# Patient Record
Sex: MALE | Race: WHITE | HISPANIC OR LATINO | Employment: FULL TIME | URBAN - METROPOLITAN AREA
[De-identification: names, ages, dates, MRNs, and addresses within clinical notes are randomized per-mention and may not be internally consistent; named-entity substitution may affect disease eponyms.]

---

## 2017-02-03 ENCOUNTER — PATIENT MESSAGE (OUTPATIENT)
Dept: BARIATRICS | Facility: CLINIC | Age: 42
End: 2017-02-03

## 2017-02-03 ENCOUNTER — OFFICE VISIT (OUTPATIENT)
Dept: BARIATRICS | Facility: CLINIC | Age: 42
End: 2017-02-03
Payer: COMMERCIAL

## 2017-02-03 ENCOUNTER — LAB VISIT (OUTPATIENT)
Dept: LAB | Facility: HOSPITAL | Age: 42
End: 2017-02-03
Attending: SURGERY
Payer: COMMERCIAL

## 2017-02-03 VITALS
WEIGHT: 315 LBS | SYSTOLIC BLOOD PRESSURE: 100 MMHG | DIASTOLIC BLOOD PRESSURE: 70 MMHG | HEIGHT: 68 IN | BODY MASS INDEX: 47.74 KG/M2 | HEART RATE: 67 BPM

## 2017-02-03 DIAGNOSIS — Z98.84 STATUS POST LAPAROSCOPIC SLEEVE GASTRECTOMY: ICD-10-CM

## 2017-02-03 DIAGNOSIS — E78.5 HYPERLIPIDEMIA, UNSPECIFIED HYPERLIPIDEMIA TYPE: ICD-10-CM

## 2017-02-03 DIAGNOSIS — I10 ESSENTIAL HYPERTENSION: ICD-10-CM

## 2017-02-03 DIAGNOSIS — Z98.84 LAP-BAND SURGERY STATUS: ICD-10-CM

## 2017-02-03 DIAGNOSIS — E66.01 MORBID OBESITY, UNSPECIFIED OBESITY TYPE: ICD-10-CM

## 2017-02-03 DIAGNOSIS — E66.01 MORBID OBESITY WITH BMI OF 50.0-59.9, ADULT: Primary | ICD-10-CM

## 2017-02-03 DIAGNOSIS — G47.33 OBSTRUCTIVE SLEEP APNEA SYNDROME: ICD-10-CM

## 2017-02-03 DIAGNOSIS — K21.9 GASTROESOPHAGEAL REFLUX DISEASE, ESOPHAGITIS PRESENCE NOT SPECIFIED: ICD-10-CM

## 2017-02-03 LAB
ALBUMIN SERPL BCP-MCNC: 3.5 G/DL
ALP SERPL-CCNC: 131 U/L
ALT SERPL W/O P-5'-P-CCNC: 31 U/L
ANION GAP SERPL CALC-SCNC: 7 MMOL/L
AST SERPL-CCNC: 23 U/L
BASOPHILS # BLD AUTO: 0.03 K/UL
BASOPHILS NFR BLD: 0.6 %
BILIRUB SERPL-MCNC: 0.9 MG/DL
BUN SERPL-MCNC: 13 MG/DL
CALCIUM SERPL-MCNC: 9.3 MG/DL
CHLORIDE SERPL-SCNC: 109 MMOL/L
CHOLEST/HDLC SERPL: 5.7 {RATIO}
CO2 SERPL-SCNC: 25 MMOL/L
CREAT SERPL-MCNC: 0.8 MG/DL
DIFFERENTIAL METHOD: ABNORMAL
EOSINOPHIL # BLD AUTO: 0.3 K/UL
EOSINOPHIL NFR BLD: 5.9 %
ERYTHROCYTE [DISTWIDTH] IN BLOOD BY AUTOMATED COUNT: 13.8 %
EST. GFR  (AFRICAN AMERICAN): >60 ML/MIN/1.73 M^2
EST. GFR  (NON AFRICAN AMERICAN): >60 ML/MIN/1.73 M^2
GLUCOSE SERPL-MCNC: 88 MG/DL
HCT VFR BLD AUTO: 43.6 %
HDL/CHOLESTEROL RATIO: 17.6 %
HDLC SERPL-MCNC: 176 MG/DL
HDLC SERPL-MCNC: 31 MG/DL
HGB BLD-MCNC: 14.2 G/DL
IRON SERPL-MCNC: 118 UG/DL
LDLC SERPL CALC-MCNC: 125.8 MG/DL
LYMPHOCYTES # BLD AUTO: 1.7 K/UL
LYMPHOCYTES NFR BLD: 32.9 %
MCH RBC QN AUTO: 26.3 PG
MCHC RBC AUTO-ENTMCNC: 32.6 %
MCV RBC AUTO: 81 FL
MONOCYTES # BLD AUTO: 0.4 K/UL
MONOCYTES NFR BLD: 8.5 %
NEUTROPHILS # BLD AUTO: 2.6 K/UL
NEUTROPHILS NFR BLD: 51.9 %
NONHDLC SERPL-MCNC: 145 MG/DL
PLATELET # BLD AUTO: 269 K/UL
PMV BLD AUTO: 9.8 FL
POTASSIUM SERPL-SCNC: 3.8 MMOL/L
PROT SERPL-MCNC: 6.8 G/DL
RBC # BLD AUTO: 5.39 M/UL
SATURATED IRON: 37 %
SODIUM SERPL-SCNC: 141 MMOL/L
TOTAL IRON BINDING CAPACITY: 318 UG/DL
TRANSFERRIN SERPL-MCNC: 215 MG/DL
TRIGL SERPL-MCNC: 96 MG/DL
VIT B12 SERPL-MCNC: 625 PG/ML
WBC # BLD AUTO: 5.07 K/UL

## 2017-02-03 PROCEDURE — 83540 ASSAY OF IRON: CPT

## 2017-02-03 PROCEDURE — 99999 PR PBB SHADOW E&M-EST. PATIENT-LVL III: CPT | Mod: PBBFAC,,, | Performed by: PHYSICIAN ASSISTANT

## 2017-02-03 PROCEDURE — 85025 COMPLETE CBC W/AUTO DIFF WBC: CPT

## 2017-02-03 PROCEDURE — 82607 VITAMIN B-12: CPT

## 2017-02-03 PROCEDURE — 80061 LIPID PANEL: CPT

## 2017-02-03 PROCEDURE — 99024 POSTOP FOLLOW-UP VISIT: CPT | Mod: S$GLB,,, | Performed by: PHYSICIAN ASSISTANT

## 2017-02-03 PROCEDURE — 80053 COMPREHEN METABOLIC PANEL: CPT

## 2017-02-03 PROCEDURE — 84425 ASSAY OF VITAMIN B-1: CPT

## 2017-02-03 NOTE — PATIENT INSTRUCTIONS
- Continue daily vitamins and medications.  - Ursodiol 500 mg daily for 3 months for the gallbladder.  - Anti-Acid medication, Omeprazole daily as needed.  Follow instructions to wean off medication.  - Miralax daily for constipation, no fiber.  - No NSAIDs, Tylenol for pain.  - Can swallow whole pills.  - RTC in 3 months or sooner if needed.  - Call the office for any issues.  - Check labs today.    - To lose weight you want to cut 100% starchy carbohydrates out of your diet (bread, rice, pasta, potatoes, granola, flour, corn, peas, oatmeal, grits, tortillas, crackers, chips) and get  grams of protein.  Aim for 100 grams of protein daily.    - Premier Protein (Chocolate, Bananas & Cream, Strawberries & Cream, Vanilla) [employer] or [employer]co    - Syntrax [place] from [employer], www.YCLIENTS COMPANY.com, www.bariatricchoice.com. (LACTOSE FREE)    - Atkins Lift - [employer] & [employer] (LACTOSE FREE)    - Veggetti Pro from [employer], [employer], [employer] Bath & Beyond    - www.pinterest.com (cauliflower, cloud bread, quest bar cookies, eggplant, zucchini, zucchini noodles, crustless quiche, no carb meals, taco lettuce boats)    - http://theworldaccordingtoeggface.Movatu.Projjix/        Eating Protein after Bariatric Surgery  After having Bariatric Surgery it can get confusing which foods are the best to eat, especially when it comes to getting in enough protein when you are on the go. Here are a few ideas on how to get in the best quality of protein when youre having a busy day.    Protein bars can be a great way to get in the calories and protein you need. NOTICE: Protein bars are high in calories and should be used as a meal replacement. You should purchase protein bars with at least 20g of protein and no more then 4g of sugar.                                    A few bars that meet these guidelines are:  Product Name Serving Size Calories Protein Sugar   Pure Protein 1  bar   (1.76 oz) 180 kcal   19g protein 2g sugar   Think Thin  1 bar     (2.1 oz) 240 kcal 20g protein 0g sugar   EAS Myoplex Carb Control 1 bar    (2.46 oz ) 260 kcal 25g protein 1g sugar   Power Bar Protein Plus Reduced Sugar 1 bar     (2.57 oz) 270 kcal 22g protein 1g sugar   Muscletech Nitro-tech Hardcore 1 bar     (2.8 oz) 270 kcal 30g protein 1g sugar   Protein Revolution 1 bar       (2.75 oz) 280 kcal 32g protein 2g sugar   MET-RX Protein Plus 1 bar     (3.0 oz) 300 kcal 32g protein 3g sugar   Pure Protein 1 bar    (2.75 oz) 310 kcal 31g protein 3g sugar   Detour Lean Muscle 1 bar     (3.2 oz) 370 kcal 32g protein, 3g sugar   Quest Protein Bar 1 bar            (60 gm) 160 kcal 20 g protein 1 g sugar   Protein bars are not the only options for a on the go snack or meal. A few options include:    Edamame   1 cup = 254 kcal, 22g protein, 0g sugar  Cottage Cheese  4 oz = 82 kcal, 14g protein, 3g sugar                                          1% Milk    Turkey    3 oz = 130 kcal, 24.7g protein, 0g sugar   Boiled Egg   140 kcal, 12g protein, 2g sugar  String Cheese    2 = 160 kcal, 14g protein, 0g sugar  Lean Ham 3oz = 133 kcal, 21g protein, 0g sugar   Lentils    1 cup = 230 kcal, 17.9g protein, 3g sugar

## 2017-02-03 NOTE — MR AVS SNAPSHOT
[facility] - Bariatric Surgery  [address]  [facility] [address]  Phone: [phone]  Fax: [phone]                  Jesús Galindo   2/3/2017 8:20 AM   Office Visit    Description:  Male : 1975   Provider:  Glenny Haider PA-C   Department:  [facility] - Bariatric Surgery           Reason for Visit     Follow-up           Diagnoses this Visit        Comments    Morbid obesity with BMI of 50.0-59.9, adult    -  Primary            To Do List           Future Appointments        Provider Department Dept Phone    2017 8:00 AM LAB, SAME DAY [facility] [phone]    2017 8:40 AM Glenny Haider PA-C [facility] Bariatric Surgery [phone]      Goals (5 Years of Data)     None      [facility] On Call     [facility] On Call Nurse Care Line -  Assistance  Registered nurses in the [facility] Center provide clinical advisement, health education, appointment booking, and other advisory services.  Call for this free service at [phone].             Medications           Message regarding Medications     Verify the changes and/or additions to your medication regime listed below are the same as discussed with your clinician today.  If any of these changes or additions are incorrect, please notify your healthcare provider.        STOP taking these medications     promethazine (PHENERGAN) 25 MG suppository Place 1 suppository (25 mg total) rectally every 6 (six) hours as needed.           Verify that the below list of medications is an accurate representation of the medications you are currently taking.  If none reported, the list may be blank. If incorrect, please contact your healthcare provider. Carry this list with you in case of emergency.           Current Medications     b complex vitamins tablet Take 1 tablet by mouth once daily.    CALCIUM CITRATE/VITAMIN D3 (CALCIUM CITRATE + D ORAL) Take 1 tablet by mouth 2 (two) times daily.    cyanocobalamin,  "vitamin B-12, 1,000 mcg Subl Place 1 tablet under the tongue every other day.    ergocalciferol (VITAMIN D2) 50,000 unit Cap Take 1 capsule (50,000 Units total) by mouth once a week.    omeprazole (PRILOSEC) 40 MG capsule Take 1 capsule (40 mg total) by mouth every morning. Open capsule and swallow beads whole by mouth daily    ondansetron (ZOFRAN-ODT) 8 MG TbDL Take 1 tablet (8 mg total) by mouth every 8 (eight) hours as needed.    pediatric multivit-iron-min (FLINTSTONES COMPLETE, IRON,) Chew Take 1 tablet by mouth 2 (two) times daily.    PENNSAID 20 mg/gram /actuation(2 %) sopm APPLY TWO PUMPS TO AFFECTED AREA(S) TWICE DAILY AS NEEDED    polyethylene glycol (GLYCOLAX) 17 gram PwPk Take 17 g by mouth daily as needed (constipation).    ranitidine (ZANTAC) 150 MG tablet Take 1 tablet (150 mg total) by mouth 2 (two) times daily.    ursodiol (URMILA FORTE) 500 MG tablet Take 1 tablet (500 mg total) by mouth once daily. Crush tablet and take daily.  Okay to compound into liquid at patient's request.    zolpidem (AMBIEN) 5 MG Tab TAKE 1 TABLET (5 MG TOTAL) BY MOUTH NIGHTLY AS NEEDED.           Clinical Reference Information           Your Vitals Were     BP Pulse Height Weight BMI    100/70 67 5' 8" (1.727 m) 153.1 kg (337 lb 8.4 oz) 51.32 kg/m2      Blood Pressure          Most Recent Value    BP  100/70      Allergies as of 2/3/2017     No Known Allergies      Immunizations Administered on Date of Encounter - 2/3/2017     None      Orders Placed During Today's Visit     Future Labs/Procedures Expected by Expires    B12  2/3/2017 4/4/2018    B1  2/3/2017 4/4/2018    CBC w/ Auto Differential  2/3/2017 4/4/2018    CMP  2/3/2017 4/4/2018    Iron Studies  2/3/2017 4/4/2018    Lipid Panel  2/3/2017 4/4/2018    Vitamin D 25 Hydroxy  2/3/2017 4/4/2018      Instructions    - Continue daily vitamins and medications.  - Ursodiol 500 mg daily for 3 months for the gallbladder.  - Anti-Acid medication, Omeprazole daily as needed.  " Follow instructions to wean off medication.  - Miralax daily for constipation, no fiber.  - No NSAIDs, Tylenol for pain.  - Can swallow whole pills.  - RTC in 3 months or sooner if needed.  - Call the office for any issues.  - Check labs today.    - To lose weight you want to cut 100% starchy carbohydrates out of your diet (bread, rice, pasta, potatoes, granola, flour, corn, peas, oatmeal, grits, tortillas, crackers, chips) and get  grams of protein.  Aim for 100 grams of protein daily.    - Premier Protein (Chocolate, Bananas & Cream, Strawberries & Cream, Vanilla) [employer] or [employer]co    - Syntrax [place] from [employer], www.Scandlines.com, www.bariatricchoice.com. (LACTOSE FREE)    - Atkins Lift - [employer] & [employer] (LACTOSE FREE)    - Veggetti Pro from [employer], [employer], [employer] Bath & Beyond    - www.pinterest.com (cauliflower, cloud bread, quest bar cookies, eggplant, zucchini, zucchini noodles, crustless quiche, no carb meals, taco lettuce boats)    - http://thewfaridaaccojesicaingnickolas.Miira.gogamingo/        Eating Protein after Bariatric Surgery  After having Bariatric Surgery it can get confusing which foods are the best to eat, especially when it comes to getting in enough protein when you are on the go. Here are a few ideas on how to get in the best quality of protein when youre having a busy day.    Protein bars can be a great way to get in the calories and protein you need. NOTICE: Protein bars are high in calories and should be used as a meal replacement. You should purchase protein bars with at least 20g of protein and no more then 4g of sugar.                                    A few bars that meet these guidelines are:  Product Name Serving Size Calories Protein Sugar   Pure Protein 1  bar   (1.76 oz) 180 kcal   19g protein 2g sugar   Think Thin 1 bar     (2.1 oz) 240 kcal 20g protein 0g sugar   EAS Myoplex Carb Control 1 bar    (2.46 oz ) 260 kcal 25g protein 1g sugar   Power Bar Protein Plus  Reduced Sugar 1 bar     (2.57 oz) 270 kcal 22g protein 1g sugar   Muscletech Nitro-tech Hardcore 1 bar     (2.8 oz) 270 kcal 30g protein 1g sugar   Protein Revolution 1 bar       (2.75 oz) 280 kcal 32g protein 2g sugar   MET-RX Protein Plus 1 bar     (3.0 oz) 300 kcal 32g protein 3g sugar   Pure Protein 1 bar    (2.75 oz) 310 kcal 31g protein 3g sugar   Detour Lean Muscle 1 bar     (3.2 oz) 370 kcal 32g protein, 3g sugar   Quest Protein Bar 1 bar            (60 gm) 160 kcal 20 g protein 1 g sugar   Protein bars are not the only options for a on the go snack or meal. A few options include:    Edamame   1 cup = 254 kcal, 22g protein, 0g sugar  Cottage Cheese  4 oz = 82 kcal, 14g protein, 3g sugar                                          1% Milk    Turkey    3 oz = 130 kcal, 24.7g protein, 0g sugar   Boiled Egg   140 kcal, 12g protein, 2g sugar  String Cheese    2 = 160 kcal, 14g protein, 0g sugar  Lean Ham 3oz = 133 kcal, 21g protein, 0g sugar   Lentils    1 cup = 230 kcal, 17.9g protein, 3g sugar         Language Assistance Services     ATTENTION: Language assistance services are available, free of charge. Please call [phone].      ATENCIÓN: Si habla español, tiene a perez disposición servicios gratuitos de asistencia lingüística. Llame al [phone].     CHÚ Ý: N?u b?n nói Ti?ng Vi?t, có các d?ch v? h? tr? ngôn ng? mi?n phí dành cho b?n. G?i s? [phone].         [facility] complies with applicable Federal civil rights laws and does not discriminate on the basis of race, color, national origin, age, disability, or sex.

## 2017-02-03 NOTE — PROGRESS NOTES
BARIATRIC POST-OPERATIVE FOLLOW UP:    Chief Complaint   Patient presents with    Follow-up     3month sleeve       HISTORY OF PRESENT ILLNESS: Jesús Galindo is a 41 y.o. male with a Body mass index is 51.32 kg/(m^2). who presents for a 3 month follow up s/p Band Removal -->Lap Sleeve with Dr. Parker on 11/10/2016.  He is doing well and tolerating the diet without difficulty.  His is off all  He has lost 53 lbs, approximately 23% of his excess weight.  He feels great and is sleeping well at night.  His high blood pressure and high cholesterol are both resolved and normal off medications.  He has no complaints.     Denies: nausea, vomiting, abdominal pain, changes in bowel movement pattern, fever, chills, dysphagia, chest pain, and shortness of breath.    Review of Systems   Constitutional: Negative for chills, fever and malaise/fatigue.   Eyes: Negative for blurred vision and double vision.   Respiratory: Negative for cough, hemoptysis and shortness of breath.    Cardiovascular: Negative for chest pain, palpitations and leg swelling.   Gastrointestinal: Negative for abdominal pain, blood in stool, constipation, diarrhea, heartburn, melena, nausea and vomiting.   Genitourinary: Negative for dysuria and hematuria.   Musculoskeletal: Negative for back pain, falls, joint pain, myalgias and neck pain.   Skin: Negative for rash.   Neurological: Negative for dizziness, tingling, weakness and headaches.   Endo/Heme/Allergies: Negative for environmental allergies. Does not bruise/bleed easily.   Psychiatric/Behavioral: Negative.        EXERCISE & VITAMINS:  See Bariatric Assessment    MEDICATIONS/ALLERGIES:  Have been reviewed.    DIET:  Soft Bariatric Diet.  2 protein shakes daily plus 3-4 mini-meals, ~ grams protein.  32 oz H20.      Vitals:    02/03/17 0809   BP: 100/70   Pulse: 67       Physical Exam   Constitutional: He is oriented to person, place, and time. He appears well-developed and well-nourished. No  distress.   HENT:   Head: Normocephalic and atraumatic.   Cardiovascular: Normal rate, regular rhythm, normal heart sounds and intact distal pulses.    Pulmonary/Chest: Effort normal and breath sounds normal.   Abdominal: Soft. Bowel sounds are normal. He exhibits no distension and no mass. There is no tenderness. There is no rebound and no guarding.   WHSS   Musculoskeletal: He exhibits no edema.   Neurological: He is alert and oriented to person, place, and time.   Skin: Skin is warm and dry. No rash noted. He is not diaphoretic. No erythema. No pallor.   Psychiatric: He has a normal mood and affect. His behavior is normal.   Nursing note and vitals reviewed.      ASSESSMENT:  - Morbid obesity, Body mass index is 51.32 kg/(m^2).,  s/p Band Removal --> Lap Sleeve on 11/10/2016.  - Estimated goal weight, 276 lbs, which is 50% EWL  - Co-morbidities: HTN (normal off medication), HLD (normal off medications), JARAD (stable)   - Good Weight loss, 53 lbs, 23% EWL  - Good Exercise regimen  - Good Vitamin Regimen  - Good Diet    PLAN:  - Emphasized the importance of regular exercise and adherence to bariatric diet to achieve maximum weight loss.  - Encouraged patient to continue regular exercise.  - No Bariatric Registered Dietician Available.  All Diet education and counseling done by PA.  - Advanced to a Regular Bariatric Diet.  Handouts and instructions given. All questions answered.  - Continue daily vitamins and medications.  - Ursodiol 500 mg daily for 3 months for the gallbladder.  - Anti-Acid medication, Omeprazole daily as needed.  Follow instructions to wean off medication.  - Miralax daily for constipation, no fiber.  - No NSAIDs, Tylenol for pain.  - Can swallow whole pills.  - RTC in 3 months or sooner if needed.  - Call the office for any issues.  - Check labs today and at 6 month post op visit.    15 minute visit, over 50% of time spent counseling patient face to face on diet, exercise, and weight loss.

## 2017-02-07 LAB — VIT B1 SERPL-MCNC: 49 UG/L (ref 38–122)

## 2017-03-22 ENCOUNTER — OFFICE VISIT (OUTPATIENT)
Dept: INTERNAL MEDICINE | Facility: CLINIC | Age: 42
End: 2017-03-22
Payer: COMMERCIAL

## 2017-03-22 ENCOUNTER — PATIENT MESSAGE (OUTPATIENT)
Dept: INTERNAL MEDICINE | Facility: CLINIC | Age: 42
End: 2017-03-22

## 2017-03-22 VITALS — HEART RATE: 80 BPM

## 2017-03-22 DIAGNOSIS — K21.9 GASTROESOPHAGEAL REFLUX DISEASE WITHOUT ESOPHAGITIS: ICD-10-CM

## 2017-03-22 DIAGNOSIS — I10 ESSENTIAL HYPERTENSION: ICD-10-CM

## 2017-03-22 DIAGNOSIS — R07.89 CHEST WALL PAIN: ICD-10-CM

## 2017-03-22 DIAGNOSIS — G47.33 OBSTRUCTIVE SLEEP APNEA SYNDROME: ICD-10-CM

## 2017-03-22 DIAGNOSIS — B35.3 ATHLETE'S FOOT ON RIGHT: Primary | ICD-10-CM

## 2017-03-22 PROCEDURE — 99999 PR PBB SHADOW E&M-EST. PATIENT-LVL I: CPT | Mod: PBBFAC,,, | Performed by: FAMILY MEDICINE

## 2017-03-22 PROCEDURE — 1160F RVW MEDS BY RX/DR IN RCRD: CPT | Mod: S$GLB,,, | Performed by: FAMILY MEDICINE

## 2017-03-22 PROCEDURE — 99215 OFFICE O/P EST HI 40 MIN: CPT | Mod: S$GLB,,, | Performed by: FAMILY MEDICINE

## 2017-03-22 RX ORDER — NYSTATIN AND TRIAMCINOLONE ACETONIDE 100000; 1 [USP'U]/G; MG/G
CREAM TOPICAL 4 TIMES DAILY
Qty: 60 TUBE | Refills: 3 | Status: SHIPPED | OUTPATIENT
Start: 2017-03-22 | End: 2018-06-20 | Stop reason: ALTCHOICE

## 2017-03-22 NOTE — PROGRESS NOTES
Subjective:       Patient ID: Jesús Galindo is a 41 y.o. male.    Chief Complaint: No chief complaint on file.  Jesús Galindo 41 y.o. male is here for office visit to review care and physical exam, here for rash right foot, itches.  Feels well with weight loss after Bariatric intervention, has good diet.  Notes mid-steranal chest wall pain.  HPI  Review of Systems   Constitutional: Negative for activity change, appetite change, fatigue, fever and unexpected weight change.   HENT: Negative for congestion, hearing loss, postnasal drip and rhinorrhea.    Eyes: Negative for pain, discharge and visual disturbance.   Respiratory: Negative for cough, choking and shortness of breath.    Cardiovascular: Negative for chest pain, palpitations and leg swelling.   Gastrointestinal: Negative for abdominal pain, diarrhea and vomiting.   Genitourinary: Negative for dysuria, flank pain, hematuria and urgency.   Musculoskeletal: Negative for arthralgias, back pain, joint swelling and neck pain.   Skin: Negative for color change and rash.   Neurological: Negative for dizziness, tremors, syncope, weakness, numbness and headaches.   Psychiatric/Behavioral: Negative for agitation and confusion. The patient is not hyperactive.        Objective:      Physical Exam   Constitutional: He is oriented to person, place, and time. He appears well-developed and well-nourished.   HENT:   Head: Normocephalic.   Eyes: EOM are normal. Pupils are equal, round, and reactive to light.   Neck: Normal range of motion. Neck supple. No thyromegaly present.   Cardiovascular: Normal rate.  Exam reveals no gallop and no friction rub.    No murmur heard.  Pulmonary/Chest: Effort normal. No respiratory distress. He has no wheezes.   Abdominal: Soft. Bowel sounds are normal. He exhibits no mass. There is no tenderness.   Musculoskeletal: He exhibits no edema or tenderness.   Pain manubrium area   Lymphadenopathy:     He has no cervical adenopathy.   Neurological: He  is alert and oriented to person, place, and time. He has normal reflexes. No cranial nerve deficit.   Skin: Skin is warm. No rash noted.   Tinea rash right foot, between toes, also mocasin distribution   Psychiatric: He has a normal mood and affect. His behavior is normal.       Assessment:       No diagnosis found.    Plan:       Diagnoses and all orders for this visit:    Athlete's foot on right  -     nystatin-triamcinolone (MYCOLOG II) cream; Apply topically 4 (four) times daily.    Diagnoses and all orders for this visit:    Athlete's foot on right  -     nystatin-triamcinolone (MYCOLOG II) cream; Apply topically 4 (four) times daily.    Chest wall pain  - pain in manubrium area, daphne ldiscuss with Surgery  Gastroesophageal reflux disease without esophagitis  - Zantac, d/c PPI  Obstructive sleep apnea syndrome  - Improving with weight loss  Essential hypertension  - COntroled with weight loss

## 2017-03-22 NOTE — MR AVS SNAPSHOT
[facility]  [address]  [place] [address]  Phone: [phone]  Fax: [phone]                  Jesús Galindo   3/22/2017 3:20 PM   Office Visit    Description:  Male : 1975   Provider:  Clovis Mccoy MD   Department:  [facility]           Diagnoses this Visit        Comments    Athlete's foot on right    -  Primary     Chest wall pain         Gastroesophageal reflux disease without esophagitis         Obstructive sleep apnea syndrome         Essential hypertension                To Do List           Future Appointments        Provider Department Dept Phone    2017 8:00 AM LAB, SAME DAY [facility] [phone]    2017 8:40 AM [facility] - B[facility] [phone]      Goals (5 Years of Data)     None      Follow-Up and Disposition     Return in about 3 months (around 2017), or if symptoms worsen or fail to improve.       These Medications        Disp Refills Start End    nystatin-triamcinolone (MYCOLOG II) cream 60 Tube 3 3/22/2017 2017    Apply topically 4 (four) times daily. - Topical (Top)    Pharmacy: [id number] [place]  [address] [facility] Ph #: [phone]         [facility] On Call     [facility] On Call Nurse Care Line -  Assistance  Registered nurses in the [facility] provide clinical advisement, health education, appointment booking, and other advisory services.  Call for this free service at [phone].             Medications           Message regarding Medications     Verify the changes and/or additions to your medication regime listed below are the same as discussed with your clinician today.  If any of these changes or additions are incorrect, please notify your healthcare provider.        START taking these NEW medications        Refills    nystatin-triamcinolone (MYCOLOG II) cream 3    Sig: Apply topically 4 (four) times  daily.    Class: Normal    Route: Topical (Top)      STOP taking these medications     ondansetron (ZOFRAN-ODT) 8 MG TbDL Take 1 tablet (8 mg total) by mouth every 8 (eight) hours as needed.    polyethylene glycol (GLYCOLAX) 17 gram PwPk Take 17 g by mouth daily as needed (constipation).    omeprazole (PRILOSEC) 40 MG capsule Take 1 capsule (40 mg total) by mouth every morning. Open capsule and swallow beads whole by mouth daily           Verify that the below list of medications is an accurate representation of the medications you are currently taking.  If none reported, the list may be blank. If incorrect, please contact your healthcare provider. Carry this list with you in case of emergency.           Current Medications     b complex vitamins tablet Take 1 tablet by mouth once daily.    CALCIUM CITRATE/VITAMIN D3 (CALCIUM CITRATE + D ORAL) Take 1 tablet by mouth 2 (two) times daily.    cyanocobalamin, vitamin B-12, 1,000 mcg Subl Place 1 tablet under the tongue every other day.    ergocalciferol (VITAMIN D2) 50,000 unit Cap Take 1 capsule (50,000 Units total) by mouth once a week.    nystatin-triamcinolone (MYCOLOG II) cream Apply topically 4 (four) times daily.    pediatric multivit-iron-min (FLINTSTONES COMPLETE, IRON,) Chew Take 1 tablet by mouth 2 (two) times daily.    PENNSAID 20 mg/gram /actuation(2 %) sopm APPLY TWO PUMPS TO AFFECTED AREA(S) TWICE DAILY AS NEEDED    ranitidine (ZANTAC) 150 MG tablet Take 1 tablet (150 mg total) by mouth 2 (two) times daily.    ursodiol (URMILA FORTE) 500 MG tablet Take 1 tablet (500 mg total) by mouth once daily. Crush tablet and take daily.  Okay to compound into liquid at patient's request.    zolpidem (AMBIEN) 5 MG Tab TAKE 1 TABLET (5 MG TOTAL) BY MOUTH NIGHTLY AS NEEDED.           Clinical Reference Information           Your Vitals Were     Pulse                   80           Allergies as of 3/22/2017     No Known Allergies      Immunizations Administered on Date of  Encounter - 3/22/2017     None      Language Assistance Services     ATTENTION: Language assistance services are available, free of charge. Please call [phone].      ATENCIÓN: Si habla li, tiene a perez disposición servicios gratuitos de asistencia lingüística. Llame al [phone].     CHÚ Ý: N?u b?n nói Ti?ng Vi?t, có các d?ch v? h? tr? ngôn ng? mi?n phí dành cho b?n. G?i s? [phone].         [facility] complies with applicable Federal civil rights laws and does not discriminate on the basis of race, color, national origin, age, disability, or sex.

## 2017-03-23 RX ORDER — NYSTATIN 100000 U/G
CREAM TOPICAL 2 TIMES DAILY
Qty: 30 G | Refills: 3 | Status: SHIPPED | OUTPATIENT
Start: 2017-03-23 | End: 2017-08-11

## 2017-05-03 DIAGNOSIS — F51.09 SITUATIONAL INSOMNIA: ICD-10-CM

## 2017-05-04 ENCOUNTER — OFFICE VISIT (OUTPATIENT)
Dept: BARIATRICS | Facility: CLINIC | Age: 42
End: 2017-05-04
Payer: COMMERCIAL

## 2017-05-04 ENCOUNTER — LAB VISIT (OUTPATIENT)
Dept: LAB | Facility: HOSPITAL | Age: 42
End: 2017-05-04
Attending: SURGERY
Payer: COMMERCIAL

## 2017-05-04 VITALS
SYSTOLIC BLOOD PRESSURE: 120 MMHG | BODY MASS INDEX: 47.74 KG/M2 | HEIGHT: 68 IN | WEIGHT: 315 LBS | HEART RATE: 66 BPM | DIASTOLIC BLOOD PRESSURE: 80 MMHG

## 2017-05-04 DIAGNOSIS — E55.9 VITAMIN D DEFICIENCY: ICD-10-CM

## 2017-05-04 DIAGNOSIS — E66.01 MORBID OBESITY WITH BMI OF 50.0-59.9, ADULT: ICD-10-CM

## 2017-05-04 DIAGNOSIS — E66.01 MORBID OBESITY WITH BMI OF 45.0-49.9, ADULT: ICD-10-CM

## 2017-05-04 DIAGNOSIS — K21.9 GASTROESOPHAGEAL REFLUX DISEASE WITHOUT ESOPHAGITIS: ICD-10-CM

## 2017-05-04 DIAGNOSIS — G47.33 OBSTRUCTIVE SLEEP APNEA SYNDROME: Primary | ICD-10-CM

## 2017-05-04 LAB
25(OH)D3+25(OH)D2 SERPL-MCNC: 25 NG/ML
ALBUMIN SERPL BCP-MCNC: 3.6 G/DL
ALP SERPL-CCNC: 121 U/L
ALT SERPL W/O P-5'-P-CCNC: 23 U/L
ANION GAP SERPL CALC-SCNC: 5 MMOL/L
AST SERPL-CCNC: 21 U/L
BASOPHILS # BLD AUTO: 0.02 K/UL
BASOPHILS NFR BLD: 0.4 %
BILIRUB SERPL-MCNC: 0.8 MG/DL
BUN SERPL-MCNC: 14 MG/DL
CALCIUM SERPL-MCNC: 9.4 MG/DL
CHLORIDE SERPL-SCNC: 108 MMOL/L
CHOLEST/HDLC SERPL: 5.1 {RATIO}
CO2 SERPL-SCNC: 27 MMOL/L
CREAT SERPL-MCNC: 0.8 MG/DL
DIFFERENTIAL METHOD: ABNORMAL
EOSINOPHIL # BLD AUTO: 0.3 K/UL
EOSINOPHIL NFR BLD: 4.8 %
ERYTHROCYTE [DISTWIDTH] IN BLOOD BY AUTOMATED COUNT: 13.4 %
EST. GFR  (AFRICAN AMERICAN): >60 ML/MIN/1.73 M^2
EST. GFR  (NON AFRICAN AMERICAN): >60 ML/MIN/1.73 M^2
GLUCOSE SERPL-MCNC: 93 MG/DL
HCT VFR BLD AUTO: 44.2 %
HDL/CHOLESTEROL RATIO: 19.8 %
HDLC SERPL-MCNC: 182 MG/DL
HDLC SERPL-MCNC: 36 MG/DL
HGB BLD-MCNC: 14.3 G/DL
IRON SERPL-MCNC: 110 UG/DL
LDLC SERPL CALC-MCNC: 128.6 MG/DL
LYMPHOCYTES # BLD AUTO: 1.7 K/UL
LYMPHOCYTES NFR BLD: 32.6 %
MCH RBC QN AUTO: 26.8 PG
MCHC RBC AUTO-ENTMCNC: 32.4 %
MCV RBC AUTO: 83 FL
MONOCYTES # BLD AUTO: 0.4 K/UL
MONOCYTES NFR BLD: 7.9 %
NEUTROPHILS # BLD AUTO: 2.8 K/UL
NEUTROPHILS NFR BLD: 54.1 %
NONHDLC SERPL-MCNC: 146 MG/DL
PLATELET # BLD AUTO: 263 K/UL
PMV BLD AUTO: 9.4 FL
POTASSIUM SERPL-SCNC: 3.9 MMOL/L
PROT SERPL-MCNC: 7 G/DL
RBC # BLD AUTO: 5.34 M/UL
SATURATED IRON: 32 %
SODIUM SERPL-SCNC: 140 MMOL/L
TOTAL IRON BINDING CAPACITY: 340 UG/DL
TRANSFERRIN SERPL-MCNC: 230 MG/DL
TRIGL SERPL-MCNC: 87 MG/DL
VIT B12 SERPL-MCNC: 655 PG/ML
WBC # BLD AUTO: 5.22 K/UL

## 2017-05-04 PROCEDURE — 99214 OFFICE O/P EST MOD 30 MIN: CPT | Mod: S$GLB,,, | Performed by: PHYSICIAN ASSISTANT

## 2017-05-04 PROCEDURE — 83540 ASSAY OF IRON: CPT

## 2017-05-04 PROCEDURE — 3074F SYST BP LT 130 MM HG: CPT | Mod: S$GLB,,, | Performed by: PHYSICIAN ASSISTANT

## 2017-05-04 PROCEDURE — 84425 ASSAY OF VITAMIN B-1: CPT

## 2017-05-04 PROCEDURE — 1160F RVW MEDS BY RX/DR IN RCRD: CPT | Mod: S$GLB,,, | Performed by: PHYSICIAN ASSISTANT

## 2017-05-04 PROCEDURE — 85025 COMPLETE CBC W/AUTO DIFF WBC: CPT

## 2017-05-04 PROCEDURE — 99999 PR PBB SHADOW E&M-EST. PATIENT-LVL III: CPT | Mod: PBBFAC,,, | Performed by: PHYSICIAN ASSISTANT

## 2017-05-04 PROCEDURE — 80053 COMPREHEN METABOLIC PANEL: CPT

## 2017-05-04 PROCEDURE — 82607 VITAMIN B-12: CPT

## 2017-05-04 PROCEDURE — 3079F DIAST BP 80-89 MM HG: CPT | Mod: S$GLB,,, | Performed by: PHYSICIAN ASSISTANT

## 2017-05-04 PROCEDURE — 82306 VITAMIN D 25 HYDROXY: CPT

## 2017-05-04 PROCEDURE — 36415 COLL VENOUS BLD VENIPUNCTURE: CPT

## 2017-05-04 PROCEDURE — 80061 LIPID PANEL: CPT

## 2017-05-04 RX ORDER — ZOLPIDEM TARTRATE 5 MG/1
TABLET ORAL
Qty: 30 TABLET | Refills: 2 | Status: SHIPPED | OUTPATIENT
Start: 2017-05-04 | End: 2017-08-04 | Stop reason: DRUGHIGH

## 2017-05-04 RX ORDER — ERGOCALCIFEROL 1.25 MG/1
CAPSULE ORAL
Qty: 24 CAPSULE | Refills: 0 | Status: SHIPPED | OUTPATIENT
Start: 2017-05-04 | End: 2017-08-28 | Stop reason: SDUPTHER

## 2017-05-04 NOTE — PATIENT INSTRUCTIONS
- Continue daily vitamins and medications.  - Discontinue Ursodiol.  - Anti-Acid medication, Zantac as needed.  - Miralax daily for constipation, no fiber.  - No NSAIDs, Tylenol for pain.  - Can swallow whole pills.  - RTC in 3 months or sooner if needed.  - Call the office for any issues.  - Check labs at annual visit.    - To lose weight you want to cut 100% starchy carbohydrates out of your diet (bread, rice, pasta, potatoes, granola, flour, corn, peas, oatmeal, grits, tortillas, crackers, chips) and get  grams of protein.  Aim for 100 grams of protein daily.    - Premier Protein (Chocolate, Bananas & Cream, Strawberries & Cream, Vanilla) Mirian or [employer]co    - Syntrax [place] from [employer], www.bariatricadvantage.com, www.bariatricchoice.com. (LACTOSE FREE)    - Atkins Lift - [employer] & [employer] (LACTOSE FREE)    - Veggetti Pro from [employer].com, [employer], [employer] Bath & Beyond    - www.pinterest.com (cauliflower, cloud bread, quest bar cookies, eggplant, zucchini, zucchini noodles, crustless quiche, no carb meals, taco lettuce boats)    - http://janelle.ByteShield.com/    - Cauliflower Rice    - Premier Protein Clear

## 2017-05-04 NOTE — PROGRESS NOTES
BARIATRIC POST-OPERATIVE FOLLOW UP:    Chief Complaint   Patient presents with    Follow-up     6mt sleeve       HISTORY OF PRESENT ILLNESS: Jesús Galindo is a 41 y.o. male with a Body mass index is 49.78 kg/(m^2). who presents for a 6 month follow up s/p Band Removal -->Lap Sleeve with Dr. Parker on 11/10/2016.  He is doing well and tolerating the diet without difficulty.  His weight loss has slowed, likely due to adding small amounts of starches into his diet.  He has lost 63 lbs, approximately 28 of his excess weight.  He feels great and is sleeping well at night.  His high blood pressure and high cholesterol are both resolved and normal off medications.  He has no complaints.     Denies: nausea, vomiting, abdominal pain, changes in bowel movement pattern, fever, chills, dysphagia, chest pain, and shortness of breath.    Review of Systems   Constitutional: Negative for chills, fever and malaise/fatigue.   Eyes: Negative for blurred vision and double vision.   Respiratory: Negative for cough, hemoptysis and shortness of breath.    Cardiovascular: Negative for chest pain, palpitations and leg swelling.   Gastrointestinal: Negative for abdominal pain, blood in stool, constipation, diarrhea, heartburn, melena, nausea and vomiting.   Genitourinary: Negative for dysuria and hematuria.   Musculoskeletal: Negative for back pain, falls, joint pain, myalgias and neck pain.   Skin: Negative for rash.   Neurological: Negative for dizziness, tingling, weakness and headaches.   Endo/Heme/Allergies: Negative for environmental allergies. Does not bruise/bleed easily.   Psychiatric/Behavioral: Negative.        EXERCISE & VITAMINS:  See Bariatric Assessment    MEDICATIONS/ALLERGIES:  Have been reviewed.    DIET:  Regular Bariatric Diet.  Diet Recall.  Br:  2 eggs (16 g), Eva:  Chicken with greens (15 g), Di:  Red beans & 1/2 porkchop (15 g), Sn: Shake x 2 (60 g), ~80+ grams daily protein.      Vitals:    05/04/17 0828   BP:  120/80   Pulse: 66       Physical Exam   Constitutional: He is oriented to person, place, and time. He appears well-developed and well-nourished. No distress.   HENT:   Head: Normocephalic and atraumatic.   Cardiovascular: Normal rate, regular rhythm, normal heart sounds and intact distal pulses.    Pulmonary/Chest: Effort normal and breath sounds normal.   Abdominal: Soft. Bowel sounds are normal. He exhibits no distension and no mass. There is no tenderness. There is no rebound and no guarding.   WHSS   Musculoskeletal: He exhibits no edema.   Neurological: He is alert and oriented to person, place, and time.   Skin: Skin is warm and dry. No rash noted. He is not diaphoretic. No erythema. No pallor.   Psychiatric: He has a normal mood and affect. His behavior is normal.   Nursing note and vitals reviewed.      ASSESSMENT:  - Morbid obesity, Body mass index is 49.78 kg/(m^2).,  s/p Band Removal --> Lap Sleeve on 11/10/2016.  - Estimated goal weight, 276 lbs, which is 50% EWL  - Co-morbidities: HTN (normal off medication), HLD (normal off medications), JARAD (stable)   - Good Weight loss, 63 lbs, 28% EWL  - Good Exercise regimen  - Good Vitamin Regimen  - Good Diet    PLAN:  - Avoid all starches and continue to keep protein levels from  grams daily. Gave handouts and tips on how to do this.  - Emphasized the importance of regular exercise and adherence to bariatric diet to achieve maximum weight loss.  - Encouraged patient to continue regular exercise.  - No Bariatric Registered Dietician Available.  All Diet education and counseling done by PA.  - Advanced to a Regular Bariatric Diet.  Handouts and instructions given. All questions answered.  - Continue daily vitamins and medications.  - Discontinue Ursodiol.  - Anti-Acid medication, Zantac as needed.  - Miralax daily for constipation, no fiber.  - No NSAIDs, Tylenol for pain.  - Can swallow whole pills.  - RTC in 3 months or sooner if needed.  - Call the  office for any issues.  - Check labs at annual visit.    25 minute visit, over 50% of time spent counseling patient face to face on diet, exercise, and weight loss.

## 2017-05-04 NOTE — MR AVS SNAPSHOT
[facility]  [address]  [facility] [address]  Phone: [phone]  Fax: [phone]                  Jesús Galindo   2017 8:40 AM   Office Visit    Description:  Male : 1975   Provider:  Glenny Haider PA-C   Department:  [facility]           Reason for Visit     Follow-up                To Do List           Future Appointments        Provider Department Dept Phone    8/3/2017 8:40 AM Glenny Haider PA-C [facility] [phone]      Goals (5 Years of Data)     None      [facility]     [facility] Care [facility] -  Assistance  Unless otherwise directed by your provider, please contact [facility], our nurse care line that is available for  assistance.     Registered nurses in the [facility] provide: appointment scheduling, clinical advisement, health education, and other advisory services.  Call: [phone] (toll free)               Medications           Message regarding Medications     Verify the changes and/or additions to your medication regime listed below are the same as discussed with your clinician today.  If any of these changes or additions are incorrect, please notify your healthcare provider.             Verify that the below list of medications is an accurate representation of the medications you are currently taking.  If none reported, the list may be blank. If incorrect, please contact your healthcare provider. Carry this list with you in case of emergency.           Current Medications     b complex vitamins tablet Take 1 tablet by mouth once daily.    CALCIUM CITRATE/VITAMIN D3 (CALCIUM CITRATE + D ORAL) Take 1 tablet by mouth 2 (two) times daily.    cyanocobalamin, vitamin B-12, 1,000 mcg Subl Place 1 tablet under the tongue every other day.    ergocalciferol (VITAMIN D2) 50,000 unit Cap Take 1 capsule (50,000 Units total) by mouth once a week.    nystatin (MYCOSTATIN)  "cream Apply topically 2 (two) times daily.    pediatric multivit-iron-min (FLINTSTONES COMPLETE, IRON,) Chew Take 1 tablet by mouth 2 (two) times daily.    PENNSAID 20 mg/gram /actuation(2 %) sopm APPLY TWO PUMPS TO AFFECTED AREA(S) TWICE DAILY AS NEEDED    ranitidine (ZANTAC) 150 MG tablet Take 1 tablet (150 mg total) by mouth 2 (two) times daily.    ursodiol (URMILA FORTE) 500 MG tablet Take 1 tablet (500 mg total) by mouth once daily. Crush tablet and take daily.  Okay to compound into liquid at patient's request.    zolpidem (AMBIEN) 5 MG Tab TAKE 1 TABLET (5 MG TOTAL) BY MOUTH NIGHTLY AS NEEDED.    nystatin-triamcinolone (MYCOLOG II) cream Apply topically 4 (four) times daily.           Clinical Reference Information           Your Vitals Were     BP Pulse Height Weight BMI    120/80 66 5' 8" (1.727 m) 148.5 kg (327 lb 6.1 oz) 49.78 kg/m2      Blood Pressure          Most Recent Value    BP  120/80      Allergies as of 5/4/2017     No Known Allergies      Immunizations Administered on Date of Encounter - 5/4/2017     None      Instructions    - Continue daily vitamins and medications.  - Discontinue Ursodiol.  - Anti-Acid medication, Zantac as needed.  - Miralax daily for constipation, no fiber.  - No NSAIDs, Tylenol for pain.  - Can swallow whole pills.  - RTC in 3 months or sooner if needed.  - Call the office for any issues.  - Check labs at annual visit.    - To lose weight you want to cut 100% starchy carbohydrates out of your diet (bread, rice, pasta, potatoes, granola, flour, corn, peas, oatmeal, grits, tortillas, crackers, chips) and get  grams of protein.  Aim for 100 grams of protein daily.    - Premier Protein (Chocolate, Bananas & Cream, Strawberries & Cream, Vanilla) [employer] or [employer]    - Syntrax [place] from [employer], www.bariatricadOpenbucksage.com, www.bariatricchoice.com. (LACTOSE FREE)    - Atkins Lift - [employer] & [employer] (LACTOSE FREE)    - Veggetti Pro from [employer]    - www.[employer].com (cauliflower, cloud bread, quest bar cookies, eggplant, zucchini, zucchini noodles, crustless quiche, no carb meals, taco lettuce boats)    - http://theyiselingnickolas.[email or web address]/    - Cauliflower Rice    - Premier Protein Clear       Language Assistance Services     ATTENTION: Language assistance services are available, free of charge. Please call [phone].      ATENCIÓN: Si habla español, tiene a perez disposición servicios gratuitos de asistencia lingüística. Llame al [phone].     CHÚ Ý: N?u b?n nói Ti?ng Vi?t, có các d?ch v? h? tr? ngôn ng? mi?n phí dành cho b?n. G?i s? [phone].         [facility] complies with applicable Federal civil rights laws and does not discriminate on the basis of race, color, national origin, age, disability, or sex.

## 2017-05-05 LAB — VIT B1 SERPL-MCNC: 53 UG/L (ref 38–122)

## 2017-06-21 DIAGNOSIS — F51.09 SITUATIONAL INSOMNIA: ICD-10-CM

## 2017-06-22 ENCOUNTER — TELEPHONE (OUTPATIENT)
Dept: SLEEP MEDICINE | Facility: CLINIC | Age: 42
End: 2017-06-22

## 2017-06-22 RX ORDER — ZOLPIDEM TARTRATE 5 MG/1
5 TABLET ORAL NIGHTLY PRN
Qty: 30 TABLET | Refills: 1 | Status: SHIPPED | OUTPATIENT
Start: 2017-06-22 | End: 2017-07-22

## 2017-07-09 ENCOUNTER — PATIENT MESSAGE (OUTPATIENT)
Dept: GASTROENTEROLOGY | Facility: CLINIC | Age: 42
End: 2017-07-09

## 2017-08-04 ENCOUNTER — PATIENT MESSAGE (OUTPATIENT)
Dept: SLEEP MEDICINE | Facility: CLINIC | Age: 42
End: 2017-08-04

## 2017-08-04 DIAGNOSIS — F51.09 SITUATIONAL INSOMNIA: ICD-10-CM

## 2017-08-04 RX ORDER — ZOLPIDEM TARTRATE 10 MG/1
10 TABLET ORAL NIGHTLY PRN
Qty: 30 TABLET | Refills: 3 | Status: SHIPPED | OUTPATIENT
Start: 2017-08-04 | End: 2017-09-03

## 2017-08-11 ENCOUNTER — OFFICE VISIT (OUTPATIENT)
Dept: OCCUPATIONAL MEDICINE | Facility: CLINIC | Age: 42
End: 2017-08-11
Payer: COMMERCIAL

## 2017-08-11 VITALS — HEART RATE: 66 BPM | DIASTOLIC BLOOD PRESSURE: 89 MMHG | SYSTOLIC BLOOD PRESSURE: 143 MMHG

## 2017-08-11 DIAGNOSIS — S80.02XA CONTUSION OF LEFT KNEE, INITIAL ENCOUNTER: Primary | ICD-10-CM

## 2017-08-11 DIAGNOSIS — S80.11XA CONTUSION OF RIGHT LOWER LEG, INITIAL ENCOUNTER: ICD-10-CM

## 2017-08-11 PROCEDURE — 99214 OFFICE O/P EST MOD 30 MIN: CPT | Mod: S$GLB,,, | Performed by: NURSE PRACTITIONER

## 2017-08-11 PROCEDURE — 3077F SYST BP >= 140 MM HG: CPT | Mod: S$GLB,,, | Performed by: NURSE PRACTITIONER

## 2017-08-11 PROCEDURE — 3008F BODY MASS INDEX DOCD: CPT | Mod: S$GLB,,, | Performed by: NURSE PRACTITIONER

## 2017-08-11 PROCEDURE — 3079F DIAST BP 80-89 MM HG: CPT | Mod: S$GLB,,, | Performed by: NURSE PRACTITIONER

## 2017-08-11 RX ORDER — IBUPROFEN 400 MG/1
400 TABLET ORAL EVERY 6 HOURS PRN
COMMUNITY
Start: 2017-08-11 | End: 2018-11-06 | Stop reason: DRUGHIGH

## 2017-08-11 RX ORDER — OMEPRAZOLE 40 MG/1
CAPSULE, DELAYED RELEASE ORAL
Refills: 12 | COMMUNITY
Start: 2017-08-02 | End: 2019-08-20 | Stop reason: SDUPTHER

## 2017-08-11 NOTE — PROGRESS NOTES
Pt comes in with right lower leg contusion and left knee contusion from a direct blow with a pallet.

## 2017-08-11 NOTE — PROGRESS NOTES
Subjective:       Patient ID: Jesús Galindo is a 42 y.o. male.    Chief Complaint: Work Related Injury; Knee Injury; and Leg Injury                Pt comes in with right lower leg contusion and left knee contusion from a direct blow with a pallet.                 Knee Injury   This is a new problem. The current episode started today. The problem occurs constantly. The problem has been unchanged. Associated symptoms include joint swelling. Pertinent negatives include no anorexia, chest pain, congestion, coughing, diaphoresis, numbness or weakness. The symptoms are aggravated by bending and walking. He has tried ice for the symptoms. The treatment provided mild relief.     Review of Systems   Constitution: Negative for decreased appetite, diaphoresis and weakness.   HENT: Negative for congestion and ear pain.    Eyes: Negative for blurred vision and double vision.   Cardiovascular: Positive for leg swelling. Negative for chest pain, claudication and palpitations.        Left leg varicose veins medial    Respiratory: Negative for cough and hemoptysis.    Endocrine: Negative for cold intolerance and heat intolerance.   Hematologic/Lymphatic: Negative for adenopathy.   Skin: Negative for nail changes and poor wound healing.   Musculoskeletal: Positive for joint swelling and stiffness. Negative for back pain and muscle weakness.        Left knee contusion swelling noted. Tightness behind the left knee reported. Hx of venous insuffiencey   Right lower leg contusion swelling noted.    Gastrointestinal: Negative for bloating and anorexia.   Genitourinary: Negative for bladder incontinence and dysuria.   Neurological: Negative for aphonia, difficulty with concentration and numbness.   Psychiatric/Behavioral: Negative for altered mental status and hallucinations. The patient is nervous/anxious.    Allergic/Immunologic: Negative for environmental allergies.       Objective:      Physical Exam   Constitutional: He is oriented to  person, place, and time. He appears well-developed and well-nourished.   Obese male   Eyes: Conjunctivae and EOM are normal. Pupils are equal, round, and reactive to light.   Neck: Normal range of motion. Neck supple.   Cardiovascular: Normal rate, regular rhythm and intact distal pulses.    Pulses:       Dorsalis pedis pulses are 2+ on the right side, and 2+ on the left side.        Posterior tibial pulses are 2+ on the right side, and 2+ on the left side.   Lower extrem swelling and left lower leg varicose veins    Pulmonary/Chest: Effort normal and breath sounds normal.   Abdominal: Soft. Bowel sounds are normal.   Musculoskeletal: He exhibits tenderness.        Right hip: Normal.        Left hip: Normal.        Left knee: He exhibits decreased range of motion, swelling, erythema and bony tenderness. Tenderness found. Medial joint line tenderness noted.        Right upper leg: Normal.        Left upper leg: Normal.        Right lower leg: He exhibits tenderness, swelling and edema.        Legs:  Neurological: He is alert and oriented to person, place, and time. He has normal reflexes. No cranial nerve deficit or sensory deficit. Gait abnormal.   Skin: Skin is warm, dry and intact.   bilat lower legs swelling noted.  Peripheral pulse intact.  Skin warm dry and intact.  Hx of varicose veins- incompetent valves   Psychiatric: He has a normal mood and affect. His behavior is normal.       Assessment:       1. Contusion of left knee, initial encounter    2. Contusion of right lower leg, initial encounter        Plan:       Jesús was seen today for work related injury, knee injury and leg injury.    Diagnoses and all orders for this visit:    Contusion of left knee, initial encounter  -     X-Ray Knee 3 View Left; Future    Contusion of right lower leg, initial encounter  -     X-Ray Tibia Fibula 2 View Right; Future    Other orders  -     ibuprofen (ADVIL,MOTRIN) 400 MG tablet; Take 1 tablet (400 mg total) by mouth  every 6 (six) hours as needed for Other (Pain). Over the counter. Take with food        Medications Ordered This Encounter      ibuprofen (ADVIL,MOTRIN) 400 MG tablet          Sig: Take 1 tablet (400 mg total) by mouth every 6 (six) hours as needed for Other (Pain). Over the counter. Take with food  Patient Instructions: Apply ice 24-48 hours then apply heat/warm soaks, Elevated affected area (ace wrap, elevate, ice and rest)   Restrictions: No Prolonged standing/walking, No lifting/pushing/pulling more than 10 lbs, Sit down work only, Avoid climbing/kneeling/squatting, Avoid frequent bending/lifting/twisting (WORK STATUS: LIGHT DUTY.  OFFICE WORK IF AVAILABLE.  NO PROLONGED STANDING. ICE WHLE AT WORK.  ELEVATE WHILE AT WORK. MEDICATION AS DIRECTED.  rETURN TO THE CLINIC TUESDAY .  CALL WITH ANY QUESTIONS AND OR CONCERNS.  REPORT ANY CHANGES IN YOUR STATUS)

## 2017-08-11 NOTE — PATIENT INSTRUCTIONS
Understanding Bone Bruise (Bone Contusion)  A bone bruise is an injury to a bone that is less severe than a bone fracture. Bone bruises are fairly common. They can happen to people of all ages. Any type of bone in your body can get a bone bruise. Other injuries often happen along with a bone bruise, such as damage to nearby ligaments.  What happens when a bone is bruised?  Bone is made of different kinds of tissue. The periosteum is a thin layer of tissue that covers most of a bone. Where bones come together, there is usually a layer of cartilage at the edges. The bone here is called subchondral bone. Deep inside the bone is an area called the medulla. It contains the bone marrow and fibrous tissue called trabeculae.  With a bone fracture, all of the trabeculae in a region of bone have broken. But with a bone bruise, an injury only damages some of these trabeculae. An injury might cause blood to build up in the area beneath the periosteum. This causes a subperiosteal hematoma, a type of bone bruise. An injury might also cause bleeding and swelling in the area between your cartilage and the bone beneath it. This causes a subchondral bone bruise. Or bleeding and swelling can occur in the medulla of your bone. This is called an interosseous bone bruise.  What causes a bone bruise?  Injury of any kind can cause a bone bruise. Sports injuries, motor vehicle accidents, or falls from a height can cause them. Twisting injuries that cause joint sprains can also cause a bone bruise. Health conditions like arthritis may also lead to a bone bruise. This is because arthritis causes bone surfaces to grind against each other. Child abuse is another cause of bone bruises.  Symptoms of a bone bruise  Symptoms of a bone bruise can include:  · Pain and soreness in the injured area  · Swelling in the area and soft tissues around it  · Change in color of the injured area  · Swelling or stiffness of an injured joint  This pain is often  more severe and lasts longer than a soft tissue injury. How severe your symptoms are and how long they last depends on how severe the bone bruise is.  Diagnosing a bone bruise  Your healthcare provider will ask you about your medical history and symptoms. He or she will ask how you got your injury. Your provider will examine the injured area to check for pain, bruising, and swelling. After the exam, your health care provider may be able to tell if you have a bone bruise.  A bone bruise doesnt show up on an X-ray. But you may be given an X-ray to rule out a bone fracture. A fracture may need a different kind of treatment. An MRI can confirm a bone bruise. But your healthcare provider will likely only give you an MRI if your symptoms dont get better.  Date Last Reviewed: 3/3/2015  © 5566-4714 [employer]. [address] [place] [address]. All rights reserved. This information is not intended as a substitute for professional medical care. Always follow your healthcare professional's instructions.        Soft Tissue Contusion (Child)  A contusion is another word for a bruise. It happens when small blood vessels break open and leak blood into the nearby area. A contusion can result from a bump, hit, or fall. Symptoms of a contusion often include changes in skin color (bruising), swelling, and pain. It may take several hours for a deep bruise to show up. If the injury is severe, your child may need an X-ray to check for broken bones.  Depending on where the bruise is and how serious it is, pain may make it hard for your child to move the affected body part. Contusions on the back or chest may make it painful to take a deep breath.  Swelling should decrease in a few days. Bruising and pain may take several weeks to go away. Your child can gradually go back to normal activities when the swelling has gone down and he or she feels better.   Home care  Follow these guidelines when caring for your child  at home:  · Your childs health care provider may prescribe medicines for pain and inflammation. Follow all instructions for giving these to your child.  · Have your child rest as needed. You may need to restrict your child's activities for a few days.  · Protect the area with a soft towel or a pillow if advised by the childs provider.  · Use cold to help reduce swelling and pain. For infants or toddlers, wet a clean cloth with cold water, then wring it out. For older children, use a cold pack or a plastic bag of ice cubes wrapped in a thin, dry cloth  Apply the cold source to the bruised area for up to 20 minutes. Repeat this a few times a day while your child is awake. Continue for 1 or 2 days or as instructed.  · When the swelling has gone away, start using warm compresses. This is a clean cloth thats damp with warm water. Apply this to the area for 10 minutes, several times a day.  · Follow any other instructions you were given.  · Keep in mind that bruising may take several weeks to go away.  Follow-up care  Follow up with your childs health care provider.  Special note to parents  Health care providers are trained to see injuries such as this in young children as a sign of possible abuse. You may be asked questions about how your child was injured. Health care providers are required by law to ask you these questions. This is done to protect your child. Please try to be patient.  When to seek medical advice  Call your child's health care provider right away if your child has:  · Pain or swelling that doesn't improve or that gets worse  · Your child has new symptoms  Date Last Reviewed: 5/7/2015 © 2000-2016 [employer]. [address] [place] [address], [place] [address]. All rights reserved. This information is not intended as a substitute for professional medical care. Always follow your healthcare professional's instructions.      Restrictions: No Prolonged standing/walking, No lifting/pushing/pulling  more than 10 lbs, Sit down work only, Avoid climbing/kneeling/squatting, Avoid frequent bending/lifting/twisting (WORK STATUS: LIGHT DUTY.  OFFICE WORK IF AVAILABLE.  NO PROLONGED STANDING. ICE WHLE AT WORK.  ELEVATE WHILE AT WORK. MEDICATION AS DIRECTED.  RETURN TO THE CLINIC TUESDAY .  CALL WITH ANY QUESTIONS AND OR CONCERNS.  REPORT ANY CHANGES IN YOUR STATUS)

## 2017-08-11 NOTE — LETTER
[facility] [address]  [place] [address]  Phone: [phone]  Fax: [phone]    Pt Name: Jesús Galindo  Injury Date: 8/11/17   Employee ID:  Date of First Treatment: 08/11/2017   Company: [employer] reference to record [employer]          Appointment Time: 03:30 PM Arrived:  3:45 PM CDT   Physician: Valerio Red NP          Office Treatment: Jesús was seen today for work related injury, knee injury and leg injury.    Diagnoses and all orders for this visit:    Contusion of left knee, initial encounter  -     X-Ray Knee 3 View Left; Future    Contusion of right lower leg, initial encounter  -     X-Ray Tibia Fibula 2 View Right; Future    Other orders  -     ibuprofen (ADVIL,MOTRIN) 400 MG tablet; Take 1 tablet (400 mg total) by mouth every 6 (six) hours as needed for Other (Pain). Over the counter. Take with food       Patient Instructions: Apply ice 24-48 hours then apply heat/warm soaks, Elevated affected area (ace wrap, elevate, ice and rest)    Restrictions: No Prolonged standing/walking, No lifting/pushing/pulling more than 10 lbs, Sit down work only, Avoid climbing/kneeling/squatting, Avoid frequent bending/lifting/twisting (WORK STATUS: LIGHT DUTY.  OFFICE WORK IF AVAILABLE.  NO PROLONGED STANDING. ICE WHLE AT WORK.  ELEVATE WHILE AT WORK. MEDICATION AS DIRECTED.  rETURN TO THE CLINIC TUESDAY .  CALL WITH ANY QUESTIONS AND OR CONCERNS.  REPORT ANY CHANGES IN YOUR STATUS)       Return Appointment: 8/15/2017

## 2017-08-15 ENCOUNTER — OFFICE VISIT (OUTPATIENT)
Dept: OCCUPATIONAL MEDICINE | Facility: CLINIC | Age: 42
End: 2017-08-15
Payer: COMMERCIAL

## 2017-08-15 VITALS — SYSTOLIC BLOOD PRESSURE: 135 MMHG | DIASTOLIC BLOOD PRESSURE: 91 MMHG | HEART RATE: 68 BPM

## 2017-08-15 DIAGNOSIS — S80.02XD CONTUSION OF LEFT KNEE, SUBSEQUENT ENCOUNTER: Primary | ICD-10-CM

## 2017-08-15 DIAGNOSIS — S80.11XD CONTUSION OF RIGHT LOWER LEG, SUBSEQUENT ENCOUNTER: ICD-10-CM

## 2017-08-15 PROCEDURE — 3008F BODY MASS INDEX DOCD: CPT | Mod: S$GLB,,, | Performed by: NURSE PRACTITIONER

## 2017-08-15 PROCEDURE — 3075F SYST BP GE 130 - 139MM HG: CPT | Mod: S$GLB,,, | Performed by: NURSE PRACTITIONER

## 2017-08-15 PROCEDURE — 3080F DIAST BP >= 90 MM HG: CPT | Mod: S$GLB,,, | Performed by: NURSE PRACTITIONER

## 2017-08-15 PROCEDURE — 99213 OFFICE O/P EST LOW 20 MIN: CPT | Mod: S$GLB,,, | Performed by: NURSE PRACTITIONER

## 2017-08-15 NOTE — LETTER
[facility] [address]  [place] [address]  Phone: [phone]  Fax: [phone]    Pt Name: Jesús Galindo  Injury Date: 08/11/2017   Employee ID:  Date of First Treatment: 08/11/2017   Company: [employer] reference to record [employer]          Appointment Time: 10:15 AM Arrived: 10:30 AM CDT   Physician: Valerio Red NP          Office Treatment: Jesús was seen today for work related injury and knee injury.    Diagnoses and all orders for this visit:    Contusion of left knee, subsequent encounter    Contusion of right lower leg, subsequent encounter       Patient Instructions: Attention not to aggravate affected area, Elevated affected area, Apply ice 24-48 hours then apply heat/warm soaks, Use splint as directed (MEDICATION AS NEEDED.  INCREASE YOUR ACTIVITY AS TOLERATED.  RANGE OF MOTION EXERCISES DAILY AS TOLERATED.  ORTHO KNEE SLEEVE AS NEEDED.  ALTERNATE ICE AND HEAT.  ELEVATE AT HOME)    Restrictions: Sit or stand as needed, No lifting/pushing/pulling more than 25 lbs, No Prolonged standing/walking (WORK STATUS: LIGHT DUTY AND MAY INCREASE ACTIVITY AS TOLERATED. AVOID STRENUOUS ACTIVITY.  RETURN TO THE CLINIC IN ONE WEEK.  )       Return Appointment: 8/22/2017 10:00 am

## 2017-08-15 NOTE — PROGRESS NOTES
Subjective:       Patient ID: Jesús Galindo is a 42 y.o. male.    Chief Complaint: Work Related Injury and Knee Injury    Knee Injury   This is a new problem. The current episode started in the past 7 days. The problem occurs intermittently. The problem has been rapidly improving. Pertinent negatives include no chest pain, chills, coughing, fatigue, fever, headaches, nausea, numbness or vomiting. The symptoms are aggravated by twisting and bending (PROLONGED STANDING). He has tried NSAIDs and ice for the symptoms. The treatment provided moderate relief.     Review of Systems   Constitution: Negative for chills, fatigue and fever.   HENT: Negative for ear pain and headaches.    Eyes: Negative for blurred vision and pain.   Cardiovascular: Negative for chest pain, claudication, dyspnea on exertion and palpitations.   Respiratory: Negative for cough and shortness of breath.    Endocrine: Negative for polydipsia, polyphagia and polyuria.   Musculoskeletal: Positive for joint pain and stiffness (LEFT KNEE STIFFNESS). Negative for arthritis and muscle weakness.        Pt states that his Left knee is getting better and states that he has no pain.  RIGHT LOWER TIB/FIB TENDERNESS AT IMPACT SITE REPORTED   Gastrointestinal: Negative for diarrhea, heartburn, nausea and vomiting.   Genitourinary: Negative for dysuria.   Neurological: Negative for dizziness, numbness and sensory change.   Psychiatric/Behavioral: Negative for depression. The patient is not nervous/anxious.    Allergic/Immunologic: Negative for persistent infections.       Objective:      Physical Exam   Constitutional: He is oriented to person, place, and time. He appears well-developed and well-nourished.   HENT:   Head: Atraumatic.   Eyes: Conjunctivae and EOM are normal. Pupils are equal, round, and reactive to light.   Neck: Normal range of motion. Neck supple.   Cardiovascular: Normal rate and regular rhythm.    LEG LEG LARGE TORTUROUS VARICOSE VEINS    Pulmonary/Chest: Effort normal and breath sounds normal.   Abdominal: Soft. Bowel sounds are normal.   Musculoskeletal: Normal range of motion. He exhibits tenderness. He exhibits no edema or deformity.        Right hip: Normal.        Left hip: Normal.        Left knee: He exhibits swelling and ecchymosis. Tenderness found. Medial joint line tenderness noted.        Right lower leg: He exhibits tenderness and swelling.        Left lower leg: He exhibits swelling.        Legs:  LEFT KNEE ECCHYMOSIS AND SWELLING NOTED.  SKIN INTACT WARM AND DRY.  SENSORY AND CIRCULATION INTACT.  RIGHT LOWER LEG SWELLING AT IMPACT SITE NOTED.  SKIN WARM AND INTACT. FULL RANGE OF MOTION, DTR 2 PLUS BILAT, ABLE TO SQUAT BUT NOT KNEEL   Neurological: He is alert and oriented to person, place, and time. He has normal reflexes. He displays normal reflexes. No cranial nerve deficit. He exhibits normal muscle tone. Coordination normal.   Skin: Skin is warm, dry and intact. Ecchymosis noted.   BRUISING AND MILD SWELLING NOTED LEFT KNEE AND RIGHT LOWER LEG.  SKIN INTACT, SENSORY AND CIRCULATION INTACT.    Psychiatric: He has a normal mood and affect. His behavior is normal.   Nursing note and vitals reviewed.      Assessment:       1. Contusion of left knee, subsequent encounter    2. Contusion of right lower leg, subsequent encounter        Plan:       Jesús was seen today for work related injury and knee injury.    Diagnoses and all orders for this visit:    Contusion of left knee, subsequent encounter    Contusion of right lower leg, subsequent encounter         Patient Instructions: Attention not to aggravate affected area, Elevated affected area, Apply ice 24-48 hours then apply heat/warm soaks, Use splint as directed (MEDICATION AS NEEDED.  INCREASE YOUR ACTIVITY AS TOLERATED.  RANGE OF MOTION EXERCISES DAILY AS TOLERATED.  ORTHO KNEE SLEEVE AS NEEDED.  ALTERNATE ICE AND HEAT.  ELEVATE AT HOME)   Restrictions: Sit or stand as needed,  No lifting/pushing/pulling more than 25 lbs, No Prolonged standing/walking (WORK STATUS: LIGHT DUTY AND MAY INCREASE ACTIVITY AS TOLERATED. AVOID STRENUOUS ACTIVITY.  RETURN TO THE CLINIC IN ONE WEEK.  )

## 2017-08-15 NOTE — PATIENT INSTRUCTIONS
Lower Extremity Contusion  You have a contusion (bruise) of a lower extremity (leg, knee, ankle, foot, or toe). Symptoms include pain, swelling, and skin discoloration. No bones are broken. This injury may take from a few days to a few weeks to heal.  During that time, the bruise may change from reddish in color, to purple-blue, to green-yellow, to yellow-brown.  Home care  · Unless another medication was prescribed, you can take acetaminophen, ibuprofen, or naproxen to control pain. (If you have chronic liver or kidney disease or ever had a stomach ulcer or GI bleeding, talk with your doctor before using these medicines.)  · Elevate the injured area to reduce pain and swelling. As much as possible, sit or lie down with the injured area raised about the level of your heart. This is especially important during the first 48 hours.  · Ice the injured area to help reduce pain and swelling. Wrap a cold source (ice pack or ice cubes in a plastic bag) in a thin towel. Apply to the bruised area for 20 minutes every 1 to 2 hours the first day. Continue this 3 to 4 times a day until the pain and swelling goes away.  · If crutches have been advised, do not bear full weight on the injured leg until you can do so without pain. You may return to sports when you are able to put full weight and impact on the injured leg without pain.  Follow up  Follow up with your healthcare provider or our staff as advised. Call if you are not improving within the next 1 to 2 weeks.  When to seek medical advice   Call your healthcare provider right away if any of these occur:  · Increased pain or swelling  · Foot or toes become cold, blue, numb or tingly  · Signs of infection: Warmth, drainage, or increased redness or pain around the injury  · Inability to move the injured area   · Frequent bruising for unknown reasons  Date Last Reviewed: 4/24/2015  © 4930-0615 [employer]. [address] [place] [address], [place] [address]. All rights  reserved. This information is not intended as a substitute for professional medical care. Always follow your healthcare professional's instructions.        Soft Tissue Contusion (Child)  A contusion is another word for a bruise. It happens when small blood vessels break open and leak blood into the nearby area. A contusion can result from a bump, hit, or fall. Symptoms of a contusion often include changes in skin color (bruising), swelling, and pain. It may take several hours for a deep bruise to show up. If the injury is severe, your child may need an X-ray to check for broken bones.  Depending on where the bruise is and how serious it is, pain may make it hard for your child to move the affected body part. Contusions on the back or chest may make it painful to take a deep breath.  Swelling should decrease in a few days. Bruising and pain may take several weeks to go away. Your child can gradually go back to normal activities when the swelling has gone down and he or she feels better.   Home care  Follow these guidelines when caring for your child at home:  · Your childs health care provider may prescribe medicines for pain and inflammation. Follow all instructions for giving these to your child.  · Have your child rest as needed. You may need to restrict your child's activities for a few days.  · Protect the area with a soft towel or a pillow if advised by the childs provider.  · Use cold to help reduce swelling and pain. For infants or toddlers, wet a clean cloth with cold water, then wring it out. For older children, use a cold pack or a plastic bag of ice cubes wrapped in a thin, dry cloth  Apply the cold source to the bruised area for up to 20 minutes. Repeat this a few times a day while your child is awake. Continue for 1 or 2 days or as instructed.  · When the swelling has gone away, start using warm compresses. This is a clean cloth thats damp with warm water. Apply this to the area for 10 minutes,  several times a day.  · Follow any other instructions you were given.  · Keep in mind that bruising may take several weeks to go away.  Follow-up care  Follow up with your childs health care provider.  Special note to parents  Health care providers are trained to see injuries such as this in young children as a sign of possible abuse. You may be asked questions about how your child was injured. Health care providers are required by law to ask you these questions. This is done to protect your child. Please try to be patient.  When to seek medical advice  Call your child's health care provider right away if your child has:  · Pain or swelling that doesn't improve or that gets worse  · Your child has new symptoms  Date Last Reviewed: 5/7/2015  © 4377-5743 [employer]. [address] [place] [address], [place] [address]. All rights reserved. This information is not intended as a substitute for professional medical care. Always follow your healthcare professional's instructions.      Patient Instructions: Attention not to aggravate affected area, Elevated affected area, Apply ice 24-48 hours then apply heat/warm soaks, Use splint as directed (MEDICATION AS NEEDED.  INCREASE YOUR ACTIVITY AS TOLERATED.  RANGE OF MOTION EXERCISES DAILY AS TOLERATED.  ORTHO KNEE SLEEVE AS NEEDED.  ALTERNATE ICE AND HEAT.  ELEVATE AT HOME)

## 2017-08-25 ENCOUNTER — OFFICE VISIT (OUTPATIENT)
Dept: OCCUPATIONAL MEDICINE | Facility: CLINIC | Age: 42
End: 2017-08-25
Payer: COMMERCIAL

## 2017-08-25 VITALS — SYSTOLIC BLOOD PRESSURE: 115 MMHG | HEART RATE: 80 BPM | DIASTOLIC BLOOD PRESSURE: 79 MMHG

## 2017-08-25 DIAGNOSIS — S80.02XD CONTUSION OF LEFT KNEE, SUBSEQUENT ENCOUNTER: Primary | ICD-10-CM

## 2017-08-25 PROCEDURE — 3078F DIAST BP <80 MM HG: CPT | Mod: S$GLB,,, | Performed by: NURSE PRACTITIONER

## 2017-08-25 PROCEDURE — 3008F BODY MASS INDEX DOCD: CPT | Mod: S$GLB,,, | Performed by: NURSE PRACTITIONER

## 2017-08-25 PROCEDURE — 3074F SYST BP LT 130 MM HG: CPT | Mod: S$GLB,,, | Performed by: NURSE PRACTITIONER

## 2017-08-25 PROCEDURE — 99213 OFFICE O/P EST LOW 20 MIN: CPT | Mod: S$GLB,,, | Performed by: NURSE PRACTITIONER

## 2017-08-25 RX ORDER — TRAMADOL HYDROCHLORIDE 50 MG/1
TABLET ORAL
Refills: 0 | COMMUNITY
Start: 2017-06-08 | End: 2018-03-15 | Stop reason: ALTCHOICE

## 2017-08-25 NOTE — ADDENDUM NOTE
Encounter addended by: Fay Duggan MA on: 8/25/2017  1:13 PM<BR>    Actions taken: Letter status changed

## 2017-08-25 NOTE — PROGRESS NOTES
Subjective:       Patient ID: Jesús Galindo is a 42 y.o. male.    Chief Complaint: Work Related Injury and Knee Pain    Knee Pain    The incident occurred more than 1 week ago (left knee contusion . Denies previous injury to left leg and or knee). The incident occurred at work. Injury mechanism: blunt trauma. The pain is present in the left knee. Quality: very mild tenderness reported. The pain is at a severity of 2/10. The pain is mild. The pain has been improving since onset. Pertinent negatives include no inability to bear weight, loss of motion, loss of sensation, muscle weakness, numbness or tingling. He reports no foreign bodies present. The symptoms are aggravated by movement (after work reports it is more tender but is ready to return to full duty). He has tried ice, elevation and NSAIDs for the symptoms. The treatment provided significant relief.     Review of Systems   Constitution: Negative for chills, fever and weakness.   HENT: Negative for congestion, ear pain and headaches.    Eyes: Negative for blurred vision and pain.   Cardiovascular: Negative for chest pain, claudication, leg swelling and palpitations.   Respiratory: Negative for cough and shortness of breath.    Endocrine: Negative for polydipsia, polyphagia and polyuria.   Skin: Negative for flushing and rash.   Musculoskeletal: Positive for joint pain (mild left lateral knee tenderness). Negative for muscle cramps, muscle weakness and myalgias.        Pt states that he has some discomfort if Left knee stays in one place it gets crampy. The pain is more some on the back of the Left knee    Gastrointestinal: Negative for bloating and abdominal pain.   Genitourinary: Negative for flank pain.   Neurological: Negative for dizziness, numbness and tingling.   Psychiatric/Behavioral: Negative for depression. The patient is not nervous/anxious.        Objective:      Physical Exam   Constitutional: He is oriented to person, place, and time. He appears  well-developed and well-nourished.   HENT:   Head: Normocephalic and atraumatic.   Neck: Normal range of motion. Neck supple.   Cardiovascular: Normal rate and regular rhythm.    Pulmonary/Chest: Effort normal and breath sounds normal.   Abdominal: Soft. Bowel sounds are normal.   Central obesity   Musculoskeletal: Normal range of motion. He exhibits tenderness (left  lateral knee tenderness at impact site.  reports as feeling like a bruise.  denies joint instability).        Left hip: Normal.        Left knee: He exhibits normal range of motion, no swelling, no ecchymosis, no erythema, normal alignment and normal patellar mobility. Tenderness found. No MCL and no LCL tenderness noted.        Left ankle: Normal.        Left upper leg: Normal.        Left lower leg: Normal.        Legs:       Left foot: Normal.   Left knee assessment : full range of motion noted, ms 5/5, dtr 2 plus, sensory and circulation intact. Able to squat well.  Gait normal   Neurological: He is alert and oriented to person, place, and time.   Skin: Skin is warm and dry.   Psychiatric: He has a normal mood and affect. His behavior is normal.   Nursing note and vitals reviewed.      Assessment:       1. Contusion of left knee, subsequent encounter        Plan:     Jesús was seen today for work related injury and knee pain.    Diagnoses and all orders for this visit:    Contusion of left knee, subsequent encounter           Patient Instructions: Daily home exercises/warm soaks (Continue range of motion exercises and ice and heat if needed.  Call wuith any question and or concerns)   Restrictions: Regular Duty, Discharged from Occupational Health

## 2017-08-25 NOTE — PATIENT INSTRUCTIONS
Lower Extremity Contusion  You have a contusion (bruise) of a lower extremity (leg, knee, ankle, foot, or toe). Symptoms include pain, swelling, and skin discoloration. No bones are broken. This injury may take from a few days to a few weeks to heal.  During that time, the bruise may change from reddish in color, to purple-blue, to green-yellow, to yellow-brown.  Home care  · Unless another medication was prescribed, you can take acetaminophen, ibuprofen, or naproxen to control pain. (If you have chronic liver or kidney disease or ever had a stomach ulcer or GI bleeding, talk with your doctor before using these medicines.)  · Elevate the injured area to reduce pain and swelling. As much as possible, sit or lie down with the injured area raised about the level of your heart. This is especially important during the first 48 hours.  · Ice the injured area to help reduce pain and swelling. Wrap a cold source (ice pack or ice cubes in a plastic bag) in a thin towel. Apply to the bruised area for 20 minutes every 1 to 2 hours the first day. Continue this 3 to 4 times a day until the pain and swelling goes away.  · If crutches have been advised, do not bear full weight on the injured leg until you can do so without pain. You may return to sports when you are able to put full weight and impact on the injured leg without pain.  Follow up  Follow up with your healthcare provider or our staff as advised. Call if you are not improving within the next 1 to 2 weeks.  When to seek medical advice   Call your healthcare provider right away if any of these occur:  · Increased pain or swelling  · Foot or toes become cold, blue, numb or tingly  · Signs of infection: Warmth, drainage, or increased redness or pain around the injury  · Inability to move the injured area   · Frequent bruising for unknown reasons  Date Last Reviewed: 4/24/2015  © 1749-4857 [employer]. [address] [place] [address], [place] [address]. All rights  reserved. This information is not intended as a substitute for professional medical care. Always follow your healthcare professional's instructions.

## 2017-08-25 NOTE — LETTER
[facility] [address]  [place] [address]  Phone: [phone]  Fax: [phone]    Pt Name: Jesús Galindo  Injury Date:    Employee ID:  Date of First Treatment:    Company: [employer] to record [id number]            Appointment Time: 08:45 AM Arrived:  9:00 AM CDT   Physician: Valerio Red NP            Office Treatment: Jesús was seen today for work related injury and knee pain.    Diagnoses and all orders for this visit:    Contusion of left knee, subsequent encounter       Patient Instructions: Daily home exercises/warm soaks (Continue range of motion exercises and ice and heat if needed.  Call wuith any question and or concerns)    Restrictions: Regular Duty, Discharged from Occupational Health       Return Appointment: Visit date not found

## 2017-08-28 ENCOUNTER — OFFICE VISIT (OUTPATIENT)
Dept: BARIATRICS | Facility: CLINIC | Age: 42
End: 2017-08-28
Payer: COMMERCIAL

## 2017-08-28 ENCOUNTER — LAB VISIT (OUTPATIENT)
Dept: LAB | Facility: HOSPITAL | Age: 42
End: 2017-08-28
Payer: COMMERCIAL

## 2017-08-28 VITALS
HEART RATE: 73 BPM | WEIGHT: 315 LBS | HEIGHT: 68 IN | BODY MASS INDEX: 47.74 KG/M2 | SYSTOLIC BLOOD PRESSURE: 120 MMHG | DIASTOLIC BLOOD PRESSURE: 80 MMHG

## 2017-08-28 DIAGNOSIS — E55.9 VITAMIN D DEFICIENCY: ICD-10-CM

## 2017-08-28 DIAGNOSIS — E66.01 MORBID OBESITY WITH BMI OF 50.0-59.9, ADULT: ICD-10-CM

## 2017-08-28 DIAGNOSIS — G47.33 OBSTRUCTIVE SLEEP APNEA SYNDROME: ICD-10-CM

## 2017-08-28 DIAGNOSIS — K21.9 GASTROESOPHAGEAL REFLUX DISEASE WITHOUT ESOPHAGITIS: ICD-10-CM

## 2017-08-28 DIAGNOSIS — E66.01 MORBID OBESITY WITH BMI OF 45.0-49.9, ADULT: Primary | ICD-10-CM

## 2017-08-28 LAB — 25(OH)D3+25(OH)D2 SERPL-MCNC: 24 NG/ML

## 2017-08-28 PROCEDURE — 3008F BODY MASS INDEX DOCD: CPT | Mod: S$GLB,,, | Performed by: PHYSICIAN ASSISTANT

## 2017-08-28 PROCEDURE — 82306 VITAMIN D 25 HYDROXY: CPT

## 2017-08-28 PROCEDURE — 3074F SYST BP LT 130 MM HG: CPT | Mod: S$GLB,,, | Performed by: PHYSICIAN ASSISTANT

## 2017-08-28 PROCEDURE — 3079F DIAST BP 80-89 MM HG: CPT | Mod: S$GLB,,, | Performed by: PHYSICIAN ASSISTANT

## 2017-08-28 PROCEDURE — 36415 COLL VENOUS BLD VENIPUNCTURE: CPT

## 2017-08-28 PROCEDURE — 99999 PR PBB SHADOW E&M-EST. PATIENT-LVL IV: CPT | Mod: PBBFAC,,, | Performed by: PHYSICIAN ASSISTANT

## 2017-08-28 PROCEDURE — 99214 OFFICE O/P EST MOD 30 MIN: CPT | Mod: S$GLB,,, | Performed by: PHYSICIAN ASSISTANT

## 2017-08-28 RX ORDER — ERGOCALCIFEROL 1.25 MG/1
CAPSULE ORAL
Qty: 24 CAPSULE | Refills: 0 | Status: SHIPPED | OUTPATIENT
Start: 2017-08-28 | End: 2017-09-07 | Stop reason: SDUPTHER

## 2017-08-28 NOTE — PROGRESS NOTES
BARIATRIC POST-OPERATIVE FOLLOW UP:    Chief Complaint   Patient presents with    Follow-up       HISTORY OF PRESENT ILLNESS: Jesús Galindo is a 42 y.o. male with a Body mass index is 48.61 kg/m². who presents for a 6 month follow up s/p Band Removal -->Lap Sleeve with Dr. Parker on 11/10/2016.  He is doing well and tolerating the diet without difficulty.  His weight loss has slowed, likely due to adding small amounts of starches into his diet and a large amount of nuts.  Some days he only does 1 protein shake and his daily protein is too low.  He has lost 71 lbs, approximately 31% of his excess weight.  He feels great and is sleeping well at night.  His high blood pressure and high cholesterol are both resolved and normal off medications.  He has no complaints.     Denies: nausea, vomiting, abdominal pain, changes in bowel movement pattern, fever, chills, dysphagia, chest pain, and shortness of breath.    Review of Systems   Constitutional: Negative for chills, fever and malaise/fatigue.   Eyes: Negative for blurred vision and double vision.   Respiratory: Negative for cough, hemoptysis and shortness of breath.    Cardiovascular: Negative for chest pain, palpitations and leg swelling.   Gastrointestinal: Negative for abdominal pain, blood in stool, constipation, diarrhea, heartburn, melena, nausea and vomiting.   Genitourinary: Negative for dysuria and hematuria.   Musculoskeletal: Negative for back pain, falls, joint pain, myalgias and neck pain.   Skin: Negative for rash.   Neurological: Negative for dizziness, tingling, weakness and headaches.   Endo/Heme/Allergies: Negative for environmental allergies. Does not bruise/bleed easily.   Psychiatric/Behavioral: Negative.        EXERCISE & VITAMINS:  See Bariatric Assessment    MEDICATIONS/ALLERGIES:  Have been reviewed.    DIET:  Regular Bariatric Diet.  Diet Recall.  Br:  2 eggs & chickne (20 g),  Di:  Steak & salad (15 g), Sn: Shake, ham, cheese, unsalted nuts  (45 g), ~80+ grams daily protein.      Vitals:    08/28/17 0852   BP: 120/80   Pulse: 73       Physical Exam   Constitutional: He is oriented to person, place, and time. He appears well-developed and well-nourished. No distress.   HENT:   Head: Normocephalic and atraumatic.   Cardiovascular: Normal rate, regular rhythm, normal heart sounds and intact distal pulses.    Pulmonary/Chest: Effort normal and breath sounds normal.   Abdominal: Soft. Bowel sounds are normal. He exhibits no distension and no mass. There is no tenderness. There is no rebound and no guarding.   WHSS   Musculoskeletal: He exhibits no edema.   Neurological: He is alert and oriented to person, place, and time.   Skin: Skin is warm and dry. No rash noted. He is not diaphoretic. No erythema. No pallor.   Psychiatric: He has a normal mood and affect. His behavior is normal.   Nursing note and vitals reviewed.      ASSESSMENT:  - Morbid obesity, Body mass index is 48.61 kg/m².,  s/p Band Removal --> Lap Sleeve on 11/10/2016.  - Estimated goal weight, 276 lbs, which is 50% EWL  - Co-morbidities: HTN (normal off medication), HLD (normal off medications), JARAD (stable)   - Good Weight loss, 71 lbs, 31% EWL  - Good Exercise regimen  - Good Vitamin Regimen  - Good Diet    PLAN:  - Avoid all starches and continue to keep protein levels from  grams daily. Gave handouts and tips on how to do this.  - Emphasized the importance of regular exercise and adherence to bariatric diet to achieve maximum weight loss.  - Encouraged patient to continue regular exercise.  - Follow with RD to reinforce diet.  - Continue daily vitamins and medications.  - Anti-Acid medication, Zantac as needed.  - RTC in 3 months or sooner if needed.  - Call the office for any issues.  - Check labs at annual visit.    25 minute visit, over 50% of time spent counseling patient face to face on diet, exercise, and weight loss.

## 2017-08-28 NOTE — PATIENT INSTRUCTIONS
- go back to 2 shakes every day  - Add a fitness workout from Youtube (Can start with 20 minute full body workout and increase to 40 minutes)  - Vitamin D today and continue with twice daily prescription.    Since surgery you have lost 71 lbs, 31% of your excess weight              REBOOT DIET  High Protein Liquid Diet to Modified Liquid Diet  *Remember to always get in  grams of protein daily*    Sugar Free clear liquids allowed in unlimited amounts (H20, Crystal light, SF jello, low sodium broth, Powerade Zero, Caffeine free tea, Diet V8 splash, etc)       For 7 days you should drink 4 - 30 gram protein shakes (must have 4 g of sugar or less)       On Day 8 you will replace 1 - 30 gram protein shake with 1-2 high protein foods.  Therefore you will be drinking 3 protein shakes with 1-2 high protein foods. You will do this for 1 week.    High Protein Foods Include:  Canned tuna or chicken (packed in water)  Lean ground turkey breast or ground round  Turkey or chicken (no skin); cooked tender and cut in small pieces  Lean pork or beef (cook in crock pot until very tender; cut in small pieces  Scrambled, poached, or boiled eggs  Baked, broiled, grilled or boiled fish and seafood (not fried!)  Silken tofu, Edamame (soybeans)  Beans, hummus and lentils  Lean deli meats (turkey and chicken breast, ham, roast beef)  1% or Skim Milk, Lactaid, or Soymilk  Low-fat or fat-free cottage cheese, soft cheese, mozzarella string cheese, or ricotta   Light yogurt, Greek yogurt, SF pudding     After 1 week on 3 shakes and 1-2 high protein foods, you will replace another shake with 1-2 high protein foods.  Therefore you will be drinking 2 protein shakes with 3-4 high protein foods.  You will do this until you meet your desired goal.    Avoid all items below:  High fat milk (whole, 2%)  Butter, margarine, oil, mayonnaise  Sour cream, cream cheese, salad dressing  Ice Cream Cakes, cookies, pies, desserts, candy  Luncheon  meats (bologna, salami, chopped ham)  Corn Granola/cereal with nuts  Shredded Coconut  Sausage, Ramos Gravy Fried Foods  White and wheat Bread, Rice, Pasta Cereals (including grits, oatmeal)  Crackers, Pretzels, Chips, Granola Corn, Popcorn, Peas  White Potatoes, Sweet potatoes Flour and corn tortillas  Sugary drinks  Carbonated drinks  Alcohol

## 2017-08-28 NOTE — ADDENDUM NOTE
Encounter addended by: Fay Duggan MA on: 8/28/2017  8:54 AM<BR>    Actions taken: Letter status changed

## 2017-09-07 DIAGNOSIS — E55.9 VITAMIN D DEFICIENCY: ICD-10-CM

## 2017-09-07 RX ORDER — ERGOCALCIFEROL 1.25 MG/1
CAPSULE ORAL
Qty: 24 CAPSULE | Refills: 0 | Status: SHIPPED | OUTPATIENT
Start: 2017-09-07 | End: 2018-02-22 | Stop reason: SDUPTHER

## 2017-11-21 DIAGNOSIS — E66.01 MORBID OBESITY WITH BMI OF 50.0-59.9, ADULT: Primary | ICD-10-CM

## 2017-11-21 DIAGNOSIS — K21.9 GASTROESOPHAGEAL REFLUX DISEASE WITHOUT ESOPHAGITIS: ICD-10-CM

## 2017-11-21 DIAGNOSIS — E66.01 MORBID OBESITY WITH BMI OF 45.0-49.9, ADULT: ICD-10-CM

## 2017-11-22 ENCOUNTER — LAB VISIT (OUTPATIENT)
Dept: LAB | Facility: HOSPITAL | Age: 42
End: 2017-11-22
Payer: COMMERCIAL

## 2017-11-22 ENCOUNTER — OFFICE VISIT (OUTPATIENT)
Dept: BARIATRICS | Facility: CLINIC | Age: 42
End: 2017-11-22
Payer: COMMERCIAL

## 2017-11-22 VITALS
DIASTOLIC BLOOD PRESSURE: 80 MMHG | SYSTOLIC BLOOD PRESSURE: 105 MMHG | WEIGHT: 304.88 LBS | HEART RATE: 74 BPM | HEIGHT: 68 IN | BODY MASS INDEX: 46.21 KG/M2

## 2017-11-22 DIAGNOSIS — R63.4 WEIGHT LOSS: Primary | ICD-10-CM

## 2017-11-22 DIAGNOSIS — F43.9 STRESS: ICD-10-CM

## 2017-11-22 DIAGNOSIS — Z98.84 S/P LAPAROSCOPIC SLEEVE GASTRECTOMY: ICD-10-CM

## 2017-11-22 DIAGNOSIS — K21.9 GASTROESOPHAGEAL REFLUX DISEASE WITHOUT ESOPHAGITIS: ICD-10-CM

## 2017-11-22 DIAGNOSIS — E66.01 MORBID OBESITY WITH BMI OF 45.0-49.9, ADULT: ICD-10-CM

## 2017-11-22 DIAGNOSIS — F41.9 ANXIETY: ICD-10-CM

## 2017-11-22 DIAGNOSIS — E66.01 MORBID OBESITY WITH BMI OF 50.0-59.9, ADULT: ICD-10-CM

## 2017-11-22 LAB
25(OH)D3+25(OH)D2 SERPL-MCNC: 27 NG/ML
ALBUMIN SERPL BCP-MCNC: 3.5 G/DL
ALP SERPL-CCNC: 133 U/L
ALT SERPL W/O P-5'-P-CCNC: 32 U/L
ANION GAP SERPL CALC-SCNC: 8 MMOL/L
AST SERPL-CCNC: 36 U/L
BASOPHILS # BLD AUTO: 0.06 K/UL
BASOPHILS NFR BLD: 1.1 %
BILIRUB SERPL-MCNC: 1.2 MG/DL
BUN SERPL-MCNC: 10 MG/DL
CALCIUM SERPL-MCNC: 9.4 MG/DL
CHLORIDE SERPL-SCNC: 107 MMOL/L
CHOLEST SERPL-MCNC: 174 MG/DL
CHOLEST/HDLC SERPL: 4.6 {RATIO}
CO2 SERPL-SCNC: 26 MMOL/L
CREAT SERPL-MCNC: 0.9 MG/DL
DIFFERENTIAL METHOD: ABNORMAL
EOSINOPHIL # BLD AUTO: 0.4 K/UL
EOSINOPHIL NFR BLD: 7.5 %
ERYTHROCYTE [DISTWIDTH] IN BLOOD BY AUTOMATED COUNT: 14 %
EST. GFR  (AFRICAN AMERICAN): >60 ML/MIN/1.73 M^2
EST. GFR  (NON AFRICAN AMERICAN): >60 ML/MIN/1.73 M^2
GLUCOSE SERPL-MCNC: 85 MG/DL
HCT VFR BLD AUTO: 46.2 %
HDLC SERPL-MCNC: 38 MG/DL
HDLC SERPL: 21.8 %
HGB BLD-MCNC: 15 G/DL
IMM GRANULOCYTES # BLD AUTO: 0.02 K/UL
IMM GRANULOCYTES NFR BLD AUTO: 0.4 %
IRON SERPL-MCNC: 170 UG/DL
LDLC SERPL CALC-MCNC: 120.6 MG/DL
LYMPHOCYTES # BLD AUTO: 1.2 K/UL
LYMPHOCYTES NFR BLD: 22.2 %
MCH RBC QN AUTO: 26.7 PG
MCHC RBC AUTO-ENTMCNC: 32.5 G/DL
MCV RBC AUTO: 82 FL
MONOCYTES # BLD AUTO: 0.5 K/UL
MONOCYTES NFR BLD: 9.2 %
NEUTROPHILS # BLD AUTO: 3.2 K/UL
NEUTROPHILS NFR BLD: 59.6 %
NONHDLC SERPL-MCNC: 136 MG/DL
NRBC BLD-RTO: 0 /100 WBC
PLATELET # BLD AUTO: 277 K/UL
PMV BLD AUTO: 9.2 FL
POTASSIUM SERPL-SCNC: 3.8 MMOL/L
PROT SERPL-MCNC: 7.1 G/DL
RBC # BLD AUTO: 5.61 M/UL
SATURATED IRON: 48 %
SODIUM SERPL-SCNC: 141 MMOL/L
TOTAL IRON BINDING CAPACITY: 355 UG/DL
TRANSFERRIN SERPL-MCNC: 240 MG/DL
TRIGL SERPL-MCNC: 77 MG/DL
VIT B12 SERPL-MCNC: 955 PG/ML
WBC # BLD AUTO: 5.31 K/UL

## 2017-11-22 PROCEDURE — 80053 COMPREHEN METABOLIC PANEL: CPT

## 2017-11-22 PROCEDURE — 82306 VITAMIN D 25 HYDROXY: CPT

## 2017-11-22 PROCEDURE — 36415 COLL VENOUS BLD VENIPUNCTURE: CPT

## 2017-11-22 PROCEDURE — 99999 PR PBB SHADOW E&M-EST. PATIENT-LVL IV: CPT | Mod: PBBFAC,,, | Performed by: PHYSICIAN ASSISTANT

## 2017-11-22 PROCEDURE — 99024 POSTOP FOLLOW-UP VISIT: CPT | Mod: S$GLB,,, | Performed by: PHYSICIAN ASSISTANT

## 2017-11-22 PROCEDURE — 80061 LIPID PANEL: CPT

## 2017-11-22 PROCEDURE — 84425 ASSAY OF VITAMIN B-1: CPT

## 2017-11-22 PROCEDURE — 82607 VITAMIN B-12: CPT

## 2017-11-22 PROCEDURE — 83540 ASSAY OF IRON: CPT

## 2017-11-22 PROCEDURE — 85025 COMPLETE CBC W/AUTO DIFF WBC: CPT

## 2017-11-22 RX ORDER — NEOMYCIN SULFATE, POLYMYXIN B SULFATE AND DEXAMETHASONE 3.5; 10000; 1 MG/ML; [USP'U]/ML; MG/ML
1 SUSPENSION/ DROPS OPHTHALMIC DAILY
Refills: 0 | COMMUNITY
Start: 2017-11-10 | End: 2018-06-20 | Stop reason: ALTCHOICE

## 2017-11-22 RX ORDER — ZOLPIDEM TARTRATE 10 MG/1
1 TABLET ORAL DAILY
Refills: 3 | COMMUNITY
Start: 2017-11-07 | End: 2018-01-12 | Stop reason: SDUPTHER

## 2017-11-22 RX ORDER — MELOXICAM 15 MG/1
1 TABLET ORAL DAILY PRN
Refills: 2 | COMMUNITY
Start: 2017-10-30 | End: 2018-11-06 | Stop reason: ALTCHOICE

## 2017-11-22 NOTE — PATIENT INSTRUCTIONS
Protein 2O at [facility]    [facility] Clear at [facility] Isopure RTD        Meal Ideas for Regular Bariatric Diet  *Recipes and products available at [email or web address]      Breakfast: (15-20g protein)    - Egg white omelet: 2 egg whites or ½ cup Egg Beaters. (Optional proteins: cheese, shrimp, black beans, chicken, sliced turkey) (Optional veggies: tomatoes, salsa, spinach, mushrooms, onions, green peppers, or small slice avocado)     - Egg and sausage: 1 egg or ¼ cup Egg Beaters (any variety), with 1 erik or 2 links of Turkey sausage or Veggie breakfast sausage (Surface Logix or FreshOffice)    - Crust-less breakfast quiche: To make a glass pie dish, mix 4oz part skim Ricotta, 1 cup skim milk, and 2 eggs as your base. Add protein: shredded cheese, sliced lean ham or turkey, turkey espinoza/sausage. Add veggies: tomato, onion, green onion, mushroom, green pepper, spinach, etc.    - Yogurt parfait: Mix 1 - 6oz container Dannon Light N Fit vanilla yogurt, with ¼ cup crushed unsalted nuts    - Cottage cheese and fruit: ½ cup part-skim cottage cheese or ricotta cheese topped with fresh fruit or sugar free preserves     - Susan Emerson's Vanilla Egg custard* (add 2 Tbsp instant coffee granules to make Cappuccino Custard*)    - Hi-Protein café latte (skim milk, decaf coffee, 1 scoop protein powder). Optional to add Sugar free syrup or extract flavoring.    - Breakfast Lox: spread fat free cream cheese on slices of smoked salmon. Serve over scrambled or egg over easy (sauteed with nonstick cookspray) OR on a cucumber slice    - Eggwhich: Scramble or cook 1 large egg over easy using nonstick cookspray. Place between 2 slices of British espinoza and low fat cheese.     Lunch: (20-30g protein)    - ½ cup Black bean soup (Homemade or Progresso), with ¼ cup shredded low-fat cheese. Top with chopped tomato or fresh salsa.     - Lean deli turkey breast and low-fat sliced cheese, mustard or light mathias to moisten, rolled up together, or  wrapped in a Zachary lettuce leaf    - Chicken salad made from dinner leftovers, moisten with low-fat salad dressing or light mathias. Also try leftover salmon, shrimp, tuna or boiled eggs. Serve ½ cup over dark green salad    - Fat-free canned refried beans, topped with ¼ cup shredded low-fat cheese. Top with chopped tomato or fresh salsa.     - Greek salad: Top mixed greens with 1-2oz grilled chicken, tomatoes, red onions, 2-3 kalamata olives, and sprinkle lightly with feta cheese. Spritz with Balsamic vinegar to taste.     - Crust-less lunch quiche: To make a glass pie dish, mix 4oz part skim Ricotta, 1 cup skim milk, and 2 eggs as your base. Add protein: shredded cheese, sliced lean ham or turkey, shrimp, chicken. Add veggies: tomato, onion, green onion, mushroom, green pepper, spinach, artichoke, broccoli, etc.    - Pizza bake: spread a  gorge kelley mushroom with tomato sauce, low-fat shredded mozzarella and turkey pepperoni or Tajik espinoza. Add any veggies. Roast for 10-15 minutes, until cheese melted.     - Cucumber crab bites: Spread ¼ cup crab dip (lump crabmeat + light cream cheese and green onions) over sliced cucumber.     - Chicken with light spinach and artichoke dip*: Puree in : 6oz cooked and drained spinach, 2 cloves garlic, 1 can cannelloni beans, ½ cup chopped green onions, 1 can drained artichoke hearts (not marinated in oil), lemon juice and basil. Mix in 2oz chopped up chicken.    Supper: (20-30g protein)    - Serve grilled fish over dark green salad tossed with low-fat dressing, served with grilled asparagus rose     - Rotisserie chicken salad: served with sliced strawberries, walnuts, fat-free feta cheese crumbles and 1 tbsp Mejias Own Light Raspberry [place] Vinaigrette    - Shrimp cocktail: Dip cold boiled shrimp in homemade low-sugar cocktail sauce (1/2 cup Quiana One Carb ketchup, 2 tbsp horseradish, 1/4 tsp hot sauce, 1 tsp WorcesSeeToohire sauce, 1 tbsp freshly-squeezed  lemon juice). Serve with dark green salad, walnuts, and crumbled blue cheese drizzled with olive oil and Balsamic vinegar    - Tuna Melt: Spread tuna salad onto 2 thick slices of tomato. Top with low-fat cheese and broil until cheese is melted. May also be made with chicken salad of shrimp salad. [place] with different types of cheeses.    - Chicken or beef fajitas (no tortilla, rice, beans, chips). Top meat and veggies w/ fresh salsa, fat free sour cream.     - Homemade low-fat Chili using extra lean ground beef or ground turkey. Top with shredded cheese and salsa as desired. May add dollop fat-free sour cream if desired    - Chicken parmesan: Top chicken breast w/ low sugar marinara sauce, mozzarella cheese and bake until chicken reaches 165*.  Serve w/ spaghetti SQUASH or Kuwaiti cut green beans    - Dinner Omelet with shrimp or chicken and onion, green peppers and chives.    - No noodle lasagna: Use sliced zucchini or eggplant in place of noodles.  Layer with part skim ricotta cheese and low sugar meat sauce (use very lean ground beef or ground turkey).    - Mexican chicken bake: Bake chunks of chicken breast or thigh with taco seasoning, Pace brand enchilada sauce, green onions and low-fat cheese. Serve with ¼ cup black beans or fat free refried beans topped with chopped tomatoes or salsa.    - Edel frozen meatballs, simmered in Classico Marinara sauce. Different flavors of salsa or spaghetti sauce create different dishes! Sprinkle with parmesan cheese. Serve with grilled or steamed veggies, or a dark green salad.    - Simmer boneless skinless chicken thigh chunks in Classico Marinara sauce or roasted salsa until tender with chopped onion, bell pepper, garlic, mushrooms, spinach, etc.     - Hamburger or veggie burger, without the bun, dressed the way you like. Served with grilled or steamed veggies.    - Eggplant parmesan: Bake slices of eggplant at 350 degrees for 15 minutes. Layer tomato sauce, sliced  eggplant and low-fat mozzarella cheese in a baking dish and cover with foil. Bake 30-40 more minutes or until bubbly. Uncover and bake at 400 degrees for about 15 more minutes, or until top is slightly crisp.    - Fish tacos: grilled/baked white fish, wrapped in Zachary lettuce leaf, topped with salsa, shredded low-fat cheese, and light coleslaw.    - Chicken ronnie: Sprinkle chicken w/ 1 tsp of hidden valley ranch dip mix. Then grill chicken and top with black beans, salsa and 1 tsp fat free sour cream.     - Cauliflower pizza crust: Use cauliflower as crust (see recipe on pinterest, no flour!). Top w/ low fat cheese, turkey pepperoni and veggies and bake again    - chicken or turkey crust pizza: use ground chicken or turkey instead of cauliflower, spread in [place] and bake at 350 for about 20-30 minutes(may want to add garlic, black pepper, oregano and other herbs to ground meat mixture).  Remove and top w/ low fat cheese, turkey pepperoni and veggies and bake again for another 10 minutes or until cheese is browned.     Snacks: (100-200 calories; >5g protein)    - 1 low-fat cheese stick with 8 cherry tomatoes or 1 serving fresh fruit  - 4 thin slices fat-free turkey breast and 1 slice low-fat cheese  - 4 thin slices fat-free honey ham with wedge of melon  - 6-8 edamame pods (equivalent to about 1/4 cup edamame without pods).   - 1/4 cup unsalted nuts with ½ cup fruit  - 6-oz container Dannon Light n Fit vanilla yogurt, topped with 1oz unsalted nuts         - apple, celery or baby carrots spread with 2 Tbsp PB2  - apple slices with 1 oz slice low-fat cheese  - Apple slices dipped in 2 Tbsp of PB2  - celery, cucumber, bell pepper or baby carrots dipped in ¼ cup hummus bean spread or light spinach and artichoke dip (*recipe in lunch section)  - celery, cucumber, baby carrots dipped in high protein greek yogurt (Mix 16 oz plain greek yogurt + 1 packet of hidden valley ranch dip mix)  - Ranjeet Links Beef Steak - 14g  protein! (similar to beef jerky)  - 2 wedges Laughing Cow - Light Herb & Garlic Cheese with sliced cucumber or green bell pepper  - 1/2 cup low-fat cottage cheese with ¼ cup fruit or ¼ cup salsa  - RTD Protein drinks: Atkins, Low Carb Slim Fast, EAS light, Muscle Milk Light, etc.  - Homemade Protein drinks: GNC Soy95, Isopure, Nectar, UNJURY, Whey Gourmet, etc. Mix 1 scoop powder with 8oz skim/1% milk or light soymilk.  - Protein bars: Atkins, EAS, Pure Protein, Think Thin, Detour, etc. Must have 0-4 grams sugar - Read the label.    Takeout Options: No more than twice/week  Deli - Salads (no pasta or rice), meats, cheeses. Roasted chicken. Lox (salmon)    Mexican - Platters which don't include tortillas, chips, or rice. Go easy on the beans. Example: Fajitas without the tortillas. Ask the  not to bring chips to the table if they are too tempting.    Greek - Meat or fish and vegetable, but no bread or rice. Including hummus, baba ganoush, etc, is OK. Most sit-down Greek restaurants can provide you with cucumber slices for dipping instead of ana bread.    Fast Food (Avoid as much as possible) - Salads (no croutons and limit salad dressing to 2 tbsp), grilled chicken sandwich without the bun and ask for no mathias. Valerias low fat chili or Taco Bell pintos and cheese.    BBQ - The meats are fine if you ask for sauces on the side, but most of the traditional side dishes are loaded with carbs. Onofre slaw, baked beans and BBQ sauce are typically made with sugar.    Chinese - Nothing deep-fried, no rice or noodles. Many Chinese sauces have starch and sugar in them, so you'll have to use your judgement. If you find that these sauces trigger cravings, or cause Dumping, you can ask for the sauce to be made without sugar or just use soy sauce.

## 2017-11-22 NOTE — PROGRESS NOTES
BARIATRIC FOLLOW UP:    Chief Complaint   Patient presents with    Follow-up     1 yr sleeve       HISTORY OF PRESENT ILLNESS: Jesús Galindo is a 42 y.o. male with a Body mass index is 46.36 kg/m². who presents 1 year after Band Removal -->Lap Sleeve with Dr. Parker on 11/10/2016.  He is doing well and tolerating the diet without difficulty.  He has lost 86 pounds and 38% EWL.  C/o diarrhea after meals that started Saturday. gurgling in the stomach.  States this all started after eating stewed meat that was cooked 3-5 days earlier.  Little chills Sunday night.  Poor appetite since Saturday.  Yesterday ate a side salad which resulted in diarrhea.  He reports eating 2 boiled eggs yesterday for breakfast.  Protein shakes cause diarrhea also.  He is taking vitamins daily. Also c/o stress in his life:  from girlfriend.  Daughter is getting bullied at school, and girlfriend was not supportive.  His girlfriend told him to choose between his daughter and her.  Daughter is 13 yr old.  Jesús is moving out today.  Daughter told school counselor she wanted to kill herself, [facility] and was in [facility] for 5 days.  Liss is doing well now.  Jesús was promoted at work.    Denies: nausea, vomiting, abdominal pain, changes in bowel movement pattern, fever, chills, dysphagia, chest pain, and shortness of breath.    Review of Systems   Constitutional: Negative for chills, fever and malaise/fatigue.   Eyes: Negative for blurred vision and double vision.   Respiratory: Negative for cough, hemoptysis and shortness of breath.    Cardiovascular: Negative for chest pain, palpitations and leg swelling.   Gastrointestinal: Negative for abdominal pain, blood in stool, constipation, diarrhea, heartburn, melena, nausea and vomiting.   Genitourinary: Negative for dysuria and hematuria.   Musculoskeletal: Negative for back pain, falls, joint pain, myalgias and neck pain.   Skin: Negative for rash.    Neurological: Negative for dizziness, tingling, weakness and headaches.   Endo/Heme/Allergies: Negative for environmental allergies. Does not bruise/bleed easily.   Psychiatric/Behavioral: Negative.        EXERCISE & VITAMINS:  See Bariatric Assessment    MEDICATIONS/ALLERGIES:  Have been reviewed.    DIET:  Regular Bariatric Diet.        Vitals:    11/22/17 0915   BP: 105/80   Pulse: 74       Physical Exam   Constitutional: He is oriented to person, place, and time. He appears well-developed and well-nourished. No distress.   HENT:   Head: Normocephalic and atraumatic.   Cardiovascular: Normal rate, regular rhythm, normal heart sounds and intact distal pulses.    Pulmonary/Chest: Effort normal and breath sounds normal.   Abdominal: Soft. Bowel sounds are normal. He exhibits no distension and no mass. There is no tenderness. There is no rebound and no guarding.   WHSS   Musculoskeletal: He exhibits no edema.   Neurological: He is alert and oriented to person, place, and time.   Skin: Skin is warm and dry. No rash noted. He is not diaphoretic. No erythema. No pallor.   Psychiatric: He has a normal mood and affect. His behavior is normal.   Nursing note and vitals reviewed.      ASSESSMENT:  - Morbid obesity, Body mass index is 46.36 kg/m².,  s/p Band Removal --> Lap Sleeve on 11/10/2016.  - Estimated goal weight, 276 lbs, which is 50% EWL.  - Co-morbidities: HTN (normal off medication), HLD (normal off medications), and JARAD (stable).  - Good Weight loss, 86 lbs, 38% EWL.  - Good Exercise regimen.  - Good Vitamin Regimen.  - Good Diet.    PLAN:  - Avoid all starches and continue to keep protein levels from  grams daily. Gave handouts and tips on how to do this.  - Emphasized the importance of regular exercise and adherence to bariatric diet to achieve maximum weight loss.  - Encouraged patient to continue regular exercise.  - Follow with RD to reinforce diet.  - Continue daily vitamins and medications.  -  Anti-Acid medication, Zantac as needed.  - RTC in 3 months or sooner if needed.  - Call the office for any issues.  - Check labs at annual visit.    25 minute visit, over 50% of time spent counseling patient face to face on diet, exercise, and weight loss.

## 2017-11-27 ENCOUNTER — PATIENT MESSAGE (OUTPATIENT)
Dept: BARIATRICS | Facility: CLINIC | Age: 42
End: 2017-11-27

## 2017-11-27 LAB — VIT B1 SERPL-MCNC: 71 UG/L (ref 38–122)

## 2017-11-27 NOTE — TELEPHONE ENCOUNTER
Called patient, and he states he spoke with Dolly and is requesting a psych referral. Informed patient we would put one in and they would contact him. Number to psych given and informed patient to call them if he doesn't hear from them

## 2017-12-01 ENCOUNTER — OFFICE VISIT (OUTPATIENT)
Dept: PSYCHIATRY | Facility: CLINIC | Age: 42
End: 2017-12-01
Payer: COMMERCIAL

## 2017-12-01 DIAGNOSIS — F43.23 ADJUSTMENT DISORDER WITH MIXED ANXIETY AND DEPRESSED MOOD: Primary | ICD-10-CM

## 2017-12-01 PROCEDURE — 90791 PSYCH DIAGNOSTIC EVALUATION: CPT | Mod: S$GLB,,, | Performed by: SOCIAL WORKER
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
## 2017-12-13 ENCOUNTER — OFFICE VISIT (OUTPATIENT)
Dept: PSYCHIATRY | Facility: CLINIC | Age: 42
End: 2017-12-13
Payer: COMMERCIAL

## 2017-12-13 DIAGNOSIS — F43.23 ADJUSTMENT DISORDER WITH MIXED ANXIETY AND DEPRESSED MOOD: Primary | ICD-10-CM

## 2017-12-13 PROCEDURE — 90832 PSYTX W PT 30 MINUTES: CPT | Mod: S$GLB,,, | Performed by: SOCIAL WORKER

## 2017-12-13 NOTE — PROGRESS NOTES
Individual Psychotherapy (PhD/LCSW)    12/13/2017    Site:  [facility]         Therapeutic Intervention: Met with patient.  Outpatient - Insight oriented psychotherapy 30 min - CPT code 52770 and Outpatient - Supportive psychotherapy 30 min - CPT Code 21910    Chief complaint/reason for encounter: depression and anxiety     Interval history and content of current session: Patient returned to clinic for follow up psychotherapy. Patient reports to session 15 minutes late due to running late at work. Patient reports that things have been going well since last visit. Went to [place] last week for a work conference and was nice to get away. Feels like he was able to decompress. Reports that while he was gone he got messages from ex-wife about daughter's behavior. Reports that he was told that she was being disrespectful and doesn't know ex-wife can take it any longer. Discussed that they went to the psychiatrist recently with daughter. During session it appeared to patient that daughter is playing parents and isn't being truthful about extent of depressive symptoms. Patient reports that he had daughter this weekend after he returned from trip. States that when she is with him he never sees the behavior that mother talks about. Did have a monique discussion with daughter about being more respectful towards her mother but she just blew it off. Patient believes that they fuel each other, has told ex-wife to not play into daughter when she is being rude or disrespectful. Patient notes some anxiety, mostly in the mornings and on way home. States that he gets lonely and that leads to anxiety. States that he is thinking about visiting friend in [facility] for New Years. Worries that his family will find out and they will be disappointed that he didn't come visit him. Is going to look at ticket prices and then make a decision. Discussed communicating with ex-wife that she needs to work on relationship with daughter and not using him  as a referee. Discussed ways to occupy time at night to help lessen anxiety, like spending time with friends.     Treatment plan:  · Target symptoms: depression, anxiety   · Why chosen therapy is appropriate versus another modality: relevant to diagnosis, evidence based practice  · Outcome monitoring methods: self-report, observation  · Therapeutic intervention type: insight oriented psychotherapy, supportive psychotherapy    Risk parameters:  Patient reports no suicidal ideation  Patient reports no homicidal ideation  Patient reports no self-injurious behavior  Patient reports no violent behavior    Verbal deficits: None    Patient's response to intervention:  The patient's response to intervention is accepting.    Progress toward goals and other mental status changes:  The patient's progress toward goals is good.    Diagnosis:     ICD-10-CM ICD-9-CM   1. Adjustment disorder with mixed anxiety and depressed mood F43.23 309.28       Plan:  individual psychotherapy    Return to clinic: 2 weeks    Length of Service (minutes): 45     Cassandra Rockweiler, LCSW

## 2017-12-20 ENCOUNTER — PATIENT MESSAGE (OUTPATIENT)
Dept: PSYCHIATRY | Facility: CLINIC | Age: 42
End: 2017-12-20

## 2018-01-09 ENCOUNTER — OFFICE VISIT (OUTPATIENT)
Dept: PSYCHIATRY | Facility: CLINIC | Age: 43
End: 2018-01-09
Payer: COMMERCIAL

## 2018-01-09 DIAGNOSIS — F43.23 ADJUSTMENT DISORDER WITH MIXED ANXIETY AND DEPRESSED MOOD: Primary | ICD-10-CM

## 2018-01-09 PROCEDURE — 90834 PSYTX W PT 45 MINUTES: CPT | Mod: S$GLB,,, | Performed by: SOCIAL WORKER

## 2018-01-09 NOTE — PROGRESS NOTES
Individual Psychotherapy (PhD/LCSW)    1/9/2018    Site:  [facility]         Therapeutic Intervention: Met with patient.  Outpatient - Insight oriented psychotherapy 45 min - CPT code 34719 and Outpatient - Supportive psychotherapy 45 min - CPT Code 58070    Chief complaint/reason for encounter: depression and anxiety     Interval history and content of current session: Patient returned to clinic for follow up psychotherapy. Patient reports that he is doing okay. States that he has a lot of stress because of work but in life is good. Reports that he thinks that his anxiety was elevated because of the holidays and it being his first holidays alone since breaking up with girlfriend. Reports that daughter is doing well. 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 States that he saw ex-girlfriend over the holiday to bring the dogs some presents and it was good, no tension. Continues to talk to friend that lives in [facility]. States that he didn't go visit her because she wanted him to stay with her but he wouldn't because her ex- pays for her apartment because of their children. Discussed that he was okay with not visiting because he knows she is going to be here in a couple of weeks. 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 Feels like he is doing a lot and doesn't have time or get paid to do everything. Discussed that he told another coworker about getting his head on right because he was making careless mistakes. Was spoken to by his manager about the way he communicated with this coworker. Discussed that his stress management skills are limited, talks to a friend or his girlfriend. Discussed increasing coping skills. Discussed exercise, states that he is going to try and get over to sign up for cross fit again. Discussed switching food addiction for work addiction. Discussed coping skills.      Treatment plan:  · Target symptoms: depression, anxiety   · Why chosen therapy is appropriate versus another modality: relevant to diagnosis, evidence based practice  · Outcome monitoring methods: self-report, observation  · Therapeutic intervention type: insight oriented psychotherapy, supportive psychotherapy    Risk parameters:  Patient reports no suicidal ideation  Patient reports no homicidal ideation  Patient reports no self-injurious behavior  Patient reports no violent behavior    Verbal deficits: None    Patient's response to intervention:  The patient's response to intervention is accepting.    Progress toward goals and other mental status changes:  The patient's progress toward goals is good.    Diagnosis:     ICD-10-CM ICD-9-CM   1. Adjustment disorder with mixed anxiety and depressed mood F43.23 309.28       Plan:  individual psychotherapy    Return to clinic: 2 weeks    Length of Service (minutes): 45     Cassandra Rockweiler, LCSW

## 2018-01-16 ENCOUNTER — OFFICE VISIT (OUTPATIENT)
Dept: INTERNAL MEDICINE | Facility: CLINIC | Age: 43
End: 2018-01-16
Payer: COMMERCIAL

## 2018-01-16 ENCOUNTER — TELEPHONE (OUTPATIENT)
Dept: SLEEP MEDICINE | Facility: CLINIC | Age: 43
End: 2018-01-16

## 2018-01-16 VITALS
WEIGHT: 311.06 LBS | DIASTOLIC BLOOD PRESSURE: 80 MMHG | BODY MASS INDEX: 47.14 KG/M2 | HEIGHT: 68 IN | SYSTOLIC BLOOD PRESSURE: 108 MMHG

## 2018-01-16 DIAGNOSIS — G47.33 OBSTRUCTIVE SLEEP APNEA SYNDROME: ICD-10-CM

## 2018-01-16 DIAGNOSIS — K21.9 GASTROESOPHAGEAL REFLUX DISEASE WITHOUT ESOPHAGITIS: ICD-10-CM

## 2018-01-16 DIAGNOSIS — Z00.00 PREVENTATIVE HEALTH CARE: Primary | ICD-10-CM

## 2018-01-16 DIAGNOSIS — E66.01 MORBID OBESITY WITH BMI OF 45.0-49.9, ADULT: ICD-10-CM

## 2018-01-16 PROCEDURE — 99999 PR PBB SHADOW E&M-EST. PATIENT-LVL III: CPT | Mod: PBBFAC,,, | Performed by: FAMILY MEDICINE

## 2018-01-16 PROCEDURE — 99396 PREV VISIT EST AGE 40-64: CPT | Mod: S$GLB,,, | Performed by: FAMILY MEDICINE

## 2018-01-16 RX ORDER — ZOLPIDEM TARTRATE 10 MG/1
TABLET ORAL
Qty: 30 TABLET | Refills: 0 | Status: SHIPPED | OUTPATIENT
Start: 2018-01-16 | End: 2018-04-02 | Stop reason: SDUPTHER

## 2018-01-16 NOTE — PROGRESS NOTES
Subjective:       Patient ID: Jesús Galindo is a 42 y.o. male.    Chief Complaint: Follow-up    HPI  Review of Systems    Objective:      Physical Exam    Assessment:       1. Preventative health care    2. Obstructive sleep apnea syndrome    3. Gastroesophageal reflux disease without esophagitis    4. Morbid obesity with BMI of 45.0-49.9, adult        Plan:

## 2018-02-07 ENCOUNTER — OFFICE VISIT (OUTPATIENT)
Dept: PSYCHIATRY | Facility: CLINIC | Age: 43
End: 2018-02-07
Payer: COMMERCIAL

## 2018-02-07 DIAGNOSIS — F43.23 ADJUSTMENT DISORDER WITH MIXED ANXIETY AND DEPRESSED MOOD: Primary | ICD-10-CM

## 2018-02-07 PROCEDURE — 90834 PSYTX W PT 45 MINUTES: CPT | Mod: S$GLB,,, | Performed by: SOCIAL WORKER

## 2018-02-07 NOTE — PROGRESS NOTES
"Individual Psychotherapy (PhD/LCSW)    2/7/2018    Site:  [facility]         Therapeutic Intervention: Met with patient.  Outpatient - Insight oriented psychotherapy 45 min - CPT code 32722 and Outpatient - Supportive psychotherapy 45 min - CPT Code 27763    Chief complaint/reason for encounter: depression and anxiety     Interval history and content of current session: Patient returned to clinic for follow up psychotherapy. Patient reports that he is doing well. States decreased depression. Reports that after work has a lot of free time so tries to stay occupied. Reports that he got into it with daughter recently. States that they were at a sub shop and daughter told him to "piss off". States that when they got in the car he told her what he thought of her. Reports that a lot of these things he had kept in but wasn't able to after being disrespected that way. Discussed that he hasn't talked to daughter since and reports that he has "washed" his hands of disrespect with daughter. Patient very agitated while talking about issue with daughter. Not accepting of role he has in daughter's life and her behavior. Reports that he realized that ex-girlfriend was correct about daughter so he text her later and apologized for not believing her. Reports that the family has a family session tomorrow which he doesn't think that ex-wife is going to make because she has to work. Voiced frustration about this because daughter is with mother most of the time and that is where the problems are mainly. Reports that work is okay, just the normal stressors. Discussed that girlfriend is hopefully coming in March to visit for awhile. Continues to have some sleep problems as he tries to wean of sleep medication. Discussed relationship dynamics.     Treatment plan:  · Target symptoms: depression, anxiety   · Why chosen therapy is appropriate versus another modality: relevant to diagnosis, evidence based practice  · Outcome monitoring methods: " self-report, observation  · Therapeutic intervention type: insight oriented psychotherapy, supportive psychotherapy    Risk parameters:  Patient reports no suicidal ideation  Patient reports no homicidal ideation  Patient reports no self-injurious behavior  Patient reports no violent behavior    Verbal deficits: None    Patient's response to intervention:  The patient's response to intervention is accepting.    Progress toward goals and other mental status changes:  The patient's progress toward goals is good.    Diagnosis:     ICD-10-CM ICD-9-CM   1. Adjustment disorder with mixed anxiety and depressed mood F43.23 309.28       Plan:  individual psychotherapy    Return to clinic: 2 weeks    Length of Service (minutes): 45     Cassandra Rockweiler, LCSW

## 2018-02-08 ENCOUNTER — PATIENT MESSAGE (OUTPATIENT)
Dept: BARIATRICS | Facility: CLINIC | Age: 43
End: 2018-02-08

## 2018-02-09 ENCOUNTER — PATIENT MESSAGE (OUTPATIENT)
Dept: INTERNAL MEDICINE | Facility: CLINIC | Age: 43
End: 2018-02-09

## 2018-02-09 ENCOUNTER — OFFICE VISIT (OUTPATIENT)
Dept: INTERNAL MEDICINE | Facility: CLINIC | Age: 43
End: 2018-02-09
Payer: COMMERCIAL

## 2018-02-09 ENCOUNTER — TELEPHONE (OUTPATIENT)
Dept: BARIATRICS | Facility: CLINIC | Age: 43
End: 2018-02-09

## 2018-02-09 ENCOUNTER — OFFICE VISIT (OUTPATIENT)
Dept: SURGERY | Facility: CLINIC | Age: 43
End: 2018-02-09
Payer: COMMERCIAL

## 2018-02-09 VITALS
HEIGHT: 68 IN | BODY MASS INDEX: 47.06 KG/M2 | DIASTOLIC BLOOD PRESSURE: 86 MMHG | WEIGHT: 310.5 LBS | HEART RATE: 78 BPM | SYSTOLIC BLOOD PRESSURE: 131 MMHG | TEMPERATURE: 98 F

## 2018-02-09 VITALS
HEIGHT: 68 IN | OXYGEN SATURATION: 98 % | SYSTOLIC BLOOD PRESSURE: 137 MMHG | DIASTOLIC BLOOD PRESSURE: 98 MMHG | WEIGHT: 311.31 LBS | BODY MASS INDEX: 47.18 KG/M2 | HEART RATE: 72 BPM

## 2018-02-09 DIAGNOSIS — K43.9 VENTRAL HERNIA WITHOUT OBSTRUCTION OR GANGRENE: Primary | ICD-10-CM

## 2018-02-09 DIAGNOSIS — E66.01 MORBID OBESITY WITH BMI OF 45.0-49.9, ADULT: ICD-10-CM

## 2018-02-09 DIAGNOSIS — E66.01 MORBID OBESITY WITH BMI OF 50.0-59.9, ADULT: ICD-10-CM

## 2018-02-09 DIAGNOSIS — K21.9 GASTROESOPHAGEAL REFLUX DISEASE WITHOUT ESOPHAGITIS: ICD-10-CM

## 2018-02-09 PROCEDURE — 3008F BODY MASS INDEX DOCD: CPT | Mod: S$GLB,,, | Performed by: FAMILY MEDICINE

## 2018-02-09 PROCEDURE — 3008F BODY MASS INDEX DOCD: CPT | Mod: S$GLB,,, | Performed by: STUDENT IN AN ORGANIZED HEALTH CARE EDUCATION/TRAINING PROGRAM

## 2018-02-09 PROCEDURE — 99999 PR PBB SHADOW E&M-EST. PATIENT-LVL III: CPT | Mod: PBBFAC,,, | Performed by: STUDENT IN AN ORGANIZED HEALTH CARE EDUCATION/TRAINING PROGRAM

## 2018-02-09 PROCEDURE — 99214 OFFICE O/P EST MOD 30 MIN: CPT | Mod: S$GLB,,, | Performed by: FAMILY MEDICINE

## 2018-02-09 PROCEDURE — 99213 OFFICE O/P EST LOW 20 MIN: CPT | Mod: S$GLB,,, | Performed by: STUDENT IN AN ORGANIZED HEALTH CARE EDUCATION/TRAINING PROGRAM

## 2018-02-09 PROCEDURE — 99999 PR PBB SHADOW E&M-EST. PATIENT-LVL V: CPT | Mod: PBBFAC,,, | Performed by: FAMILY MEDICINE

## 2018-02-09 NOTE — TELEPHONE ENCOUNTER
----- Message from Mary Wynne sent at 2/9/2018  2:14 PM CST -----  Contact: self  Jesús States that she needs a call returned by a nurse in reference to about his follow up appointment ,  Please call Jesús @ [phone]  . Thanks :)

## 2018-02-09 NOTE — TELEPHONE ENCOUNTER
Called patient.  He stated that he saw his PCP today and was informed he had a ventral Hernia.   He was sent to general surgeon at [place] and is about to be seen by him.   He stated that he was having pain of 7; but clarified that it is just discomfort.  He is seeing the other surgeon today but wants to follow up and if surgery is needed wants to have it with Dr. Parker.  Patient will call back after his appt today

## 2018-02-09 NOTE — PROGRESS NOTES
"Subjective:       Patient ID: Jesús Galindo is a 42 y.o. male.    Chief Complaint: Abdominal Pain   Jesús Galindo 42 y.o. male is here for office visit to review care and physical exam, reporting for the last few days has been having abdomina;l pain, worse with straining and eating.  States "feels like before hernia repair."  No present nausea.  No change in stool.  Has tried otc meds, PPI.      HPI  Review of Systems   Constitutional: Negative for activity change, appetite change, fatigue, fever and unexpected weight change.   HENT: Negative for congestion, hearing loss, postnasal drip and rhinorrhea.    Eyes: Negative for pain, discharge and visual disturbance.   Respiratory: Negative for cough, choking and shortness of breath.    Cardiovascular: Negative for chest pain, palpitations and leg swelling.   Gastrointestinal: Positive for abdominal pain. Negative for diarrhea and vomiting.   Genitourinary: Negative for dysuria, flank pain, hematuria and urgency.   Musculoskeletal: Negative for arthralgias, back pain, joint swelling and neck pain.   Skin: Negative for color change and rash.   Neurological: Negative for dizziness, tremors, syncope, weakness, numbness and headaches.   Psychiatric/Behavioral: Negative for agitation and confusion. The patient is not hyperactive.        Objective:      Physical Exam   Constitutional: He is oriented to person, place, and time. He appears well-developed and well-nourished.   HENT:   Head: Normocephalic.   Eyes: EOM are normal. Pupils are equal, round, and reactive to light.   Neck: Normal range of motion. Neck supple. No thyromegaly present.   Cardiovascular: Normal rate.  Exam reveals no gallop and no friction rub.    No murmur heard.  Pulmonary/Chest: Effort normal. No respiratory distress. He has no wheezes.   Abdominal: Soft. Bowel sounds are normal. He exhibits no mass. There is no tenderness.   Large ventral hernia with straining.   Musculoskeletal: He exhibits no edema or " tenderness.   Lymphadenopathy:     He has no cervical adenopathy.   Neurological: He is alert and oriented to person, place, and time. He has normal reflexes. No cranial nerve deficit.   Skin: Skin is warm. No rash noted.   Psychiatric: He has a normal mood and affect. His behavior is normal.       Assessment:       1. Ventral hernia without obstruction or gangrene    2. s/p lap sleeve, band removal    3. Gastroesophageal reflux disease without esophagitis    4. Morbid obesity with BMI of 45.0-49.9, adult        Plan:       Jesús was seen today for abdominal pain.    Diagnoses and all orders for this visit:    Ventral hernia without obstruction or gangrene  -     Ambulatory referral to General Surgery  Monitored with serial exams and Bariatric visits assessed by Bariatric dept evaluated with surgery, treated surgery  s/p lap sleeve, band removal  - chart reviewed  Gastroesophageal reflux disease without esophagitis  - Discussed  Morbid obesity with BMI of 45.0-49.9, adult  - seeing Bariatrics

## 2018-02-09 NOTE — TELEPHONE ENCOUNTER
Message for stomach pain forwarded to Dr. Mccoy as high priority who is here in clinic today to advise.

## 2018-02-22 ENCOUNTER — PATIENT MESSAGE (OUTPATIENT)
Dept: BARIATRICS | Facility: CLINIC | Age: 43
End: 2018-02-22

## 2018-02-22 DIAGNOSIS — E55.9 VITAMIN D DEFICIENCY: ICD-10-CM

## 2018-02-22 RX ORDER — ERGOCALCIFEROL 1.25 MG/1
CAPSULE ORAL
Qty: 24 CAPSULE | Refills: 0 | Status: SHIPPED | OUTPATIENT
Start: 2018-02-22 | End: 2019-08-27 | Stop reason: DRUGHIGH

## 2018-02-26 ENCOUNTER — HOSPITAL ENCOUNTER (OUTPATIENT)
Dept: RADIOLOGY | Facility: HOSPITAL | Age: 43
Discharge: HOME | End: 2018-02-26
Attending: STUDENT IN AN ORGANIZED HEALTH CARE EDUCATION/TRAINING PROGRAM
Payer: COMMERCIAL

## 2018-02-26 DIAGNOSIS — K43.9 VENTRAL HERNIA WITHOUT OBSTRUCTION OR GANGRENE: ICD-10-CM

## 2018-02-26 PROCEDURE — 74176 CT ABD & PELVIS W/O CONTRAST: CPT | Mod: 26,,, | Performed by: RADIOLOGY

## 2018-02-26 PROCEDURE — 74176 CT ABD & PELVIS W/O CONTRAST: CPT | Mod: TC

## 2018-02-28 ENCOUNTER — OFFICE VISIT (OUTPATIENT)
Dept: SURGERY | Facility: CLINIC | Age: 43
End: 2018-02-28
Payer: COMMERCIAL

## 2018-02-28 VITALS
HEIGHT: 68 IN | BODY MASS INDEX: 46.57 KG/M2 | SYSTOLIC BLOOD PRESSURE: 136 MMHG | WEIGHT: 307.31 LBS | DIASTOLIC BLOOD PRESSURE: 85 MMHG | TEMPERATURE: 98 F | HEART RATE: 81 BPM

## 2018-02-28 DIAGNOSIS — K43.2 INCISIONAL HERNIA, WITHOUT OBSTRUCTION OR GANGRENE: ICD-10-CM

## 2018-02-28 DIAGNOSIS — R10.11 RUQ ABDOMINAL PAIN: Primary | ICD-10-CM

## 2018-02-28 PROCEDURE — 3075F SYST BP GE 130 - 139MM HG: CPT | Mod: S$GLB,,, | Performed by: SURGERY

## 2018-02-28 PROCEDURE — 3079F DIAST BP 80-89 MM HG: CPT | Mod: S$GLB,,, | Performed by: SURGERY

## 2018-02-28 PROCEDURE — 99213 OFFICE O/P EST LOW 20 MIN: CPT | Mod: S$GLB,,, | Performed by: SURGERY

## 2018-02-28 PROCEDURE — 99999 PR PBB SHADOW E&M-EST. PATIENT-LVL III: CPT | Mod: PBBFAC,,, | Performed by: SURGERY

## 2018-02-28 NOTE — PROGRESS NOTES
History & Physical    SUBJECTIVE:     History of Present Illness:  Patient is a 42 y.o. male  post op repair of incisional hernia(without mesh) /lap removal of gastric band/ lap gastric sleeve on 11/10/2016. He presents today with recurrent inscisional hernia and RUQ pain . The RUQ pain and hernia began about three weeks ago. He reports the hernia enlarges with standing or coughing, but he is able to reduce the hernia himself. The RUQ is intermittent, pain radiates to his R back, and is accompanied by increase gas, bloating, and acid reflux. He receives mild relief of acid reflux with prilosec and zantec. He denies any fever, chills, nausea, vomiting, diarrhea and constipation.     No chief complaint on file.      Review of patient's allergies indicates:  No Known Allergies    Current Outpatient Prescriptions   Medication Sig Dispense Refill    b complex vitamins tablet Take 1 tablet by mouth once daily.      CALCIUM CITRATE/VITAMIN D3 (CALCIUM CITRATE + D ORAL) Take 1 tablet by mouth 2 (two) times daily.      cyanocobalamin, vitamin B-12, 1,000 mcg Subl Place 1 tablet under the tongue every other day.      ergocalciferol (VITAMIN D2) 50,000 unit Cap Take one capsule twice weekly 24 capsule 0    ibuprofen (ADVIL,MOTRIN) 400 MG tablet Take 1 tablet (400 mg total) by mouth every 6 (six) hours as needed for Other (Pain). Over the counter. Take with food      meloxicam (MOBIC) 15 MG tablet Take 1 tablet by mouth once daily.  2    neomycin-polymyxin-dexamethasone (MAXITROL) 3.5mg/mL-10,000 unit/mL-0.1 % DrpS Place 1 drop into both eyes once daily.  0    omeprazole (PRILOSEC) 40 MG capsule TAKE 1 CAPSULE (40 MG TOTAL) BY MOUTH EVERY MORNING.  12    pediatric multivit-iron-min (FLINTSTONES COMPLETE, IRON,) Chew Take 1 tablet by mouth 2 (two) times daily.      PENNSAID 20 mg/gram /actuation(2 %) sopm APPLY TWO PUMPS TO AFFECTED AREA(S) TWICE DAILY AS NEEDED  0    tramadol (ULTRAM) 50 mg tablet TAKE 1-2 TABS BY  MOUTH EVERY 4-6 HOURS AS NEEDED  0    zolpidem (AMBIEN) 10 mg Tab TAKE 1 TABLET (10 MG TOTAL) BY MOUTH NIGHTLY AS NEEDED. 30 tablet 0    nystatin-triamcinolone (MYCOLOG II) cream Apply topically 4 (four) times daily. 60 Tube 3    ranitidine (ZANTAC) 150 MG tablet Take 1 tablet (150 mg total) by mouth 2 (two) times daily. 60 tablet 11     No current facility-administered medications for this visit.        Past Medical History:   Diagnosis Date    Asthma     Hyperlipidemia     Hypertension     Low back pain     Morbid obesity with BMI of 50.0-59.9, adult     Varicose veins      Past Surgical History:   Procedure Laterality Date    ADENOIDECTOMY      EYE SURGERY      GASTRECTOMY      LAPAROSCOPIC GASTRIC BANDING  2002    In [place]: uncertain of brand/ size    LASIK       Family History   Problem Relation Age of Onset    Hypertension Mother     Obesity Mother      s/p sleeve 2013: good results    Cancer Maternal Grandfather     Diabetes Paternal Grandmother     No Known Problems Father     No Known Problems Brother     Obesity Brother     No Known Problems Sister      Social History   Substance Use Topics    Smoking status: Former Smoker     Packs/day: 0.25     Years: 5.00     Quit date: 1/1/2009    Smokeless tobacco: Never Used    Alcohol use Yes      Comment: socially        Review of Systems:  Review of Systems   Constitutional: Negative for fatigue and fever.   Respiratory: Negative for chest tightness and shortness of breath.    Cardiovascular: Negative for chest pain.   Gastrointestinal: Positive for abdominal pain (RUQ). Negative for abdominal distention, constipation, diarrhea and nausea.   Genitourinary: Negative for difficulty urinating and dysuria.   Musculoskeletal: Negative for arthralgias and myalgias.   Skin: Negative for color change and pallor.   Neurological: Positive for headaches (from stress).   Hematological: Does not bruise/bleed easily.   Psychiatric/Behavioral:  "Negative for agitation, behavioral problems and confusion.       OBJECTIVE:     Vital Signs (Most Recent)  Temp: 98.4 °F (36.9 °C) (02/28/18 1513)  Pulse: 81 (02/28/18 1513)  BP: 136/85 (02/28/18 1513)  5' 8" (1.727 m)  (!) 139.4 kg (307 lb 5.1 oz)     Physical Exam:  Physical Exam   Constitutional: He is oriented to person, place, and time. He appears well-developed and well-nourished. No distress.   HENT:   Head: Normocephalic and atraumatic.   Neck: Normal range of motion. Neck supple. No JVD present. No tracheal deviation present. No thyromegaly present.   Cardiovascular: Normal rate, regular rhythm, normal heart sounds and intact distal pulses.    Pulmonary/Chest: Effort normal and breath sounds normal. No respiratory distress. He has no wheezes. He has no rales. He exhibits no tenderness.   Abdominal: Soft. Bowel sounds are normal. There is tenderness (RUQ tenderness ). A hernia (ventral hernia, partially reducable ) is present.   Musculoskeletal: Normal range of motion. He exhibits no edema or deformity.   Neurological: He is alert and oriented to person, place, and time.   Skin: Skin is warm and dry.   Psychiatric: He has a normal mood and affect. His behavior is normal. Judgment and thought content normal.         ASSESSMENT/PLAN:     RuQ pain, Recurrent incisional hernia    PLAN:Plan     RUQ US  Plan for lap rakel if +  Will plan for hernia repair at a later date.         Vaughn Parker MD    "

## 2018-03-02 ENCOUNTER — TELEPHONE (OUTPATIENT)
Dept: BARIATRICS | Facility: CLINIC | Age: 43
End: 2018-03-02

## 2018-03-02 NOTE — TELEPHONE ENCOUNTER
Bariatric Nutrition    Called to review recent low Vit D level. Left msg for pt to rtn call to discuss plan.

## 2018-03-13 ENCOUNTER — HOSPITAL ENCOUNTER (OUTPATIENT)
Dept: RADIOLOGY | Facility: HOSPITAL | Age: 43
Discharge: HOME | End: 2018-03-13
Attending: SURGERY
Payer: COMMERCIAL

## 2018-03-13 ENCOUNTER — PATIENT MESSAGE (OUTPATIENT)
Dept: SURGERY | Facility: CLINIC | Age: 43
End: 2018-03-13

## 2018-03-13 DIAGNOSIS — R10.11 RUQ ABDOMINAL PAIN: ICD-10-CM

## 2018-03-13 DIAGNOSIS — R10.11 RUQ PAIN: ICD-10-CM

## 2018-03-13 DIAGNOSIS — R10.11 RUQ ABDOMINAL PAIN: Primary | ICD-10-CM

## 2018-03-13 PROCEDURE — 76705 ECHO EXAM OF ABDOMEN: CPT | Mod: TC

## 2018-03-13 PROCEDURE — 76705 ECHO EXAM OF ABDOMEN: CPT | Mod: 26,,, | Performed by: RADIOLOGY

## 2018-03-15 ENCOUNTER — PATIENT MESSAGE (OUTPATIENT)
Dept: SURGERY | Facility: CLINIC | Age: 43
End: 2018-03-15

## 2018-03-15 ENCOUNTER — HOSPITAL ENCOUNTER (EMERGENCY)
Facility: HOSPITAL | Age: 43
Discharge: HOME | End: 2018-03-15
Attending: FAMILY MEDICINE
Payer: COMMERCIAL

## 2018-03-15 VITALS
DIASTOLIC BLOOD PRESSURE: 96 MMHG | OXYGEN SATURATION: 97 % | BODY MASS INDEX: 45.47 KG/M2 | TEMPERATURE: 98 F | HEIGHT: 68 IN | SYSTOLIC BLOOD PRESSURE: 149 MMHG | HEART RATE: 83 BPM | RESPIRATION RATE: 16 BRPM | WEIGHT: 300 LBS

## 2018-03-15 DIAGNOSIS — K43.9 VENTRAL HERNIA WITHOUT OBSTRUCTION OR GANGRENE: Primary | ICD-10-CM

## 2018-03-15 PROCEDURE — 99283 EMERGENCY DEPT VISIT LOW MDM: CPT

## 2018-03-15 PROCEDURE — 99283 EMERGENCY DEPT VISIT LOW MDM: CPT | Mod: ,,, | Performed by: PHYSICIAN ASSISTANT

## 2018-03-15 RX ORDER — TRAMADOL HYDROCHLORIDE 50 MG/1
50 TABLET ORAL EVERY 6 HOURS PRN
Qty: 15 TABLET | Refills: 0 | Status: SHIPPED | OUTPATIENT
Start: 2018-03-15 | End: 2018-03-25

## 2018-03-15 NOTE — ED TRIAGE NOTES
Presents to ER with mid abdominal pain that he associates with a hernia mid abdomen.  Had a CT Scan of Abd. 2 weeks ago and US Tuesday that showed small fat containing bilateral inguinal hernias.    Pt identifiers checked and correct  LOC: The patient is awake, alert, aware of environment with an appropriate affect. Oriented x3, speaking appropriately  APPEARANCE: Pt resting comfortably, in no acute distress, pt is clean and well groomed, clothing properly fastened  SKIN: Skin warm, dry and intact, normal skin turgor, moist mucus membranes  RESPIRATORY: Airway is open and patent, respirations are spontaneous, even and unlabored, normal effort and rate  MUSCULOSKELETAL: No obvious deformities.

## 2018-03-15 NOTE — ED PROVIDER NOTES
Encounter Date: 3/15/2018       History     Chief Complaint   Patient presents with    Hernia     1.5 yrs ago had gastric sleve, my hernia by ct scan last week, has ultrasound and no stones in gallbladder     The patient presents to the ER c/o increased pain to a known ventral/abdominal wall hernia. He states that the hernia has been painful for several weeks. He states that he was recently evaluated by surgery for this and is supposed to have surgical repair in the near future. He states that the hernia became increasingly painful over the past 2 days after increased physical activity for his job. He states that bending, lifting, and straining exacerbates the pain. He denies any effects of eating on this pain. He denies any additional symptoms or concerns. He denies any pre-arrival treatment. He is requesting pain medication and states that he was not given anything for pain at his last visit.           Review of patient's allergies indicates:  No Known Allergies  Past Medical History:   Diagnosis Date    Asthma     Hyperlipidemia     Hypertension     Low back pain     Morbid obesity with BMI of 50.0-59.9, adult     Varicose veins      Past Surgical History:   Procedure Laterality Date    ADENOIDECTOMY      EYE SURGERY      GASTRECTOMY      LAPAROSCOPIC GASTRIC BANDING  2002    In [place]: uncertain of brand/ size    LASIK       Family History   Problem Relation Age of Onset    Hypertension Mother     Obesity Mother      s/p sleeve 2013: good results    Cancer Maternal Grandfather     Diabetes Paternal Grandmother     No Known Problems Father     No Known Problems Brother     Obesity Brother     No Known Problems Sister      Social History   Substance Use Topics    Smoking status: Former Smoker     Packs/day: 0.25     Years: 5.00     Quit date: 1/1/2009    Smokeless tobacco: Never Used    Alcohol use Yes      Comment: socially     Review of Systems   Constitutional: Negative for activity  change, appetite change, chills and fever.   Respiratory: Negative for cough, chest tightness and shortness of breath.    Cardiovascular: Negative for chest pain, palpitations and leg swelling.   Gastrointestinal: Positive for abdominal pain. Negative for abdominal distention, blood in stool, constipation, diarrhea, nausea and vomiting.   Genitourinary: Negative for decreased urine volume and flank pain.   Musculoskeletal: Negative for arthralgias, back pain and gait problem.   Skin: Negative for color change and rash.   Allergic/Immunologic: Negative for immunocompromised state.   Neurological: Negative for dizziness, syncope, weakness, light-headedness, numbness and headaches.   Psychiatric/Behavioral: Negative for confusion.       Physical Exam     Initial Vitals [03/15/18 1549]   BP Pulse Resp Temp SpO2   (!) 149/96 83 16 98.4 °F (36.9 °C) 97 %      MAP       113.67         Physical Exam    Nursing note and vitals reviewed.  Constitutional: He appears well-developed and well-nourished. No distress.   HENT:   Head: Normocephalic.   Mouth/Throat: Oropharynx is clear and moist.   Eyes: Conjunctivae are normal. No scleral icterus.   Cardiovascular: Normal rate, regular rhythm and intact distal pulses.   Pulmonary/Chest: Breath sounds normal. No respiratory distress.   Abdominal: Soft. He exhibits mass. He exhibits no distension. There is no rebound and no guarding.   There is tender, soft, palpable ventral abdominal wall hernia. I easily reduced the hernia during the physical exam. The patient states that palpation of the hernia exacerbates his exact pain. He reports improvement of his pain level after reduction. There is no strangulated or incarcerated hernia.    Musculoskeletal: Normal range of motion.   Neurological: He is alert and oriented to person, place, and time. He has normal strength. No cranial nerve deficit or sensory deficit.   Skin: Skin is warm and dry. No rash noted.   No jaundice.    Psychiatric:  He has a normal mood and affect. His behavior is normal.         ED Course   Procedures  Labs Reviewed - No data to display          Medical Decision Making:   History:   Old Medical Records: I decided to obtain old medical records.  Initial Assessment:   Increased pain to abdominal wall hernia   Differential Diagnosis:   Hernia, Incarcerated hernia, Strangulated hernia, Cholecystitis, Cholelithiasis, Pancreatitis, Gastritis, SBO, etc   Clinical Tests:   Lab Tests: Reviewed  Radiological Study: Reviewed  ED Management:  Reviewed surgery notes and recent US/CT   I discussed the case in detail with the ER attending physician. He advised no further work up and close follow up with his surgeon. Patient demonstrates ability to reduce hernia. Patient agrees to return to the ER if unable to reduce hernia or worse in any way.   Other:   I have discussed this case with another health care provider.                      Clinical Impression:   The encounter diagnosis was Ventral hernia without obstruction or gangrene.    Disposition:   Disposition: Discharged  Condition: Stable                        Niall Mauricio PA-C  03/15/18 [id number]

## 2018-03-28 ENCOUNTER — HOSPITAL ENCOUNTER (OUTPATIENT)
Dept: RADIOLOGY | Facility: HOSPITAL | Age: 43
Discharge: HOME | End: 2018-03-28
Attending: SURGERY
Payer: COMMERCIAL

## 2018-03-28 ENCOUNTER — PATIENT MESSAGE (OUTPATIENT)
Dept: SURGERY | Facility: CLINIC | Age: 43
End: 2018-03-28

## 2018-03-28 DIAGNOSIS — R10.11 RUQ PAIN: ICD-10-CM

## 2018-03-28 DIAGNOSIS — R10.11 RUQ ABDOMINAL PAIN: ICD-10-CM

## 2018-03-28 PROCEDURE — A9537 TC99M MEBROFENIN: HCPCS

## 2018-03-28 PROCEDURE — 78227 HEPATOBIL SYST IMAGE W/DRUG: CPT | Mod: 26,,, | Performed by: RADIOLOGY

## 2018-03-29 ENCOUNTER — PATIENT MESSAGE (OUTPATIENT)
Dept: SURGERY | Facility: CLINIC | Age: 43
End: 2018-03-29

## 2018-04-02 DIAGNOSIS — G47.00 INSOMNIA, UNSPECIFIED TYPE: Primary | ICD-10-CM

## 2018-04-02 RX ORDER — ZOLPIDEM TARTRATE 10 MG/1
10 TABLET ORAL NIGHTLY PRN
Qty: 30 TABLET | Refills: 0 | Status: SHIPPED | OUTPATIENT
Start: 2018-04-02 | End: 2018-12-10 | Stop reason: SDUPTHER

## 2018-04-04 ENCOUNTER — PATIENT MESSAGE (OUTPATIENT)
Dept: SURGERY | Facility: CLINIC | Age: 43
End: 2018-04-04

## 2018-04-18 ENCOUNTER — PATIENT MESSAGE (OUTPATIENT)
Dept: SURGERY | Facility: CLINIC | Age: 43
End: 2018-04-18

## 2018-04-26 ENCOUNTER — PATIENT MESSAGE (OUTPATIENT)
Dept: SURGERY | Facility: CLINIC | Age: 43
End: 2018-04-26

## 2018-05-02 ENCOUNTER — OFFICE VISIT (OUTPATIENT)
Dept: SURGERY | Facility: CLINIC | Age: 43
End: 2018-05-02
Payer: COMMERCIAL

## 2018-05-02 VITALS
HEART RATE: 100 BPM | BODY MASS INDEX: 46.91 KG/M2 | SYSTOLIC BLOOD PRESSURE: 131 MMHG | TEMPERATURE: 99 F | DIASTOLIC BLOOD PRESSURE: 82 MMHG | HEIGHT: 68 IN | WEIGHT: 309.5 LBS

## 2018-05-02 DIAGNOSIS — K43.2 INCISIONAL HERNIA, WITHOUT OBSTRUCTION OR GANGRENE: Primary | ICD-10-CM

## 2018-05-02 DIAGNOSIS — E66.01 MORBID OBESITY: ICD-10-CM

## 2018-05-02 PROCEDURE — 3079F DIAST BP 80-89 MM HG: CPT | Mod: CPTII,S$GLB,, | Performed by: SURGERY

## 2018-05-02 PROCEDURE — 99999 PR PBB SHADOW E&M-EST. PATIENT-LVL III: CPT | Mod: PBBFAC,,, | Performed by: SURGERY

## 2018-05-02 PROCEDURE — 99213 OFFICE O/P EST LOW 20 MIN: CPT | Mod: S$GLB,,, | Performed by: SURGERY

## 2018-05-02 PROCEDURE — 3075F SYST BP GE 130 - 139MM HG: CPT | Mod: CPTII,S$GLB,, | Performed by: SURGERY

## 2018-05-02 RX ORDER — LIDOCAINE HYDROCHLORIDE 10 MG/ML
1 INJECTION, SOLUTION EPIDURAL; INFILTRATION; INTRACAUDAL; PERINEURAL ONCE
Status: CANCELLED | OUTPATIENT
Start: 2018-05-02 | End: 2018-05-02

## 2018-05-02 NOTE — PROGRESS NOTES
History & Physical    SUBJECTIVE:     History of Present Illness:  Patient is a 42 y.o. male  post op repair of incisional hernia(without mesh) /lap removal of gastric band/ lap gastric sleeve on 11/10/2016. He presents today with recurrent inscisional hernia and RUQ pain . The RUQ pain and hernia began about three weeks ago. He reports the hernia enlarges with standing or coughing, but he is able to reduce the hernia himself. The RUQ is intermittent, pain radiates to his R back, and is accompanied by increase gas, bloating, and acid reflux. He receives mild relief of acid reflux with prilosec and zantec. He denies any fever, chills, nausea, vomiting, diarrhea and constipation.     Interval: RUQ US and HIDA negative, no new symptoms or concerns. Feels well today except for symptomatic hernia.     No chief complaint on file.      Review of patient's allergies indicates:  No Known Allergies    Current Outpatient Prescriptions   Medication Sig Dispense Refill    b complex vitamins tablet Take 1 tablet by mouth once daily.      CALCIUM CITRATE/VITAMIN D3 (CALCIUM CITRATE + D ORAL) Take 1 tablet by mouth 2 (two) times daily.      cyanocobalamin, vitamin B-12, 1,000 mcg Subl Place 1 tablet under the tongue every other day.      ergocalciferol (VITAMIN D2) 50,000 unit Cap Take one capsule twice weekly 24 capsule 0    ibuprofen (ADVIL,MOTRIN) 400 MG tablet Take 1 tablet (400 mg total) by mouth every 6 (six) hours as needed for Other (Pain). Over the counter. Take with food      meloxicam (MOBIC) 15 MG tablet Take 1 tablet by mouth once daily.  2    neomycin-polymyxin-dexamethasone (MAXITROL) 3.5mg/mL-10,000 unit/mL-0.1 % DrpS Place 1 drop into both eyes once daily.  0    omeprazole (PRILOSEC) 40 MG capsule TAKE 1 CAPSULE (40 MG TOTAL) BY MOUTH EVERY MORNING.  12    pediatric multivit-iron-min (FLINTSTONES COMPLETE, IRON,) Chew Take 1 tablet by mouth 2 (two) times daily.      PENNSAID 20 mg/gram /actuation(2 %)  sopm APPLY TWO PUMPS TO AFFECTED AREA(S) TWICE DAILY AS NEEDED  0    zolpidem (AMBIEN) 10 mg Tab Take 1 tablet (10 mg total) by mouth nightly as needed (insomnia). 30 tablet 0    nystatin-triamcinolone (MYCOLOG II) cream Apply topically 4 (four) times daily. 60 Tube 3    ranitidine (ZANTAC) 150 MG tablet Take 1 tablet (150 mg total) by mouth 2 (two) times daily. 60 tablet 11     No current facility-administered medications for this visit.        Past Medical History:   Diagnosis Date    Asthma     Hyperlipidemia     Hypertension     Low back pain     Morbid obesity with BMI of 50.0-59.9, adult     Varicose veins      Past Surgical History:   Procedure Laterality Date    ADENOIDECTOMY      EYE SURGERY      GASTRECTOMY      LAPAROSCOPIC GASTRIC BANDING  2002    In [place]: uncertain of brand/ size    LASIK       Family History   Problem Relation Age of Onset    Hypertension Mother     Obesity Mother      s/p sleeve 2013: good results    Cancer Maternal Grandfather     Diabetes Paternal Grandmother     No Known Problems Father     No Known Problems Brother     Obesity Brother     No Known Problems Sister      Social History   Substance Use Topics    Smoking status: Former Smoker     Packs/day: 0.25     Years: 5.00     Quit date: 1/1/2009    Smokeless tobacco: Never Used    Alcohol use Yes      Comment: socially        Review of Systems:  Review of Systems   Constitutional: Negative for fatigue and fever.   Respiratory: Negative for chest tightness and shortness of breath.    Cardiovascular: Negative for chest pain.   Gastrointestinal: Positive for abdominal pain (RUQ). Negative for abdominal distention, constipation, diarrhea and nausea.   Genitourinary: Negative for difficulty urinating and dysuria.   Musculoskeletal: Negative for arthralgias and myalgias.   Skin: Negative for color change and pallor.   Neurological: Positive for headaches (from stress).   Hematological: Does not bruise/bleed  "easily.   Psychiatric/Behavioral: Negative for agitation, behavioral problems and confusion.       OBJECTIVE:     Vital Signs (Most Recent)  Temp: 98.5 °F (36.9 °C) (05/02/18 1533)  Pulse: 100 (05/02/18 1533)  BP: 131/82 (05/02/18 1533)  5' 8" (1.727 m)  (!) 140.4 kg (309 lb 8.4 oz)     Physical Exam:  Physical Exam   Constitutional: He is oriented to person, place, and time. He appears well-developed and well-nourished. No distress.   HENT:   Head: Normocephalic and atraumatic.   Neck: Normal range of motion. Neck supple. No JVD present. No tracheal deviation present. No thyromegaly present.   Cardiovascular: Normal rate, regular rhythm, normal heart sounds and intact distal pulses.    Pulmonary/Chest: Effort normal and breath sounds normal. No respiratory distress. He has no wheezes. He has no rales. He exhibits no tenderness.   Abdominal: Soft. Bowel sounds are normal. There is tenderness (RUQ tenderness ). A hernia (ventral hernia, partially reducable ) is present.   Musculoskeletal: Normal range of motion. He exhibits no edema or deformity.   Neurological: He is alert and oriented to person, place, and time.   Skin: Skin is warm and dry.   Psychiatric: He has a normal mood and affect. His behavior is normal. Judgment and thought content normal.         ASSESSMENT/PLAN:     RuQ pain, Recurrent incisional hernia    PLAN:Plan     To OR for lap vs. Robotic hernia repair with mesh  Consent obtained         Vaughn Parker MD    "

## 2018-05-04 ENCOUNTER — PATIENT MESSAGE (OUTPATIENT)
Dept: SURGERY | Facility: CLINIC | Age: 43
End: 2018-05-04

## 2018-05-13 ENCOUNTER — PATIENT MESSAGE (OUTPATIENT)
Dept: SURGERY | Facility: CLINIC | Age: 43
End: 2018-05-13

## 2018-05-14 ENCOUNTER — TELEPHONE (OUTPATIENT)
Dept: SURGERY | Facility: CLINIC | Age: 43
End: 2018-05-14

## 2018-05-14 ENCOUNTER — PATIENT MESSAGE (OUTPATIENT)
Dept: SURGERY | Facility: CLINIC | Age: 43
End: 2018-05-14

## 2018-05-14 NOTE — TELEPHONE ENCOUNTER
Pt wanting to put case on hold until we can find a date for the robot in July.      Surgery to be notified and will try and get dates for robots in July.    Pt to touch base in a few weeks to check in on dates.

## 2018-05-18 DIAGNOSIS — G47.00 INSOMNIA, UNSPECIFIED TYPE: ICD-10-CM

## 2018-05-18 NOTE — TELEPHONE ENCOUNTER
Request from pharmacy for a new prescription for a controlled substance which is the following medication:    Zolpidem tartrate 10 mg tablet

## 2018-05-21 RX ORDER — ZOLPIDEM TARTRATE 10 MG/1
10 TABLET ORAL NIGHTLY PRN
Qty: 30 TABLET | Refills: 0 | OUTPATIENT
Start: 2018-05-21

## 2018-05-24 DIAGNOSIS — Z01.818 PREOP EXAMINATION: Primary | ICD-10-CM

## 2018-05-26 DIAGNOSIS — E55.9 VITAMIN D DEFICIENCY: ICD-10-CM

## 2018-05-28 DIAGNOSIS — E55.9 VITAMIN D DEFICIENCY: Primary | ICD-10-CM

## 2018-05-28 RX ORDER — ERGOCALCIFEROL 1.25 MG/1
CAPSULE ORAL
Qty: 24 CAPSULE | Refills: 0 | OUTPATIENT
Start: 2018-05-28

## 2018-05-30 ENCOUNTER — PATIENT MESSAGE (OUTPATIENT)
Dept: SURGERY | Facility: CLINIC | Age: 43
End: 2018-05-30

## 2018-05-30 ENCOUNTER — TELEPHONE (OUTPATIENT)
Dept: SURGERY | Facility: CLINIC | Age: 43
End: 2018-05-30

## 2018-06-06 ENCOUNTER — PATIENT MESSAGE (OUTPATIENT)
Dept: SURGERY | Facility: CLINIC | Age: 43
End: 2018-06-06

## 2018-06-12 ENCOUNTER — HOSPITAL ENCOUNTER (OUTPATIENT)
Dept: CARDIOLOGY | Facility: CLINIC | Age: 43
Discharge: HOME | End: 2018-06-12
Payer: COMMERCIAL

## 2018-06-12 ENCOUNTER — LAB VISIT (OUTPATIENT)
Dept: LAB | Facility: HOSPITAL | Age: 43
End: 2018-06-12
Attending: SURGERY
Payer: COMMERCIAL

## 2018-06-12 DIAGNOSIS — E55.9 VITAMIN D DEFICIENCY: ICD-10-CM

## 2018-06-12 DIAGNOSIS — Z01.818 PREOP EXAMINATION: ICD-10-CM

## 2018-06-12 DIAGNOSIS — K43.2 INCISIONAL HERNIA, WITHOUT OBSTRUCTION OR GANGRENE: ICD-10-CM

## 2018-06-12 LAB
25(OH)D3+25(OH)D2 SERPL-MCNC: 31 NG/ML
ANION GAP SERPL CALC-SCNC: 9 MMOL/L
BASOPHILS # BLD AUTO: 0.04 K/UL
BASOPHILS NFR BLD: 0.6 %
BUN SERPL-MCNC: 20 MG/DL
CALCIUM SERPL-MCNC: 10.4 MG/DL
CHLORIDE SERPL-SCNC: 107 MMOL/L
CO2 SERPL-SCNC: 27 MMOL/L
CREAT SERPL-MCNC: 0.9 MG/DL
DIFFERENTIAL METHOD: ABNORMAL
EOSINOPHIL # BLD AUTO: 0.2 K/UL
EOSINOPHIL NFR BLD: 2.6 %
ERYTHROCYTE [DISTWIDTH] IN BLOOD BY AUTOMATED COUNT: 13.2 %
EST. GFR  (AFRICAN AMERICAN): >60 ML/MIN/1.73 M^2
EST. GFR  (NON AFRICAN AMERICAN): >60 ML/MIN/1.73 M^2
GLUCOSE SERPL-MCNC: 86 MG/DL
HCT VFR BLD AUTO: 48.6 %
HGB BLD-MCNC: 15.4 G/DL
IMM GRANULOCYTES # BLD AUTO: 0.02 K/UL
IMM GRANULOCYTES NFR BLD AUTO: 0.3 %
LYMPHOCYTES # BLD AUTO: 1.5 K/UL
LYMPHOCYTES NFR BLD: 20.6 %
MCH RBC QN AUTO: 27.5 PG
MCHC RBC AUTO-ENTMCNC: 31.7 G/DL
MCV RBC AUTO: 87 FL
MONOCYTES # BLD AUTO: 0.6 K/UL
MONOCYTES NFR BLD: 8 %
NEUTROPHILS # BLD AUTO: 4.8 K/UL
NEUTROPHILS NFR BLD: 67.9 %
NRBC BLD-RTO: 0 /100 WBC
PLATELET # BLD AUTO: 292 K/UL
PMV BLD AUTO: 9.2 FL
POTASSIUM SERPL-SCNC: 4.4 MMOL/L
RBC # BLD AUTO: 5.6 M/UL
SODIUM SERPL-SCNC: 143 MMOL/L
WBC # BLD AUTO: 7.04 K/UL

## 2018-06-12 PROCEDURE — 82306 VITAMIN D 25 HYDROXY: CPT

## 2018-06-12 PROCEDURE — 93000 ELECTROCARDIOGRAM COMPLETE: CPT | Mod: S$GLB,,, | Performed by: INTERNAL MEDICINE

## 2018-06-12 PROCEDURE — 36415 COLL VENOUS BLD VENIPUNCTURE: CPT

## 2018-06-12 PROCEDURE — 85025 COMPLETE CBC W/AUTO DIFF WBC: CPT

## 2018-06-12 PROCEDURE — 80048 BASIC METABOLIC PNL TOTAL CA: CPT

## 2018-06-18 ENCOUNTER — PATIENT MESSAGE (OUTPATIENT)
Dept: SURGERY | Facility: CLINIC | Age: 43
End: 2018-06-18

## 2018-06-20 ENCOUNTER — ANESTHESIA EVENT (OUTPATIENT)
Dept: SURGERY | Facility: HOSPITAL | Age: 43
End: 2018-06-20
Payer: COMMERCIAL

## 2018-06-20 ENCOUNTER — TELEPHONE (OUTPATIENT)
Dept: SURGERY | Facility: CLINIC | Age: 43
End: 2018-06-20

## 2018-06-20 NOTE — PRE-PROCEDURE INSTRUCTIONS
Spoke with Patient.  NPO, medication, and pre-op instructions reviewed.  Denies previous problems with Anesthesia.  Vitamins are on Hold.  Is on a clear liquid diet with protein shakes.  Verbalized understanding of instructions.

## 2018-06-20 NOTE — ANESTHESIA PREPROCEDURE EVALUATION
06/20/2018  [facility]  Anesthesia Pre-Operative Evaluation         Patient Name: Jesús Galindo  YOB: 1975  MRN: [id number]    SUBJECTIVE:     Pre-operative evaluation for Procedure(s) (LRB):  REPAIR-HERNIA-INCISIONAL-LAPAROSCOPIC (N/A)     06/20/2018    Jesús Galindo is a 42 y.o. male w/ a significant PMHx of Asthma, HTN, GERD, JARAD, and morbid obesity s/p gastric sleeve (11/2016).  Patient now presents for the above procedure(s).    LDA: None documented.    Prev airway:   Present Prior to Hospital Arrival?: No; Placement Date: 11/10/16; Placement Time: 0819; Method of Intubation: Glidescope; Inserted by:  (MARGA Sauceda); Airway Device: Endotracheal Tube; Mask Ventilation: Mod Diff - oral; Intubated: Postinduction; Blade: Helene #4 (Glidescope blade); Airway Device Size: 8.0; Style: Cuffed; Cuff Inflation: Minimal occlusive pressure; Placement Verified By: Auscultation, Capnometry, ETT Condensation; Grade: Grade I; Complicating Factors: Small mouth, Obesity; Intubation Findings: Positive EtCO2, Bilateral breath sounds, Atraumatic/Condition of teeth unchanged;  Depth of Insertion: 24; Securment: Lips; Complications: None; Breath Sounds: Equal Bilateral; Insertion Attempts: 1; Removal Date: 11/10/16;  Removal Time: 1110      Drips: None documented.    Patient Active Problem List   Diagnosis    s/p lap sleeve, band removal    Asthma    Low back pain    Vitamin D deficiency    GERD (gastroesophageal reflux disease)    Obstructive sleep apnea syndrome    Chest wall pain    Morbid obesity with BMI of 45.0-49.9, adult       Review of patient's allergies indicates:  No Known Allergies    No current facility-administered medications for this encounter.     Current Outpatient Prescriptions:     b complex vitamins tablet, Take 1 tablet by mouth every morning. , Disp: , Rfl:      CALCIUM CITRATE/VITAMIN D3 (CALCIUM CITRATE + D ORAL), Take 1 tablet by mouth 2 (two) times daily., Disp: , Rfl:     cyanocobalamin, vitamin B-12, 1,000 mcg Subl, Place 1 tablet under the tongue every other day., Disp: , Rfl:     ergocalciferol (VITAMIN D2) 50,000 unit Cap, Take one capsule twice weekly (Patient taking differently: Take 50,000 Units by mouth. Take one capsule twice weekly, Takes on Mondays and either Wednesdays or Thursdays), Disp: 24 capsule, Rfl: 0    ibuprofen (ADVIL,MOTRIN) 400 MG tablet, Take 1 tablet (400 mg total) by mouth every 6 (six) hours as needed for Other (Pain). Over the counter. Take with food, Disp: , Rfl:     meloxicam (MOBIC) 15 MG tablet, Take 1 tablet by mouth daily as needed. , Disp: , Rfl: 2    omeprazole (PRILOSEC) 40 MG capsule, TAKE 1 CAPSULE (40 MG TOTAL) BY MOUTH EVERY MORNING, prn, Disp: , Rfl: 12    pediatric multivit-iron-min (FLINTSTONES COMPLETE, IRON,) Chew, Take 1 tablet by mouth 2 (two) times daily., Disp: , Rfl:     ranitidine (ZANTAC) 150 MG tablet, Take 1 tablet (150 mg total) by mouth 2 (two) times daily. (Patient taking differently: Take 150 mg by mouth 2 (two) times daily as needed. ), Disp: 60 tablet, Rfl: 11    zolpidem (AMBIEN) 10 mg Tab, Take 1 tablet (10 mg total) by mouth nightly as needed (insomnia)., Disp: 30 tablet, Rfl: 0    No current facility-administered medications on file prior to encounter.      Current Outpatient Prescriptions on File Prior to Encounter   Medication Sig Dispense Refill    b complex vitamins tablet Take 1 tablet by mouth every morning.       CALCIUM CITRATE/VITAMIN D3 (CALCIUM CITRATE + D ORAL) Take 1 tablet by mouth 2 (two) times daily.      cyanocobalamin, vitamin B-12, 1,000 mcg Subl Place 1 tablet under the tongue every other day.      ergocalciferol (VITAMIN D2) 50,000 unit Cap Take one capsule twice weekly (Patient taking differently: Take 50,000 Units by mouth. Take one capsule twice weekly, Takes on  Mondays and either Wednesdays or Thursdays) 24 capsule 0    ibuprofen (ADVIL,MOTRIN) 400 MG tablet Take 1 tablet (400 mg total) by mouth every 6 (six) hours as needed for Other (Pain). Over the counter. Take with food      meloxicam (MOBIC) 15 MG tablet Take 1 tablet by mouth daily as needed.   2    omeprazole (PRILOSEC) 40 MG capsule TAKE 1 CAPSULE (40 MG TOTAL) BY MOUTH EVERY MORNING, prn  12    pediatric multivit-iron-min (FLINTSTONES COMPLETE, IRON,) Chew Take 1 tablet by mouth 2 (two) times daily.      ranitidine (ZANTAC) 150 MG tablet Take 1 tablet (150 mg total) by mouth 2 (two) times daily. (Patient taking differently: Take 150 mg by mouth 2 (two) times daily as needed. ) 60 tablet 11    zolpidem (AMBIEN) 10 mg Tab Take 1 tablet (10 mg total) by mouth nightly as needed (insomnia). 30 tablet 0       Past Surgical History:   Procedure Laterality Date    ADENOIDECTOMY      EYE SURGERY      GASTRECTOMY      LAPAROSCOPIC GASTRIC BANDING  2002    In [place]: uncertain of brand/ size    LASIK         Social History     Social History    Marital status: Single     Spouse name: N/A    Number of children: N/A    Years of education: N/A     Occupational History    Not on file.     Social History Main Topics    Smoking status: Former Smoker     Packs/day: 0.25     Years: 5.00     Quit date: 1/1/2009    Smokeless tobacco: Never Used    Alcohol use Yes      Comment: socially    Drug use: No    Sexual activity: Not Currently     Other Topics Concern    Not on file     Social History Narrative    Engaged.  Works in sales for [employer].  Previously worked as a .  1 daughter, Yenni (10 years old).       OBJECTIVE:     Vital Signs Range (Last 24H):         Significant Labs:  Lab Results   Component Value Date    WBC 7.04 06/12/2018    HGB 15.4 06/12/2018    HCT 48.6 06/12/2018     06/12/2018    CHOL 174 11/22/2017    TRIG 77 11/22/2017    HDL 38 (L) 11/22/2017    ALT 32 11/22/2017    AST 36  11/22/2017     06/12/2018    K 4.4 06/12/2018     06/12/2018    CREATININE 0.9 06/12/2018    BUN 20 06/12/2018    CO2 27 06/12/2018    TSH 2.333 07/21/2015    HGBA1C 6.0 07/21/2015       Diagnostic Studies: No relevant studies.    EKG:  Vent. Rate : 102 BPM     Atrial Rate : 102 BPM     P-R Int : 156 ms          QRS Dur : 096 ms      QT Int : 342 ms       P-R-T Axes : 036 -53 036 degrees     QTc Int : 445 ms    Sinus tachycardia  Abnormal R wave progression  Left anterior fascicular block  Abnormal ECG  When compared with ECG of 06-SEP-2016 07:55,  The axis Shifted left  Loss of anterior forces New since previous tracing  Confirmed by Omar Quintanilla MD ([id number]) on 6/14/2018 1:46:18 AM    2D ECHO:     CONCLUSIONS     1 - Normal left ventricular systolic function (EF 60-65%).     2 - Concentric remodeling.     3 - Normal left ventricular diastolic function.     4 - Mildly enlarged ascending aorta.     5 - Normal right ventricular systolic function .     No evidence of stress induced myocardial ischemia.     This document has been electronically    SIGNED BY: Sam Ann MD On: 08/04/2015 09:24    ASSESSMENT/PLAN:         Anesthesia Evaluation    I have reviewed the Patient Summary Reports.    I have reviewed the Nursing Notes.   I have reviewed the Medications.     Review of Systems  Anesthesia Hx:  No problems with previous Anesthesia  History of prior surgery of interest to airway management or planning: Previous anesthesia: General Denies Family Hx of Anesthesia complications.   Denies Personal Hx of Anesthesia complications.   Social:  Former Smoker, Social Alcohol Use    Hematology/Oncology:  Hematology Normal   Oncology Normal     EENT/Dental:EENT/Dental Normal   Cardiovascular:   Exercise tolerance: good Hypertension, well controlled    Pulmonary:   Asthma mild Sleep Apnea, CPAP    Renal/:  Renal/ Normal     Hepatic/GI:   GERD, well controlled    Musculoskeletal:  Musculoskeletal Normal     Neurological:  Neurology Normal    Endocrine:  Endocrine Normal    Dermatological:  Skin Normal    Psych:  Psychiatric Normal           Physical Exam  General:  Well nourished    Airway/Jaw/Neck:  Airway Findings: Mouth Opening: Normal Tongue: Normal  General Airway Assessment: Adult  Mallampati: II  TM Distance: Normal, at least 6 cm  Jaw/Neck Findings:  Neck ROM: Normal ROM      Dental:  Dental Findings: In tact        Mental Status:  Mental Status Findings:  Cooperative, Alert and Oriented         Anesthesia Plan  Type of Anesthesia, risks & benefits discussed:  Anesthesia Type:  general  Patient's Preference: GA  Intra-op Monitoring Plan: standard ASA monitors  Intra-op Monitoring Plan Comments:   Post Op Pain Control Plan: multimodal analgesia  Post Op Pain Control Plan Comments:   Induction:   IV  Beta Blocker:  Patient is not currently on a Beta-Blocker (No further documentation required).       Informed Consent: Patient understands risks and agrees with Anesthesia plan.  Questions answered. Anesthesia consent signed with patient.  ASA Score: 3     Day of Surgery Review of History & Physical:  There are no significant changes.  H&P update referred to the surgeon.         Ready For Surgery From Anesthesia Perspective.

## 2018-06-21 ENCOUNTER — SURGERY (OUTPATIENT)
Age: 43
End: 2018-06-21

## 2018-06-21 ENCOUNTER — ANESTHESIA (OUTPATIENT)
Dept: SURGERY | Facility: HOSPITAL | Age: 43
End: 2018-06-21
Payer: COMMERCIAL

## 2018-06-21 ENCOUNTER — HOSPITAL ENCOUNTER (OUTPATIENT)
Facility: HOSPITAL | Age: 43
Discharge: HOME | End: 2018-06-22
Attending: SURGERY | Admitting: SURGERY
Payer: COMMERCIAL

## 2018-06-21 DIAGNOSIS — K43.2 INCISIONAL HERNIA, WITHOUT OBSTRUCTION OR GANGRENE: Primary | ICD-10-CM

## 2018-06-21 PROCEDURE — 37000009 HC ANESTHESIA EA ADD 15 MINS: Performed by: SURGERY

## 2018-06-21 PROCEDURE — D9220A PRA ANESTHESIA: Mod: ,,, | Performed by: ANESTHESIOLOGY

## 2018-06-21 PROCEDURE — 36000708 HC OR TIME LEV III 1ST 15 MIN: Performed by: SURGERY

## 2018-06-21 PROCEDURE — 63600175 PHARM REV CODE 636 W HCPCS

## 2018-06-21 PROCEDURE — 63600175 PHARM REV CODE 636 W HCPCS: Performed by: SURGERY

## 2018-06-21 PROCEDURE — 63600175 PHARM REV CODE 636 W HCPCS: Performed by: STUDENT IN AN ORGANIZED HEALTH CARE EDUCATION/TRAINING PROGRAM

## 2018-06-21 PROCEDURE — 27000221 HC OXYGEN, UP TO 24 HOURS

## 2018-06-21 PROCEDURE — 36000709 HC OR TIME LEV III EA ADD 15 MIN: Performed by: SURGERY

## 2018-06-21 PROCEDURE — 63600175 PHARM REV CODE 636 W HCPCS: Performed by: ANESTHESIOLOGY

## 2018-06-21 PROCEDURE — 49655 PR LAP, INCISIONAL HERNIA REPAIR,INCARCERATED: CPT | Mod: ,,, | Performed by: SURGERY

## 2018-06-21 PROCEDURE — 25000003 PHARM REV CODE 250: Performed by: STUDENT IN AN ORGANIZED HEALTH CARE EDUCATION/TRAINING PROGRAM

## 2018-06-21 PROCEDURE — 71000033 HC RECOVERY, INTIAL HOUR: Performed by: SURGERY

## 2018-06-21 PROCEDURE — 71000039 HC RECOVERY, EACH ADD'L HOUR: Performed by: SURGERY

## 2018-06-21 PROCEDURE — C1781 MESH (IMPLANTABLE): HCPCS | Performed by: SURGERY

## 2018-06-21 PROCEDURE — 25000003 PHARM REV CODE 250: Performed by: ANESTHESIOLOGY

## 2018-06-21 PROCEDURE — 27201423 OPTIME MED/SURG SUP & DEVICES STERILE SUPPLY: Performed by: SURGERY

## 2018-06-21 PROCEDURE — 25000003 PHARM REV CODE 250: Performed by: SURGERY

## 2018-06-21 PROCEDURE — 37000008 HC ANESTHESIA 1ST 15 MINUTES: Performed by: SURGERY

## 2018-06-21 DEVICE — MESH VENTRALIGHT ST 6IN CIR: Type: IMPLANTABLE DEVICE | Site: ABDOMEN | Status: FUNCTIONAL

## 2018-06-21 RX ORDER — ONDANSETRON 2 MG/ML
4 INJECTION INTRAMUSCULAR; INTRAVENOUS DAILY PRN
Status: DISCONTINUED | OUTPATIENT
Start: 2018-06-21 | End: 2018-06-21 | Stop reason: HOSPADM

## 2018-06-21 RX ORDER — NEOSTIGMINE METHYLSULFATE 1 MG/ML
INJECTION, SOLUTION INTRAVENOUS
Status: DISCONTINUED | OUTPATIENT
Start: 2018-06-21 | End: 2018-06-21

## 2018-06-21 RX ORDER — ONDANSETRON 8 MG/1
8 TABLET, ORALLY DISINTEGRATING ORAL EVERY 8 HOURS PRN
Status: DISCONTINUED | OUTPATIENT
Start: 2018-06-21 | End: 2018-06-22 | Stop reason: HOSPADM

## 2018-06-21 RX ORDER — PHENYLEPHRINE HYDROCHLORIDE 10 MG/ML
INJECTION INTRAVENOUS
Status: DISCONTINUED | OUTPATIENT
Start: 2018-06-21 | End: 2018-06-21

## 2018-06-21 RX ORDER — MIDAZOLAM HYDROCHLORIDE 1 MG/ML
INJECTION, SOLUTION INTRAMUSCULAR; INTRAVENOUS
Status: DISCONTINUED | OUTPATIENT
Start: 2018-06-21 | End: 2018-06-21

## 2018-06-21 RX ORDER — FENTANYL CITRATE 50 UG/ML
INJECTION, SOLUTION INTRAMUSCULAR; INTRAVENOUS
Status: DISCONTINUED | OUTPATIENT
Start: 2018-06-21 | End: 2018-06-21

## 2018-06-21 RX ORDER — DIPHENHYDRAMINE HYDROCHLORIDE 50 MG/ML
25 INJECTION INTRAMUSCULAR; INTRAVENOUS EVERY 4 HOURS PRN
Status: DISCONTINUED | OUTPATIENT
Start: 2018-06-21 | End: 2018-06-22 | Stop reason: HOSPADM

## 2018-06-21 RX ORDER — TAMSULOSIN HYDROCHLORIDE 0.4 MG/1
0.4 CAPSULE ORAL DAILY
Status: DISCONTINUED | OUTPATIENT
Start: 2018-06-22 | End: 2018-06-22 | Stop reason: HOSPADM

## 2018-06-21 RX ORDER — FENTANYL CITRATE 50 UG/ML
25 INJECTION, SOLUTION INTRAMUSCULAR; INTRAVENOUS EVERY 5 MIN PRN
Status: COMPLETED | OUTPATIENT
Start: 2018-06-21 | End: 2018-06-21

## 2018-06-21 RX ORDER — ROCURONIUM BROMIDE 10 MG/ML
INJECTION, SOLUTION INTRAVENOUS
Status: DISCONTINUED | OUTPATIENT
Start: 2018-06-21 | End: 2018-06-21

## 2018-06-21 RX ORDER — ONDANSETRON 2 MG/ML
INJECTION INTRAMUSCULAR; INTRAVENOUS
Status: DISCONTINUED | OUTPATIENT
Start: 2018-06-21 | End: 2018-06-21

## 2018-06-21 RX ORDER — BACITRACIN 50000 [IU]/1
INJECTION, POWDER, FOR SOLUTION INTRAMUSCULAR
Status: DISCONTINUED | OUTPATIENT
Start: 2018-06-21 | End: 2018-06-21 | Stop reason: HOSPADM

## 2018-06-21 RX ORDER — HYDROCODONE BITARTRATE AND ACETAMINOPHEN 5; 325 MG/1; MG/1
1 TABLET ORAL EVERY 4 HOURS PRN
Status: DISCONTINUED | OUTPATIENT
Start: 2018-06-21 | End: 2018-06-22

## 2018-06-21 RX ORDER — CEFAZOLIN SODIUM 1 G/3ML
2 INJECTION, POWDER, FOR SOLUTION INTRAMUSCULAR; INTRAVENOUS
Status: COMPLETED | OUTPATIENT
Start: 2018-06-21 | End: 2018-06-21

## 2018-06-21 RX ORDER — ACETAMINOPHEN 325 MG/1
650 TABLET ORAL EVERY 8 HOURS PRN
Status: DISCONTINUED | OUTPATIENT
Start: 2018-06-21 | End: 2018-06-22 | Stop reason: HOSPADM

## 2018-06-21 RX ORDER — SODIUM CHLORIDE 9 MG/ML
INJECTION, SOLUTION INTRAVENOUS CONTINUOUS
Status: DISCONTINUED | OUTPATIENT
Start: 2018-06-21 | End: 2018-06-22

## 2018-06-21 RX ORDER — PANTOPRAZOLE SODIUM 40 MG/1
40 TABLET, DELAYED RELEASE ORAL DAILY
Status: DISCONTINUED | OUTPATIENT
Start: 2018-06-22 | End: 2018-06-22 | Stop reason: HOSPADM

## 2018-06-21 RX ORDER — HYDROMORPHONE HYDROCHLORIDE 1 MG/ML
INJECTION, SOLUTION INTRAMUSCULAR; INTRAVENOUS; SUBCUTANEOUS
Status: COMPLETED
Start: 2018-06-21 | End: 2018-06-21

## 2018-06-21 RX ORDER — LIDOCAINE HCL/PF 100 MG/5ML
SYRINGE (ML) INTRAVENOUS
Status: DISCONTINUED | OUTPATIENT
Start: 2018-06-21 | End: 2018-06-21

## 2018-06-21 RX ORDER — SODIUM CHLORIDE 0.9 % (FLUSH) 0.9 %
3 SYRINGE (ML) INJECTION
Status: DISCONTINUED | OUTPATIENT
Start: 2018-06-21 | End: 2018-06-22 | Stop reason: HOSPADM

## 2018-06-21 RX ORDER — HYDROMORPHONE HYDROCHLORIDE 1 MG/ML
0.2 INJECTION, SOLUTION INTRAMUSCULAR; INTRAVENOUS; SUBCUTANEOUS EVERY 5 MIN PRN
Status: DISCONTINUED | OUTPATIENT
Start: 2018-06-21 | End: 2018-06-21 | Stop reason: HOSPADM

## 2018-06-21 RX ORDER — EPHEDRINE SULFATE 50 MG/ML
INJECTION, SOLUTION INTRAVENOUS
Status: DISCONTINUED | OUTPATIENT
Start: 2018-06-21 | End: 2018-06-21

## 2018-06-21 RX ORDER — LIDOCAINE HYDROCHLORIDE AND EPINEPHRINE 10; 10 MG/ML; UG/ML
INJECTION, SOLUTION INFILTRATION; PERINEURAL
Status: DISCONTINUED | OUTPATIENT
Start: 2018-06-21 | End: 2018-06-21 | Stop reason: HOSPADM

## 2018-06-21 RX ORDER — RAMELTEON 8 MG/1
8 TABLET ORAL NIGHTLY PRN
Status: DISCONTINUED | OUTPATIENT
Start: 2018-06-21 | End: 2018-06-21

## 2018-06-21 RX ORDER — HYDROCODONE BITARTRATE AND ACETAMINOPHEN 10; 325 MG/1; MG/1
1 TABLET ORAL EVERY 4 HOURS PRN
Status: DISCONTINUED | OUTPATIENT
Start: 2018-06-21 | End: 2018-06-22 | Stop reason: HOSPADM

## 2018-06-21 RX ORDER — SODIUM CHLORIDE 0.9 % (FLUSH) 0.9 %
3 SYRINGE (ML) INJECTION
Status: DISCONTINUED | OUTPATIENT
Start: 2018-06-21 | End: 2018-06-21

## 2018-06-21 RX ORDER — BUPIVACAINE HYDROCHLORIDE 2.5 MG/ML
INJECTION, SOLUTION EPIDURAL; INFILTRATION; INTRACAUDAL
Status: DISCONTINUED | OUTPATIENT
Start: 2018-06-21 | End: 2018-06-21 | Stop reason: HOSPADM

## 2018-06-21 RX ORDER — PROPOFOL 10 MG/ML
VIAL (ML) INTRAVENOUS
Status: DISCONTINUED | OUTPATIENT
Start: 2018-06-21 | End: 2018-06-21

## 2018-06-21 RX ORDER — HYDROCODONE BITARTRATE AND ACETAMINOPHEN 10; 325 MG/1; MG/1
TABLET ORAL
Status: COMPLETED
Start: 2018-06-21 | End: 2018-06-21

## 2018-06-21 RX ORDER — LIDOCAINE HYDROCHLORIDE 10 MG/ML
1 INJECTION, SOLUTION EPIDURAL; INFILTRATION; INTRACAUDAL; PERINEURAL ONCE
Status: COMPLETED | OUTPATIENT
Start: 2018-06-21 | End: 2018-06-21

## 2018-06-21 RX ORDER — SODIUM CHLORIDE 9 MG/ML
INJECTION, SOLUTION INTRAVENOUS CONTINUOUS
Status: DISCONTINUED | OUTPATIENT
Start: 2018-06-21 | End: 2018-06-21

## 2018-06-21 RX ORDER — FENTANYL CITRATE 50 UG/ML
INJECTION, SOLUTION INTRAMUSCULAR; INTRAVENOUS
Status: COMPLETED
Start: 2018-06-21 | End: 2018-06-21

## 2018-06-21 RX ORDER — GLYCOPYRROLATE 0.2 MG/ML
INJECTION INTRAMUSCULAR; INTRAVENOUS
Status: DISCONTINUED | OUTPATIENT
Start: 2018-06-21 | End: 2018-06-21

## 2018-06-21 RX ADMIN — GLYCOPYRROLATE 0.2 MG: 0.2 INJECTION, SOLUTION INTRAMUSCULAR; INTRAVENOUS at 10:06

## 2018-06-21 RX ADMIN — BACITRACIN 50000 UNITS: 50000 INJECTION, POWDER, LYOPHILIZED, FOR SOLUTION INTRAMUSCULAR at 11:06

## 2018-06-21 RX ADMIN — PROPOFOL 120 MG: 10 INJECTION, EMULSION INTRAVENOUS at 11:06

## 2018-06-21 RX ADMIN — NEOSTIGMINE METHYLSULFATE 5 MG: 1 INJECTION INTRAVENOUS at 11:06

## 2018-06-21 RX ADMIN — FENTANYL CITRATE 100 MCG: 50 INJECTION, SOLUTION INTRAMUSCULAR; INTRAVENOUS at 09:06

## 2018-06-21 RX ADMIN — SODIUM CHLORIDE: 0.9 INJECTION, SOLUTION INTRAVENOUS at 01:06

## 2018-06-21 RX ADMIN — ROCURONIUM BROMIDE 10 MG: 10 INJECTION, SOLUTION INTRAVENOUS at 10:06

## 2018-06-21 RX ADMIN — FENTANYL CITRATE 25 MCG: 50 INJECTION INTRAMUSCULAR; INTRAVENOUS at 12:06

## 2018-06-21 RX ADMIN — HYDROCODONE BITARTRATE AND ACETAMINOPHEN 1 TABLET: 10; 325 TABLET ORAL at 09:06

## 2018-06-21 RX ADMIN — PROPOFOL 60 MG: 10 INJECTION, EMULSION INTRAVENOUS at 09:06

## 2018-06-21 RX ADMIN — Medication 0.2 MG: at 02:06

## 2018-06-21 RX ADMIN — ROCURONIUM BROMIDE 10 MG: 10 INJECTION, SOLUTION INTRAVENOUS at 09:06

## 2018-06-21 RX ADMIN — MIDAZOLAM HYDROCHLORIDE 2 MG: 1 INJECTION, SOLUTION INTRAMUSCULAR; INTRAVENOUS at 09:06

## 2018-06-21 RX ADMIN — CEFAZOLIN 2 G: 330 INJECTION, POWDER, FOR SOLUTION INTRAMUSCULAR; INTRAVENOUS at 09:06

## 2018-06-21 RX ADMIN — GLYCOPYRROLATE 0.6 MG: 0.2 INJECTION, SOLUTION INTRAMUSCULAR; INTRAVENOUS at 11:06

## 2018-06-21 RX ADMIN — ROCURONIUM BROMIDE 40 MG: 10 INJECTION, SOLUTION INTRAVENOUS at 09:06

## 2018-06-21 RX ADMIN — ONDANSETRON 4 MG: 2 INJECTION INTRAMUSCULAR; INTRAVENOUS at 11:06

## 2018-06-21 RX ADMIN — LIDOCAINE HYDROCHLORIDE 100 MG: 20 INJECTION, SOLUTION INTRAVENOUS at 09:06

## 2018-06-21 RX ADMIN — EPHEDRINE SULFATE 5 MG: 50 INJECTION, SOLUTION INTRAMUSCULAR; INTRAVENOUS; SUBCUTANEOUS at 10:06

## 2018-06-21 RX ADMIN — SODIUM CHLORIDE: 0.9 INJECTION, SOLUTION INTRAVENOUS at 08:06

## 2018-06-21 RX ADMIN — PROPOFOL 40 MG: 10 INJECTION, EMULSION INTRAVENOUS at 10:06

## 2018-06-21 RX ADMIN — PHENYLEPHRINE HYDROCHLORIDE 200 MCG: 10 INJECTION INTRAVENOUS at 10:06

## 2018-06-21 RX ADMIN — HYDROCODONE BITARTRATE AND ACETAMINOPHEN 1 TABLET: 10; 325 TABLET ORAL at 01:06

## 2018-06-21 RX ADMIN — PROPOFOL 200 MG: 10 INJECTION, EMULSION INTRAVENOUS at 09:06

## 2018-06-21 RX ADMIN — BUPIVACAINE HYDROCHLORIDE 7.5 ML: 2.5 INJECTION, SOLUTION EPIDURAL; INFILTRATION; INTRACAUDAL; PERINEURAL at 11:06

## 2018-06-21 RX ADMIN — LIDOCAINE HYDROCHLORIDE AND EPINEPHRINE 7.5 ML: 10; 10 INJECTION, SOLUTION INFILTRATION; PERINEURAL at 11:06

## 2018-06-21 RX ADMIN — FENTANYL CITRATE 50 MCG: 50 INJECTION, SOLUTION INTRAMUSCULAR; INTRAVENOUS at 10:06

## 2018-06-21 RX ADMIN — SODIUM CHLORIDE, SODIUM GLUCONATE, SODIUM ACETATE, POTASSIUM CHLORIDE, MAGNESIUM CHLORIDE, SODIUM PHOSPHATE, DIBASIC, AND POTASSIUM PHOSPHATE: .53; .5; .37; .037; .03; .012; .00082 INJECTION, SOLUTION INTRAVENOUS at 10:06

## 2018-06-21 RX ADMIN — LIDOCAINE HYDROCHLORIDE 2 MG: 10 INJECTION, SOLUTION EPIDURAL; INFILTRATION; INTRACAUDAL; PERINEURAL at 08:06

## 2018-06-21 RX ADMIN — FENTANYL CITRATE 25 MCG: 50 INJECTION INTRAMUSCULAR; INTRAVENOUS at 01:06

## 2018-06-21 RX ADMIN — FENTANYL CITRATE 50 MCG: 50 INJECTION, SOLUTION INTRAMUSCULAR; INTRAVENOUS at 11:06

## 2018-06-21 RX ADMIN — ONDANSETRON 8 MG: 8 TABLET, ORALLY DISINTEGRATING ORAL at 03:06

## 2018-06-21 NOTE — NURSING TRANSFER
Nursing Transfer Note      6/21/2018     Transfer To: [facility]    Transfer via stretcher    Transfer with n/a    Transported by  PCT    Medicines sent: NS infusing    Chart send with patient: Yes    Notified: brothgera Macias    Patient reassessed at: 6/21/18 9033

## 2018-06-21 NOTE — OP NOTE
DATE OF PROCEDURE: 06/21/2018  SERVICE: General Surgery.   Surgeon(s) and Role:     * Alejandra Carlos MD - Resident - Assisting     * Vaughn Parker Jr., MD - Primary  PREOPERATIVE DIAGNOSIS: Incarcerated Incisional Hernia  POSTOPERATIVE DIAGNOSIS: Same  PROCEDURE: Laparoscopic repair of incarcerated hernia with mesh.   ANESTHESIA: General endotracheal and local.   DESCRIPTION OF PROCEDURE: The patient was taken to the Operating Room, placed   under general anesthesia and prepped and draped in sterile fashion. At this   time, an incision was made in the left upper quadrant, midclavicular subcostal   after infiltrating with local anesthetic. This was 12 mm in nature. Using   Optiview trocar under direct visualization, intra-abdominal access was obtained   without difficulty and pneumoperitoneum was obtained. Further ports were then   placed including a left lower quadrant port, a right anterior axillary subcostal   port and a right lower quadrant port under direct visualization after   infiltrating with local anesthetic. Once the ports were placed, we noticed   there were moderate adhesions to the anterior abdominal wall. We took these   down sharply where appropriate and bluntly where able to. We noticed the   Incarcerated hernia present.  This was reduced with blunt dissection and electrocautery where appropriate.  We then used electrocautery to take down the falciform   ligament and to reduce the hernia. We continued to take down the falciform ligament to allow for 5 cm coverage of the hernia defect. We measured the hernia and it was approximately 4x3cm, so we proceeded with placement of a 15.2cm diameter Ventralight ST mesh with Echo positioning device. This was rolled and placed into the abdominal cavity..   We made a small skin incision in the middle of the hernia defect after   infiltrating with local anesthetic and using a suture passer to grasp the   catheter attached to the positioning device on the mesh  and pulled this   anteriorly. We then insufflated the balloon on the mesh and then it spread   nicely over the anterior abdominal wall with the hernia centered on the mesh.   At this time, using a Secure Strap Tacker, we tacked the mesh circumferentially and removed the Echo positioning device through the 12 mm port. Once the mesh was tacked in place, we proceeded with 4 transfascial sutures, 0 Gerton-Patrice in nature. This   was done by infiltrating with local anesthetic at the skin level and making a   small stab incision and then using a suture passer to place U stitches   transfascially through the mesh in the equally spaced around the mesh. These were then tied down, holding the mesh to the anterior abdominal wall transfascial sutures.   Once this was complete, we inspected the mesh and it was in very good   condition, nice and taut against the anterior abdominal wall with   circumferential coverage of the hernia defect. The 12 mm port was then removed   and using 0-Vicryl suture in U-fashion, we closed the fascia of this incision   and tied this down. The air was then evacuated and the ports were removed. The   skin of all 4 ports was closed with 4-0 Monocryl suture in subcuticular   fashion. Mastisol and Steri-Strips were placed. Mastisol and Steri-Strips were   also placed over the 4 stab incisions for the transfascial sutures and the   hernia stab incision used for placement of the mesh. At this time, the patient   was allowed to awake from general anesthesia and transferred to bed for transfer   to Recovery and an abdominal binder was placed.   COMPLICATIONS: None.   SPONGE COUNT: Correct.   BLOOD LOSS: 15 mL.   FLUIDS: Per Anesthesia.   BLOOD GIVEN: None.   DRAINS: None.   SPECIMENS: None.   CONDITION OF PATIENT: Good.   I was present for the entire procedure.

## 2018-06-21 NOTE — PLAN OF CARE
Plan of care discussed with patient. All questions/concerns addressed. Patient verbalizes understanding. Patient reports surgical pain & nausea tolerable. VSS. No voiced concerns/needs at this time. Family has been updated. Will continue to monitor.

## 2018-06-21 NOTE — H&P
History & Physical     SUBJECTIVE:      History of Present Illness:  Patient is a 42 y.o. male  post op repair of incisional hernia(without mesh) /lap removal of gastric band/ lap gastric sleeve on 11/10/2016. He presents today with recurrent inscisional hernia and RUQ pain . The RUQ pain and hernia began about three weeks ago. He reports the hernia enlarges with standing or coughing, but he is able to reduce the hernia himself. The RUQ is intermittent, pain radiates to his R back, and is accompanied by increase gas, bloating, and acid reflux. He receives mild relief of acid reflux with prilosec and zantec. He denies any fever, chills, nausea, vomiting, diarrhea and constipation.      Interval: RUQ US and HIDA negative, no new symptoms or concerns. Feels well today except for symptomatic hernia.      No chief complaint on file.        Review of patient's allergies indicates:  No Known Allergies            Current Outpatient Prescriptions   Medication Sig Dispense Refill    b complex vitamins tablet Take 1 tablet by mouth once daily.        CALCIUM CITRATE/VITAMIN D3 (CALCIUM CITRATE + D ORAL) Take 1 tablet by mouth 2 (two) times daily.        cyanocobalamin, vitamin B-12, 1,000 mcg Subl Place 1 tablet under the tongue every other day.        ergocalciferol (VITAMIN D2) 50,000 unit Cap Take one capsule twice weekly 24 capsule 0    ibuprofen (ADVIL,MOTRIN) 400 MG tablet Take 1 tablet (400 mg total) by mouth every 6 (six) hours as needed for Other (Pain). Over the counter. Take with food        meloxicam (MOBIC) 15 MG tablet Take 1 tablet by mouth once daily.   2    neomycin-polymyxin-dexamethasone (MAXITROL) 3.5mg/mL-10,000 unit/mL-0.1 % DrpS Place 1 drop into both eyes once daily.   0    omeprazole (PRILOSEC) 40 MG capsule TAKE 1 CAPSULE (40 MG TOTAL) BY MOUTH EVERY MORNING.   12    pediatric multivit-iron-min (FLINTSTONES COMPLETE, IRON,) Chew Take 1 tablet by mouth 2 (two) times daily.        PENNSAID 20  mg/gram /actuation(2 %) sopm APPLY TWO PUMPS TO AFFECTED AREA(S) TWICE DAILY AS NEEDED   0    zolpidem (AMBIEN) 10 mg Tab Take 1 tablet (10 mg total) by mouth nightly as needed (insomnia). 30 tablet 0    nystatin-triamcinolone (MYCOLOG II) cream Apply topically 4 (four) times daily. 60 Tube 3    ranitidine (ZANTAC) 150 MG tablet Take 1 tablet (150 mg total) by mouth 2 (two) times daily. 60 tablet 11      No current facility-administered medications for this visit.               Past Medical History:   Diagnosis Date    Asthma      Hyperlipidemia      Hypertension      Low back pain      Morbid obesity with BMI of 50.0-59.9, adult      Varicose veins              Past Surgical History:   Procedure Laterality Date    ADENOIDECTOMY        EYE SURGERY        GASTRECTOMY        LAPAROSCOPIC GASTRIC BANDING   2002     In [place]: uncertain of brand/ size    LASIK                 Family History   Problem Relation Age of Onset    Hypertension Mother      Obesity Mother         s/p sleeve 2013: good results    Cancer Maternal Grandfather      Diabetes Paternal Grandmother      No Known Problems Father      No Known Problems Brother      Obesity Brother      No Known Problems Sister                Social History   Substance Use Topics    Smoking status: Former Smoker       Packs/day: 0.25       Years: 5.00       Quit date: 1/1/2009    Smokeless tobacco: Never Used    Alcohol use Yes          Comment: socially         Review of Systems:  Review of Systems   Constitutional: Negative for fatigue and fever.   Respiratory: Negative for chest tightness and shortness of breath.    Cardiovascular: Negative for chest pain.   Gastrointestinal: Positive for abdominal pain (RUQ). Negative for abdominal distention, constipation, diarrhea and nausea.   Genitourinary: Negative for difficulty urinating and dysuria.   Musculoskeletal: Negative for arthralgias and myalgias.   Skin: Negative for color change and  "pallor.   Neurological: Positive for headaches (from stress).   Hematological: Does not bruise/bleed easily.   Psychiatric/Behavioral: Negative for agitation, behavioral problems and confusion.         OBJECTIVE:      Vital Signs (Most Recent)  /78 (BP Location: Left arm, Patient Position: Lying)   Pulse 72   Temp 98.5 °F (36.9 °C) (Oral)   Resp 18   Ht 5' 8" (1.727 m)   Wt 133.8 kg (295 lb)   SpO2 99%   BMI 44.85 kg/m²     Physical Exam:  Physical Exam   Constitutional: He is oriented to person, place, and time. He appears well-developed and well-nourished. No distress.   HENT:   Head: Normocephalic and atraumatic.   Neck: Normal range of motion. Neck supple. No JVD present. No tracheal deviation present. No thyromegaly present.   Cardiovascular: Normal rate, regular rhythm, normal heart sounds and intact distal pulses.    Pulmonary/Chest: Effort normal and breath sounds normal. No respiratory distress. He has no wheezes. He has no rales. He exhibits no tenderness.   Abdominal: Soft. Bowel sounds are normal. There is tenderness (RUQ tenderness ). A hernia (ventral hernia, partially reducable ) is present.   Musculoskeletal: Normal range of motion. He exhibits no edema or deformity.   Neurological: He is alert and oriented to person, place, and time.   Skin: Skin is warm and dry.   Psychiatric: He has a normal mood and affect. His behavior is normal. Judgment and thought content normal.            ASSESSMENT/PLAN:      RuQ pain, Recurrent incisional hernia     PLAN:Plan      To OR for lap hernia repair with mesh       "

## 2018-06-21 NOTE — TRANSFER OF CARE
"Anesthesia Transfer of Care Note    Patient: Jesús Galindo    Procedure(s) Performed: Procedure(s) (LRB):  REPAIR-HERNIA-INCISIONAL-LAPAROSCOPIC WITH MESH (N/A)    Patient location: PACU    Anesthesia Type: general    Transport from OR: Transported from OR on 6-10 L/min O2 by face mask with adequate spontaneous ventilation    Post pain: adequate analgesia    Post assessment: no apparent anesthetic complications    Post vital signs: stable    Level of consciousness: awake    Nausea/Vomiting: no nausea/vomiting    Complications: none    Transfer of care protocol was followed      Last vitals:   Visit Vitals  /60   Pulse (!) 57   Temp 36.4 °C (97.5 °F) (Axillary)   Resp 18   Ht 5' 8" (1.727 m)   Wt 133.8 kg (295 lb)   SpO2 100%   BMI 44.85 kg/m²     "

## 2018-06-22 ENCOUNTER — TELEPHONE (OUTPATIENT)
Dept: SURGERY | Facility: CLINIC | Age: 43
End: 2018-06-22

## 2018-06-22 VITALS
HEIGHT: 68 IN | OXYGEN SATURATION: 96 % | DIASTOLIC BLOOD PRESSURE: 79 MMHG | SYSTOLIC BLOOD PRESSURE: 135 MMHG | RESPIRATION RATE: 18 BRPM | HEART RATE: 79 BPM | TEMPERATURE: 98 F | BODY MASS INDEX: 44.86 KG/M2 | WEIGHT: 296 LBS

## 2018-06-22 DIAGNOSIS — K43.2 INCISIONAL HERNIA, WITHOUT OBSTRUCTION OR GANGRENE: Primary | ICD-10-CM

## 2018-06-22 PROCEDURE — 25000003 PHARM REV CODE 250: Performed by: STUDENT IN AN ORGANIZED HEALTH CARE EDUCATION/TRAINING PROGRAM

## 2018-06-22 RX ORDER — ONDANSETRON 8 MG/1
8 TABLET, ORALLY DISINTEGRATING ORAL EVERY 6 HOURS PRN
Qty: 30 TABLET | Refills: 0 | Status: SHIPPED | OUTPATIENT
Start: 2018-06-22 | End: 2020-05-18

## 2018-06-22 RX ORDER — HYDROCODONE BITARTRATE AND ACETAMINOPHEN 5; 325 MG/1; MG/1
1 TABLET ORAL EVERY 4 HOURS PRN
Qty: 41 TABLET | Refills: 0 | Status: SHIPPED | OUTPATIENT
Start: 2018-06-22 | End: 2018-06-22

## 2018-06-22 RX ORDER — HYDROCODONE BITARTRATE AND ACETAMINOPHEN 5; 325 MG/1; MG/1
1 TABLET ORAL EVERY 4 HOURS PRN
Qty: 41 TABLET | Refills: 0 | Status: SHIPPED | OUTPATIENT
Start: 2018-06-22 | End: 2018-07-10 | Stop reason: SDUPTHER

## 2018-06-22 RX ORDER — DOCUSATE SODIUM 50 MG/5ML
100 LIQUID ORAL DAILY
Status: DISCONTINUED | OUTPATIENT
Start: 2018-06-22 | End: 2018-06-22 | Stop reason: HOSPADM

## 2018-06-22 RX ORDER — ENOXAPARIN SODIUM 100 MG/ML
40 INJECTION SUBCUTANEOUS EVERY 24 HOURS
Status: DISCONTINUED | OUTPATIENT
Start: 2018-06-22 | End: 2018-06-22 | Stop reason: HOSPADM

## 2018-06-22 RX ADMIN — DOCUSATE SODIUM 100 MG: 50 LIQUID ORAL at 09:06

## 2018-06-22 RX ADMIN — PANTOPRAZOLE SODIUM 40 MG: 40 TABLET, DELAYED RELEASE ORAL at 09:06

## 2018-06-22 RX ADMIN — ONDANSETRON 8 MG: 8 TABLET, ORALLY DISINTEGRATING ORAL at 04:06

## 2018-06-22 RX ADMIN — HYDROCODONE BITARTRATE AND ACETAMINOPHEN 1 TABLET: 10; 325 TABLET ORAL at 09:06

## 2018-06-22 RX ADMIN — TAMSULOSIN HYDROCHLORIDE 0.4 MG: 0.4 CAPSULE ORAL at 09:06

## 2018-06-22 NOTE — ANESTHESIA POSTPROCEDURE EVALUATION
"Anesthesia Post Evaluation    Patient: Jesús Galindo    Procedure(s) Performed: Procedure(s) (LRB):  REPAIR-HERNIA-INCISIONAL-LAPAROSCOPIC WITH MESH (N/A)    Final Anesthesia Type: general  Patient location during evaluation: PACU  Patient participation: Yes- Able to Participate  Level of consciousness: awake and alert  Post-procedure vital signs: reviewed and stable  Pain management: adequate  Airway patency: patent  PONV status at discharge: No PONV  Anesthetic complications: no      Cardiovascular status: blood pressure returned to baseline  Respiratory status: unassisted  Hydration status: euvolemic  Follow-up not needed.        Visit Vitals  /60 (BP Location: Right arm, Patient Position: Lying)   Pulse 67   Temp 36.9 °C (98.4 °F) (Tympanic)   Resp 18   Ht 5' 8" (1.727 m)   Wt 134.3 kg (296 lb)   SpO2 97%   BMI 45.01 kg/m²       Pain/Maurice Score: Pain Assessment Performed: Yes (6/22/2018  3:00 AM)  Presence of Pain: non-verbal indicators absent (6/22/2018  3:00 AM)  Pain Rating Prior to Med Admin: 8 (6/21/2018  9:01 PM)  Pain Rating Post Med Admin: 4 (6/21/2018 11:00 PM)  Maurice Score: 9 (6/21/2018  2:05 PM)      "

## 2018-06-22 NOTE — NURSING
Pt ready for discharge. Discharge instructions given to pt. Pt verbalizes understanding. IV removed catheter intact.

## 2018-06-22 NOTE — PROGRESS NOTES
[facility]  General Surgery  Progress Note    Subjective:     History of Present Illness:  No notes on file    Post-Op Info:  Procedure(s) (LRB):  REPAIR-HERNIA-INCISIONAL-LAPAROSCOPIC WITH MESH (N/A)   1 Day Post-Op     Interval History: NAEON. Pain but controlled with oral medications. Tolerating clear liquids. No flatus. Urinary retention requiring replacement of ruby     Medications:  Continuous Infusions:  Scheduled Meds:   docusate  100 mg Oral Daily    enoxaparin  40 mg Subcutaneous Daily    pantoprazole  40 mg Oral Daily    tamsulosin  0.4 mg Oral Daily     PRN Meds:acetaminophen, diphenhydrAMINE, HYDROcodone-acetaminophen, HYDROcodone-acetaminophen, ondansetron, sodium chloride 0.9%     Review of patient's allergies indicates:  No Known Allergies  Objective:     Vital Signs (Most Recent):  Temp: 98.3 °F (36.8 °C) (06/22/18 0815)  Pulse: 79 (06/22/18 0815)  Resp: 18 (06/22/18 0815)  BP: 135/79 (06/22/18 0815)  SpO2: 96 % (06/22/18 0815) Vital Signs (24h Range):  Temp:  [96.5 °F (35.8 °C)-98.5 °F (36.9 °C)] 98.3 °F (36.8 °C)  Pulse:  [47-85] 79  Resp:  [13-18] 18  SpO2:  [93 %-100 %] 96 %  BP: (108-135)/(60-83) 135/79     Weight: 134.3 kg (296 lb)  Body mass index is 45.01 kg/m².    Intake/Output - Last 3 Shifts       06/20 0700 - 06/21 0659 06/21 0700 - 06/22 0659 06/22 0700 - 06/23 0659    P.O.  120     I.V. (mL/kg)  1265 (9.4)     Total Intake(mL/kg)  1385 (10.3)     Urine (mL/kg/hr)  860     Total Output   860      Net   +525                   Physical Exam   Constitutional: He is oriented to person, place, and time. He appears well-developed and well-nourished.   HENT:   Head: Normocephalic and atraumatic.   Eyes: EOM are normal. Pupils are equal, round, and reactive to light.   Neck: Normal range of motion. Neck supple.   Cardiovascular: Normal rate and regular rhythm.    Pulmonary/Chest: Effort normal and breath sounds normal.   Abdominal: Soft. Bowel sounds are normal. He  exhibits no distension.   Abdominal binder in place. Laparoscopic incisions in place with steristrips. Yovanny tender to palpation.    Musculoskeletal: Normal range of motion.   Neurological: He is alert and oriented to person, place, and time.   Skin: Skin is warm and dry.          Significant Labs:  None    Significant Diagnostics:  None    Assessment/Plan:     * Incisional hernia, without obstruction or gangrene    POD1 lap incisional hernia repair    - advance to bariatric soft  - bowel regimen - colace  - prn norco for pain  - prn zofran for nausea  - d/c IVF  - OOBTC, ambulate   - lovenox  - flomax. Will attempt d/c ruby this morning. If urinary retention, will d/c with leg bag  Dispo: d/c home today if tolerating a diet and passing flatus             Jorge Zavaleta MD  General Surgery  [facility]

## 2018-06-22 NOTE — ASSESSMENT & PLAN NOTE
POD1 lap incisional hernia repair    - advance to bariatric soft  - bowel regimen - colace  - prn norco for pain  - prn zofran for nausea  - d/c IVF  - OOBTC, ambulate   - lovenox  Dispo: d/c home today if tolerating a diet and passing flatus

## 2018-06-22 NOTE — TELEPHONE ENCOUNTER
Pt states the pharmacy would not fill his pain meds . The prescription was said to be a copy and was not valid. Dr macario will call the pharmacy and try to straighten it out asap! Pt v/u

## 2018-06-22 NOTE — DISCHARGE SUMMARY
[facility]  General Surgery  Discharge Summary      Patient Name: Jesús Galindo  MRN: [id number]  Admission Date: 6/21/2018  Hospital Length of Stay: 0 days  Discharge Date and Time:  06/22/2018 11:07 AM  Attending Physician: Vaughn Parker Jr.,*   Discharging Provider: Jorge Zavaleta MD  Primary Care Provider: Clovis Mccoy MD     HPI: Patient is a 42 y.o. male  post op repair of incisional hernia(without mesh) /lap removal of gastric band/ lap gastric sleeve on 11/10/2016. He presents today with recurrent inscisional hernia and RUQ pain . The RUQ pain and hernia began about three weeks ago. He reports the hernia enlarges with standing or coughing, but he is able to reduce the hernia himself. The RUQ is intermittent, pain radiates to his R back, and is accompanied by increase gas, bloating, and acid reflux. He receives mild relief of acid reflux with prilosec and zantec. He denies any fever, chills, nausea, vomiting, diarrhea and constipation.     Procedure(s) (LRB):  REPAIR-HERNIA-INCISIONAL-LAPAROSCOPIC WITH MESH (N/A)     Hospital Course: Was kept overnight and progressed on his diet. He was tolerating bariatric soft diet without nausea or vomiting prior to discharge. He was passing flatus prior to discharge and hemodynamically stable. He had ruby replace overnight for urinary retention. The ruby was discontinued the next day and he was able to void on his own.     Consults: none     Significant Diagnostic Studies: None    Pending Diagnostic Studies:     None        Final Active Diagnoses:    Diagnosis Date Noted POA    PRINCIPAL PROBLEM:  Incisional hernia, without obstruction or gangrene [K43.2] 06/21/2018 Yes      Problems Resolved During this Admission:    Diagnosis Date Noted Date Resolved POA      Discharged Condition: good    Disposition:     Follow Up:  Follow-up Information     Vaughn Parker Jr, MD In 2 weeks.    Specialties:  General Surgery, Bariatrics  Why:  Appt:  7/6/18 at 11:30 AM.   Contact information:  [address]  [facility] [address]  [phone]                 Patient Instructions:     Diet Adult Regular     Lifting restrictions   Order Comments: No lifting greater than 10 lbs. Continue to wear abdominal binder. Can not drive in narcotics.     Notify your health care provider if you experience any of the following:  increased confusion or weakness     Notify your health care provider if you experience any of the following:  persistent dizziness, light-headedness, or visual disturbances     Notify your health care provider if you experience any of the following:  worsening rash     Notify your health care provider if you experience any of the following:  severe persistent headache     Notify your health care provider if you experience any of the following:  difficulty breathing or increased cough     Notify your health care provider if you experience any of the following:  redness, tenderness, or signs of infection (pain, swelling, redness, odor or green/yellow discharge around incision site)     Notify your health care provider if you experience any of the following:  severe uncontrolled pain     Notify your health care provider if you experience any of the following:  persistent nausea and vomiting or diarrhea     Notify your health care provider if you experience any of the following:  temperature >100.4     No dressing needed   Order Comments: Steristrips will fall off on their own. No outer dressing needed. Okay to shower tomorrow.       Medications:  Reconciled Home Medications:      Medication List      START taking these medications    HYDROcodone-acetaminophen 5-325 mg per tablet  Commonly known as:  NORCO  Take 1 tablet by mouth every 4 (four) hours as needed.        CHANGE how you take these medications    ergocalciferol 50,000 unit Cap  Commonly known as:  VITAMIN D2  Take one capsule twice weekly  What changed:  · how much to take  · how to take  this  · additional instructions     ranitidine 150 MG tablet  Commonly known as:  ZANTAC  Take 1 tablet (150 mg total) by mouth 2 (two) times daily.  What changed:  · when to take this  · reasons to take this        CONTINUE taking these medications    b complex vitamins tablet  Take 1 tablet by mouth every morning.     CALCIUM CITRATE + D ORAL  Take 1 tablet by mouth 2 (two) times daily.     cyanocobalamin (vitamin B-12) 1,000 mcg Subl  Place 1 tablet under the tongue every other day.     FLINTSTONES COMPLETE (IRON) Chew  Generic drug:  pediatric multivit-iron-min  Take 1 tablet by mouth 2 (two) times daily.     ibuprofen 400 MG tablet  Commonly known as:  ADVIL,MOTRIN  Take 1 tablet (400 mg total) by mouth every 6 (six) hours as needed for Other (Pain). Over the counter. Take with food     meloxicam 15 MG tablet  Commonly known as:  MOBIC  Take 1 tablet by mouth daily as needed.     omeprazole 40 MG capsule  Commonly known as:  PRILOSEC  TAKE 1 CAPSULE (40 MG TOTAL) BY MOUTH EVERY MORNING, prn     zolpidem 10 mg Tab  Commonly known as:  AMBIEN  Take 1 tablet (10 mg total) by mouth nightly as needed (insomnia).            Jorge Zavaleta MD  General Surgery  [facility]

## 2018-06-22 NOTE — TELEPHONE ENCOUNTER
Dr. lovett called and said the prescription has been called into the pharmacy and he is good to pick it up. Pt v/u

## 2018-06-22 NOTE — SUBJECTIVE & OBJECTIVE
Interval History: NAEON. Pain but controlled with oral medications. Tolerating clear liquids. No flatus.     Medications:  Continuous Infusions:  Scheduled Meds:   docusate  100 mg Oral Daily    enoxaparin  40 mg Subcutaneous Daily    pantoprazole  40 mg Oral Daily    tamsulosin  0.4 mg Oral Daily     PRN Meds:acetaminophen, diphenhydrAMINE, HYDROcodone-acetaminophen, HYDROcodone-acetaminophen, ondansetron, sodium chloride 0.9%     Review of patient's allergies indicates:  No Known Allergies  Objective:     Vital Signs (Most Recent):  Temp: 98.3 °F (36.8 °C) (06/22/18 0815)  Pulse: 79 (06/22/18 0815)  Resp: 18 (06/22/18 0815)  BP: 135/79 (06/22/18 0815)  SpO2: 96 % (06/22/18 0815) Vital Signs (24h Range):  Temp:  [96.5 °F (35.8 °C)-98.5 °F (36.9 °C)] 98.3 °F (36.8 °C)  Pulse:  [47-85] 79  Resp:  [13-18] 18  SpO2:  [93 %-100 %] 96 %  BP: (108-135)/(60-83) 135/79     Weight: 134.3 kg (296 lb)  Body mass index is 45.01 kg/m².    Intake/Output - Last 3 Shifts       06/20 0700 - 06/21 0659 06/21 0700 - 06/22 0659 06/22 0700 - 06/23 0659    P.O.  120     I.V. (mL/kg)  1265 (9.4)     Total Intake(mL/kg)  1385 (10.3)     Urine (mL/kg/hr)  860     Total Output   860      Net   +525                   Physical Exam   Constitutional: He is oriented to person, place, and time. He appears well-developed and well-nourished.   HENT:   Head: Normocephalic and atraumatic.   Eyes: EOM are normal. Pupils are equal, round, and reactive to light.   Neck: Normal range of motion. Neck supple.   Cardiovascular: Normal rate and regular rhythm.    Pulmonary/Chest: Effort normal and breath sounds normal.   Abdominal: Soft. Bowel sounds are normal. He exhibits no distension.   Abdominal binder in place. Laparoscopic incisions in place with steristrips. Yovanny tender to palpation.    Musculoskeletal: Normal range of motion.   Neurological: He is alert and oriented to person, place, and time.   Skin: Skin is warm and dry.          Significant  Labs:  None    Significant Diagnostics:  None

## 2018-07-02 ENCOUNTER — PATIENT MESSAGE (OUTPATIENT)
Dept: SURGERY | Facility: CLINIC | Age: 43
End: 2018-07-02

## 2018-07-06 ENCOUNTER — OFFICE VISIT (OUTPATIENT)
Dept: SURGERY | Facility: CLINIC | Age: 43
End: 2018-07-06
Payer: COMMERCIAL

## 2018-07-06 VITALS
DIASTOLIC BLOOD PRESSURE: 81 MMHG | BODY MASS INDEX: 44.86 KG/M2 | HEART RATE: 86 BPM | SYSTOLIC BLOOD PRESSURE: 132 MMHG | HEIGHT: 68 IN | TEMPERATURE: 98 F | WEIGHT: 296 LBS

## 2018-07-06 DIAGNOSIS — Z09 POSTOP CHECK: Primary | ICD-10-CM

## 2018-07-06 PROCEDURE — 99024 POSTOP FOLLOW-UP VISIT: CPT | Mod: S$GLB,,, | Performed by: SURGERY

## 2018-07-06 PROCEDURE — 99999 PR PBB SHADOW E&M-EST. PATIENT-LVL III: CPT | Mod: PBBFAC,,, | Performed by: SURGERY

## 2018-07-06 NOTE — PROGRESS NOTES
HPI:  The patient is status post-lap repair of ventral hernia with mesh.  Doing well.  Some slight tenderness but improving.  Some sharp pains as well but they are minor and short lived.  [place] diet.  Feeling better than prior to surgery.    PHYSICAL EXAM:  Physical Exam     In NAD  Incisions c/d/i  No signs of recurrence      ASSESSMENT:    The patient is doing well after surgery.     PLAN:    Follow up PRN.  Activity: light duty for 4 weeks        Vaughn Parker MD

## 2018-07-10 DIAGNOSIS — K43.2 INCISIONAL HERNIA, WITHOUT OBSTRUCTION OR GANGRENE: ICD-10-CM

## 2018-07-10 RX ORDER — HYDROCODONE BITARTRATE AND ACETAMINOPHEN 5; 325 MG/1; MG/1
1 TABLET ORAL EVERY 4 HOURS PRN
Qty: 15 TABLET | Refills: 0 | Status: SHIPPED | OUTPATIENT
Start: 2018-07-10 | End: 2019-07-23

## 2018-07-19 ENCOUNTER — PATIENT MESSAGE (OUTPATIENT)
Dept: SURGERY | Facility: CLINIC | Age: 43
End: 2018-07-19

## 2018-07-30 ENCOUNTER — PATIENT MESSAGE (OUTPATIENT)
Dept: PSYCHIATRY | Facility: CLINIC | Age: 43
End: 2018-07-30

## 2018-08-10 ENCOUNTER — OFFICE VISIT (OUTPATIENT)
Dept: PSYCHIATRY | Facility: CLINIC | Age: 43
End: 2018-08-10
Payer: COMMERCIAL

## 2018-08-10 DIAGNOSIS — F43.23 ADJUSTMENT DISORDER WITH MIXED ANXIETY AND DEPRESSED MOOD: Primary | ICD-10-CM

## 2018-08-10 PROCEDURE — 90834 PSYTX W PT 45 MINUTES: CPT | Mod: S$GLB,,, | Performed by: SOCIAL WORKER

## 2018-08-10 NOTE — PROGRESS NOTES
"Individual Psychotherapy (PhD/LCSW)    8/10/2018    Site:  Health system         Therapeutic Intervention: Met with patient.  Outpatient - Insight oriented psychotherapy 45 min - CPT code 06982 and Outpatient - Supportive psychotherapy 45 min - CPT Code 67033    Chief complaint/reason for encounter: depression and anxiety     Interval history and content of current session: Patient returned to clinic for follow up psychotherapy. Patient reports that things are going "so so". States that he is still having problems with daughter. States that girlfriend came in for patient's surgery and that is when patient started having more problems with daughter. Told daughter prior to girlfriend being around and daughter was "fine" with situation, patient even said that he told her if she didn't want to spend time with girlfriend on their weekend they didn't have to. States that later ex-wife called and said that daughter did not want to come over for his weekend. Reports that he was okay with it because he doesn't want to make daughter uncomfortable. Got rude and mean text messages from daughter and later daughter went on social media and "blasted" girlfriend and father. Patient reports that this upset him because he didn't do anything and all he wants from his daughter is for her to be respectful. Reports that he has not spoken to daughter since father's day. Also reports that he is having some trouble with girlfriend. While they were out shopping on day saw that seven came in to [place] in May when she was supposed to be in [facility] for a retreat with child and that she came into [place] 10 days before she told him that she was coming. Reports that on the day that she was supposedly coming into [place] she pretended that she was flying when she was already in the city. Did not tell patient what she was doing, said that it was none of his business. Patient reports a history of trust issues due to relationship with " exs-wife and being cheated on. Discussed relationship dynamics. Reports that he requested a family session with ex-wife and daughter on Tuesday. Suggested that patient write down what he would like to say to them in order to be assertive but not aggressive.     Treatment plan:  · Target symptoms: depression, anxiety   · Why chosen therapy is appropriate versus another modality: relevant to diagnosis, evidence based practice  · Outcome monitoring methods: self-report, observation  · Therapeutic intervention type: insight oriented psychotherapy, supportive psychotherapy    Risk parameters:  Patient reports no suicidal ideation  Patient reports no homicidal ideation  Patient reports no self-injurious behavior  Patient reports no violent behavior    Verbal deficits: None    Patient's response to intervention:  The patient's response to intervention is accepting.    Progress toward goals and other mental status changes:  The patient's progress toward goals is good.    Diagnosis:     ICD-10-CM ICD-9-CM   1. Adjustment disorder with mixed anxiety and depressed mood F43.23 309.28       Plan:  individual psychotherapy    Return to clinic: 2 weeks    Length of Service (minutes): 45     Cassandra Rockweiler, LCSW

## 2018-08-28 ENCOUNTER — TELEPHONE (OUTPATIENT)
Dept: BARIATRICS | Facility: CLINIC | Age: 43
End: 2018-08-28

## 2018-10-02 ENCOUNTER — LAB VISIT (OUTPATIENT)
Dept: LAB | Facility: HOSPITAL | Age: 43
End: 2018-10-02
Attending: NURSE PRACTITIONER
Payer: COMMERCIAL

## 2018-10-02 ENCOUNTER — OFFICE VISIT (OUTPATIENT)
Dept: INTERNAL MEDICINE | Facility: CLINIC | Age: 43
End: 2018-10-02
Payer: COMMERCIAL

## 2018-10-02 ENCOUNTER — IMMUNIZATION (OUTPATIENT)
Dept: INTERNAL MEDICINE | Facility: CLINIC | Age: 43
End: 2018-10-02
Payer: COMMERCIAL

## 2018-10-02 VITALS
WEIGHT: 304.88 LBS | HEIGHT: 68 IN | HEART RATE: 97 BPM | BODY MASS INDEX: 46.21 KG/M2 | DIASTOLIC BLOOD PRESSURE: 76 MMHG | OXYGEN SATURATION: 97 % | SYSTOLIC BLOOD PRESSURE: 134 MMHG

## 2018-10-02 DIAGNOSIS — G51.4 FACIAL TWITCHING: Primary | ICD-10-CM

## 2018-10-02 DIAGNOSIS — G51.4 FACIAL TWITCHING: ICD-10-CM

## 2018-10-02 LAB
ALBUMIN SERPL BCP-MCNC: 3.8 G/DL
ALP SERPL-CCNC: 109 U/L
ALT SERPL W/O P-5'-P-CCNC: 20 U/L
ANION GAP SERPL CALC-SCNC: 6 MMOL/L
AST SERPL-CCNC: 20 U/L
BASOPHILS # BLD AUTO: 0.04 K/UL
BASOPHILS NFR BLD: 0.5 %
BILIRUB SERPL-MCNC: 0.6 MG/DL
BUN SERPL-MCNC: 15 MG/DL
CALCIUM SERPL-MCNC: 9.6 MG/DL
CHLORIDE SERPL-SCNC: 109 MMOL/L
CO2 SERPL-SCNC: 26 MMOL/L
CREAT SERPL-MCNC: 0.9 MG/DL
DIFFERENTIAL METHOD: NORMAL
EOSINOPHIL # BLD AUTO: 0.3 K/UL
EOSINOPHIL NFR BLD: 3.7 %
ERYTHROCYTE [DISTWIDTH] IN BLOOD BY AUTOMATED COUNT: 14 %
EST. GFR  (AFRICAN AMERICAN): >60 ML/MIN/1.73 M^2
EST. GFR  (NON AFRICAN AMERICAN): >60 ML/MIN/1.73 M^2
GLUCOSE SERPL-MCNC: 92 MG/DL
HCT VFR BLD AUTO: 46.1 %
HGB BLD-MCNC: 14.9 G/DL
LYMPHOCYTES # BLD AUTO: 1.6 K/UL
LYMPHOCYTES NFR BLD: 21 %
MAGNESIUM SERPL-MCNC: 2.4 MG/DL
MCH RBC QN AUTO: 27.3 PG
MCHC RBC AUTO-ENTMCNC: 32.3 G/DL
MCV RBC AUTO: 85 FL
MONOCYTES # BLD AUTO: 0.7 K/UL
MONOCYTES NFR BLD: 9.4 %
NEUTROPHILS # BLD AUTO: 5 K/UL
NEUTROPHILS NFR BLD: 65.3 %
PHOSPHATE SERPL-MCNC: 2.8 MG/DL
PLATELET # BLD AUTO: 279 K/UL
PMV BLD AUTO: 9.3 FL
POTASSIUM SERPL-SCNC: 4 MMOL/L
PROT SERPL-MCNC: 7 G/DL
RBC # BLD AUTO: 5.45 M/UL
SODIUM SERPL-SCNC: 141 MMOL/L
WBC # BLD AUTO: 7.63 K/UL

## 2018-10-02 PROCEDURE — 3008F BODY MASS INDEX DOCD: CPT | Mod: CPTII,S$GLB,, | Performed by: NURSE PRACTITIONER

## 2018-10-02 PROCEDURE — 90471 IMMUNIZATION ADMIN: CPT | Mod: S$GLB,,, | Performed by: INTERNAL MEDICINE

## 2018-10-02 PROCEDURE — 83735 ASSAY OF MAGNESIUM: CPT

## 2018-10-02 PROCEDURE — 90686 IIV4 VACC NO PRSV 0.5 ML IM: CPT | Mod: S$GLB,,, | Performed by: INTERNAL MEDICINE

## 2018-10-02 PROCEDURE — 36415 COLL VENOUS BLD VENIPUNCTURE: CPT

## 2018-10-02 PROCEDURE — 3078F DIAST BP <80 MM HG: CPT | Mod: CPTII,S$GLB,, | Performed by: NURSE PRACTITIONER

## 2018-10-02 PROCEDURE — 99214 OFFICE O/P EST MOD 30 MIN: CPT | Mod: 25,S$GLB,, | Performed by: NURSE PRACTITIONER

## 2018-10-02 PROCEDURE — 85025 COMPLETE CBC W/AUTO DIFF WBC: CPT

## 2018-10-02 PROCEDURE — 80053 COMPREHEN METABOLIC PANEL: CPT

## 2018-10-02 PROCEDURE — 3075F SYST BP GE 130 - 139MM HG: CPT | Mod: CPTII,S$GLB,, | Performed by: NURSE PRACTITIONER

## 2018-10-02 PROCEDURE — 99999 PR PBB SHADOW E&M-EST. PATIENT-LVL IV: CPT | Mod: PBBFAC,,, | Performed by: NURSE PRACTITIONER

## 2018-10-02 PROCEDURE — 84100 ASSAY OF PHOSPHORUS: CPT

## 2018-10-02 NOTE — PROGRESS NOTES
Subjective:       Patient ID: Jesús Galindo is a 43 y.o. male.    Chief Complaint: Eye Problem ( jumping ) and Migraine (left side )    Pt here c/o left eye twitching for the past few weeks.  Pt denies any whiplash injury or trauma.  Pt has not eliseo taking his calcium supplements        Review of Systems   Constitutional: Positive for activity change. Negative for unexpected weight change.   HENT: Negative for hearing loss, rhinorrhea and trouble swallowing.    Eyes: Positive for visual disturbance. Negative for discharge.   Respiratory: Negative for chest tightness and wheezing.    Cardiovascular: Negative for chest pain and palpitations.   Gastrointestinal: Negative for blood in stool, constipation, diarrhea and vomiting.   Endocrine: Negative for polydipsia and polyuria.   Genitourinary: Negative for difficulty urinating, hematuria and urgency.   Musculoskeletal: Positive for neck pain. Negative for arthralgias and joint swelling.   Skin: Negative for rash.   Neurological: Negative for weakness and headaches.   Psychiatric/Behavioral: Negative for confusion and dysphoric mood.         Past Medical History:   Diagnosis Date    Asthma     GERD (gastroesophageal reflux disease)     Hyperlipidemia     Hypertension     Low back pain     Low back pain     Morbid obesity with BMI of 50.0-59.9, adult     JARAD (obstructive sleep apnea)     Varicose veins     Vitamin D deficiency      Past Surgical History:   Procedure Laterality Date    ADENOIDECTOMY      ESOPHAGOGASTRODUODENOSCOPY (EGD) N/A 8/25/2016    Performed by Vaughn Cortes [facility] at [facility] ENDO (2ND FLR)    ESOPHAGOGASTRODUODENOSCOPY (EGD) N/A 11/5/2015    Performed by Vaughn Cortes MD at [facility] (2ND FLR)    EYE SURGERY      GASTRECTOMY      GASTRECTOMY-SLEEVE-LAPAROSCOPIC with EGD-28820 N/A 11/10/2016    Performed by Vaughn Parker Jr., MD at [facility] OR 2ND FLR    LAPAROSCOPIC GASTRIC BANDING  2002    In [place]: uncertain of brand/ size     LAPAROSCOPIC REPAIR OF RECURRENT INCARCERATED INCISIONAL HERNIA N/A 6/21/2018    Procedure: REPAIR-HERNIA-INCISIONAL-LAPAROSCOPIC WITH MESH;  Surgeon: Vaughn Parker Jr., MD;  Location: [facility] OR [address] [place] [address];  Service: General;  Laterality: N/A;    LASIK      REMOVAL-GASTRIC BAND-LAPAROSCOPIC-43774 N/A 11/10/2016    Performed by Vaughn Parker Jr., MD at [facility] OR [address] [place] [address]    REPAIR-HERNIA-INCISIONAL-LAPAROSCOPIC WITH MESH N/A 6/21/2018    Performed by Vaughn Parker Jr., MD at [facility] OR [address] [place] [address]    REPAIR-HERNIA-LAPAROSCOPIC-INCISIONAL; Incarcerated  11/10/2016    Performed by Vaughn Parker Jr., MD at [facility] OR [address] [place] [address]     Social History     Social History Narrative    Engaged.  Works in [employer] for [employer].  Previously worked as a .  1 daughter, Yenni (10 years old).     Family History   Problem Relation Age of Onset    Hypertension Mother     Obesity Mother         s/p sleeve 2013: good results    Cancer Maternal Grandfather     Diabetes Paternal Grandmother     No Known Problems Father     No Known Problems Brother     Obesity Brother     No Known Problems Sister      Outpatient Encounter Medications as of 10/2/2018   Medication Sig Dispense Refill    b complex vitamins tablet Take 1 tablet by mouth every morning.       CALCIUM CITRATE/VITAMIN D3 (CALCIUM CITRATE + D ORAL) Take 1 tablet by mouth 2 (two) times daily.      cyanocobalamin, vitamin B-12, 1,000 mcg Subl Place 1 tablet under the tongue every other day.      ergocalciferol (VITAMIN D2) 50,000 unit Cap Take one capsule twice weekly (Patient taking differently: Take 50,000 Units by mouth. Take one capsule twice weekly, Takes on Mondays and either Wednesdays or Thursdays) 24 capsule 0    HYDROcodone-acetaminophen (NORCO) 5-325 mg per tablet Take 1 tablet by mouth every 4 (four) hours as needed. 15 tablet 0    ibuprofen (ADVIL,MOTRIN) 400 MG tablet Take 1 tablet (400 mg total) by mouth every 6 (six) hours as needed for Other  "(Pain). Over the counter. Take with food      meloxicam (MOBIC) 15 MG tablet Take 1 tablet by mouth daily as needed.   2    omeprazole (PRILOSEC) 40 MG capsule TAKE 1 CAPSULE (40 MG TOTAL) BY MOUTH EVERY MORNING, prn  12    ondansetron (ZOFRAN-ODT) 8 MG TbDL Take 1 tablet (8 mg total) by mouth every 6 (six) hours as needed. 30 tablet 0    pediatric multivit-iron-min (FLINTSTONES COMPLETE, IRON,) Chew Take 1 tablet by mouth 2 (two) times daily.      zolpidem (AMBIEN) 10 mg Tab Take 1 tablet (10 mg total) by mouth nightly as needed (insomnia). 30 tablet 0    ranitidine (ZANTAC) 150 MG tablet Take 1 tablet (150 mg total) by mouth 2 (two) times daily. (Patient taking differently: Take 150 mg by mouth 2 (two) times daily as needed. ) 60 tablet 11     No facility-administered encounter medications on file as of 10/2/2018.      Last 3 sets of Vitals  Vitals - 1 value per visit 6/22/2018 7/6/2018 10/2/2018   SYSTOLIC 135 132 134   DIASTOLIC 79 81 76   PULSE 79 86 97   TEMPERATURE 98.3 98.3 -   RESPIRATIONS 18 - -   SPO2 96 - 97   Weight (lb) - 296 304.9   Weight (kg) - 134.265 138.3   HEIGHT - 5' 8" 5' 8"   BODY MASS INDEX - 45.01 46.36   VISIT REPORT - - -   Waist Measurements - - -   Pain Score  - 2 -         Objective:      Physical Exam   Constitutional: He is oriented to person, place, and time. He appears well-developed and well-nourished. No distress.   HENT:   Head: Normocephalic and atraumatic.   Eyes: EOM are normal. Pupils are equal, round, and reactive to light.   Neck: Normal range of motion. Neck supple.   Cardiovascular: Normal rate, regular rhythm, normal heart sounds and intact distal pulses.   Pulmonary/Chest: Effort normal and breath sounds normal. No stridor. No respiratory distress.   Abdominal: Soft.   Lymphadenopathy:     He has no cervical adenopathy.   Neurological: He is alert and oriented to person, place, and time. He displays normal reflexes. No cranial nerve deficit or sensory deficit. He " exhibits normal muscle tone. Coordination normal.   twicthing noted under left eye with tapping on trigeminal nerve     Skin: Skin is warm and dry. Capillary refill takes less than 2 seconds. No rash noted. He is not diaphoretic. No erythema. No pallor.   Psychiatric: He has a normal mood and affect. His behavior is normal. Judgment and thought content normal.   Nursing note and vitals reviewed.          Lab Results   Component Value Date    WBC 7.04 06/12/2018    RBC 5.60 06/12/2018    HGB 15.4 06/12/2018    HCT 48.6 06/12/2018    MCV 87 06/12/2018    MCH 27.5 06/12/2018    MCHC 31.7 (L) 06/12/2018    RDW 13.2 06/12/2018     06/12/2018    MPV 9.2 06/12/2018    GRAN 4.8 06/12/2018    GRAN 67.9 06/12/2018    LYMPH 1.5 06/12/2018    LYMPH 20.6 06/12/2018    MONO 0.6 06/12/2018    MONO 8.0 06/12/2018    EOS 0.2 06/12/2018    BASO 0.04 06/12/2018    EOSINOPHIL 2.6 06/12/2018    BASOPHIL 0.6 06/12/2018     Lab Results   Component Value Date    WBC 7.04 06/12/2018    HGB 15.4 06/12/2018    HCT 48.6 06/12/2018     06/12/2018    CHOL 174 11/22/2017    TRIG 77 11/22/2017    HDL 38 (L) 11/22/2017    ALT 32 11/22/2017    AST 36 11/22/2017     06/12/2018    K 4.4 06/12/2018     06/12/2018    CREATININE 0.9 06/12/2018    BUN 20 06/12/2018    CO2 27 06/12/2018    TSH 2.333 07/21/2015    HGBA1C 6.0 07/21/2015       Assessment:       1. Facial twitching        Plan:           Jesús was seen today for eye problem and migraine.    Diagnoses and all orders for this visit:    Facial twitching  -     CBC auto differential; Future  -     Comprehensive metabolic panel; Future  -     Magnesium; Future  -     PHOSPHORUS; Future      Patient Instructions   Resume your calcium supplement as directed.      I will send you a message with your results

## 2018-11-06 ENCOUNTER — OFFICE VISIT (OUTPATIENT)
Dept: INTERNAL MEDICINE | Facility: CLINIC | Age: 43
End: 2018-11-06
Payer: COMMERCIAL

## 2018-11-06 VITALS
WEIGHT: 310.44 LBS | HEART RATE: 109 BPM | TEMPERATURE: 99 F | SYSTOLIC BLOOD PRESSURE: 124 MMHG | BODY MASS INDEX: 47.05 KG/M2 | HEIGHT: 68 IN | OXYGEN SATURATION: 98 % | DIASTOLIC BLOOD PRESSURE: 80 MMHG

## 2018-11-06 DIAGNOSIS — K21.9 GASTROESOPHAGEAL REFLUX DISEASE WITHOUT ESOPHAGITIS: ICD-10-CM

## 2018-11-06 DIAGNOSIS — E55.9 VITAMIN D DEFICIENCY: ICD-10-CM

## 2018-11-06 DIAGNOSIS — M54.9 CVA TENDERNESS: ICD-10-CM

## 2018-11-06 DIAGNOSIS — N50.819 TESTICULAR DISCOMFORT: ICD-10-CM

## 2018-11-06 DIAGNOSIS — F32.A ANXIETY AND DEPRESSION: ICD-10-CM

## 2018-11-06 DIAGNOSIS — G47.00 INSOMNIA, UNSPECIFIED TYPE: ICD-10-CM

## 2018-11-06 DIAGNOSIS — M54.50 ACUTE LEFT-SIDED LOW BACK PAIN WITHOUT SCIATICA: ICD-10-CM

## 2018-11-06 DIAGNOSIS — J45.909 UNCOMPLICATED ASTHMA, UNSPECIFIED ASTHMA SEVERITY, UNSPECIFIED WHETHER PERSISTENT: ICD-10-CM

## 2018-11-06 DIAGNOSIS — F41.9 ANXIETY AND DEPRESSION: ICD-10-CM

## 2018-11-06 DIAGNOSIS — G47.33 OBSTRUCTIVE SLEEP APNEA SYNDROME: ICD-10-CM

## 2018-11-06 DIAGNOSIS — E66.01 MORBID OBESITY WITH BMI OF 45.0-49.9, ADULT: ICD-10-CM

## 2018-11-06 DIAGNOSIS — R30.0 DYSURIA: Primary | ICD-10-CM

## 2018-11-06 PROCEDURE — 87086 URINE CULTURE/COLONY COUNT: CPT

## 2018-11-06 PROCEDURE — 99213 OFFICE O/P EST LOW 20 MIN: CPT | Mod: S$GLB,,, | Performed by: FAMILY MEDICINE

## 2018-11-06 PROCEDURE — 99999 PR PBB SHADOW E&M-EST. PATIENT-LVL V: CPT | Mod: PBBFAC,,, | Performed by: FAMILY MEDICINE

## 2018-11-06 PROCEDURE — 3079F DIAST BP 80-89 MM HG: CPT | Mod: CPTII,S$GLB,, | Performed by: FAMILY MEDICINE

## 2018-11-06 PROCEDURE — 3008F BODY MASS INDEX DOCD: CPT | Mod: CPTII,S$GLB,, | Performed by: FAMILY MEDICINE

## 2018-11-06 PROCEDURE — 3074F SYST BP LT 130 MM HG: CPT | Mod: CPTII,S$GLB,, | Performed by: FAMILY MEDICINE

## 2018-11-06 RX ORDER — IBUPROFEN 800 MG/1
800 TABLET ORAL 3 TIMES DAILY PRN
Qty: 90 TABLET | Refills: 2 | Status: SHIPPED | OUTPATIENT
Start: 2018-11-06 | End: 2019-09-04 | Stop reason: SDUPTHER

## 2018-11-06 NOTE — PROGRESS NOTES
Subjective:      Patient ID: Jesús Galindo is a 43 y.o. male.    Chief Complaint: Establish Care; Urinary Tract Infection (duration of 1 week); and Low-back Pain (left side duration of 1 week)      HPI:  Jesús Galindo is a 43 year old male with history of asthma, gastroesophageal reflux disease, low back pain, morbid obesity, and obstructive sleep apnea who presents to establish care and with a complaint of low back pain and testicular discomfort.    Testicular discomfort:  Began approximately 1 week ago.  Described as a cramping sensation to the testicles and scrotum after urinating.  States his urine has been slightly darker in the mornings lately.  Denies other associated urinary changes including odor, turbidity, dysuria, hematuria, or difficulty urinating.  Denies associated fevers/chills and rash.  Endorses associated left sided low back pain over this same time course; described as a sharp sensation; denies any known inciting event.  Taking ibuprofen 400 mg 2 tabs as needed for pain.  UA in office showing jackie urine, slightly hazy, SG 1.025, pH 5, trace leukocytes, negative nitrite, 30+ protein, normal glucose, negative ketones, normal urobilinogen, + bilirubin, 5-10 RBC.    Anxiety; Depression:  Seen by Dr. Rockweiller, psychiatry, who he gets psychotherapy through.  Symptoms stable.    Asthma:  Denies any recent issues; stable.    GERD:  Taking omeprazole 40 mg by mouth daily and ranitidine 150 mg by mouth twice daily.  Avoids heavy sauces.  Symptoms stable.    Insomnia:  Takes Ambien 10 mg by mouth nightly.  Symptoms stable.    Obesity:  Status post gastric sleeve procedure Nov. 2016; has lost ~ 100 lbs. Since that time.  Followed by bariatric clinic.    JARAD:  Uses CPAP nightly.  Symptoms stable.    Vit. D deficiency:  Taking supplemental vitamin D.  Level within normal limits 6/12/18.    Health Care Maintenance:  Influenza vaccination:  10/2/18  Last tetanus booster:  10/2/18      Past Medical History:    Diagnosis Date    Asthma     GERD (gastroesophageal reflux disease)     Hyperlipidemia     Hypertension     Low back pain     Low back pain     Morbid obesity with BMI of 50.0-59.9, adult     JARAD (obstructive sleep apnea)     Varicose veins     Vitamin D deficiency        Past Surgical History:   Procedure Laterality Date    ADENOIDECTOMY      ESOPHAGOGASTRODUODENOSCOPY (EGD) N/A 8/25/2016    Performed by Vaughn Cortes MD at [facility] ENDO ([address] [place] [address])    ESOPHAGOGASTRODUODENOSCOPY (EGD) N/A 11/5/2015    Performed by Vaughn Cortes MD at [facility] ENDO ([address] [place] [address])    EYE SURGERY      GASTRECTOMY      GASTRECTOMY-SLEEVE-LAPAROSCOPIC with EGD-[id number] N/A 11/10/2016    Performed by Vaughn Parker Jr., MD at [facility] OR [address] [place] [address]    LAPAROSCOPIC GASTRIC BANDING  2002    In [place]: uncertain of brand/ size    LAPAROSCOPIC REPAIR OF RECURRENT INCARCERATED INCISIONAL HERNIA N/A 6/21/2018    Procedure: REPAIR-HERNIA-INCISIONAL-LAPAROSCOPIC WITH MESH;  Surgeon: Vaughn Parker Jr., MD;  Location: [facility] OR [address] [place] [address];  Service: General;  Laterality: N/A;    LASIK      REMOVAL-GASTRIC BAND-LAPAROSCOPIC-43774 N/A 11/10/2016    Performed by Vaughn Parker Jr., MD at [facility] OR [address] [place] [address]    REPAIR-HERNIA-INCISIONAL-LAPAROSCOPIC WITH MESH N/A 6/21/2018    Performed by Vaughn Parker Jr., MD at [facility] OR [address] [place] [address]    REPAIR-HERNIA-LAPAROSCOPIC-INCISIONAL; Incarcerated  11/10/2016    Performed by Vaughn Parker Jr., MD at [facility] OR [address] [place] [address]       Family History   Problem Relation Age of Onset    Hypertension Mother     Obesity Mother         s/p sleeve 2013: good results    Cancer Maternal Grandfather     Diabetes Paternal Grandmother     No Known Problems Father     No Known Problems Brother     Obesity Brother     No Known Problems Sister        Social History     Socioeconomic History    Marital status: Single     Spouse name: None    Number of children: None    Years of education: None    Highest  education level: None   Social Needs    Financial resource strain: None    Food insecurity - worry: None    Food insecurity - inability: None    Transportation needs - medical: None    Transportation needs - non-medical: None   Occupational History    None   Tobacco Use    Smoking status: Former Smoker     Packs/day: 0.25     Years: 5.00     Pack years: 1.25     Last attempt to quit: 2009     Years since quittin.8    Smokeless tobacco: Never Used   Substance and Sexual Activity    Alcohol use: Yes     Comment: socially    Drug use: No    Sexual activity: Not Currently   Other Topics Concern    None   Social History Narrative    Engaged.  Works in sales for [employer].  Previously worked as a .  1 daughter, Yenni (10 years old).       Review of Systems   Constitutional: Negative for chills, fatigue and fever.   HENT: Negative for congestion, hearing loss, nosebleeds, rhinorrhea, sore throat and trouble swallowing.    Eyes: Negative for pain and visual disturbance.   Respiratory: Negative for cough, shortness of breath and wheezing.    Cardiovascular: Negative for chest pain and palpitations.   Gastrointestinal: Negative for abdominal distention, abdominal pain, constipation, diarrhea, nausea and vomiting.   Genitourinary: Positive for testicular pain. Negative for difficulty urinating, dysuria, frequency, hematuria and urgency.   Musculoskeletal: Positive for back pain. Negative for myalgias.   Skin: Negative for color change and rash.   Neurological: Negative for dizziness, syncope, speech difficulty, weakness, numbness and headaches.   Psychiatric/Behavioral: Negative for agitation, behavioral problems and confusion. The patient is not nervous/anxious.      Objective:     Vitals:    18 1115   BP: 124/80   BP Location: Right arm   Patient Position: Sitting   BP Method: Large (Manual)   Pulse: 109   Temp: 98.7 °F (37.1 °C)   SpO2: 98%   Weight: (!) 140.8 kg (310 lb 6.5 oz)   Height: 5'  "8" (1.727 m)       Physical Exam   Constitutional: He appears well-developed and well-nourished. He is cooperative. No distress.   Obese.   HENT:   Head: Normocephalic and atraumatic.   Right Ear: Hearing and external ear normal.   Left Ear: Hearing and external ear normal.   Nose: Nose normal. No rhinorrhea. No epistaxis.   Mouth/Throat: Oropharynx is clear and moist and mucous membranes are normal. No oral lesions.   Eyes: Conjunctivae, EOM and lids are normal. Pupils are equal, round, and reactive to light. Right eye exhibits no discharge. Left eye exhibits no discharge.   Neck: Trachea normal and normal range of motion. Neck supple. No tracheal deviation present.   Cardiovascular: Normal rate, regular rhythm and normal heart sounds. Exam reveals no gallop and no friction rub.   No murmur heard.  Pulmonary/Chest: Effort normal and breath sounds normal. No respiratory distress. He has no wheezes. He has no rales.   Abdominal: Soft. Bowel sounds are normal. He exhibits no distension. There is no tenderness. There is CVA tenderness. There is no rebound and no guarding.   Positive CVA tenderness on the left noted.   Musculoskeletal: Normal range of motion. He exhibits no edema or deformity.   Lymphadenopathy:     He has no cervical adenopathy.   Neurological: He is alert. No cranial nerve deficit. He exhibits normal muscle tone.   Skin: Skin is warm and dry. No rash noted.   Psychiatric: He has a normal mood and affect. His speech is normal and behavior is normal. Judgment and thought content normal. Cognition and memory are normal.   Nursing note and vitals reviewed.     Assessment:      1. Dysuria    2. Testicular discomfort    3. Acute left-sided low back pain without sciatica    4. CVA tenderness    5. Anxiety and depression    6. Uncomplicated asthma, unspecified asthma severity, unspecified whether persistent    7. Gastroesophageal reflux disease without esophagitis    8. Insomnia, unspecified type    9. Morbid " obesity with BMI of 45.0-49.9, adult    10. Obstructive sleep apnea syndrome    11. Vitamin D deficiency      Plan:   Jesús was seen today for establish care, urinary tract infection and low-back pain.    Diagnoses and all orders for this visit:    Dysuria; Testicular discomfort  -     POCT URINE DIPSTICK WITHOUT MICROSCOPE  -     US Scrotum And Testicles; Future  -     Urine culture to ensure no bacterial growth    Acute left-sided low back pain without sciatica; CVA tenderness  -     Possibly due to nephrolithiasis with positive CVA and RBC noted on UA; US Retroperitoneal Complete Kidney and; Future  -     ibuprofen (ADVIL,MOTRIN) 800 MG tablet; Take 1 tablet (800 mg total) by mouth 3 (three) times daily as needed for Pain.  -     Recommended increased daily fluid intake    Anxiety and depression        -     Stable, continue regular follow up with psychiatry.    Uncomplicated asthma, unspecified asthma severity, unspecified whether persistent        -     Stable.    Gastroesophageal reflux disease without esophagitis        -    Stable, continue current regimen.    Insomnia, unspecified type        -    Stable, continue current regimen.    Morbid obesity with BMI of 45.0-49.9, adult  -     Lipid panel; Future  -     Counseled patient on the importance of increased daily aerobic exercise and weight loss; continue follow up with bariatrics    Obstructive sleep apnea syndrome        -    Stable, continue current regimen.    Vitamin D deficiency        -    Stable, continue current regimen.

## 2018-11-07 LAB — BACTERIA UR CULT: NO GROWTH

## 2018-11-21 ENCOUNTER — TELEPHONE (OUTPATIENT)
Dept: INTERNAL MEDICINE | Facility: CLINIC | Age: 43
End: 2018-11-21

## 2018-11-21 ENCOUNTER — HOSPITAL ENCOUNTER (OUTPATIENT)
Dept: RADIOLOGY | Facility: HOSPITAL | Age: 43
Discharge: HOME | End: 2018-11-21
Attending: FAMILY MEDICINE
Payer: COMMERCIAL

## 2018-11-21 DIAGNOSIS — M54.9 CVA TENDERNESS: ICD-10-CM

## 2018-11-21 DIAGNOSIS — N50.819 TESTICULAR DISCOMFORT: ICD-10-CM

## 2018-11-21 DIAGNOSIS — M54.50 ACUTE LEFT-SIDED LOW BACK PAIN WITHOUT SCIATICA: ICD-10-CM

## 2018-11-21 PROCEDURE — 76770 US EXAM ABDO BACK WALL COMP: CPT | Mod: 26,,, | Performed by: RADIOLOGY

## 2018-11-21 PROCEDURE — 76870 US EXAM SCROTUM: CPT | Mod: TC

## 2018-11-21 PROCEDURE — 76870 US EXAM SCROTUM: CPT | Mod: 26,,, | Performed by: RADIOLOGY

## 2018-11-21 PROCEDURE — 76770 US EXAM ABDO BACK WALL COMP: CPT | Mod: TC

## 2018-12-10 ENCOUNTER — PATIENT MESSAGE (OUTPATIENT)
Dept: SLEEP MEDICINE | Facility: CLINIC | Age: 43
End: 2018-12-10

## 2018-12-10 ENCOUNTER — TELEPHONE (OUTPATIENT)
Dept: SLEEP MEDICINE | Facility: CLINIC | Age: 43
End: 2018-12-10

## 2018-12-10 DIAGNOSIS — G47.00 INSOMNIA, UNSPECIFIED TYPE: ICD-10-CM

## 2018-12-10 RX ORDER — ZOLPIDEM TARTRATE 10 MG/1
10 TABLET ORAL NIGHTLY PRN
Qty: 30 TABLET | Refills: 0 | Status: SHIPPED | OUTPATIENT
Start: 2018-12-10 | End: 2019-01-29 | Stop reason: SDUPTHER

## 2018-12-11 ENCOUNTER — PATIENT MESSAGE (OUTPATIENT)
Dept: NEUROLOGY | Facility: CLINIC | Age: 43
End: 2018-12-11

## 2018-12-27 ENCOUNTER — PATIENT MESSAGE (OUTPATIENT)
Dept: SURGERY | Facility: CLINIC | Age: 43
End: 2018-12-27

## 2019-01-03 ENCOUNTER — PATIENT MESSAGE (OUTPATIENT)
Dept: INTERNAL MEDICINE | Facility: CLINIC | Age: 44
End: 2019-01-03

## 2019-01-03 ENCOUNTER — OFFICE VISIT (OUTPATIENT)
Dept: INTERNAL MEDICINE | Facility: CLINIC | Age: 44
End: 2019-01-03
Payer: COMMERCIAL

## 2019-01-03 VITALS
HEART RATE: 78 BPM | BODY MASS INDEX: 47.74 KG/M2 | WEIGHT: 315 LBS | OXYGEN SATURATION: 97 % | SYSTOLIC BLOOD PRESSURE: 122 MMHG | DIASTOLIC BLOOD PRESSURE: 88 MMHG | HEIGHT: 68 IN | TEMPERATURE: 98 F

## 2019-01-03 DIAGNOSIS — H10.9 BACTERIAL CONJUNCTIVITIS OF LEFT EYE: ICD-10-CM

## 2019-01-03 DIAGNOSIS — J32.9 RHINOSINUSITIS: Primary | ICD-10-CM

## 2019-01-03 PROCEDURE — 3008F PR BODY MASS INDEX (BMI) DOCUMENTED: ICD-10-PCS | Mod: CPTII,S$GLB,, | Performed by: PHYSICIAN ASSISTANT

## 2019-01-03 PROCEDURE — 3079F PR MOST RECENT DIASTOLIC BLOOD PRESSURE 80-89 MM HG: ICD-10-PCS | Mod: CPTII,S$GLB,, | Performed by: PHYSICIAN ASSISTANT

## 2019-01-03 PROCEDURE — 99214 PR OFFICE/OUTPT VISIT, EST, LEVL IV, 30-39 MIN: ICD-10-PCS | Mod: S$GLB,,, | Performed by: PHYSICIAN ASSISTANT

## 2019-01-03 PROCEDURE — 3074F PR MOST RECENT SYSTOLIC BLOOD PRESSURE < 130 MM HG: ICD-10-PCS | Mod: CPTII,S$GLB,, | Performed by: PHYSICIAN ASSISTANT

## 2019-01-03 PROCEDURE — 99214 OFFICE O/P EST MOD 30 MIN: CPT | Mod: S$GLB,,, | Performed by: PHYSICIAN ASSISTANT

## 2019-01-03 PROCEDURE — 99999 PR PBB SHADOW E&M-EST. PATIENT-LVL III: ICD-10-PCS | Mod: PBBFAC,,, | Performed by: PHYSICIAN ASSISTANT

## 2019-01-03 PROCEDURE — 3074F SYST BP LT 130 MM HG: CPT | Mod: CPTII,S$GLB,, | Performed by: PHYSICIAN ASSISTANT

## 2019-01-03 PROCEDURE — 3079F DIAST BP 80-89 MM HG: CPT | Mod: CPTII,S$GLB,, | Performed by: PHYSICIAN ASSISTANT

## 2019-01-03 PROCEDURE — 3008F BODY MASS INDEX DOCD: CPT | Mod: CPTII,S$GLB,, | Performed by: PHYSICIAN ASSISTANT

## 2019-01-03 PROCEDURE — 99999 PR PBB SHADOW E&M-EST. PATIENT-LVL III: CPT | Mod: PBBFAC,,, | Performed by: PHYSICIAN ASSISTANT

## 2019-01-03 RX ORDER — AMOXICILLIN AND CLAVULANATE POTASSIUM 875; 125 MG/1; MG/1
1 TABLET, FILM COATED ORAL EVERY 12 HOURS
Qty: 20 TABLET | Refills: 0 | Status: SHIPPED | OUTPATIENT
Start: 2019-01-03 | End: 2019-01-13

## 2019-01-03 RX ORDER — POLYMYXIN B SULFATE AND TRIMETHOPRIM 1; 10000 MG/ML; [USP'U]/ML
1 SOLUTION OPHTHALMIC EVERY 6 HOURS
Qty: 10 ML | Refills: 0 | Status: SHIPPED | OUTPATIENT
Start: 2019-01-03 | End: 2019-12-27

## 2019-01-03 RX ORDER — FLUTICASONE PROPIONATE 50 MCG
1 SPRAY, SUSPENSION (ML) NASAL DAILY
Qty: 16 G | Refills: 0 | Status: SHIPPED | OUTPATIENT
Start: 2019-01-03 | End: 2019-08-09 | Stop reason: SDUPTHER

## 2019-01-03 NOTE — PROGRESS NOTES
"Subjective:       Patient ID: Jesús Galindo is a 43 y.o. male.    Chief Complaint: Cough; Nasal Congestion; and Eye Problem    HPI     Established pt of Natalio Amaya MD    C/o cough, nasal/sinus congestion (green), sinus pressure and sneezing for the past week. Also woke up this AM with redness to left eye with crusting and drainage.     Review of patient's allergies indicates:  No Known Allergies    Social History     Tobacco Use    Smoking status: Former Smoker     Packs/day: 0.25     Years: 5.00     Pack years: 1.25     Last attempt to quit: 1/1/2009     Years since quitting: 10.0    Smokeless tobacco: Never Used   Substance Use Topics    Alcohol use: Yes     Comment: socially    Drug use: No     Past Medical History:   Diagnosis Date    Asthma     GERD (gastroesophageal reflux disease)     Hyperlipidemia     Hypertension     Low back pain     Low back pain     Morbid obesity with BMI of 50.0-59.9, adult     JARAD (obstructive sleep apnea)     Varicose veins     Vitamin D deficiency        Review of Systems   Constitutional: Negative for chills, fever and unexpected weight change.   Eyes: Negative for visual disturbance.   Respiratory: Negative for cough, shortness of breath and wheezing.    Cardiovascular: Negative for chest pain and leg swelling.   Gastrointestinal: Negative for abdominal pain, nausea and vomiting.   Endocrine: Negative for polydipsia, polyphagia and polyuria.   Skin: Negative for rash.   Neurological: Negative for weakness, light-headedness and headaches.       Objective: /88   Pulse 78   Temp 98.4 °F (36.9 °C)   Ht 5' 8" (1.727 m)   Wt (!) 144.5 kg (318 lb 9 oz)   SpO2 97%   BMI 48.44 kg/m²         Physical Exam   Constitutional: He is oriented to person, place, and time. He appears well-developed and well-nourished. No distress.   HENT:   Head: Normocephalic and atraumatic.   Mouth/Throat: Oropharynx is clear and moist.   Eyes: EOM are normal. Pupils are equal, " round, and reactive to light. Right eye exhibits no discharge and no exudate. Left eye exhibits exudate. Right conjunctiva is not injected. Right conjunctiva has no hemorrhage. Left conjunctiva is injected. Left conjunctiva has no hemorrhage.   Cardiovascular: Normal rate and regular rhythm. Exam reveals no friction rub.   No murmur heard.  Pulmonary/Chest: Effort normal and breath sounds normal. He has no wheezes. He has no rales.   Abdominal: Soft. Bowel sounds are normal. There is no tenderness.   Musculoskeletal: Normal range of motion.   Neurological: He is alert and oriented to person, place, and time.   Skin: Skin is warm and dry. Capillary refill takes less than 2 seconds. No rash noted.   Psychiatric: He has a normal mood and affect.   Vitals reviewed.      Assessment:       1. Rhinosinusitis    2. Bacterial conjunctivitis of left eye        Plan:         Jesús was seen today for cough, nasal congestion and eye problem.    Diagnoses and all orders for this visit:    Rhinosinusitis  -     fluticasone (FLONASE) 50 mcg/actuation nasal spray; 1 spray (50 mcg total) by Each Nare route once daily.  -     amoxicillin-clavulanate 875-125mg (AUGMENTIN) 875-125 mg per tablet; Take 1 tablet by mouth every 12 (twelve) hours. for 10 days    Bacterial conjunctivitis of left eye  -     polymyxin B sulf-trimethoprim (POLYTRIM) 10,000 unit- 1 mg/mL Drop; Place 1 drop into the left eye every 6 (six) hours.      RTC if symptoms worsen or fail to improve    Sonya Sharma PA-C    Patient Instructions   A  ADD CLARITIN OR ZYRTEC       Understanding Sinus Problems    You dont often think about your sinuses until theres a problem. One day you realize you cant smell dinner cooking. Or you find you often have headaches or problems breathing through your nose.  Symptoms of sinus problems  Sinus problems can cause uncomfortable symptoms. Your nose may run constantly. You might have trouble sleeping at night. You may even lose  your sense of smell. Other symptoms can include:  · Nasal congestion  · Fullness in ears  · Green, yellow, or bloody drainage from the nose  · Trouble tasting food  · Frequent headaches  · Facial pain  · Cough  When sinuses are blocked  If something blocks the passages in the nose or sinuses, mucus cant drain. Mucus-filled sinuses often become infected.  · Colds cause the lining of the nose and sinuses to swell and make extra mucus. A buildup of mucus can lead to a more serious infection.  · Allergies irritate turbinates and other tissues. This causes swelling, which can cause a blockage. Over time, this irritation can also lead to sacs of swollen tissue (polyps).  · Polyps may form in both the sinuses and nose. Polyps can grow large enough to clog nasal passages and block drainage.  · A crooked (deviated) septum may block nasal passages. This is often the result of an injury.  Date Last Reviewed: 11/1/2016  © 7944-5348 [employer]. [address] [place] [address], [place] [address]. All rights reserved. This information is not intended as a substitute for professional medical care. Always follow your healthcare professional's instructions.

## 2019-01-03 NOTE — PATIENT INSTRUCTIONS
A  ADD CLARITIN OR ZYRTEC       Understanding Sinus Problems    You dont often think about your sinuses until theres a problem. One day you realize you cant smell dinner cooking. Or you find you often have headaches or problems breathing through your nose.  Symptoms of sinus problems  Sinus problems can cause uncomfortable symptoms. Your nose may run constantly. You might have trouble sleeping at night. You may even lose your sense of smell. Other symptoms can include:  · Nasal congestion  · Fullness in ears  · Green, yellow, or bloody drainage from the nose  · Trouble tasting food  · Frequent headaches  · Facial pain  · Cough  When sinuses are blocked  If something blocks the passages in the nose or sinuses, mucus cant drain. Mucus-filled sinuses often become infected.  · Colds cause the lining of the nose and sinuses to swell and make extra mucus. A buildup of mucus can lead to a more serious infection.  · Allergies irritate turbinates and other tissues. This causes swelling, which can cause a blockage. Over time, this irritation can also lead to sacs of swollen tissue (polyps).  · Polyps may form in both the sinuses and nose. Polyps can grow large enough to clog nasal passages and block drainage.  · A crooked (deviated) septum may block nasal passages. This is often the result of an injury.  Date Last Reviewed: 11/1/2016  © 4871-9492 [employer]. [address] [place] [address], [place] [address]. All rights reserved. This information is not intended as a substitute for professional medical care. Always follow your healthcare professional's instructions.

## 2019-01-29 DIAGNOSIS — G47.00 INSOMNIA, UNSPECIFIED TYPE: ICD-10-CM

## 2019-01-29 RX ORDER — ZOLPIDEM TARTRATE 10 MG/1
10 TABLET ORAL NIGHTLY PRN
Qty: 30 TABLET | Refills: 0 | Status: SHIPPED | OUTPATIENT
Start: 2019-01-29 | End: 2019-03-28 | Stop reason: SDUPTHER

## 2019-03-28 DIAGNOSIS — G47.00 INSOMNIA, UNSPECIFIED TYPE: ICD-10-CM

## 2019-03-28 RX ORDER — ZOLPIDEM TARTRATE 10 MG/1
10 TABLET ORAL NIGHTLY PRN
Qty: 30 TABLET | Refills: 0 | Status: SHIPPED | OUTPATIENT
Start: 2019-03-28 | End: 2019-04-02 | Stop reason: SDUPTHER

## 2019-04-01 ENCOUNTER — PATIENT MESSAGE (OUTPATIENT)
Dept: SLEEP MEDICINE | Facility: CLINIC | Age: 44
End: 2019-04-01

## 2019-04-02 DIAGNOSIS — G47.00 INSOMNIA, UNSPECIFIED TYPE: ICD-10-CM

## 2019-04-02 RX ORDER — ZOLPIDEM TARTRATE 10 MG/1
10 TABLET ORAL NIGHTLY PRN
Qty: 30 TABLET | Refills: 0 | Status: SHIPPED | OUTPATIENT
Start: 2019-04-02 | End: 2019-05-23 | Stop reason: SDUPTHER

## 2019-04-12 ENCOUNTER — OFFICE VISIT (OUTPATIENT)
Dept: INTERNAL MEDICINE | Facility: CLINIC | Age: 44
End: 2019-04-12

## 2019-04-12 ENCOUNTER — PATIENT MESSAGE (OUTPATIENT)
Dept: INTERNAL MEDICINE | Facility: CLINIC | Age: 44
End: 2019-04-12

## 2019-04-12 VITALS
TEMPERATURE: 98 F | HEART RATE: 100 BPM | BODY MASS INDEX: 47.74 KG/M2 | OXYGEN SATURATION: 95 % | HEIGHT: 68 IN | DIASTOLIC BLOOD PRESSURE: 82 MMHG | WEIGHT: 315 LBS | SYSTOLIC BLOOD PRESSURE: 146 MMHG

## 2019-04-12 DIAGNOSIS — R51.9 NONINTRACTABLE HEADACHE, UNSPECIFIED CHRONICITY PATTERN, UNSPECIFIED HEADACHE TYPE: ICD-10-CM

## 2019-04-12 DIAGNOSIS — E66.01 MORBID OBESITY: ICD-10-CM

## 2019-04-12 DIAGNOSIS — I10 HYPERTENSION, UNSPECIFIED TYPE: Primary | ICD-10-CM

## 2019-04-12 PROCEDURE — 99999 PR PBB SHADOW E&M-EST. PATIENT-LVL III: CPT | Mod: PBBFAC,,, | Performed by: INTERNAL MEDICINE

## 2019-04-12 PROCEDURE — 99999 PR PBB SHADOW E&M-EST. PATIENT-LVL III: ICD-10-PCS | Mod: PBBFAC,,, | Performed by: INTERNAL MEDICINE

## 2019-04-12 PROCEDURE — 99213 PR OFFICE/OUTPT VISIT, EST, LEVL III, 20-29 MIN: ICD-10-PCS | Mod: S$PBB,,, | Performed by: INTERNAL MEDICINE

## 2019-04-12 PROCEDURE — 99213 OFFICE O/P EST LOW 20 MIN: CPT | Mod: S$PBB,,, | Performed by: INTERNAL MEDICINE

## 2019-04-12 PROCEDURE — 99213 OFFICE O/P EST LOW 20 MIN: CPT | Mod: PBBFAC | Performed by: INTERNAL MEDICINE

## 2019-04-12 RX ORDER — METOPROLOL SUCCINATE 50 MG/1
50 TABLET, EXTENDED RELEASE ORAL DAILY
Qty: 30 TABLET | Refills: 11 | Status: SHIPPED | OUTPATIENT
Start: 2019-04-12 | End: 2019-10-22 | Stop reason: SDUPTHER

## 2019-04-12 RX ORDER — ALPRAZOLAM 0.5 MG/1
0.5 TABLET ORAL 3 TIMES DAILY
Qty: 90 TABLET | Refills: 0 | Status: SHIPPED | OUTPATIENT
Start: 2019-04-12 | End: 2019-05-21 | Stop reason: SDUPTHER

## 2019-04-12 NOTE — PROGRESS NOTES
Subjective:       Patient ID: Jesús Galindo is a 43 y.o. male.    Chief Complaint: Headache (high BP)    Emotionally stressed obese man complains of HTN and headache.  Headache is global, goes away with excedrin but comes back.  Is stressed because he was laid off and is going for multiple job interviews.  He and wife are near tears here in clinic.      Review of Systems   Constitutional: Negative for activity change, appetite change and fever.   HENT: Negative for congestion, postnasal drip and sore throat.    Respiratory: Negative for cough, shortness of breath and wheezing.    Cardiovascular: Negative for chest pain and palpitations.   Gastrointestinal: Negative for abdominal pain, blood in stool, constipation, diarrhea, nausea and vomiting.   Genitourinary: Negative for decreased urine volume, difficulty urinating, flank pain and frequency.   Musculoskeletal: Negative for arthralgias.   Neurological: Negative for dizziness, weakness and headaches.       Objective:      Physical Exam   Constitutional:           Assessment:       1. Hypertension, unspecified type    2. Nonintractable headache, unspecified chronicity pattern, unspecified headache type    3. Morbid obesity        Plan:   Jesús was seen today for headache.    Diagnoses and all orders for this visit:    Hypertension, unspecified type    Nonintractable headache, unspecified chronicity pattern, unspecified headache type    Morbid obesity    Other orders  -     metoprolol succinate (TOPROL-XL) 50 MG 24 hr tablet; Take 1 tablet (50 mg total) by mouth once daily.  -     ALPRAZolam (XANAX) 0.5 MG tablet; Take 1 tablet (0.5 mg total) by mouth 3 (three) times daily.

## 2019-04-16 ENCOUNTER — PATIENT MESSAGE (OUTPATIENT)
Dept: INTERNAL MEDICINE | Facility: CLINIC | Age: 44
End: 2019-04-16

## 2019-05-21 ENCOUNTER — PATIENT MESSAGE (OUTPATIENT)
Dept: SLEEP MEDICINE | Facility: CLINIC | Age: 44
End: 2019-05-21

## 2019-05-21 DIAGNOSIS — G47.00 INSOMNIA, UNSPECIFIED TYPE: ICD-10-CM

## 2019-05-21 RX ORDER — ZOLPIDEM TARTRATE 10 MG/1
10 TABLET ORAL NIGHTLY PRN
Qty: 30 TABLET | Refills: 0 | OUTPATIENT
Start: 2019-05-21

## 2019-05-21 RX ORDER — ALPRAZOLAM 0.5 MG/1
0.5 TABLET ORAL 3 TIMES DAILY
Qty: 40 TABLET | Refills: 0 | Status: SHIPPED | OUTPATIENT
Start: 2019-05-21 | End: 2020-04-02 | Stop reason: SDUPTHER

## 2019-05-21 NOTE — TELEPHONE ENCOUNTER
zolpidem (AMBIEN) 10 mg Tab 10 mg, Nightly PRN  EditCancel Reorder      Summary: Take 1 tablet (10 mg total) by mouth nightly as needed (insomnia)., Starting Tue 4/2/2019, Normal   Dose, Route, Frequency: 10 mg, Oral, Nightly PRN  Start: 4/2/2019  Ord/Sold: 4/2/2019 (O)  Report  Adh:   Long-term:   Pharmacy: [facility]/pharmacy #[id number] - [place], [place] - [address]  Med Dose History       Patient Sig: Take 1 tablet (10 mg total) by mouth nightly as needed (insomnia).       Ordered on: 4/2/2019       Authorized by: FLORENCE BRYSON       Dispense: 30 tablet       Refills: 0 ordered       Med Comments: Not to exceed 5 additional fills before 01/31/2018       Prior Authorization: Request PA        Last office visit: 12/30/16

## 2019-05-23 DIAGNOSIS — G47.00 INSOMNIA, UNSPECIFIED TYPE: ICD-10-CM

## 2019-05-23 RX ORDER — ZOLPIDEM TARTRATE 10 MG/1
10 TABLET ORAL NIGHTLY PRN
Qty: 30 TABLET | Refills: 1 | Status: SHIPPED | OUTPATIENT
Start: 2019-05-23 | End: 2019-06-28 | Stop reason: SDUPTHER

## 2019-06-28 DIAGNOSIS — G47.00 INSOMNIA, UNSPECIFIED TYPE: ICD-10-CM

## 2019-06-28 RX ORDER — ZOLPIDEM TARTRATE 10 MG/1
10 TABLET ORAL NIGHTLY PRN
Qty: 30 TABLET | Refills: 1 | Status: SHIPPED | OUTPATIENT
Start: 2019-06-28 | End: 2019-07-23 | Stop reason: SDUPTHER

## 2019-06-28 NOTE — TELEPHONE ENCOUNTER
zolpidem (AMBIEN) 10 mg Tab 10 mg, Nightly PRN  EditCancel Reorder       Summary: Take 1 tablet (10 mg total) by mouth nightly as needed (insomnia)., Starting Thu 5/23/2019, Normal   Dose, Route, Frequency: 10 mg, Oral, Nightly PRN  Start: 5/23/2019  Ord/Sold: 5/23/2019 (O)  Report  Adh:   Long-term:   Pharmacy: [facility]/pharmacy #[id number] - [place], [place] - [address] [facility]  Med Dose History       Patient Sig: Take 1 tablet (10 mg total) by mouth nightly as needed (insomnia).       Ordered on: 5/23/2019       Authorized by: FLORENCE BRYSON       Dispense: 30 tablet       Refills: 1 ordered       Med Comments: Not to exceed 5 additional fills before 01/31/2018       Prior Authorization: Request [place]        Last office visit: 12/30/2016  Pt is scheduled to return to clinic on 7/23/19

## 2019-07-23 ENCOUNTER — OFFICE VISIT (OUTPATIENT)
Dept: SLEEP MEDICINE | Facility: CLINIC | Age: 44
End: 2019-07-23
Payer: COMMERCIAL

## 2019-07-23 VITALS
SYSTOLIC BLOOD PRESSURE: 136 MMHG | WEIGHT: 315 LBS | DIASTOLIC BLOOD PRESSURE: 88 MMHG | BODY MASS INDEX: 47.74 KG/M2 | HEIGHT: 68 IN | HEART RATE: 78 BPM

## 2019-07-23 DIAGNOSIS — G47.33 OBSTRUCTIVE SLEEP APNEA: Primary | ICD-10-CM

## 2019-07-23 DIAGNOSIS — G47.00 INSOMNIA, UNSPECIFIED TYPE: ICD-10-CM

## 2019-07-23 PROCEDURE — 3075F SYST BP GE 130 - 139MM HG: CPT | Mod: CPTII,S$GLB,, | Performed by: NURSE PRACTITIONER

## 2019-07-23 PROCEDURE — 3075F PR MOST RECENT SYSTOLIC BLOOD PRESS GE 130-139MM HG: ICD-10-PCS | Mod: CPTII,S$GLB,, | Performed by: NURSE PRACTITIONER

## 2019-07-23 PROCEDURE — 3008F PR BODY MASS INDEX (BMI) DOCUMENTED: ICD-10-PCS | Mod: CPTII,S$GLB,, | Performed by: NURSE PRACTITIONER

## 2019-07-23 PROCEDURE — 99999 PR PBB SHADOW E&M-EST. PATIENT-LVL IV: CPT | Mod: PBBFAC,,, | Performed by: NURSE PRACTITIONER

## 2019-07-23 PROCEDURE — 99214 OFFICE O/P EST MOD 30 MIN: CPT | Mod: S$GLB,,, | Performed by: NURSE PRACTITIONER

## 2019-07-23 PROCEDURE — 3079F PR MOST RECENT DIASTOLIC BLOOD PRESSURE 80-89 MM HG: ICD-10-PCS | Mod: CPTII,S$GLB,, | Performed by: NURSE PRACTITIONER

## 2019-07-23 PROCEDURE — 3079F DIAST BP 80-89 MM HG: CPT | Mod: CPTII,S$GLB,, | Performed by: NURSE PRACTITIONER

## 2019-07-23 PROCEDURE — 99214 PR OFFICE/OUTPT VISIT, EST, LEVL IV, 30-39 MIN: ICD-10-PCS | Mod: S$GLB,,, | Performed by: NURSE PRACTITIONER

## 2019-07-23 PROCEDURE — 3008F BODY MASS INDEX DOCD: CPT | Mod: CPTII,S$GLB,, | Performed by: NURSE PRACTITIONER

## 2019-07-23 PROCEDURE — 99999 PR PBB SHADOW E&M-EST. PATIENT-LVL IV: ICD-10-PCS | Mod: PBBFAC,,, | Performed by: NURSE PRACTITIONER

## 2019-07-23 RX ORDER — ZOLPIDEM TARTRATE 10 MG/1
10 TABLET ORAL NIGHTLY PRN
Qty: 30 TABLET | Refills: 5 | Status: SHIPPED | OUTPATIENT
Start: 2019-07-23 | End: 2020-01-21 | Stop reason: SDUPTHER

## 2019-07-23 NOTE — PROGRESS NOTES
"Jesús Gailndo  returns today regarding the management of obstructive sleep apnea. Since last seen 2016, he continues to use nightly 12-18cm. Hx of gastric sleeve 11/10/16.  Feels the pressure is still so high so stopped use 5/2018. Ready to resume. Still taking ambien 10mg to help sleep onset. Sleep is consolidated and he denies complex sleep behaviors. Improved excessive daytime sleepiness.  Denies witnessed apneic pauses.      Previous Interrogation: good machine condition, 7d 2/5hrs. 1d: 6hrs. Li view mask. AHI 1.4.100% mask fit. Periodic 0%, 90% tile 16.5cm.     PSG (372#) 11/9/15:AHI was 48.2 with an oxygen kandy of 76.0%. Initial improvement was observed on 15 cm H2O controlling respiratory events in supine REM sleep with residual hypopneas. Improvement was observed on 15 cm H2O, but the patient was not tested on that pressure for the sufficient amount of time, thus the effective pressure was not determined.       REVIEW OF SYSTEMS: Sleep related symptoms as per HPI. 9# gain. Occasional sinus congestion. Otherwise a balance review of 10-systems is negative.         PHYSICAL EXAM:   /88   Pulse 78   Ht 5' 8" (1.727 m)   Wt (!) 153.3 kg (337 lb 15.4 oz)   BMI 51.39 kg/m²   GENERAL: morbid obese body habitus, well groomed     ASSESSMENT:   1. Obstructive sleep apnea, severe significant, continued excellent adherence, having continued improvement of his symptoms, but recent pressure intolerance given 50# loss since surgery last month. 7/23/19: Ready to resume consistent use if pressure is not too high. Needs new supplies  2. insomnia  Medical comorbidities include: morbid obesity        PLAN:   1.LOWER apap to 8-18cm. Resume use and rtc 6-mos, SOONER if needed, notify if pressure still too high. Supplies via  [facility]. Discussed purpose of PAP therapy, health benefits of CPAP, as well as the potential ramifications of untreated sleep apnea, which could include daytime sleepiness, hypertension, heart " disease and/or stroke  2. Advised to abstain from driving should he feel sleepy or drowsy.   3. Encouraged continued  weight loss efforts for potential improvement of JARAD and overall health benefits (goal 180-200#)  4.continue  AMbien 5-10mg prn qhs,safe use

## 2019-08-09 DIAGNOSIS — J32.9 RHINOSINUSITIS: ICD-10-CM

## 2019-08-09 RX ORDER — FLUTICASONE PROPIONATE 50 MCG
1 SPRAY, SUSPENSION (ML) NASAL DAILY
Qty: 16 G | Refills: 6 | Status: SHIPPED | OUTPATIENT
Start: 2019-08-09 | End: 2019-11-18 | Stop reason: SDUPTHER

## 2019-08-20 ENCOUNTER — OFFICE VISIT (OUTPATIENT)
Dept: INTERNAL MEDICINE | Facility: CLINIC | Age: 44
End: 2019-08-20
Payer: COMMERCIAL

## 2019-08-20 DIAGNOSIS — G47.00 INSOMNIA, UNSPECIFIED TYPE: ICD-10-CM

## 2019-08-20 DIAGNOSIS — Z11.3 ROUTINE SCREENING FOR STI (SEXUALLY TRANSMITTED INFECTION): ICD-10-CM

## 2019-08-20 DIAGNOSIS — E66.01 MORBID OBESITY WITH BMI OF 50.0-59.9, ADULT: ICD-10-CM

## 2019-08-20 DIAGNOSIS — G47.33 OBSTRUCTIVE SLEEP APNEA SYNDROME: ICD-10-CM

## 2019-08-20 DIAGNOSIS — E78.49 OTHER HYPERLIPIDEMIA: ICD-10-CM

## 2019-08-20 DIAGNOSIS — E55.9 VITAMIN D DEFICIENCY: ICD-10-CM

## 2019-08-20 DIAGNOSIS — Z87.438 HISTORY OF BALANITIS: ICD-10-CM

## 2019-08-20 DIAGNOSIS — J45.909 UNCOMPLICATED ASTHMA, UNSPECIFIED ASTHMA SEVERITY, UNSPECIFIED WHETHER PERSISTENT: ICD-10-CM

## 2019-08-20 DIAGNOSIS — F41.9 ANXIETY AND DEPRESSION: Primary | ICD-10-CM

## 2019-08-20 DIAGNOSIS — I10 ESSENTIAL HYPERTENSION: ICD-10-CM

## 2019-08-20 DIAGNOSIS — F32.A ANXIETY AND DEPRESSION: Primary | ICD-10-CM

## 2019-08-20 DIAGNOSIS — K21.9 GASTROESOPHAGEAL REFLUX DISEASE, ESOPHAGITIS PRESENCE NOT SPECIFIED: ICD-10-CM

## 2019-08-20 PROCEDURE — 99396 PREV VISIT EST AGE 40-64: CPT | Mod: S$GLB,,, | Performed by: FAMILY MEDICINE

## 2019-08-20 PROCEDURE — 3077F SYST BP >= 140 MM HG: CPT | Mod: CPTII,S$GLB,, | Performed by: FAMILY MEDICINE

## 2019-08-20 PROCEDURE — 3077F PR MOST RECENT SYSTOLIC BLOOD PRESSURE >= 140 MM HG: ICD-10-PCS | Mod: CPTII,S$GLB,, | Performed by: FAMILY MEDICINE

## 2019-08-20 PROCEDURE — 3080F PR MOST RECENT DIASTOLIC BLOOD PRESSURE >= 90 MM HG: ICD-10-PCS | Mod: CPTII,S$GLB,, | Performed by: FAMILY MEDICINE

## 2019-08-20 PROCEDURE — 99396 PR PREVENTIVE VISIT,EST,40-64: ICD-10-PCS | Mod: S$GLB,,, | Performed by: FAMILY MEDICINE

## 2019-08-20 PROCEDURE — 99999 PR PBB SHADOW E&M-EST. PATIENT-LVL IV: CPT | Mod: PBBFAC,,, | Performed by: FAMILY MEDICINE

## 2019-08-20 PROCEDURE — 99999 PR PBB SHADOW E&M-EST. PATIENT-LVL IV: ICD-10-PCS | Mod: PBBFAC,,, | Performed by: FAMILY MEDICINE

## 2019-08-20 PROCEDURE — 87491 CHLMYD TRACH DNA AMP PROBE: CPT

## 2019-08-20 PROCEDURE — 3080F DIAST BP >= 90 MM HG: CPT | Mod: CPTII,S$GLB,, | Performed by: FAMILY MEDICINE

## 2019-08-20 RX ORDER — OMEPRAZOLE 40 MG/1
CAPSULE, DELAYED RELEASE ORAL
Qty: 30 CAPSULE | Refills: 11 | Status: SHIPPED | OUTPATIENT
Start: 2019-08-20 | End: 2020-08-19 | Stop reason: SDUPTHER

## 2019-08-20 RX ORDER — CLOTRIMAZOLE 1 %
CREAM (GRAM) TOPICAL 2 TIMES DAILY
Qty: 14 G | Refills: 2 | Status: SHIPPED | OUTPATIENT
Start: 2019-08-20 | End: 2020-02-11 | Stop reason: SDUPTHER

## 2019-08-20 RX ORDER — AMLODIPINE BESYLATE 5 MG/1
5 TABLET ORAL DAILY
Qty: 30 TABLET | Refills: 11 | Status: SHIPPED | OUTPATIENT
Start: 2019-08-20 | End: 2020-08-18 | Stop reason: SDUPTHER

## 2019-08-20 NOTE — PATIENT INSTRUCTIONS
Controlling High Blood Pressure  High blood pressure (hypertension) is often called the silent killer. This is because many people who have it dont know it. High blood pressure is defined as 140/90 mm Hg or higher. Know your blood pressure and remember to check it regularly. Doing so can save your life. Here are some things you can do to help control your blood pressure.    Choose heart-healthy foods  · Select low-salt, low-fat foods. Limit sodium intake to 2,000 mg per day or the amount suggested by your healthcare provider.  · Limit canned, dried, cured, packaged, and fast foods. These can contain a lot of salt.  · Eat 8 to 10 servings of fruits and vegetables every day.  · Choose lean meats, fish, or chicken.  · Eat whole-grain pasta, brown rice, and beans.  · Eat 2 to 3 servings of low-fat or fat-free dairy products.  · Ask your doctor about the DASH eating plan. This plan helps reduce blood pressure.  · When you go to a restaurant, ask that your meal be prepared with no added salt.  Maintain a healthy weight  · Ask your healthcare provider how many calories to eat a day. Then stick to that number.  · Ask your healthcare provider what weight range is healthiest for you. If you are overweight, a weight loss of only 3% to 5% of your body weight can help lower blood pressure. Generally, a good weight loss goal is to lose 10% of your body weight in a year.  · Limit snacks and sweets.  · Get regular exercise.  Get up and get active  · Choose activities you enjoy. Find ones you can do with friends or family. This includes bicycling, dancing, walking, and jogging.  · Park farther away from building entrances.  · Use stairs instead of the elevator.  · When you can, walk or bike instead of driving.  · Bighorn leaves, garden, or do household repairs.  · Be active at a moderate to vigorous level of physical activity for at least 40 minutes for a minimum of 3 to 4 days a week.   Manage stress  · Make time to relax and enjoy  life. Find time to laugh.  · Communicate your concerns with your loved ones and your healthcare provider.  · Visit with family and friends, and keep up with hobbies.  Limit alcohol and quit smoking  · Men should have no more than 2 drinks per day.  · Women should have no more than 1 drink per day.  · Talk with your healthcare provider about quitting smoking. Smoking significantly increases your risk for heart disease and stroke. Ask your healthcare provider about community smoking cessation programs and other options.  Medicines  If lifestyle changes arent enough, your healthcare provider may prescribe high blood pressure medicine. Take all medicines as prescribed. If you have any questions about your medicines, ask your healthcare provider before stopping or changing them.   Date Last Reviewed: 4/27/2016  © 7033-9976 [employer]. [address] [place] [address], [place] [address]. All rights reserved. This information is not intended as a substitute for professional medical care. Always follow your healthcare professional's instructions.

## 2019-08-20 NOTE — PROGRESS NOTES
Subjective:      Patient ID: Jesús Galindo is a 44 y.o. male.    Chief Complaint: Annual Exam      HPI:  Jesús Galindo is a 44 year old male with history of asthma, gastroesophageal reflux disease, low back pain, morbid obesity, and obstructive sleep apnea who presents to clinic today for annual exam.    States a couple of weeks ago he developed a yeast infection on his penis.  States his girl friend had a yeast infection previously.  States he took some of his girl friends PO Rx for this (thinks diflucan).  Denies associated penile discharge.  Symptoms are coming and going, not present currently.  Requests screening for STIs.    Anxiety; Depression:  Seen by Dr. Rockweiller, psychiatry, who he gets psychotherapy through.  Symptoms improved after finding new job.    Asthma:  Denies any recent issues; stable.    GERD:  Taking omeprazole 40 mg by mouth daily.  Ran out of ranitidine.  Requests refills on both.  Avoids heavy sauces.  Symptoms stable.    HLD:  Total cholesterol 218.    HTN:  Prescribed metoprolol succinate 50 mg by mouth daily.  States he did have a hectic work day today.    Insomnia:  Takes Ambien 10 mg by mouth nightly.  Symptoms stable.    Obesity:  Status post gastric sleeve procedure Nov. 2016; has lost ~ 100 lbs. Since that time.  Up 37 lbs since Nov. 2018.  Not doing anything for his weight currently.  Would be interested in bariatric medicine referral.    JARAD:  Uses CPAP nightly.  Symptoms stable.    Vit. D deficiency:  Previously took supplemental vitamin D.    Health Care Maintenance:  Influenza vaccination:  Deferred today  Last tetanus booster:  10/2/18      Past Medical History:   Diagnosis Date    Asthma     GERD (gastroesophageal reflux disease)     Hyperlipidemia     Hypertension     Low back pain     Low back pain     Morbid obesity with BMI of 50.0-59.9, adult     JARAD (obstructive sleep apnea)     Varicose veins     Vitamin D deficiency        Past Surgical History:   Procedure  Laterality Date    ADENOIDECTOMY      ESOPHAGOGASTRODUODENOSCOPY (EGD) N/A 8/25/2016    Performed by Vaugh[facility]es [facility] at [facility] (2ND FLR)    ESOPHAGOGASTRODUODENOSCOPY (EGD) N/A 11/5/2015    Per[facility] at [facility] END[facility] (2ND FLR)    EYE SURGERY      GASTRECTOMY      GASTRECTOMY-SLEEVE-LAPAROSCOPIC with EGD-69670 N/A 11/10/2016    Performed by Vaughn Parker Jr., MD at [facility] OR [address] [place] [address]    LAPAROSCOPIC GASTRIC BANDING  2002    In [place]: uncertain of brand/ size    LASIK      REMOVAL-GASTRIC BAND-LAPAROSCOPIC-43774 N/A 11/10/2016    Performed by Vaughn Parker Jr[facility] at [facility] OR [address] [place] [address]    REPAIR-HERNIA-INCISIONAL-LAPAROSCOPIC WITH MESH N/A 6/21/2018    Performed by Vaughn Parker Jr[facility] at [facility] OR [address] [place] [address]    REPAIR-HERNIA-LAPAROSCOPIC-INCISIONAL; Incarcerated  11/10/2016    Performed by Vaughn Parker Jr[facility] at [facility] OR [address] [place] [address]       Family History   Problem Relation Age of Onset    Hypertension Mother     Obesity Mother         s/p sleeve 2013: good results    Cancer Maternal Grandfather     Diabetes Paternal Grandmother     No Known Problems Father     No Known Problems Brother     Obesity Brother     No Known Problems Sister        Social History     Socioeconomic History    Marital status: Single     Spouse name: Not on file    Number of children: Not on file    Years of education: Not on file    Highest education level: Not on file   Occupational History    Not on file   Social Needs    Financial resource strain: Not hard at all    Food insecurity:     Worry: Sometimes true     Inability: Never true    Transportation needs:     Medical: No     Non-medical: No   Tobacco Use    Smoking status: Former Smoker     Packs/day: 0.25     Years: 5.00     Pack years: 1.25     Last attempt to quit: 1/1/2009     Years since quitting: 10.6    Smokeless tobacco: Never Used   Substance and Sexual Activity    Alcohol use: Yes     Frequency: 2-3 times a week      Drinks per session: 3 or 4     Binge frequency: Monthly     Comment: socially    Drug use: No    Sexual activity: Not Currently   Lifestyle    Physical activity:     Days per week: 0 days     Minutes per session: 0 min    Stress: To some extent   Relationships    Social connections:     Talks on phone: Never     Gets together: Once a week     Attends Scientologist service: Not on file     Active member of club or organization: Yes     Attends meetings of clubs or organizations: More than 4 times per year     Relationship status:    Other Topics Concern    Not on file   Social History Narrative    Engaged.  Works in sales for [employer].  Previously worked as a .  1 daughter, Yenni (10 years old).       Review of Systems   Constitutional: Negative for activity change, chills, fatigue, fever and unexpected weight change.   HENT: Negative for congestion, hearing loss, nosebleeds, rhinorrhea, sore throat and trouble swallowing.    Eyes: Negative for pain, discharge and visual disturbance.   Respiratory: Negative for cough, chest tightness, shortness of breath and wheezing.    Cardiovascular: Positive for palpitations. Negative for chest pain.   Gastrointestinal: Negative for abdominal distention, abdominal pain, blood in stool, constipation, diarrhea, nausea and vomiting.   Endocrine: Negative for polydipsia and polyuria.   Genitourinary: Negative for difficulty urinating, dysuria, frequency, hematuria and urgency.   Musculoskeletal: Negative for arthralgias, back pain, joint swelling, myalgias and neck pain.   Skin: Negative for color change and rash.   Neurological: Negative for dizziness, syncope, speech difficulty, weakness, numbness and headaches.   Psychiatric/Behavioral: Negative for agitation, behavioral problems, confusion and dysphoric mood. The patient is not nervous/anxious.      Objective:     Vitals:    08/20/19 1520   BP: (!) 142/94   BP Location: Right arm   Patient Position: Sitting  "  BP Method: Medium (Manual)   Pulse: 80   Temp: 98.4 °F (36.9 °C)   TempSrc: Oral   SpO2: 97%   Weight: (!) 157.8 kg (347 lb 14.2 oz)   Height: 5' 8" (1.727 m)       Physical Exam   Constitutional: He appears well-developed and well-nourished. He is cooperative. No distress.   HENT:   Head: Normocephalic and atraumatic.   Right Ear: Hearing and external ear normal.   Left Ear: Hearing and external ear normal.   Nose: Nose normal. No rhinorrhea. No epistaxis.   Mouth/Throat: Oropharynx is clear and moist and mucous membranes are normal. No oral lesions.   Eyes: Pupils are equal, round, and reactive to light. Conjunctivae, EOM and lids are normal. Right eye exhibits no discharge. Left eye exhibits no discharge.   Neck: Trachea normal and normal range of motion. Neck supple. No tracheal deviation present.   Cardiovascular: Normal rate, regular rhythm and normal heart sounds. Exam reveals no gallop and no friction rub.   No murmur heard.  Pulmonary/Chest: Effort normal and breath sounds normal. No respiratory distress. He has no wheezes. He has no rales.   Abdominal: Soft. Bowel sounds are normal. He exhibits no distension. There is no tenderness. There is no rebound and no guarding.   Genitourinary: Penis normal. No penile erythema or penile tenderness. No discharge found.   Musculoskeletal: Normal range of motion. He exhibits no edema or deformity.   Neurological: He is alert. No cranial nerve deficit. He exhibits normal muscle tone.   Skin: Skin is warm and dry. No rash noted.   Psychiatric: He has a normal mood and affect. His speech is normal and behavior is normal. Judgment and thought content normal. Cognition and memory are normal.   Nursing note and vitals reviewed.     Assessment:      1. Anxiety and depression    2. Uncomplicated asthma, unspecified asthma severity, unspecified whether persistent    3. Gastroesophageal reflux disease, esophagitis presence not specified    4. History of balanitis    5. Other " hyperlipidemia    6. Essential hypertension    7. Insomnia, unspecified type    8. Morbid obesity with BMI of 50.0-59.9, adult    9. Obstructive sleep apnea syndrome    10. Routine screening for STI (sexually transmitted infection)    11. Vitamin D deficiency      Plan:   Jesús was seen today for annual exam.    Diagnoses and all orders for this visit:    Anxiety and depression        -     Reviewed; stable    Uncomplicated asthma, unspecified asthma severity, unspecified whether persistent        -     Stable.    Gastroesophageal reflux disease, esophagitis presence not specified  -     Stable, continue omeprazole (PRILOSEC) 40 MG capsule; TAKE 1 CAPSULE (40 MG TOTAL) BY MOUTH EVERY MORNING AS NEEDED FOR ACID REFLUX; refilled.  -     Continue ranitidine (ZANTAC) 150 MG tablet; Take 1 tablet (150 mg total) by mouth 2 (two) times daily as needed for Heartburn; refilled.    History of balanitis  -     No signs of infection on exam today.  Provided Rx for clotrimazole (LOTRIMIN) 1 % cream; Apply topically 2 (two) times daily. for 7 days, start if symptoms return.  Recommended avoidance of sexual intercourse when either him or his partner have active yeast infection.    Other hyperlipidemia  -     Lipid panel; Future    Essential hypertension  -     Comprehensive metabolic panel; Future  -     CBC auto differential; Future  -     TSH; Future  -     Hemoglobin A1c; Future  -     Above goal.  Start amLODIPine (NORVASC) 5 MG tablet; Take 1 tablet (5 mg total) by mouth once daily.  Continue metoprolol.  Counseled patient on the importance of a low sodium diet and daily aerobic exercise.  Return to clinic in 1-2 weeks for nursing blood pressure check.    Insomnia, unspecified type        -     Stable, continue current regimen.    Morbid obesity with BMI of 50.0-59.9, adult        -     Counseled patient on the importance of a healthy low calorie diet, increased daily aerobic exercise, and weight loss.        -     Referred  to bariatric medicine    Obstructive sleep apnea syndrome        -     Stable, continue CPAP; recommended weight loss    Routine screening for STI (sexually transmitted infection)  -     HIV 1/2 Ag/Ab (4th Gen); Future  -     RPR; Future  -     C. trachomatis/N. gonorrhoeae by AMP DNA [facility]; Urine  -     HEPATITIS PANEL, ACUTE; Future    Vitamin D deficiency  -     Vitamin D; Future

## 2019-08-21 LAB
C TRACH DNA SPEC QL NAA+PROBE: NOT DETECTED
N GONORRHOEA DNA SPEC QL NAA+PROBE: NOT DETECTED

## 2019-08-22 ENCOUNTER — TELEPHONE (OUTPATIENT)
Dept: INTERNAL MEDICINE | Facility: CLINIC | Age: 44
End: 2019-08-22

## 2019-08-22 VITALS
WEIGHT: 315 LBS | BODY MASS INDEX: 47.74 KG/M2 | DIASTOLIC BLOOD PRESSURE: 100 MMHG | HEIGHT: 68 IN | TEMPERATURE: 98 F | HEART RATE: 80 BPM | OXYGEN SATURATION: 97 % | SYSTOLIC BLOOD PRESSURE: 146 MMHG

## 2019-08-22 NOTE — TELEPHONE ENCOUNTER
Referred to bariatric medicine clinic (discussed at time of visit); please process/notify patient.

## 2019-08-27 ENCOUNTER — TELEPHONE (OUTPATIENT)
Dept: INTERNAL MEDICINE | Facility: CLINIC | Age: 44
End: 2019-08-27

## 2019-08-27 ENCOUNTER — PATIENT MESSAGE (OUTPATIENT)
Dept: INTERNAL MEDICINE | Facility: CLINIC | Age: 44
End: 2019-08-27

## 2019-08-27 ENCOUNTER — LAB VISIT (OUTPATIENT)
Dept: LAB | Facility: HOSPITAL | Age: 44
End: 2019-08-27
Payer: COMMERCIAL

## 2019-08-27 DIAGNOSIS — E55.9 VITAMIN D DEFICIENCY: Primary | ICD-10-CM

## 2019-08-27 DIAGNOSIS — I10 ESSENTIAL HYPERTENSION: ICD-10-CM

## 2019-08-27 DIAGNOSIS — Z11.3 ROUTINE SCREENING FOR STI (SEXUALLY TRANSMITTED INFECTION): ICD-10-CM

## 2019-08-27 DIAGNOSIS — R73.03 PREDIABETES: ICD-10-CM

## 2019-08-27 DIAGNOSIS — E78.49 OTHER HYPERLIPIDEMIA: ICD-10-CM

## 2019-08-27 DIAGNOSIS — E55.9 VITAMIN D DEFICIENCY: ICD-10-CM

## 2019-08-27 DIAGNOSIS — R76.8 POSITIVE HEPATITIS C ANTIBODY TEST: Primary | ICD-10-CM

## 2019-08-27 LAB
25(OH)D3+25(OH)D2 SERPL-MCNC: 17 NG/ML (ref 30–96)
ALBUMIN SERPL BCP-MCNC: 3.6 G/DL (ref 3.5–5.2)
ALP SERPL-CCNC: 96 U/L (ref 55–135)
ALT SERPL W/O P-5'-P-CCNC: 40 U/L (ref 10–44)
ANION GAP SERPL CALC-SCNC: 9 MMOL/L (ref 8–16)
AST SERPL-CCNC: 28 U/L (ref 10–40)
BASOPHILS # BLD AUTO: 0.03 K/UL (ref 0–0.2)
BASOPHILS NFR BLD: 0.6 % (ref 0–1.9)
BILIRUB SERPL-MCNC: 0.8 MG/DL (ref 0.1–1)
BUN SERPL-MCNC: 11 MG/DL (ref 6–20)
CALCIUM SERPL-MCNC: 9.1 MG/DL (ref 8.7–10.5)
CHLORIDE SERPL-SCNC: 106 MMOL/L (ref 95–110)
CHOLEST SERPL-MCNC: 229 MG/DL (ref 120–199)
CHOLEST/HDLC SERPL: 4.8 {RATIO} (ref 2–5)
CO2 SERPL-SCNC: 24 MMOL/L (ref 23–29)
CREAT SERPL-MCNC: 0.8 MG/DL (ref 0.5–1.4)
DIFFERENTIAL METHOD: NORMAL
EOSINOPHIL # BLD AUTO: 0.3 K/UL (ref 0–0.5)
EOSINOPHIL NFR BLD: 5.5 % (ref 0–8)
ERYTHROCYTE [DISTWIDTH] IN BLOOD BY AUTOMATED COUNT: 13.7 % (ref 11.5–14.5)
EST. GFR  (AFRICAN AMERICAN): >60 ML/MIN/1.73 M^2
EST. GFR  (NON AFRICAN AMERICAN): >60 ML/MIN/1.73 M^2
ESTIMATED AVG GLUCOSE: 120 MG/DL (ref 68–131)
GLUCOSE SERPL-MCNC: 102 MG/DL (ref 70–110)
HAV IGM SERPL QL IA: NEGATIVE
HBA1C MFR BLD HPLC: 5.8 % (ref 4–5.6)
HBV CORE IGM SERPL QL IA: NEGATIVE
HBV SURFACE AG SERPL QL IA: NEGATIVE
HCT VFR BLD AUTO: 43.6 % (ref 40–54)
HCV AB SERPL QL IA: POSITIVE
HDLC SERPL-MCNC: 48 MG/DL (ref 40–75)
HDLC SERPL: 21 % (ref 20–50)
HGB BLD-MCNC: 14.2 G/DL (ref 14–18)
HIV 1+2 AB+HIV1 P24 AG SERPL QL IA: NEGATIVE
LDLC SERPL CALC-MCNC: 123.8 MG/DL (ref 63–159)
LYMPHOCYTES # BLD AUTO: 1.1 K/UL (ref 1–4.8)
LYMPHOCYTES NFR BLD: 24 % (ref 18–48)
MCH RBC QN AUTO: 27.5 PG (ref 27–31)
MCHC RBC AUTO-ENTMCNC: 32.6 G/DL (ref 32–36)
MCV RBC AUTO: 85 FL (ref 82–98)
MONOCYTES # BLD AUTO: 0.6 K/UL (ref 0.3–1)
MONOCYTES NFR BLD: 12.3 % (ref 4–15)
NEUTROPHILS # BLD AUTO: 2.7 K/UL (ref 1.8–7.7)
NEUTROPHILS NFR BLD: 57.6 % (ref 38–73)
NONHDLC SERPL-MCNC: 181 MG/DL
PLATELET # BLD AUTO: 256 K/UL (ref 150–350)
PMV BLD AUTO: 9.5 FL (ref 9.2–12.9)
POTASSIUM SERPL-SCNC: 3.7 MMOL/L (ref 3.5–5.1)
PROT SERPL-MCNC: 6.7 G/DL (ref 6–8.4)
RBC # BLD AUTO: 5.16 M/UL (ref 4.6–6.2)
SODIUM SERPL-SCNC: 139 MMOL/L (ref 136–145)
TRIGL SERPL-MCNC: 286 MG/DL (ref 30–150)
TSH SERPL DL<=0.005 MIU/L-ACNC: 2.95 UIU/ML (ref 0.4–4)
WBC # BLD AUTO: 4.7 K/UL (ref 3.9–12.7)

## 2019-08-27 PROCEDURE — 86703 HIV-1/HIV-2 1 RESULT ANTBDY: CPT

## 2019-08-27 PROCEDURE — 80074 ACUTE HEPATITIS PANEL: CPT

## 2019-08-27 PROCEDURE — 80061 LIPID PANEL: CPT

## 2019-08-27 PROCEDURE — 82306 VITAMIN D 25 HYDROXY: CPT

## 2019-08-27 PROCEDURE — 84443 ASSAY THYROID STIM HORMONE: CPT

## 2019-08-27 PROCEDURE — 80053 COMPREHEN METABOLIC PANEL: CPT

## 2019-08-27 PROCEDURE — 86592 SYPHILIS TEST NON-TREP QUAL: CPT

## 2019-08-27 PROCEDURE — 85025 COMPLETE CBC W/AUTO DIFF WBC: CPT

## 2019-08-27 PROCEDURE — 36415 COLL VENOUS BLD VENIPUNCTURE: CPT

## 2019-08-27 PROCEDURE — 83036 HEMOGLOBIN GLYCOSYLATED A1C: CPT

## 2019-08-27 RX ORDER — VIT C/E/ZN/COPPR/LUTEIN/ZEAXAN 250MG-90MG
1000 CAPSULE ORAL DAILY
Qty: 30 CAPSULE | Refills: 2 | Status: SHIPPED | OUTPATIENT
Start: 2019-08-27 | End: 2019-11-18 | Stop reason: SDUPTHER

## 2019-08-28 ENCOUNTER — LAB VISIT (OUTPATIENT)
Dept: LAB | Facility: HOSPITAL | Age: 44
End: 2019-08-28
Payer: COMMERCIAL

## 2019-08-28 DIAGNOSIS — R76.8 POSITIVE HEPATITIS C ANTIBODY TEST: ICD-10-CM

## 2019-08-28 LAB — RPR SER QL: NORMAL

## 2019-08-28 PROCEDURE — 87522 HEPATITIS C REVRS TRNSCRPJ: CPT

## 2019-08-28 PROCEDURE — 36415 COLL VENOUS BLD VENIPUNCTURE: CPT

## 2019-08-29 ENCOUNTER — PATIENT MESSAGE (OUTPATIENT)
Dept: INTERNAL MEDICINE | Facility: CLINIC | Age: 44
End: 2019-08-29

## 2019-08-30 LAB
HCV RNA SERPL NAA+PROBE-LOG IU: <1.08 LOG (10) IU/ML
HCV RNA SERPL QL NAA+PROBE: NOT DETECTED IU/ML
HCV RNA SPEC NAA+PROBE-ACNC: <12 IU/ML

## 2019-09-04 DIAGNOSIS — M54.50 ACUTE LEFT-SIDED LOW BACK PAIN WITHOUT SCIATICA: ICD-10-CM

## 2019-09-04 DIAGNOSIS — G47.00 INSOMNIA, UNSPECIFIED TYPE: ICD-10-CM

## 2019-09-04 RX ORDER — IBUPROFEN 800 MG/1
800 TABLET ORAL 3 TIMES DAILY PRN
Qty: 90 TABLET | Refills: 2 | Status: SHIPPED | OUTPATIENT
Start: 2019-09-04 | End: 2020-07-08 | Stop reason: SDUPTHER

## 2019-09-04 RX ORDER — ZOLPIDEM TARTRATE 10 MG/1
10 TABLET ORAL NIGHTLY PRN
Qty: 30 TABLET | Refills: 5 | Status: CANCELLED | OUTPATIENT
Start: 2019-09-04

## 2019-10-22 RX ORDER — METOPROLOL SUCCINATE 50 MG/1
TABLET, EXTENDED RELEASE ORAL
Qty: 90 TABLET | Refills: 3 | Status: SHIPPED | OUTPATIENT
Start: 2019-10-22 | End: 2020-11-14 | Stop reason: SDUPTHER

## 2019-11-06 ENCOUNTER — TELEPHONE (OUTPATIENT)
Dept: INTERNAL MEDICINE | Facility: CLINIC | Age: 44
End: 2019-11-06

## 2019-11-06 ENCOUNTER — PATIENT MESSAGE (OUTPATIENT)
Dept: INTERNAL MEDICINE | Facility: CLINIC | Age: 44
End: 2019-11-06

## 2019-11-12 ENCOUNTER — PATIENT MESSAGE (OUTPATIENT)
Dept: ADMINISTRATIVE | Facility: OTHER | Age: 44
End: 2019-11-12

## 2019-11-12 ENCOUNTER — TELEPHONE (OUTPATIENT)
Dept: INTERNAL MEDICINE | Facility: CLINIC | Age: 44
End: 2019-11-12

## 2019-11-12 ENCOUNTER — PATIENT OUTREACH (OUTPATIENT)
Dept: OTHER | Facility: OTHER | Age: 44
End: 2019-11-12

## 2019-11-12 ENCOUNTER — CLINICAL SUPPORT (OUTPATIENT)
Dept: INTERNAL MEDICINE | Facility: CLINIC | Age: 44
End: 2019-11-12
Payer: COMMERCIAL

## 2019-11-12 NOTE — PROGRESS NOTES
Pt is here for b/p check, 131/78, p 67. Dr Amaya notified, no changes in medications per MD, pt is to start digital htn program.

## 2019-11-15 ENCOUNTER — PATIENT MESSAGE (OUTPATIENT)
Dept: OTHER | Facility: OTHER | Age: 44
End: 2019-11-15

## 2019-11-18 DIAGNOSIS — E55.9 VITAMIN D DEFICIENCY: ICD-10-CM

## 2019-11-18 DIAGNOSIS — J32.9 RHINOSINUSITIS: ICD-10-CM

## 2019-11-18 RX ORDER — VIT C/E/ZN/COPPR/LUTEIN/ZEAXAN 250MG-90MG
1000 CAPSULE ORAL DAILY
Qty: 90 CAPSULE | Refills: 3 | Status: SHIPPED | OUTPATIENT
Start: 2019-11-18 | End: 2021-02-23

## 2019-11-18 RX ORDER — FLUTICASONE PROPIONATE 50 MCG
SPRAY, SUSPENSION (ML) NASAL
Qty: 18.2 ML | Refills: 3 | Status: SHIPPED | OUTPATIENT
Start: 2019-11-18 | End: 2020-08-27 | Stop reason: SDUPTHER

## 2019-11-27 ENCOUNTER — PATIENT OUTREACH (OUTPATIENT)
Dept: OTHER | Facility: OTHER | Age: 44
End: 2019-11-27

## 2019-12-02 NOTE — PROGRESS NOTES
Digital Medicine: Health  Follow-Up    Takes pill when he gets up.   Breakfast is 2 boiled eggs and 1 hotdog (scrambled or fried on weekends) or cereal with 2% milk. Cup of coffee at office, 3 sweet and lows with creamer (sugar-free)  Snacks: peanuts, protein snack packs, other things from home  Lunch: eaten out with customers or leftovers from home  Dinner: (8ish) protein, vegetable, salad, carb (rice)  Cut down on energy drinks (sugar-free, 0 calorie ones)  Water throughout the day, diet coke/coke zero    The history is provided by the patient.     Follow Up  Follow-up reason(s): routine education      Routine Education Topics: eating patterns and physical activity        INTERVENTION(S)  recommended diet modifications, recommend physical activity, encouragement/support and goal setting    PLAN  patient verbalizes understanding      There are no preventive care reminders to display for this patient.    Last 5 Patient Entered Readings                                      Current 30 Day Average: 134/86     Recent Readings 12/2/2019 11/30/2019 11/29/2019 11/27/2019 11/26/2019    SBP (mmHg) 147 121 137 127 139    DBP (mmHg) 92 87 87 85 88    Pulse 85 68 74 76 83                      Diet Screening   Breakfast is typically between 6-7 am.  2 boiled eggs with 1 hot dog or cereal with 2% milk.  Lunch is typically between. Eaten out sometimes, sometimes leftovers.    Dinner is typically between after 8 pm.  Protein, vegetable, salad, carb (usually rice).    Snacks are typically. Peanuts, protein snack boxes, little things from home.    Patient reports eating or drinking the following: soda, water, fresh vegetables, caffeine, artificial sweeteners, packaged/processed foods and restaurant foodHe has the following dietary restrictions: noneHe does not skip meals regularly.  He has no standard fasting periods.      Patient did not have times when they could not afford food or ran out.      The patient states that he  typically eats a non-home cooked meal (ex: dining out, take out, frozen meals) 3-4 times per week.  He cooks for self.    Patient does the shopping for groceries.  He gets groceries from the grocery store.      He does not find cooking difficult.      Barriers to a Healthy Diet: no barriers to healthy eating    Intervention(s): reducing sodium intake and reducing processed foods    Assigning the following patient goals: maintain low sodium diet    Physical Activity Screening   When asked if exercising, patient responded: yesHis level of intensity when exercising is moderate.    Patient participates in the following activities: walking    He identified the following barriers to physical activity: no barriers to being active    Walks 2 miles couple times a week  Uses treadmill when travelling for work    Assigning the following patient goal(s): increase physical activity, 150 minutes of exercise per week and participate in exercise weekly    Medication Adherence Screening   He misses doses: never      M-F takes it at 6am but on weekends takes it a little alter because he wakes up later.    Tobacco and Alcohol Screening       No tobacco use in 20+ years    Cedric's and soda or glass of wine a night      SDOH

## 2019-12-05 ENCOUNTER — PATIENT MESSAGE (OUTPATIENT)
Dept: SLEEP MEDICINE | Facility: CLINIC | Age: 44
End: 2019-12-05

## 2019-12-05 DIAGNOSIS — G47.33 OBSTRUCTIVE SLEEP APNEA: Primary | ICD-10-CM

## 2019-12-10 ENCOUNTER — PATIENT OUTREACH (OUTPATIENT)
Dept: OTHER | Facility: OTHER | Age: 44
End: 2019-12-10

## 2019-12-10 NOTE — PROGRESS NOTES
12/10/19 9:08 AM - Called patient and left voicemail with call back number. Will follow up with patient at next encounter.

## 2019-12-27 NOTE — PROGRESS NOTES
Digital Medicine: Clinician Introduction    Jesús Galindo is a 44 y.o. male who is newly enrolled in the Digital Medicine Clinic.    The following information was reviewed and updated:  Preferred pharmacy   [id number] - [place] - [address]  [address]  [place] [address]  Phone: [phone] Fax: [phone]      Patient prefers a 30 days supply.     Review of patient's allergies indicates:  No Known Allergies    Called patient to welcome into HTN DMP. Patient endorses adherence to medication regimen. Patient denies hypotensive s/sx (lightheadedness, dizziness, nausea, fatigue); patient denies hypertensive s/sx (SOB, CP, severe headaches, changes in vision). Instructed patient to seek medical care if BP > 180/110 and is accompanied by hypertensive s/sx associated, patient confirms understanding. He reports that sometimes his blood pressure is elevated due to other stress in his life, but did not want to go into detail about stressors at this time. He reports that he checks his blood pressure right before he goes to work and finds that it is elevated because he is often rushing to get ready.        The history is provided by the patient. No  was used.     HYPERTENSION  Our goal is to get BP to consistently below 130/80mmHg and make the process convenient so patient can avoid extra trips to the office. Getting your blood pressure below 130/80mmHg (definition of control) will reduce your risk for heart attack, kidney failure, stroke and death (as well as kidney failure, eye disease, & dementia)      Reviewed non-pharmacologic therapies and impact on BP      Explained that we expect patient to obtain several blood pressures per week at random times of day.  Instructed patient not to allow anyone else to use phone and monitoring device.  Confirmed appropriate BP monitoring technique.      Explained to patient that the digital medicine team is not available for emergencies.   Patient will call [facility] on-call ([phone] or [phone]) or 911 if needed.    Patient's BP goal is 130/80. Patients BP average is 137/92 mmHg, which is above goal, per 2017 ACC/AHA Hypertension Guidelines.  When asked what the patient thinks is causing BP to be elevated, they state: rush to check before he goes to work      Med Review complete.    Allergies reviewed.    Last 5 Patient Entered Readings                                      Current 30 Day Average: 137/92     Recent Readings 12/24/2019 12/22/2019 12/22/2019 12/21/2019 12/20/2019    SBP (mmHg) 142 132 150 140 134    DBP (mmHg) 98 82 99 97 96    Pulse 84 68 69 96 90          INTERVENTION(S)  reviewed monitoring technique, encouragement/support and goal setting    PLAN  patient verbalizes understanding, additional monitoring needed and continue monitoring    Assessment:  Reviewed recent readings. Per 2017 ACC/ AHA HTN guidelines (goal of BP < 130/80), current 30-day average need to be addressed more throroughly today.     Plan:  Continue current medication regimen. Reviewed proper BP measurement techniques and BP goal. Encouraged patient to continue to check his BP regularly and to check when he comes home from work after sitting for 5-15 minutes. Will discuss lifestyle modifications at future encounter. Performed medication reconciliation. I will continue to monitor regularly and will follow-up in ~3 weeks, sooner if blood pressure begins to trend upward or downward. Patient denies having questions or concerns. Patient has my contact information and knows to call with any concerns or clinical changes.      There are no preventive care reminders to display for this patient.    Current Medication Regimen:  Hypertension Medications             amLODIPine (NORVASC) 5 MG tablet Take 1 tablet (5 mg total) by mouth once daily.    metoprolol succinate (TOPROL-XL) 50 MG 24 hr tablet TAKE 1 TABLET BY MOUTH EVERY DAY            Reviewed the importance of  self-monitoring, medication adherence, and that the health  can be used as a resource for lifestyle modifications to help reduce or maintain a healthy lifestyle.    Sent link to [facility] webpages and my contact information via [employer] for future questions. Follow up scheduled.         Screenings

## 2019-12-30 ENCOUNTER — PATIENT OUTREACH (OUTPATIENT)
Dept: OTHER | Facility: OTHER | Age: 44
End: 2019-12-30

## 2019-12-30 NOTE — PROGRESS NOTES
Digital Medicine: Health  Follow-Up    Avg /92 as of Dec 30, 2019. Stated no changes and everything was going well. Said he'd take a reading tonight when he gets home from work.     The history is provided by the patient.     Follow Up  Follow-up reason(s): routine education and goal follow-up      Routine Education Topics: eating patterns and physical activity        INTERVENTION(S)  encouragement/support, denied support and denied questions    PLAN  patient verbalizes understanding      There are no preventive care reminders to display for this patient.    Last 5 Patient Entered Readings                                      Current 30 Day Average: 138/92     Recent Readings 12/24/2019 12/22/2019 12/22/2019 12/21/2019 12/20/2019    SBP (mmHg) 142 132 150 140 134    DBP (mmHg) 98 82 99 97 96    Pulse 84 68 69 96 90                      Diet Screening   No change to diet.      Physical Activity Screening   No change to exercise routine.          SDOH

## 2020-01-16 ENCOUNTER — PATIENT OUTREACH (OUTPATIENT)
Dept: ADMINISTRATIVE | Facility: OTHER | Age: 45
End: 2020-01-16

## 2020-01-20 ENCOUNTER — TELEPHONE (OUTPATIENT)
Dept: SLEEP MEDICINE | Facility: CLINIC | Age: 45
End: 2020-01-20

## 2020-01-21 ENCOUNTER — TELEPHONE (OUTPATIENT)
Dept: SLEEP MEDICINE | Facility: CLINIC | Age: 45
End: 2020-01-21

## 2020-01-21 ENCOUNTER — OFFICE VISIT (OUTPATIENT)
Dept: SLEEP MEDICINE | Facility: CLINIC | Age: 45
End: 2020-01-21
Payer: COMMERCIAL

## 2020-01-21 VITALS
SYSTOLIC BLOOD PRESSURE: 127 MMHG | HEIGHT: 68 IN | HEART RATE: 82 BPM | DIASTOLIC BLOOD PRESSURE: 83 MMHG | WEIGHT: 315 LBS | BODY MASS INDEX: 47.74 KG/M2

## 2020-01-21 DIAGNOSIS — G47.33 OBSTRUCTIVE SLEEP APNEA SYNDROME: Primary | ICD-10-CM

## 2020-01-21 DIAGNOSIS — E66.01 MORBID OBESITY WITH BMI OF 50.0-59.9, ADULT: ICD-10-CM

## 2020-01-21 DIAGNOSIS — G47.00 INSOMNIA, UNSPECIFIED TYPE: ICD-10-CM

## 2020-01-21 PROCEDURE — 3008F BODY MASS INDEX DOCD: CPT | Mod: CPTII,S$GLB,, | Performed by: NURSE PRACTITIONER

## 2020-01-21 PROCEDURE — 3079F PR MOST RECENT DIASTOLIC BLOOD PRESSURE 80-89 MM HG: ICD-10-PCS | Mod: CPTII,S$GLB,, | Performed by: NURSE PRACTITIONER

## 2020-01-21 PROCEDURE — 99214 PR OFFICE/OUTPT VISIT, EST, LEVL IV, 30-39 MIN: ICD-10-PCS | Mod: S$GLB,,, | Performed by: NURSE PRACTITIONER

## 2020-01-21 PROCEDURE — 99999 PR PBB SHADOW E&M-EST. PATIENT-LVL IV: CPT | Mod: PBBFAC,,, | Performed by: NURSE PRACTITIONER

## 2020-01-21 PROCEDURE — 3074F SYST BP LT 130 MM HG: CPT | Mod: CPTII,S$GLB,, | Performed by: NURSE PRACTITIONER

## 2020-01-21 PROCEDURE — 3074F PR MOST RECENT SYSTOLIC BLOOD PRESSURE < 130 MM HG: ICD-10-PCS | Mod: CPTII,S$GLB,, | Performed by: NURSE PRACTITIONER

## 2020-01-21 PROCEDURE — 3008F PR BODY MASS INDEX (BMI) DOCUMENTED: ICD-10-PCS | Mod: CPTII,S$GLB,, | Performed by: NURSE PRACTITIONER

## 2020-01-21 PROCEDURE — 99214 OFFICE O/P EST MOD 30 MIN: CPT | Mod: S$GLB,,, | Performed by: NURSE PRACTITIONER

## 2020-01-21 PROCEDURE — 99999 PR PBB SHADOW E&M-EST. PATIENT-LVL IV: ICD-10-PCS | Mod: PBBFAC,,, | Performed by: NURSE PRACTITIONER

## 2020-01-21 PROCEDURE — 3079F DIAST BP 80-89 MM HG: CPT | Mod: CPTII,S$GLB,, | Performed by: NURSE PRACTITIONER

## 2020-01-21 RX ORDER — ZOLPIDEM TARTRATE 10 MG/1
10 TABLET ORAL NIGHTLY PRN
Qty: 30 TABLET | Refills: 5 | Status: SHIPPED | OUTPATIENT
Start: 2020-01-21 | End: 2020-08-03 | Stop reason: SDUPTHER

## 2020-01-21 NOTE — PROGRESS NOTES
"Jesús Galindo  returns today regarding the management of obstructive sleep apnea. Since last seen he continues to use nightly 12-18cm. Hx of gastric sleeve 11/10/16. Still taking ambien 10mg to help sleep onset. Sleep is consolidated and he denies complex sleep behaviors. Improved excessive daytime sleepiness.  Denies witnessed apneic pauses.  LOT of stress, rejoined recently crossfit [employer]. ESS=7. 26# gain.      Interrogation: good machine condition,Li view mask. AHI 1.8.100% mask fit. Periodic 0%, 90% tile 15-16.5cm.     PSG (372#) 11/9/15:AHI was 48.2 with an oxygen kandy of 76.0%. Initial improvement was observed on 15 cm H2O controlling respiratory events in supine REM sleep with residual hypopneas. Improvement was observed on 15 cm H2O, but the patient was not tested on that pressure for the sufficient amount of time, thus the effective pressure was not determined.       REVIEW OF SYSTEMS: Sleep related symptoms as per HPI.  Occasional sinus congestion. Otherwise a balance review of 10-systems is negative.         PHYSICAL EXAM:   /83   Pulse 82   Ht 5' 8" (1.727 m)   Wt (!) 165 kg (363 lb 12.1 oz)   BMI 55.31 kg/m²   GENERAL: morbid obese body habitus, well groomed, W/D    ASSESSMENT:   1. Obstructive sleep apnea, severe significant, continued excellent adherence, having continued improvement of his symptoms, but recent pressure intolerance given 50# loss since surgery last month. 7/23/19: Ready to resume consistent use if pressure is not too high. Needs new supplies 1/21/20: Adherent with apap, benefiting from use. AHI<5.   2. insomnia  Medical comorbidities include: morbid obesity        PLAN:   1.continue apap 12-18cm.rtc 12-mos, SOONER if needed. Supplies via  [facility]. Discussed purpose of PAP therapy, health benefits of CPAP, as well as the potential ramifications of untreated sleep apnea, which could include daytime sleepiness, hypertension, heart disease and/or stroke  2. Advised to abstain " from driving should he feel sleepy or drowsy.   3. Encouraged continued  weight loss efforts for potential improvement of JARAD and overall health benefits (goal 180-200#)  4.continue  AMbien 5-10mg prn qhs,safe use

## 2020-01-27 ENCOUNTER — PATIENT OUTREACH (OUTPATIENT)
Dept: OTHER | Facility: OTHER | Age: 45
End: 2020-01-27

## 2020-01-27 NOTE — PROGRESS NOTES
Digital Medicine: Health  Follow-Up    Was walking into a meeting. Started doing crossfit last week, 3x/week. Watching what he's eating and controlling things like sodium, trying to make smarter choices overall. Medication still going well. Denied resources or questions.     Avg BP as of Jan 27, 2020 is 139/89 and trending down.     The history is provided by the patient.     Follow Up  Follow-up reason(s): routine education and goal follow-up      Routine Education Topics: eating patterns and physical activity        INTERVENTION(S)  encouragement/support, goal setting, denied resources and denied questions    PLAN  patient verbalizes understanding      There are no preventive care reminders to display for this patient.    Last 5 Patient Entered Readings                                      Current 30 Day Average: 139/89     Recent Readings 1/26/2020 1/24/2020 1/22/2020 1/21/2020 1/20/2020    SBP (mmHg) 149 135 134 134 124    DBP (mmHg) 98 97 82 84 72    Pulse 80 95 71 73 78                      Diet Screening   No change to diet.  He has the following dietary restrictions: low sodium diet    Physical Activity Screening   When asked if exercising, patient responded: yes    He exercises for 60 minutes per day 3 day(s) a week.      Patient participates in the following activities: group classes and weights and Crossfit    Assigning the following patient goal(s): 150 minutes of exercise per week and participate in exercise weekly    Medication Adherence Screening       Stated everything going well with meds.       SDOH

## 2020-02-08 ENCOUNTER — PATIENT MESSAGE (OUTPATIENT)
Dept: INTERNAL MEDICINE | Facility: CLINIC | Age: 45
End: 2020-02-08

## 2020-02-11 ENCOUNTER — PATIENT MESSAGE (OUTPATIENT)
Dept: ADMINISTRATIVE | Facility: OTHER | Age: 45
End: 2020-02-11

## 2020-02-11 ENCOUNTER — HOSPITAL ENCOUNTER (OUTPATIENT)
Dept: RADIOLOGY | Facility: HOSPITAL | Age: 45
Discharge: HOME | End: 2020-02-11
Attending: FAMILY MEDICINE
Payer: COMMERCIAL

## 2020-02-11 ENCOUNTER — OFFICE VISIT (OUTPATIENT)
Dept: INTERNAL MEDICINE | Facility: CLINIC | Age: 45
End: 2020-02-11
Payer: COMMERCIAL

## 2020-02-11 VITALS
OXYGEN SATURATION: 93 % | HEIGHT: 68 IN | WEIGHT: 315 LBS | TEMPERATURE: 98 F | SYSTOLIC BLOOD PRESSURE: 118 MMHG | HEART RATE: 107 BPM | BODY MASS INDEX: 47.74 KG/M2 | DIASTOLIC BLOOD PRESSURE: 88 MMHG

## 2020-02-11 DIAGNOSIS — B35.3 TINEA PEDIS, UNSPECIFIED LATERALITY: ICD-10-CM

## 2020-02-11 DIAGNOSIS — J45.20 MILD INTERMITTENT ASTHMA WITHOUT COMPLICATION: ICD-10-CM

## 2020-02-11 DIAGNOSIS — J06.9 URI WITH COUGH AND CONGESTION: ICD-10-CM

## 2020-02-11 PROCEDURE — 3079F PR MOST RECENT DIASTOLIC BLOOD PRESSURE 80-89 MM HG: ICD-10-PCS | Mod: CPTII,S$GLB,, | Performed by: FAMILY MEDICINE

## 2020-02-11 PROCEDURE — 99999 PR PBB SHADOW E&M-EST. PATIENT-LVL IV: ICD-10-PCS | Mod: PBBFAC,,, | Performed by: FAMILY MEDICINE

## 2020-02-11 PROCEDURE — 3074F SYST BP LT 130 MM HG: CPT | Mod: CPTII,S$GLB,, | Performed by: FAMILY MEDICINE

## 2020-02-11 PROCEDURE — 99213 PR OFFICE/OUTPT VISIT, EST, LEVL III, 20-29 MIN: ICD-10-PCS | Mod: S$GLB,,, | Performed by: FAMILY MEDICINE

## 2020-02-11 PROCEDURE — 71046 X-RAY EXAM CHEST 2 VIEWS: CPT | Mod: TC

## 2020-02-11 PROCEDURE — 3079F DIAST BP 80-89 MM HG: CPT | Mod: CPTII,S$GLB,, | Performed by: FAMILY MEDICINE

## 2020-02-11 PROCEDURE — 99213 OFFICE O/P EST LOW 20 MIN: CPT | Mod: S$GLB,,, | Performed by: FAMILY MEDICINE

## 2020-02-11 PROCEDURE — 71046 XR CHEST PA AND LATERAL: ICD-10-PCS | Mod: 26,,, | Performed by: RADIOLOGY

## 2020-02-11 PROCEDURE — 71046 X-RAY EXAM CHEST 2 VIEWS: CPT | Mod: 26,,, | Performed by: RADIOLOGY

## 2020-02-11 PROCEDURE — 3074F PR MOST RECENT SYSTOLIC BLOOD PRESSURE < 130 MM HG: ICD-10-PCS | Mod: CPTII,S$GLB,, | Performed by: FAMILY MEDICINE

## 2020-02-11 PROCEDURE — 99999 PR PBB SHADOW E&M-EST. PATIENT-LVL IV: CPT | Mod: PBBFAC,,, | Performed by: FAMILY MEDICINE

## 2020-02-11 PROCEDURE — 3008F BODY MASS INDEX DOCD: CPT | Mod: CPTII,S$GLB,, | Performed by: FAMILY MEDICINE

## 2020-02-11 PROCEDURE — 3008F PR BODY MASS INDEX (BMI) DOCUMENTED: ICD-10-PCS | Mod: CPTII,S$GLB,, | Performed by: FAMILY MEDICINE

## 2020-02-11 RX ORDER — ALBUTEROL SULFATE 90 UG/1
2 AEROSOL, METERED RESPIRATORY (INHALATION) EVERY 6 HOURS PRN
Qty: 18 G | Refills: 11 | Status: SHIPPED | OUTPATIENT
Start: 2020-02-11 | End: 2022-04-13 | Stop reason: SDUPTHER

## 2020-02-11 RX ORDER — METHYLPREDNISOLONE 4 MG/1
TABLET ORAL
Qty: 1 PACKAGE | Refills: 0 | Status: SHIPPED | OUTPATIENT
Start: 2020-02-11 | End: 2020-03-03

## 2020-02-11 RX ORDER — CLOTRIMAZOLE 1 %
CREAM (GRAM) TOPICAL 2 TIMES DAILY
Qty: 28 G | Refills: 2 | Status: SHIPPED | OUTPATIENT
Start: 2020-02-11 | End: 2021-10-07

## 2020-02-11 NOTE — PROGRESS NOTES
Subjective:      Patient ID: Jesús Galindo is a 44 y.o. male.    Chief Complaint: Nasal Congestion (x6 days ); Wheezing (over 1 mt ); and lungs (soreness from chronic coughing )      HPI:  Jesús Galindo is a 44 year old male with history of asthma, gastroesophageal reflux disease, low back pain, morbid obesity, and obstructive sleep apnea who presents to clinic today with complaints of chest congestion and wheezing.    Complains of wheezing for the past month and congestion (nasal and chest) for the past 6 days.  Endorses associated cough productive of green to yellow sputum, rhinorrhea, shortness of breath, chest pain (described as pressure/itching sensation to central chest, related to soreness from the cough), and headaches.    Denies associated fevers, chills, otorrhea, otalgia, hearing changes, epistaxis, sore throat, lymphadenopathy.    Tried OTC Rx without improvement.    Afebrile, normal O2 saturation on exam today.    Endorses history of asthma; does not take any medications for this.      Past Medical History:   Diagnosis Date    Asthma     GERD (gastroesophageal reflux disease)     Hyperlipidemia     Hypertension     Low back pain     Low back pain     Morbid obesity with BMI of 50.0-59.9, adult     JARAD (obstructive sleep apnea)     Varicose veins     Vitamin D deficiency        Past Surgical History:   Procedure Laterality Date    ADENOIDECTOMY      EYE SURGERY      GASTRECTOMY      LAPAROSCOPIC GASTRIC BANDING  2002    In [place]: uncertain of brand/ size    LAPAROSCOPIC REPAIR OF RECURRENT INCARCERATED INCISIONAL HERNIA N/A 6/21/2018    Procedure: REPAIR-HERNIA-INCISIONAL-LAPAROSCOPIC WITH MESH;  Surgeon: Vaughn Parker Jr., MD;  Location: [facility] OR [address] [place] [address];  Service: General;  Laterality: N/A;    LASIK         Family History   Problem Relation Age of Onset    Hypertension Mother     Obesity Mother         s/p sleeve 2013: good results    Cancer Maternal Grandfather     Diabetes  Paternal Grandmother     No Known Problems Father     No Known Problems Brother     Obesity Brother     No Known Problems Sister        Social History     Socioeconomic History    Marital status: Single     Spouse name: Not on file    Number of children: Not on file    Years of education: Not on file    Highest education level: Not on file   Occupational History    Not on file   Social Needs    Financial resource strain: Not hard at all    Food insecurity:     Worry: Sometimes true     Inability: Never true    Transportation needs:     Medical: No     Non-medical: No   Tobacco Use    Smoking status: Former Smoker     Packs/day: 0.25     Years: 5.00     Pack years: 1.25     Last attempt to quit: 2009     Years since quittin.1    Smokeless tobacco: Never Used   Substance and Sexual Activity    Alcohol use: Yes     Frequency: 2-3 times a week     Drinks per session: 3 or 4     Binge frequency: Monthly     Comment: socially    Drug use: No    Sexual activity: Not Currently   Lifestyle    Physical activity:     Days per week: 0 days     Minutes per session: 0 min    Stress: To some extent   Relationships    Social connections:     Talks on phone: Never     Gets together: Once a week     Attends L[employer] service: Not on file     Active member of club or organization: Yes     Attends meetings of clubs or organizations: More than 4 times per year     Relationship status:    Other Topics Concern    Not on file   Social History Narrative    Engaged.  Works in sales for [employer].  Previously worked as a .  1 daughter, Yenni (10 years old).       Review of Systems   Constitutional: Positive for chills. Negative for fatigue and fever.   HENT: Positive for congestion, rhinorrhea, sinus pressure and sneezing. Negative for ear pain, hearing loss, nosebleeds, postnasal drip, sore throat and trouble swallowing.    Eyes: Negative for pain and visual disturbance.   Respiratory: Positive  "for cough, shortness of breath and wheezing.    Cardiovascular: Positive for chest pain. Negative for palpitations.   Gastrointestinal: Negative for abdominal distention, abdominal pain, constipation, diarrhea, nausea and vomiting.   Genitourinary: Negative for difficulty urinating, dysuria, frequency, hematuria and urgency.   Musculoskeletal: Negative for arthralgias, back pain and myalgias.   Skin: Negative for color change and rash.   Allergic/Immunologic: Negative for environmental allergies.   Neurological: Negative for dizziness, syncope, speech difficulty, weakness, numbness and headaches.   Psychiatric/Behavioral: Negative for agitation, behavioral problems and confusion. The patient is not nervous/anxious.      Objective:     Vitals:    02/11/20 1426   BP: 118/88   BP Location: Right arm   Patient Position: Sitting   BP Method: Large (Manual)   Pulse: 107   Temp: 98.4 °F (36.9 °C)   TempSrc: Oral   SpO2: (!) 93%   Weight: (!) 163.1 kg (359 lb 9.1 oz)   Height: 5' 8" (1.727 m)       Physical Exam   Constitutional: He appears well-developed and well-nourished. He is cooperative. No distress.   HENT:   Head: Normocephalic and atraumatic.   Right Ear: Hearing, tympanic membrane, external ear and ear canal normal.   Left Ear: Hearing, tympanic membrane, external ear and ear canal normal.   Nose: No rhinorrhea. No epistaxis. Right sinus exhibits maxillary sinus tenderness and frontal sinus tenderness. Left sinus exhibits maxillary sinus tenderness and frontal sinus tenderness.   Mouth/Throat: Mucous membranes are normal. No oral lesions. Posterior oropharyngeal erythema present. No oropharyngeal exudate, posterior oropharyngeal edema or tonsillar abscesses.   Eyes: Pupils are equal, round, and reactive to light. Conjunctivae and lids are normal. Right eye exhibits no discharge. Left eye exhibits no discharge.   Neck: Trachea normal. Neck supple. No tracheal deviation present.   Cardiovascular: Normal rate, " regular rhythm and normal heart sounds. Exam reveals no gallop and no friction rub.   No murmur heard.  Pulmonary/Chest: Effort normal. No respiratory distress. He has wheezes in the right upper field, the right middle field, the right lower field, the left upper field, the left middle field and the left lower field. He has rhonchi in the right lower field and the left lower field. He has no rales.   Abdominal: Soft. Bowel sounds are normal. He exhibits no distension. There is no tenderness. There is no rebound and no guarding.   Musculoskeletal: He exhibits no deformity.   Neurological: He is alert. He exhibits normal muscle tone.   Skin: Skin is warm and dry. No rash noted.   Psychiatric: He has a normal mood and affect. His speech is normal and behavior is normal. Judgment and thought content normal. Cognition and memory are normal.   Nursing note and vitals reviewed.     Assessment:      1. URI with cough and congestion    2. Mild intermittent asthma without complication    3. Tinea pedis, unspecified laterality      Plan:   Jesús was seen today for nasal congestion, wheezing and lungs.    Diagnoses and all orders for this visit:    URI with cough and congestion  -     albuterol (VENTOLIN HFA) 90 mcg/actuation inhaler; Inhale 2 puffs into the lungs every 6 (six) hours as needed for Wheezing or Shortness of Breath. Rescue  -     methylPREDNISolone (MEDROL DOSEPACK) 4 mg tablet; use as directed  -     X-Ray Chest PA And Lateral; Future  -     Provided pocket prescription for Augmentin 875-125 mg PO Q12H for 5 days if no improvement in 3-5 days or sooner if worsening.  If no improvement after completion recommended patient return to clinic for further evaluation and management.    Mild intermittent asthma without complication  -     albuterol (VENTOLIN HFA) 90 mcg/actuation inhaler; Inhale 2 puffs into the lungs every 6 (six) hours as needed for Wheezing or Shortness of Breath. Rescue  -     X-Ray Chest PA And  Lateral; Future    Tinea pedis, unspecified laterality  -     Start clotrimazole (LOTRIMIN) 1 % cream; Apply topically 2 (two) times daily.       Answers for HPI/ROS submitted by the patient on 2/11/2020   Cough  Chronicity: new  Onset: in the past 7 days  Progression since onset: rapidly worsening  Frequency: every few minutes  Cough characteristics: productive of brown sputum, productive of purulent sputum  ear congestion: No  heartburn: Yes  hemoptysis: No  nasal congestion: Yes  sweats: No  weight loss: No  Aggravated by: nothing  asthma: Yes  bronchiectasis: No  bronchitis: No  COPD: No  emphysema: No  pneumonia: No

## 2020-02-12 ENCOUNTER — TELEPHONE (OUTPATIENT)
Dept: INTERNAL MEDICINE | Facility: CLINIC | Age: 45
End: 2020-02-12

## 2020-02-12 DIAGNOSIS — R73.03 PREDIABETES: Primary | ICD-10-CM

## 2020-02-12 DIAGNOSIS — E55.9 VITAMIN D DEFICIENCY: ICD-10-CM

## 2020-02-21 ENCOUNTER — PATIENT OUTREACH (OUTPATIENT)
Dept: OTHER | Facility: OTHER | Age: 45
End: 2020-02-21

## 2020-02-21 NOTE — PROGRESS NOTES
Digital Medicine: Health  Follow-Up    Stated everything was going well lately, no hiccups with the Mardi Gras season.     Still doing everything the same. Still doing crossfit 3x/week which he stated he thinks is helping bring his avg down.     Avg BP as of Feb 21, 2020 is 138/85.      Denied resources or questions.     The history is provided by the patient.     Follow Up  Follow-up reason(s): routine education and goal follow-up      Routine Education Topics: eating patterns and physical activity        INTERVENTION(S)  encouragement/support, denied resources and denied questions    PLAN  patient verbalizes understanding      There are no preventive care reminders to display for this patient.    Last 5 Patient Entered Readings                                      Current 30 Day Average: 138/85     Recent Readings 2/20/2020 2/18/2020 2/14/2020 2/12/2020 2/10/2020    SBP (mmHg) 147 135 135 141 140    DBP (mmHg) 89 79 80 81 82    Pulse 77 73 66 95 68                      Diet Screening   No change to diet.      Physical Activity Screening   No change to exercise routine.    When asked if exercising, patient responded: yes    He exercises for 60 minutes per day 3 day(s) a week.  His level of intensity when exercising is high.    Patient participates in the following activities: group classes and Crossfit    He identified the following barriers to physical activity: no barriers to being active    Still doing Crossfit 3x/week    Assigning the following patient goal(s): 150 minutes of exercise per week and participate in exercise weekly    Medication Adherence Screening   He did not miss a dose this month.      SDOH

## 2020-02-28 ENCOUNTER — PATIENT MESSAGE (OUTPATIENT)
Dept: SLEEP MEDICINE | Facility: CLINIC | Age: 45
End: 2020-02-28

## 2020-03-02 ENCOUNTER — PATIENT MESSAGE (OUTPATIENT)
Dept: INTERNAL MEDICINE | Facility: CLINIC | Age: 45
End: 2020-03-02

## 2020-03-02 RX ORDER — BENZONATATE 100 MG/1
100 CAPSULE ORAL 3 TIMES DAILY PRN
Qty: 21 CAPSULE | Refills: 1 | Status: SHIPPED | OUTPATIENT
Start: 2020-03-02 | End: 2021-10-07

## 2020-03-02 RX ORDER — PROMETHAZINE HYDROCHLORIDE AND CODEINE PHOSPHATE 6.25; 1 MG/5ML; MG/5ML
5 SOLUTION ORAL NIGHTLY PRN
Qty: 118 ML | Refills: 0 | Status: SHIPPED | OUTPATIENT
Start: 2020-03-02 | End: 2020-03-12

## 2020-03-02 NOTE — TELEPHONE ENCOUNTER
See Brodie message sent; Tessalon and Robitussin with codeine, follow-up in the next 24-48 hours if symptoms do not improve, or sooner with problems

## 2020-03-20 ENCOUNTER — PATIENT OUTREACH (OUTPATIENT)
Dept: OTHER | Facility: OTHER | Age: 45
End: 2020-03-20

## 2020-03-27 NOTE — PROGRESS NOTES
Digital Medicine: Health  Follow-Up    Avg BP as of Mar 27, 2020 is 141/87. DBP is currently trending up with SBP slightly trending down.     No changes to diet and not exercising currently with working from home.     Everything good with medication.     The history is provided by the patient.     Follow Up  Follow-up reason(s): routine education      Routine Education Topics: eating patterns and physical activity        INTERVENTION(S)  encouragement/support, denied resources and denied questions    PLAN  patient verbalizes understanding and continue monitoring      There are no preventive care reminders to display for this patient.    Last 5 Patient Entered Readings                                      Current 30 Day Average: 141/87     Recent Readings 3/26/2020 3/17/2020 3/16/2020 3/4/2020 3/3/2020    SBP (mmHg) 143 133 145 148 138    DBP (mmHg) 98 90 88 87 72    Pulse 97 83 78 77 75                      Diet Screening   No change to diet.      Physical Activity Screening   When asked if exercising, patient responded: no    Hasn't been exercising lately since he's been working from home.     Intervention(s): goal setting and exercise ideas     Assigning the following patient goal(s): increase physical activity, 150 minutes of exercise per week and participate in exercise weekly    Medication Adherence Screening   He did not miss a dose this month.      SDOH

## 2020-03-30 ENCOUNTER — PATIENT MESSAGE (OUTPATIENT)
Dept: INTERNAL MEDICINE | Facility: CLINIC | Age: 45
End: 2020-03-30

## 2020-04-01 ENCOUNTER — PATIENT MESSAGE (OUTPATIENT)
Dept: ADMINISTRATIVE | Facility: OTHER | Age: 45
End: 2020-04-01

## 2020-04-02 RX ORDER — ALPRAZOLAM 0.5 MG/1
0.5 TABLET ORAL 3 TIMES DAILY
Qty: 40 TABLET | Refills: 0 | Status: SHIPPED | OUTPATIENT
Start: 2020-04-02 | End: 2020-07-08

## 2020-04-02 RX ORDER — ALPRAZOLAM 0.5 MG/1
0.5 TABLET ORAL 3 TIMES DAILY
Qty: 40 TABLET | Refills: 0 | OUTPATIENT
Start: 2020-04-02 | End: 2020-05-02

## 2020-04-20 ENCOUNTER — PATIENT MESSAGE (OUTPATIENT)
Dept: INTERNAL MEDICINE | Facility: CLINIC | Age: 45
End: 2020-04-20

## 2020-04-20 DIAGNOSIS — R21 RASH OF NECK: Primary | ICD-10-CM

## 2020-04-20 RX ORDER — TRIAMCINOLONE ACETONIDE 1 MG/G
CREAM TOPICAL 2 TIMES DAILY
Qty: 45 G | Refills: 2 | Status: SHIPPED | OUTPATIENT
Start: 2020-04-20 | End: 2023-08-10

## 2020-04-20 NOTE — TELEPHONE ENCOUNTER
Returned call.  Spoke with patient stated he had similar episode ~3 weeks ago when I was out of office.  Ate a peanut butter cup last night, otherwise denies any known inciting event.  Denies history of known peanut allergy.  States he has associated itching.  Took Benadryl with improvement.  Has wheezing but has chronic asthma--symptoms controlled with inhaler.  Denies SOB.  Sent in Rx for topical steroid PRN itching.  Recommended he use Benadryl PRN as well.  Recommend he avoid further peanut products and follow up with allergy.  Referral placed.  Also referred to dermatology if no improvement with cream and allergy work up unremarkable.  Recommended immediate medical evaluation for any severe/sustained SOB, difficulty breathing, throat tightness, severe/sustained wheezing.    Staff:  Please contact patient and provide contact info for allergy and dermatology.  Recommend he schedule with allergy and if their work up is unremarkable then can follow up with derm.

## 2020-04-20 NOTE — TELEPHONE ENCOUNTER
Called pt and provided both numbers to allergy and derm  [phone] and [phone] respectively.    Pt to call if there is any questions referrals in Epic .    Chastity

## 2020-04-21 ENCOUNTER — OFFICE VISIT (OUTPATIENT)
Dept: ALLERGY | Facility: CLINIC | Age: 45
End: 2020-04-21
Payer: COMMERCIAL

## 2020-04-21 VITALS — HEIGHT: 68 IN | BODY MASS INDEX: 47.74 KG/M2 | WEIGHT: 315 LBS | RESPIRATION RATE: 16 BRPM

## 2020-04-21 DIAGNOSIS — H10.403 CHRONIC CONJUNCTIVITIS OF BOTH EYES, UNSPECIFIED CHRONIC CONJUNCTIVITIS TYPE: ICD-10-CM

## 2020-04-21 DIAGNOSIS — L30.9 DERMATITIS: ICD-10-CM

## 2020-04-21 DIAGNOSIS — R05.9 COUGH: Primary | ICD-10-CM

## 2020-04-21 DIAGNOSIS — J31.0 CHRONIC RHINITIS: ICD-10-CM

## 2020-04-21 DIAGNOSIS — K21.9 GASTROESOPHAGEAL REFLUX DISEASE, ESOPHAGITIS PRESENCE NOT SPECIFIED: ICD-10-CM

## 2020-04-21 PROCEDURE — 99244 PR OFFICE CONSULTATION,LEVEL IV: ICD-10-PCS | Mod: S$GLB,,, | Performed by: ALLERGY & IMMUNOLOGY

## 2020-04-21 PROCEDURE — 99999 PR PBB SHADOW E&M-EST. PATIENT-LVL III: ICD-10-PCS | Mod: PBBFAC,,, | Performed by: ALLERGY & IMMUNOLOGY

## 2020-04-21 PROCEDURE — 99999 PR PBB SHADOW E&M-EST. PATIENT-LVL III: CPT | Mod: PBBFAC,,, | Performed by: ALLERGY & IMMUNOLOGY

## 2020-04-21 PROCEDURE — 99244 OFF/OP CNSLTJ NEW/EST MOD 40: CPT | Mod: S$GLB,,, | Performed by: ALLERGY & IMMUNOLOGY

## 2020-04-21 RX ORDER — ERGOCALCIFEROL 1.25 MG/1
50000 CAPSULE ORAL
Qty: 24 CAPSULE | Refills: 1 | Status: SHIPPED | OUTPATIENT
Start: 2020-04-21 | End: 2021-10-07

## 2020-04-21 NOTE — LETTER
April 21, 2020      Natalio Amaya MD  [address]  [facility] [address]           [facility] All[facility]  [address]  [facility] [address]  Phone: [phone]  Fax: [phone]          Patient: Jesús Galindo   MR Number: [id number]   YOB: 1975   Date of Visit: 4/21/2020       Dear Dr. Natalio Amaya:    Thank you for referring Jesús Galindo to me for evaluation. Attached you will find relevant portions of my assessment and plan of care.    If you have questions, please do not hesitate to call me. I look forward to following Jesús Galindo along with you.    Sincerely,    CARLA Shay III, MD    Enclosure  CC:  No Recipients    If you would like to receive this communication electronically, please contact [email or web address] or [phone] to request more information on [employer] Link access.    For providers and/or their staff who would like to refer a patient to [facility], please contact us through our one-stop-shop provider referral line, [facility] [place], at [phone].    If you feel you have received this communication in error or would no longer like to receive these types of communications, please e-mail [email or web address]

## 2020-04-21 NOTE — PROGRESS NOTES
Jesús Galindo is referred by Dr. Natalio Amaya for a consult for a rash and chronic rhinitis with asthma.  He is here alone.    He developed increased rhinitis and conjunctivitis in childhood.  He took OTC antihistamines with improvement.  He may have had some mild asthma around age 12.  This resolved.    He has not had any asthma since then until February 2020.  He developed an upper respiratory infection with a cough and wheezing.  He was treated with albuterol, steroids, and antibiotics with resolution.    He then developed another upper respiratory infection and needed antibiotics, Tessalon Perles, and Robitussin with codeine.  His symptoms again improved.    Since that time he has continued to have a cough particularly in the morning when he wakes up.  He also will have wheezing throughout the day.  He describes a sensation like ants in his substernal area.  This is similar to reflux he has experienced in the past.  He has Omeprazole but only takes as needed.    He does have occasional hoarseness.  He has not had any sore throats, throat clearing, or difficulty swallowing.    He does have itching of the eyes and uses Maxitrol eyedrops as needed.  He also has been using Flonase and Zyrtec every day.    He had gastric sleeve surgery done on November 10, 2016.  He then had an incisional hernia with mesh placed on June 21, 2018.    On March 28th he developed an erythematous nonraised rash across his neck.  He was told to take OTC hydrocortisone cream on April 1, 2020.  Within several days his rash had resolved.  On April 12 his rash recurred and since then has gotten worse.  He was prescribed Kenalog 0.1% cream which she has been using.  His rash has again resolved.  He did eat peanut butter Hussain's cups the night before the second rash.  He has eaten peanuts frequently in the past without any difficulty.    For ROS, FH, SH please see Allergy and Asthma Questionnaire dated today.    Some relevant pertinent  positives:    Review of Systems/PMH:  He has hypertension and takes amlodipine and metoprolol.  He has sleep apnea and uses CPAP.  He has had recent increased fatigue.  He has had a vitamin-D insufficiency and takes 1000 units daily.  His last level was 21 on February 11, 2020.      Family History:  Negative for atopic disease.    Home environment:  He has lived in the same house for the past four months.  There was no water damage.  There is no evidence of mold.  There is carpeting in the bedroom.  There are no pets.    Social History: He was born in [facility] and moved to the [place] when he was 4 years old.  He then lived in [place] for nine years and moved back to [facility].  He went to [employer] in [place] for high school for four years.  He then moved to [place] and went to [employer].  He got  in 2005 and moved to [place] for a year and half.  He works in equipment sales.  He is a nonsmoker.    Physical Examination:  General: Well-developed, well-nourished, no acute distress.  Head: No sinus tenderness.  Eyes: Conjunctivae:  No bulbar or palpebral conjunctival injection.  Ears: EAC's clear.  TM's clear.  No pre-auricular nodes.  Nose: Nasal Mucosa:  Pink.  Septum: No apparent deviation.  Turbinates:  No significant edema.  Polyps/Mass:  None visible.  Teeth/Gums:  No bleeding noted.  Oropharynx: No exudates.  Neck: Supple without thyromegaly. No cervical lymphadenopathy.    Respiratory/Chest: Effort: Good.  Auscultation:  Clear bilaterally.  Cardiovascular:  No murmur, rubs, or gallop heard.   GI:  Non-tender.  No masses.  No organomegaly.  Extremities:  No cyanosis, clubbing, or edema.  Skin: Good turgor.  No urticaria or angioedema.  Neuro/Psych: Oriented x 3.    Pictures are reviewed on his cell phone on April 21, 2020 and are not consistent with urticaria.    Assessment:  1.  Chronic rhinitis, consider allergic.  2.  Chronic conjunctivitis, consider  allergic.  3.  Chronic cough with wheezing, consider allergic component, versus GERD.  4.  GERD.  5.  S/P gastric sleeve surgery November 2016.  6.  Recurrent rash, not urticaria.  7.  Doubt food allergy.    Recommendations:  1.  Laboratory as ordered.  2.  Continue Zyrtec daily.  3.  Continue Flonase.  4.  Continue albuterol as needed.  5.  Continue Kenalog cream.  6.  Increase omeprazole to 40 milligrams twice a day.  7.  Vitamin-D 05841 units twice a week.  8.  Follow symptoms on above.  9.  Spirometry when available.  10.  Return to clinic in two weeks.  11.  Dermatology evaluation.

## 2020-04-24 ENCOUNTER — PATIENT OUTREACH (OUTPATIENT)
Dept: OTHER | Facility: OTHER | Age: 45
End: 2020-04-24

## 2020-04-24 NOTE — PROGRESS NOTES
Digital Medicine: Health  Follow-Up    Avg BP as of Apr 24, 2020 is 145/94. Trending up slightly.     Had a rash last week and went to the Dr on Tues for it. Had some labs done and waiting on those. Has some cream for it.     No diet changes. No exercising lately though which may be contributing to the higher readings in addition to the rash and stress of that.     The history is provided by the patient.     Follow Up  Follow-up reason(s): reading review and routine education      Readings are trending up   Routine Education Topics: eating patterns and physical activity        INTERVENTION(S)  encouragement/support, denied resources and denied questions    PLAN  patient verbalizes understanding and continue monitoring      There are no preventive care reminders to display for this patient.    Last 5 Patient Entered Readings                                      Current 30 Day Average: 145/94     Recent Readings 4/14/2020 4/2/2020 3/26/2020 3/17/2020 3/16/2020    SBP (mmHg) 153 140 143 133 145    DBP (mmHg) 89 94 98 90 88    Pulse 91 93 97 83 78                      Diet Screening   No change to diet.      Physical Activity Screening   When asked if exercising, patient responded: no    He identified the following barriers to physical activity: does not want to exercise    Hasn't been getting much exercise lately as he's not going out as much. Wants to get back to normal routine.     Assigning the following patient goal(s): increase physical activity, 150 minutes of exercise per week and participate in exercise weekly      SDOH

## 2020-04-28 ENCOUNTER — PATIENT MESSAGE (OUTPATIENT)
Dept: ALLERGY | Facility: CLINIC | Age: 45
End: 2020-04-28

## 2020-04-29 ENCOUNTER — PATIENT MESSAGE (OUTPATIENT)
Dept: ALLERGY | Facility: CLINIC | Age: 45
End: 2020-04-29

## 2020-04-30 ENCOUNTER — LAB VISIT (OUTPATIENT)
Dept: INTERNAL MEDICINE | Facility: CLINIC | Age: 45
End: 2020-04-30
Payer: COMMERCIAL

## 2020-04-30 ENCOUNTER — OFFICE VISIT (OUTPATIENT)
Dept: ALLERGY | Facility: CLINIC | Age: 45
End: 2020-04-30
Payer: COMMERCIAL

## 2020-04-30 VITALS — BODY MASS INDEX: 47.74 KG/M2 | WEIGHT: 315 LBS | HEIGHT: 68 IN

## 2020-04-30 DIAGNOSIS — L30.9 DERMATITIS: ICD-10-CM

## 2020-04-30 DIAGNOSIS — R05.9 COUGH: Primary | ICD-10-CM

## 2020-04-30 DIAGNOSIS — K21.9 GASTROESOPHAGEAL REFLUX DISEASE, ESOPHAGITIS PRESENCE NOT SPECIFIED: ICD-10-CM

## 2020-04-30 DIAGNOSIS — H10.13 ALLERGIC CONJUNCTIVITIS, BILATERAL: ICD-10-CM

## 2020-04-30 DIAGNOSIS — E55.9 VITAMIN D INSUFFICIENCY: ICD-10-CM

## 2020-04-30 DIAGNOSIS — J30.89 ALLERGIC RHINITIS DUE TO DUST MITE: ICD-10-CM

## 2020-04-30 DIAGNOSIS — R05.9 COUGH: ICD-10-CM

## 2020-04-30 LAB — SARS-COV-2 RNA RESP QL NAA+PROBE: NOT DETECTED

## 2020-04-30 PROCEDURE — 99214 OFFICE O/P EST MOD 30 MIN: CPT | Mod: S$GLB,,, | Performed by: ALLERGY & IMMUNOLOGY

## 2020-04-30 PROCEDURE — 99999 PR PBB SHADOW E&M-EST. PATIENT-LVL III: CPT | Mod: PBBFAC,,, | Performed by: ALLERGY & IMMUNOLOGY

## 2020-04-30 PROCEDURE — 3008F BODY MASS INDEX DOCD: CPT | Mod: CPTII,S$GLB,, | Performed by: ALLERGY & IMMUNOLOGY

## 2020-04-30 PROCEDURE — U0002 COVID-19 LAB TEST NON-CDC: HCPCS

## 2020-04-30 PROCEDURE — 99999 PR PBB SHADOW E&M-EST. PATIENT-LVL III: ICD-10-PCS | Mod: PBBFAC,,, | Performed by: ALLERGY & IMMUNOLOGY

## 2020-04-30 PROCEDURE — 3008F PR BODY MASS INDEX (BMI) DOCUMENTED: ICD-10-PCS | Mod: CPTII,S$GLB,, | Performed by: ALLERGY & IMMUNOLOGY

## 2020-04-30 PROCEDURE — 99214 PR OFFICE/OUTPT VISIT, EST, LEVL IV, 30-39 MIN: ICD-10-PCS | Mod: S$GLB,,, | Performed by: ALLERGY & IMMUNOLOGY

## 2020-04-30 NOTE — PROGRESS NOTES
Jesús Galindo returns to clinic for continued evaluation chronic rhinitis and asthma.  He is here.  He was last seen April 21, 2020.    Since his last visit, he has seen improvement.  He is no longer experiencing the sensation of ants in his chest.  He does think that this was reflux as it has improved with omeprazole 40 milligrams twice a day.    He has also been taking Flonase and Zyrtec daily.    He has not any upper respiratory infections.    He does continue to have a nonproductive cough with wheezing.  He has been using albuterol on most days.  He has been able to go two or three days without needing any.  This may occur during the day or night.    This past weekend he had increased throat itching and the sensation that something was in his throat.  He was clearing his throat and was concerned that it may be swelling.  He took two Benadryl and it improved.    His girlfriend is a radiation therapy technician at [facility].  She developed a fever with cough, shortness of breath, and myalgias on Tuesday March 31st.  She was tested and was positive for COVID-19 on April 1st.  She was in quarantine for two weeks and has improved.    He did have significant exposure to her before she found out about the test.    He thinks the respiratory difficulty that he was having in February in March may have been due to COVID-19.    He has been scheduled for a spirometry that needs a COVID-19 screening done before this will be done.    His increased respiratory symptoms began after he moved into a new house.  He says that it was extremely sully.  There was also an upholstered couch that was sully and cats had been sleeping on.  He has been doing a lot of cleaning in the house.    His rash has improved.    [facility] PEQ ALLERGY QUESTIONNAIRE SHORT 4/29/2020   Facial swelling? No   Sinus pain? No   Sinus pressure? No   Ear discharge? No   Ear pain? No   Hearing loss? No   Nosebleeds? No   Postnasal drip? No   Sneezing? No   Runny nose? No    Congestion? No   Sore throat? No   Trouble swallowing? Yes   Voice change? No   Eye itching? No   Eye redness? No   Eye discharge? No   Eye pain?  No   Light sensitivity / light hurts the eyes? No   Cough? No   Wheezing? Yes   Shortness of breath? No   Apnea? No   Choking? No   Chest tightness? No   Rash? Yes   Color change of skin? No     Physical Examination:  General: Well-developed, well-nourished, no acute distress.  Head: No sinus tenderness.  Eyes: Conjunctivae:  No bulbar or palpebral conjunctival injection.  Ears: EAC's clear.  TM's clear.  No pre-auricular nodes.  Nose: Nasal Mucosa:  Pink.  Septum: No apparent deviation.  Turbinates:  No significant edema.  Polyps/Mass:  None visible.  Teeth/Gums:  No bleeding noted.  Oropharynx: No exudates.  Neck: Supple without thyromegaly. No cervical lymphadenopathy.    Respiratory/Chest: Effort: Good.  Auscultation:  Clear bilaterally.  Skin: Good turgor.  No urticaria or angioedema.  Neuro/Psych: Oriented x 3.    Pictures are reviewed on his cell phone on April 21, 2020 and are not consistent with urticaria.    Laboratory 04/21/2020:  IgE:  241.  ImmunoCAP:    Class IV:  DM f.  Class II:  Jose, cat.  Class I:  [place].  Class 0/I:  DM pt., Cedar, English plantain, white oak, rag weed, cockroach, dog, peanut.  Vitamin-D Level:  17.    Assessment:  1.  Allergic rhinitis.  2.  Allergic conjunctivitis.  3.  Chronic cough with wheezing, consider allergic component, versus GERD.  4.  GERD.  5.  S/P gastric sleeve surgery November 2016.  6.  Probable LPR.  7.  Recurrent rash, not urticaria.  8.  Doubt food allergy.  9.  Vitamin-D insufficiency.    Recommendations:  1.  House dust mite avoidance.  2.  Continue Zyrtec daily.  3.  Continue Flonase.  4.  Continue albuterol as needed.  5.  Continue Kenalog cream when needed.  6.  Continue omeprazole to 40 milligrams twice a day.  7.  Continue Vitamin-D 88857 units twice a week.  8.  Follow symptoms on above.  9.   Spirometry with COVID-19 screening.  10.  COVID-19 antibody level when available.  11.  Return to clinic in two weeks.  12.  Discuss mechanisms on return to clinic.  13.  Dermatology evaluation if needed.    Patient education April 30, 2020:  LPR and web site were reviewed.  House dust mite avoidance was discussed.  Handouts were given.

## 2020-05-01 ENCOUNTER — HOSPITAL ENCOUNTER (OUTPATIENT)
Dept: PULMONOLOGY | Facility: CLINIC | Age: 45
Discharge: HOME | End: 2020-05-01
Payer: COMMERCIAL

## 2020-05-01 DIAGNOSIS — R05.9 COUGH: ICD-10-CM

## 2020-05-01 PROCEDURE — 94060 EVALUATION OF WHEEZING: CPT | Mod: S$GLB,,, | Performed by: INTERNAL MEDICINE

## 2020-05-01 PROCEDURE — 94727 PR PULM FUNCTION TEST BY GAS: ICD-10-PCS | Mod: S$GLB,,, | Performed by: INTERNAL MEDICINE

## 2020-05-01 PROCEDURE — 94727 GAS DIL/WSHOT DETER LNG VOL: CPT | Mod: S$GLB,,, | Performed by: INTERNAL MEDICINE

## 2020-05-01 PROCEDURE — 94060 PR EVAL OF BRONCHOSPASM: ICD-10-PCS | Mod: S$GLB,,, | Performed by: INTERNAL MEDICINE

## 2020-05-01 PROCEDURE — 94729 DIFFUSING CAPACITY: CPT | Mod: S$GLB,,, | Performed by: INTERNAL MEDICINE

## 2020-05-01 PROCEDURE — 94729 PR C02/MEMBANE DIFFUSE CAPACITY: ICD-10-PCS | Mod: S$GLB,,, | Performed by: INTERNAL MEDICINE

## 2020-05-05 ENCOUNTER — PATIENT MESSAGE (OUTPATIENT)
Dept: ADMINISTRATIVE | Facility: OTHER | Age: 45
End: 2020-05-05

## 2020-05-05 ENCOUNTER — PATIENT MESSAGE (OUTPATIENT)
Dept: ALLERGY | Facility: CLINIC | Age: 45
End: 2020-05-05

## 2020-05-18 ENCOUNTER — OFFICE VISIT (OUTPATIENT)
Dept: ALLERGY | Facility: CLINIC | Age: 45
End: 2020-05-18
Payer: COMMERCIAL

## 2020-05-18 VITALS — WEIGHT: 315 LBS | BODY MASS INDEX: 47.74 KG/M2 | HEIGHT: 68 IN

## 2020-05-18 DIAGNOSIS — J45.20 ASTHMA IN ADULT, MILD INTERMITTENT, UNCOMPLICATED: Primary | ICD-10-CM

## 2020-05-18 DIAGNOSIS — E55.9 VITAMIN D INSUFFICIENCY: ICD-10-CM

## 2020-05-18 DIAGNOSIS — H10.13 ALLERGIC CONJUNCTIVITIS, BILATERAL: ICD-10-CM

## 2020-05-18 DIAGNOSIS — J30.89 ALLERGIC RHINITIS DUE TO DUST MITE: ICD-10-CM

## 2020-05-18 DIAGNOSIS — K21.9 GASTROESOPHAGEAL REFLUX DISEASE, ESOPHAGITIS PRESENCE NOT SPECIFIED: ICD-10-CM

## 2020-05-18 PROCEDURE — 99999 PR PBB SHADOW E&M-EST. PATIENT-LVL III: CPT | Mod: PBBFAC,,, | Performed by: ALLERGY & IMMUNOLOGY

## 2020-05-18 PROCEDURE — 99999 PR PBB SHADOW E&M-EST. PATIENT-LVL III: ICD-10-PCS | Mod: PBBFAC,,, | Performed by: ALLERGY & IMMUNOLOGY

## 2020-05-18 PROCEDURE — 3008F BODY MASS INDEX DOCD: CPT | Mod: CPTII,S$GLB,, | Performed by: ALLERGY & IMMUNOLOGY

## 2020-05-18 PROCEDURE — 99214 PR OFFICE/OUTPT VISIT, EST, LEVL IV, 30-39 MIN: ICD-10-PCS | Mod: S$GLB,,, | Performed by: ALLERGY & IMMUNOLOGY

## 2020-05-18 PROCEDURE — 3008F PR BODY MASS INDEX (BMI) DOCUMENTED: ICD-10-PCS | Mod: CPTII,S$GLB,, | Performed by: ALLERGY & IMMUNOLOGY

## 2020-05-18 PROCEDURE — 99214 OFFICE O/P EST MOD 30 MIN: CPT | Mod: S$GLB,,, | Performed by: ALLERGY & IMMUNOLOGY

## 2020-05-18 RX ORDER — DIPHENHYDRAMINE HCL 25 MG
25 CAPSULE ORAL EVERY 6 HOURS PRN
COMMUNITY

## 2020-05-18 RX ORDER — CETIRIZINE HYDROCHLORIDE 10 MG/1
10 TABLET ORAL
COMMUNITY

## 2020-05-18 NOTE — PROGRESS NOTES
Jesús Galindo returns to clinic today for continued evaluation of allergic rhinitis and asthma.  He is here alone.  He was last seen April 30, 2020.    After his last visit, he got rid of the a upholstered couch in his house.  He thinks this helped a lot.    He has continued to take Flonase and Zyrtec daily.  He has also been taking omeprazole 40 milligrams twice a day.  He has continued to take vitamin-D twice a week as well.    His rhinitis and conjunctivitis have been completely controlled.    His wheezing is over 90% improved.  He continues to have symptoms when he recliner is at night and first thing in the morning.  He has been using his albuterol three to 4 times a week.  This does relieve his symptoms.    He has not had any heartburn or indigestion.    The rash on his neck has improved with the topical cream.    He has developed a dark area on the left inner high that is occasionally covered with dry skin.    [facility] PEQ ALLERGY QUESTIONNAIRE SHORT 5/15/2020   Facial swelling? No   Sinus pain? No   Sinus pressure? No   Ear discharge? No   Ear pain? Yes   Hearing loss? No   Nosebleeds? Yes   Postnasal drip? No   Sneezing? Yes   Runny nose? Yes   Congestion? No   Sore throat? No   Trouble swallowing? No   Voice change? No   Eye itching? No   Eye redness? No   Eye discharge? No   Eye pain?  No   Light sensitivity / light hurts the eyes? Yes   Cough? No   Wheezing? Yes   Shortness of breath? No   Apnea? No   Choking? No   Chest tightness? No   Rash? No   Color change of skin? No     Physical Examination:  General: Well-developed, well-nourished, no acute distress.  Head: No sinus tenderness.  Eyes: Conjunctivae:  No bulbar or palpebral conjunctival injection.  Ears: EAC's clear.  TM's clear.  No pre-auricular nodes.  Nose: Nasal Mucosa:  Pink.  Septum: No apparent deviation.  Turbinates:  No significant edema.  Polyps/Mass:  None visible.  Teeth/Gums:  No bleeding noted.  Oropharynx: No exudates.  Neck: Supple without  thyromegaly. No cervical lymphadenopathy.    Respiratory/Chest: Effort: Good.  Auscultation:  Clear bilaterally.  Skin: Good turgor.  No urticaria or angioedema.  Neuro/Psych: Oriented x 3.    Pictures are reviewed on his cell phone on April 21, 2020 and are not consistent with urticaria.    Laboratory 04/21/2020:  IgE:  241.  ImmunoCAP:    Class IV:  DM f.  Class II:  Jose, cat.  Class I:  [place].  Class 0/I:  DM pt., Cedar, English plantain, white oak, rag weed, cockroach, dog, peanut.  Vitamin-D Level:  17.    Complete PFT 05/04/2020:  Spirometry shows airflow is normal. Spirometry is improved following bronchodilator administration. Lung volumes show mild restriction. DLCO is normal.    Assessment:  1.  Allergic rhinitis.  2.  Allergic conjunctivitis.  3.  Chronic cough with wheezing, consider allergic component, versus GERD, improved.  4.  GERD.  5.  S/P gastric sleeve surgery November 2016.  6.  Probable LPR.  7.  Recurrent rash, not urticaria, improved.  8.  Doubt food allergy.  9.  Vitamin-D insufficiency.    Recommendations:  1.  Laboratory as ordered.  2.  Continue house dust mite avoidance.  3.  Continue Zyrtec daily.  4.  Continue Flonase.  5.  Continue albuterol as needed.  6.  Continue Kenalog cream when needed.  7.  Continue omeprazole to 40 milligrams twice a day.  8.  Continue Vitamin-D 58516 units twice a week.  9.  Follow symptoms on above.  10.  Return to clinic in 2 to 3 months or sooner if needed.    Patient education May 18, 2020:  Allergic mechanisms and treatment options were reviewed in detail.    Patient education April 30, 2020:  LPR and web site were reviewed.  House dust mite avoidance was discussed.  Handouts were given.

## 2020-05-26 ENCOUNTER — PATIENT OUTREACH (OUTPATIENT)
Dept: OTHER | Facility: OTHER | Age: 45
End: 2020-05-26

## 2020-05-26 ENCOUNTER — TELEPHONE (OUTPATIENT)
Dept: ALLERGY | Facility: CLINIC | Age: 45
End: 2020-05-26

## 2020-05-26 NOTE — TELEPHONE ENCOUNTER
----- Message from CARLA Shay III, MD sent at 5/18/2020  4:51 PM CDT -----  He did have Covid 19 in the past. He needs to continue his Vitamin D.

## 2020-05-26 NOTE — PROGRESS NOTES
Digital Medicine: Health  Follow-Up    The history is provided by the patient.     Follow Up  Follow-up reason(s): reading review      Readings are missing.   tech support needed.Called to follow up with patient. He states he's doing fine.    He states he took blood pressure readings last week. On may 20th, it was 152/98 pulse 73, on may 21 it was 151/102 pulse 89.   He denied having any HTN symptoms, states he does not know why it was high. He does admit taking the reading while he was in the process of getting ready for work. Reviewed with him to take at least 5 minutes to rest before taking a reading or waiting until he gets off of work so he has time to rest fully.     He confirmed bluetooth is turned on, he opened the [employer] geroge recently and while we were on the phone.             INTERVENTION(S)  reviewed monitoring technique, encouragement/support and denied questions    PLAN  patient verbalizes understanding, patient amenable to changes and continue monitoring    Plan to follow up in 4 weeks.      There are no preventive care reminders to display for this patient.    Last 5 Patient Entered Readings                                      Current 30 Day Average: 131/94     Recent Readings 5/12/2020 5/1/2020 4/14/2020 4/2/2020 3/26/2020    SBP (mmHg) - 131 153 140 143    DBP (mmHg) - 94 89 94 98    Pulse 83 94 91 93 97                  Screenings    SDOH

## 2020-06-05 ENCOUNTER — PATIENT OUTREACH (OUTPATIENT)
Dept: OTHER | Facility: OTHER | Age: 45
End: 2020-06-05

## 2020-06-05 NOTE — PROGRESS NOTES
06/05/2020 1:59 PM Called patient and left voicemail with call back number. Will follow up with patient at next encounter.   06/22/2020 - sent patient a [employer] message regarding missing readings. Will follow up at future encounter; BP average 139/96 mmHg

## 2020-06-25 ENCOUNTER — PATIENT OUTREACH (OUTPATIENT)
Dept: OTHER | Facility: OTHER | Age: 45
End: 2020-06-25

## 2020-07-02 NOTE — PROGRESS NOTES
Digital Medicine: Health  Follow-Up    Avg BP as of July 2, 2020 is 140/88.    Took a reading on July 1 and July 2 and the readings are higher than normal which he stated frustrated him. His diet hasn't changed and he does his exercise classes every day. I recommended charging the cuff to see if that's what's going on. I will check in on those numbers next week.     The history is provided by the patient.   Follow Up  Follow-up reason(s): reading review and routine education      Readings are trending up due to inaccurate technique.    Routine Education Topics: eating patterns and physical activity        INTERVENTION(S)  encouragement/support, denied resources and denied questions    PLAN  patient verbalizes understanding and continue monitoring      There are no preventive care reminders to display for this patient.    Last 5 Patient Entered Readings                                      Current 30 Day Average: 140/88     Recent Readings 7/2/2020 7/1/2020 6/26/2020 6/25/2020 6/24/2020    SBP (mmHg) 151 161 139 141 129    DBP (mmHg) 90 99 81 79 82    Pulse 86 67 78 79 72                      Diet Screening   No change to diet.  He has the following dietary restrictions: low sodium diet    Assigning the following patient goals: maintain low sodium diet    Physical Activity Screening   No change to exercise routine.    When asked if exercising, patient responded: yesHis level of intensity when exercising is moderate.    Patient participates in the following activities: group classes    Assigning the following patient goal(s): 150 minutes of exercise per week and participate in exercise weekly      SDOH

## 2020-07-08 DIAGNOSIS — M54.50 ACUTE LEFT-SIDED LOW BACK PAIN WITHOUT SCIATICA: ICD-10-CM

## 2020-07-08 RX ORDER — ALPRAZOLAM 0.5 MG/1
0.5 TABLET ORAL 3 TIMES DAILY
Qty: 40 TABLET | Refills: 0 | Status: SHIPPED | OUTPATIENT
Start: 2020-07-08 | End: 2020-08-27 | Stop reason: SDUPTHER

## 2020-07-08 RX ORDER — IBUPROFEN 800 MG/1
800 TABLET ORAL 3 TIMES DAILY PRN
Qty: 90 TABLET | Refills: 2 | Status: SHIPPED | OUTPATIENT
Start: 2020-07-08 | End: 2021-02-08

## 2020-07-08 NOTE — PROGRESS NOTES
Refill Routing Note     Medication(s) are not appropriate for processing by [facility]:    Non Participating Provider in Refill Center     Appointments  past 12m or future 3m with PCP    Date Provider   Last Visit   2/11/2020 Natalio Amaya MD   Next Visit   Visit date not found Natalio Amaya MD           Automatic Epic Protocol Generated Data:    Requested Prescriptions   Pending Prescriptions Disp Refills    ALPRAZolam (XANAX) 0.5 MG tablet [Pharmacy Med Name: ALPRAZOLAM 0.5 MG TABLET] 40 tablet 0     Sig: TAKE 1 TABLET (0.5 MG TOTAL) BY MOUTH 3 (THREE) TIMES DAILY.       There is no refill protocol information for this order           Note composed:9:46 PM 07/07/2020

## 2020-07-13 NOTE — PROGRESS NOTES
Digital Medicine: Clinician Follow-Up    Called patient to follow up. Patient endorses adherence to medication regimen. Patient denies hypotensive s/sx (lightheadedness, dizziness, nausea, fatigue); patient denies hypertensive s/sx (SOB, CP, severe headaches, changes in vision). He states that he often checks his BP prior to taking his medication and that may be the reason that his BP is elevated. He states that he started to do Crossfit.     The history is provided by the patient. No  was used.   Follow Up  Follow-up reason(s): routine education      Assessment:  Reviewed recent readings. Per 2017 ACC/ AHA HTN guidelines (goal of BP < 130/80), current 30-day average need to be addressed more throroughly today.     Last 5 Patient Entered Readings                                      Current 30 Day Average: 144/89     Recent Readings 7/10/2020 7/2/2020 7/1/2020 6/26/2020 6/25/2020    SBP (mmHg) 143 151 161 139 141    DBP (mmHg) 102 90 99 81 79    Pulse 95 86 67 78 79        INTERVENTION(S)  reviewed monitoring technique, encouragement/support and goal setting    PLAN  patient verbalizes understanding and continue monitoring    >Continue current medication regimen.   >Informed patient to check his BP 1 hour or more after taking antihypertensive medications.   >If BP remains elevated despite proper measurement techniques, will increase amlodipine dose to 5 mg daily.  >I will continue to monitor regularly and will follow-up in 2 to 3 weeks, sooner if blood pressure begins to trend upward or downward.   >Patient denies having questions or concerns. Patient has my contact information and knows to call with any concerns or clinical changes.        There are no preventive care reminders to display for this patient.    Hypertension Medications             amLODIPine (NORVASC) 5 MG tablet Take 1 tablet (5 mg total) by mouth once daily.    metoprolol succinate (TOPROL-XL) 50 MG 24 hr tablet TAKE 1 TABLET BY  MOUTH EVERY DAY

## 2020-07-17 ENCOUNTER — PATIENT MESSAGE (OUTPATIENT)
Dept: SLEEP MEDICINE | Facility: CLINIC | Age: 45
End: 2020-07-17

## 2020-07-17 DIAGNOSIS — G47.00 INSOMNIA, UNSPECIFIED TYPE: ICD-10-CM

## 2020-07-31 ENCOUNTER — PATIENT OUTREACH (OUTPATIENT)
Dept: OTHER | Facility: OTHER | Age: 45
End: 2020-07-31

## 2020-07-31 NOTE — PROGRESS NOTES
Digital Medicine: Health  Follow-Up    The history is provided by the patient.             Reason for review: Blood pressure not at goal        Topics Covered on Call: physical activity, Diet and medication, stress    Additional Follow-up details: Avg BP as of July 31, 2020 is 150/95. Trending down but had some higher readings.     Stated that the July 27 reading was after work and may be from stress.     Takes medication every morning.     Still exercising and watching what he eats.     Charged device the other day so we don't think there's an error in readings. Will continue to monitor numbers.           Diet-no change to diet    No change to diet.  Patient reports eating or drinking the following: Still watching what he is eating and watching salt     Additional diet details:    Physical Activity-no change to routine  No change to exercise routine.       Additional physical activity details: Still doing his normal workout routine      Medication Adherence-Medication adherence was assessed.        Substance, Sleep, Stress-No change  stress-assessed  Details:work still stressful  Intervention(s):    Sleep-not assessed  Details:  Intervention(s):    Alcohol -not assessed  Details:  Intervention(s):    Tobacco-Not Assessed  Details:  Intervention(s):        PLAN  Additional monitoring needed: watch BP readings  Continue current diet/physical activity routine:    Patient verbalizes understanding. Patient did not express questions or concerns and patient has contact information if needed.    Explained the importance of self-monitoring and medication adherence. Encouraged the patient to communicate with their health  for lifestyle modifications to help improve or maintain a healthy lifestyle.        There are no preventive care reminders to display for this patient.    Last 5 Patient Entered Readings                                      Current 30 Day Average: 150/95     Recent Readings 7/27/2020 7/17/2020  7/10/2020 7/2/2020 7/1/2020    SBP (mmHg) 152 143 143 151 161    DBP (mmHg) 93 92 102 90 99    Pulse 68 93 95 86 67

## 2020-08-03 ENCOUNTER — PATIENT OUTREACH (OUTPATIENT)
Dept: OTHER | Facility: OTHER | Age: 45
End: 2020-08-03

## 2020-08-03 DIAGNOSIS — G47.00 INSOMNIA, UNSPECIFIED TYPE: ICD-10-CM

## 2020-08-03 RX ORDER — ZOLPIDEM TARTRATE 10 MG/1
10 TABLET ORAL NIGHTLY PRN
Qty: 30 TABLET | Refills: 5 | Status: CANCELLED | OUTPATIENT
Start: 2020-08-03

## 2020-08-03 RX ORDER — ZOLPIDEM TARTRATE 10 MG/1
10 TABLET ORAL NIGHTLY PRN
Qty: 30 TABLET | Refills: 3 | Status: SHIPPED | OUTPATIENT
Start: 2020-08-03 | End: 2020-12-11 | Stop reason: SDUPTHER

## 2020-08-03 NOTE — TELEPHONE ENCOUNTER
LOV 01/21/2020    Last filled zolpidem (AMBIEN) 10 mg Tab 30 tablet w/ 5 refills on 1/21/2020.

## 2020-08-03 NOTE — PROGRESS NOTES
Digital Medicine: Clinician Follow-Up    Called patient to follow up on HTN DMP. He reports that he spoke with his health  on Friday who informed him to continue to check his BP more regularly. He states that he was stressed at work last month and would like to work on lifestyle modifications prior to making a medication change.     The history is provided by the patient.     Patient is not experiencing signs/symptoms of hypotension.  Patient is not experiencing signs/symptoms of hypertension.        Last 5 Patient Entered Readings                                      Current 30 Day Average: 146/96     Recent Readings 7/27/2020 7/17/2020 7/10/2020 7/2/2020 7/1/2020    SBP (mmHg) 152 143 143 151 161    DBP (mmHg) 93 92 102 90 99    Pulse 68 93 95 86 67               Depression Screening  Did not address depression screening.    Sleep Apnea Screening    Did not address sleep apnea screening.     Medication Affordability Screening  Did not address medication affordability screening.     Medication Adherence-Medication adherence was assessed.          ASSESSMENT(S)  Patients BP average is 146/96 mmHg, which is above goal. Patient's BP goal is less than or equal to 130/80 per 2017 ACC/AHA Hypertension Guidelines.   BP is likely elevated due to stress at work. He is working with health  to make adjustments in lifestyle.    PLAN  Additional monitoring needed: In future, may increase amlodipine dose to 10 mg daily for added BP control.  Continue current therapy:  Provided patient education:    Patient verbalizes understanding. Patient did not express questions or concerns and patient has contact information if needed.    Explained the importance of self-monitoring and medication adherence. Encouraged the patient to communicate with their health  for lifestyle modifications to help improve or maintain a healthy lifestyle.          There are no preventive care reminders to display for this  patient.      Hypertension Medications             amLODIPine (NORVASC) 5 MG tablet Take 1 tablet (5 mg total) by mouth once daily.    metoprolol succinate (TOPROL-XL) 50 MG 24 hr tablet TAKE 1 TABLET BY MOUTH EVERY DAY

## 2020-08-12 ENCOUNTER — LAB VISIT (OUTPATIENT)
Dept: LAB | Facility: HOSPITAL | Age: 45
End: 2020-08-12
Payer: COMMERCIAL

## 2020-08-12 ENCOUNTER — TELEPHONE (OUTPATIENT)
Dept: INTERNAL MEDICINE | Facility: CLINIC | Age: 45
End: 2020-08-12

## 2020-08-12 DIAGNOSIS — R73.03 PREDIABETES: ICD-10-CM

## 2020-08-12 DIAGNOSIS — R73.03 PREDIABETES: Primary | ICD-10-CM

## 2020-08-12 LAB
ESTIMATED AVG GLUCOSE: 126 MG/DL (ref 68–131)
HBA1C MFR BLD HPLC: 6 % (ref 4–5.6)

## 2020-08-12 PROCEDURE — 83036 HEMOGLOBIN GLYCOSYLATED A1C: CPT

## 2020-08-12 PROCEDURE — 36415 COLL VENOUS BLD VENIPUNCTURE: CPT

## 2020-08-18 ENCOUNTER — PATIENT OUTREACH (OUTPATIENT)
Dept: OTHER | Facility: OTHER | Age: 45
End: 2020-08-18

## 2020-08-18 ENCOUNTER — TELEPHONE (OUTPATIENT)
Dept: INTERNAL MEDICINE | Facility: CLINIC | Age: 45
End: 2020-08-18

## 2020-08-18 DIAGNOSIS — I10 ESSENTIAL HYPERTENSION: ICD-10-CM

## 2020-08-18 RX ORDER — AMLODIPINE BESYLATE 5 MG/1
5 TABLET ORAL DAILY
Qty: 90 TABLET | Refills: 3 | Status: SHIPPED | OUTPATIENT
Start: 2020-08-18 | End: 2020-09-15 | Stop reason: SDUPTHER

## 2020-08-18 NOTE — PROGRESS NOTES
Digital Medicine: Clinician Follow-Up    Called patient to follow up on HDMP. He reports that since starting Ambien he has noticed that he has had more quality sleep. He reports that he uses his mask, in reference to his CPAP machine, every night.     The history is provided by the patient.   Follow-up reason(s): routine follow up.     Hypertension  Patient needs assistance troubleshooting: Patient reminder needed..    Patient is not experiencing signs/symptoms of hypotension.  Patient is not experiencing signs/symptoms of hypertension.          Last 5 Patient Entered Readings                                      Current 30 Day Average: 148/93     Recent Readings 8/6/2020 8/5/2020 8/4/2020 7/27/2020 7/17/2020    SBP (mmHg) 147 144 147 152 143    DBP (mmHg) 87 94 99 93 92    Pulse 79 88 78 68 93               Depression Screening  Did not address depression screening.    Sleep Apnea Screening  Patient previously diagnosed with JARAD and is not interested in a referral at this time.     He reports he is currently using CPAP for 7 hour(s) per night. JARAD is managed effectively with CPAP use.      Medication Affordability Screening  Did not address medication affordability screening.           ASSESSMENT(S)  Patients BP average is 148/93 mmHg, which is above goal. Patient's BP goal is less than or equal to 130/80 per 2017 ACC/AHA Hypertension Guidelines.     HTN is uncontrolled likely due to lifestyle modification factors such as JARAD and obesity. Likely that patient's BP will trend downward with consistent quality sleep. BP is appropriately managed with first line agent - DHP CCB - along with cardioselective beta blocker.      Hypertension Plan  Additional monitoring needed. If BP remains elevated, will increase amlodipine dose to 10 mg daily.  Continue current therapy.  Instructed to charge device.  Provided patient education.       Addressed any questions or concerns and patient has my contact information if needed  prior to next outreach. Patient verbalizes understanding.            There are no preventive care reminders to display for this patient.      Hypertension Medications             amLODIPine (NORVASC) 5 MG tablet Take 1 tablet (5 mg total) by mouth once daily.    metoprolol succinate (TOPROL-XL) 50 MG 24 hr tablet TAKE 1 TABLET BY MOUTH EVERY DAY

## 2020-08-19 ENCOUNTER — TELEPHONE (OUTPATIENT)
Dept: INTERNAL MEDICINE | Facility: CLINIC | Age: 45
End: 2020-08-19

## 2020-08-19 DIAGNOSIS — K21.9 GASTROESOPHAGEAL REFLUX DISEASE, ESOPHAGITIS PRESENCE NOT SPECIFIED: ICD-10-CM

## 2020-08-19 RX ORDER — OMEPRAZOLE 40 MG/1
CAPSULE, DELAYED RELEASE ORAL
Qty: 90 CAPSULE | Refills: 3 | Status: SHIPPED | OUTPATIENT
Start: 2020-08-19 | End: 2020-08-27 | Stop reason: SDUPTHER

## 2020-08-19 NOTE — TELEPHONE ENCOUNTER
Refilled Rx electronically to [facility] on [address].  Was notified by email from fax sent to coumadin clinic.  Please call this [facility] and give them the correct fax number for my office and have them change this in their system.

## 2020-08-27 DIAGNOSIS — K21.9 GASTROESOPHAGEAL REFLUX DISEASE, ESOPHAGITIS PRESENCE NOT SPECIFIED: ICD-10-CM

## 2020-08-27 DIAGNOSIS — J32.9 RHINOSINUSITIS: ICD-10-CM

## 2020-08-27 RX ORDER — ALPRAZOLAM 0.5 MG/1
0.5 TABLET ORAL 3 TIMES DAILY PRN
Qty: 40 TABLET | Refills: 0 | Status: SHIPPED | OUTPATIENT
Start: 2020-08-27 | End: 2021-02-27 | Stop reason: SDUPTHER

## 2020-08-27 RX ORDER — OMEPRAZOLE 40 MG/1
CAPSULE, DELAYED RELEASE ORAL
Qty: 90 CAPSULE | Refills: 3 | Status: SHIPPED | OUTPATIENT
Start: 2020-08-27 | End: 2021-10-25

## 2020-08-27 RX ORDER — FLUTICASONE PROPIONATE 50 MCG
2 SPRAY, SUSPENSION (ML) NASAL 2 TIMES DAILY
Qty: 18.2 ML | Refills: 3 | Status: SHIPPED | OUTPATIENT
Start: 2020-08-27 | End: 2020-12-22

## 2020-08-27 RX ORDER — OMEPRAZOLE 40 MG/1
CAPSULE, DELAYED RELEASE ORAL
Qty: 90 CAPSULE | Refills: 3 | Status: CANCELLED | OUTPATIENT
Start: 2020-08-27

## 2020-08-28 ENCOUNTER — TELEPHONE (OUTPATIENT)
Dept: ALLERGY | Facility: CLINIC | Age: 45
End: 2020-08-28

## 2020-08-28 ENCOUNTER — PATIENT OUTREACH (OUTPATIENT)
Dept: OTHER | Facility: OTHER | Age: 45
End: 2020-08-28

## 2020-08-28 NOTE — TELEPHONE ENCOUNTER
Spoke with patient and he accidentally asked for a refill of his albuterol.  He was wanting to ask for a refill of his Omeprazole.  Notified patient that Dr. Lance refilled Omeprazole yesterday.  Patient denied that he was needing albuterol at this time and if he needs it in the future, he will make an appointment with Dr. Shay.

## 2020-08-28 NOTE — PROGRESS NOTES
"Digital Medicine: Health  Follow-Up    The history is provided by the patient.             Reason for review: Blood pressure not at goal        Topics Covered on Call: physical activity and Diet    Additional Follow-up details: Avg BP as of Aug 28, 2020 is 141/93.     "Taking it easy" so stress is a little lower.    Still doing Crossfit. Had a reading of 125 after working out but prior to working out.    No diet changes.     Had a question about inhaler and high BPs. Messaged Siria to ask and will pass along info to patient. He is taking Zyrtec as well for allergies.          Diet-no change to diet    No change to diet.        Physical Activity-no change to routine  No change to exercise routine.       Additional physical activity details: Still doing crossfit.       Medication Adherence-Medication adherence was assessed.        Substance, Sleep, Stress-change  stress-assessed  Details:lower stress this month  Intervention(s):    Sleep-  Details:  Intervention(s):    Alcohol -  Details:  Intervention(s):    Tobacco-  Details:  Intervention(s):          Continue current diet/physical activity routine.  Provided patient education. Will message about inhaler and Zyrtec        Patient verbalizes understanding.      Explained the importance of self-monitoring and medication adherence. Encouraged the patient to communicate with their health  for lifestyle modifications to help improve or maintain a healthy lifestyle.                Topic    Lipid (Cholesterol) Test        Last 5 Patient Entered Readings                                      Current 30 Day Average: 141/93     Recent Readings 8/20/2020 8/19/2020 8/19/2020 8/19/2020 8/6/2020    SBP (mmHg) 142 125 150 152 147    DBP (mmHg) 89 92 93 99 87    Pulse 110 110 68 83 79               "

## 2020-09-15 ENCOUNTER — PATIENT OUTREACH (OUTPATIENT)
Dept: OTHER | Facility: OTHER | Age: 45
End: 2020-09-15

## 2020-09-15 DIAGNOSIS — I10 ESSENTIAL HYPERTENSION: ICD-10-CM

## 2020-09-15 RX ORDER — AMLODIPINE BESYLATE 5 MG/1
10 TABLET ORAL DAILY
Qty: 90 TABLET | Refills: 0
Start: 2020-09-15 | End: 2020-09-17 | Stop reason: SDUPTHER

## 2020-09-15 NOTE — PROGRESS NOTES
Digital Medicine: Clinician Follow-Up    Called patient to follow up on HDMP. He denies any changes in diet and physical activity for his readings trending downward.    The history is provided by the patient.      Review of patient's allergies indicates:  No Known Allergies  Follow-up reason(s): routine follow up.     Hypertension    Readings are trending down   Patient is not experiencing signs/symptoms of hypotension.  Patient is not experiencing signs/symptoms of hypertension.          Last 5 Patient Entered Readings                                      Current 30 Day Average: 137/93     Recent Readings 9/15/2020 9/5/2020 9/3/2020 9/1/2020 9/1/2020    SBP (mmHg) 147 153 137 120 152    DBP (mmHg) 97 98 88 82 95    Pulse 84 78 81 106 95                 Depression Screening  Did not address depression screening.    Sleep Apnea Screening    Did not address sleep apnea screening.     Medication Affordability Screening  Did not address medication affordability screening.     Medication Adherence-Medication adherence was assessed.          ASSESSMENT(S)  Patients BP average is 137/93 mmHg, which is above goal. Patient's BP goal is less than or equal to 130/80 per 2017 ACC/AHA Hypertension Guidelines.     HTN is appropriately managed with first line antihypertensive agent - DHP CCB - along with cardioselective beta blocker.      Hypertension Plan  Medication change. Increase amlodipine dose to 10 mg daily.  Additional monitoring needed.  Continue current therapy.  Continue current diet/physical activity routine.  Provided patient education. Informed patient that he may separate the dose to avoid any pedal edema and encouraged him to take 5 mg of amlodipine in the AM and 5 mg in the PM.       Addressed patient questions and patient has my contact information if needed prior to next outreach. Patient verbalizes understanding.      Explained the importance of self-monitoring and medication adherence. Encouraged the patient  to communicate with their health  for lifestyle modifications to help improve or maintain a healthy lifestyle.                  Topic    Lipid (Cholesterol) Test      There are no preventive care reminders to display for this patient.    Hypertension Medications             amLODIPine (NORVASC) 5 MG tablet Take 2 tablets (10 mg total) by mouth twice daily.    metoprolol succinate (TOPROL-XL) 50 MG 24 hr tablet TAKE 1 TABLET BY MOUTH EVERY DAY

## 2020-09-16 ENCOUNTER — PATIENT MESSAGE (OUTPATIENT)
Dept: ADMINISTRATIVE | Facility: OTHER | Age: 45
End: 2020-09-16

## 2020-09-16 DIAGNOSIS — I10 ESSENTIAL HYPERTENSION: ICD-10-CM

## 2020-09-17 RX ORDER — AMLODIPINE BESYLATE 5 MG/1
10 TABLET ORAL DAILY
Qty: 180 TABLET | Refills: 1 | Status: SHIPPED | OUTPATIENT
Start: 2020-09-17 | End: 2020-11-16 | Stop reason: SDUPTHER

## 2020-09-25 ENCOUNTER — PATIENT OUTREACH (OUTPATIENT)
Dept: OTHER | Facility: OTHER | Age: 45
End: 2020-09-25

## 2020-09-25 NOTE — PROGRESS NOTES
Digital Medicine: Health  Follow-Up    The history is provided by the patient.             Reason for review: Blood pressure not at goal        Topics Covered on Call: physical activity and Diet    Additional Follow-up details: Avg BP as of Sept 24, 2020 is 141/90.     No diet or exercise changes, still working out.     Denied increase in stress.     Denied s/s of HTN with higher readings lately.     Siria changed his medication about a week ago.          Diet-no change to diet    No change to diet.        Physical Activity-no change to routine  No change to exercise routine.     Medication Adherence-Medication adherence was asssessed.  Patient continue taking medication as prescribed.          Siria changed his medication about a week ago.     Substance, Sleep, Stress-No change  stress-assessed  Details:no change  Intervention(s):    Sleep-  Details:  Intervention(s):    Alcohol -  Details:  Intervention(s):    Tobacco-  Details:  Intervention(s):          Additional monitoring needed. Watch if medication lowers BP  Continue current diet/physical activity routine.       Addressed patient questions and patient has my contact information if needed prior to next outreach. Patient verbalizes understanding.      Explained the importance of self-monitoring and medication adherence. Encouraged the patient to communicate with their health  for lifestyle modifications to help improve or maintain a healthy lifestyle.                Topic    Lipid (Cholesterol) Test        Last 5 Patient Entered Readings                                      Current 30 Day Average: 141/90     Recent Readings 9/25/2020 9/24/2020 9/23/2020 9/17/2020 9/16/2020    SBP (mmHg) 150 147 137 146 136    DBP (mmHg) 97 81 84 94 88    Pulse 92 89 85 90 102

## 2020-09-29 ENCOUNTER — PATIENT OUTREACH (OUTPATIENT)
Dept: OTHER | Facility: OTHER | Age: 45
End: 2020-09-29

## 2020-10-23 ENCOUNTER — PATIENT OUTREACH (OUTPATIENT)
Dept: OTHER | Facility: OTHER | Age: 45
End: 2020-10-23
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
## 2020-10-27 ENCOUNTER — CLINICAL SUPPORT (OUTPATIENT)
Dept: INTERNAL MEDICINE | Facility: CLINIC | Age: 45
End: 2020-10-27
Payer: COMMERCIAL

## 2020-10-27 ENCOUNTER — IMMUNIZATION (OUTPATIENT)
Dept: INTERNAL MEDICINE | Facility: CLINIC | Age: 45
End: 2020-10-27
Payer: COMMERCIAL

## 2020-10-27 ENCOUNTER — OFFICE VISIT (OUTPATIENT)
Dept: INTERNAL MEDICINE | Facility: CLINIC | Age: 45
End: 2020-10-27
Payer: COMMERCIAL

## 2020-10-27 VITALS
BODY MASS INDEX: 47.74 KG/M2 | HEART RATE: 74 BPM | HEIGHT: 68 IN | DIASTOLIC BLOOD PRESSURE: 88 MMHG | TEMPERATURE: 98 F | OXYGEN SATURATION: 95 % | SYSTOLIC BLOOD PRESSURE: 120 MMHG | WEIGHT: 315 LBS

## 2020-10-27 DIAGNOSIS — J45.30 MILD PERSISTENT ASTHMA WITHOUT COMPLICATION: ICD-10-CM

## 2020-10-27 DIAGNOSIS — E78.49 OTHER HYPERLIPIDEMIA: ICD-10-CM

## 2020-10-27 DIAGNOSIS — R73.03 PREDIABETES: ICD-10-CM

## 2020-10-27 DIAGNOSIS — E66.01 MORBID OBESITY WITH BMI OF 50.0-59.9, ADULT: ICD-10-CM

## 2020-10-27 DIAGNOSIS — K21.9 GASTROESOPHAGEAL REFLUX DISEASE, UNSPECIFIED WHETHER ESOPHAGITIS PRESENT: ICD-10-CM

## 2020-10-27 DIAGNOSIS — Z00.00 ANNUAL PHYSICAL EXAM: ICD-10-CM

## 2020-10-27 DIAGNOSIS — F41.9 ANXIETY AND DEPRESSION: ICD-10-CM

## 2020-10-27 DIAGNOSIS — I10 ESSENTIAL HYPERTENSION: ICD-10-CM

## 2020-10-27 DIAGNOSIS — F32.A ANXIETY AND DEPRESSION: ICD-10-CM

## 2020-10-27 DIAGNOSIS — G47.33 OBSTRUCTIVE SLEEP APNEA SYNDROME: ICD-10-CM

## 2020-10-27 DIAGNOSIS — E55.9 VITAMIN D INSUFFICIENCY: ICD-10-CM

## 2020-10-27 PROCEDURE — 3079F PR MOST RECENT DIASTOLIC BLOOD PRESSURE 80-89 MM HG: ICD-10-PCS | Mod: CPTII,S$GLB,, | Performed by: FAMILY MEDICINE

## 2020-10-27 PROCEDURE — 90472 PNEUMOCOCCAL POLYSACCHARIDE VACCINE 23-VALENT =>2YO SQ IM: ICD-10-PCS | Mod: S$GLB,,, | Performed by: FAMILY MEDICINE

## 2020-10-27 PROCEDURE — 3074F SYST BP LT 130 MM HG: CPT | Mod: CPTII,S$GLB,, | Performed by: FAMILY MEDICINE

## 2020-10-27 PROCEDURE — 90732 PPSV23 VACC 2 YRS+ SUBQ/IM: CPT | Mod: S$GLB,,, | Performed by: FAMILY MEDICINE

## 2020-10-27 PROCEDURE — 99999 PR PBB SHADOW E&M-EST. PATIENT-LVL V: ICD-10-PCS | Mod: PBBFAC,,, | Performed by: FAMILY MEDICINE

## 2020-10-27 PROCEDURE — 90686 FLU VACCINE (QUAD) GREATER THAN OR EQUAL TO 3YO PRESERVATIVE FREE IM: ICD-10-PCS | Mod: S$GLB,,, | Performed by: FAMILY MEDICINE

## 2020-10-27 PROCEDURE — 3079F DIAST BP 80-89 MM HG: CPT | Mod: CPTII,S$GLB,, | Performed by: FAMILY MEDICINE

## 2020-10-27 PROCEDURE — 99396 PR PREVENTIVE VISIT,EST,40-64: ICD-10-PCS | Mod: 25,S$GLB,, | Performed by: FAMILY MEDICINE

## 2020-10-27 PROCEDURE — 3008F BODY MASS INDEX DOCD: CPT | Mod: CPTII,S$GLB,, | Performed by: FAMILY MEDICINE

## 2020-10-27 PROCEDURE — 90471 FLU VACCINE (QUAD) GREATER THAN OR EQUAL TO 3YO PRESERVATIVE FREE IM: ICD-10-PCS | Mod: S$GLB,,, | Performed by: FAMILY MEDICINE

## 2020-10-27 PROCEDURE — 90471 IMMUNIZATION ADMIN: CPT | Mod: S$GLB,,, | Performed by: FAMILY MEDICINE

## 2020-10-27 PROCEDURE — 90732 PNEUMOCOCCAL POLYSACCHARIDE VACCINE 23-VALENT =>2YO SQ IM: ICD-10-PCS | Mod: S$GLB,,, | Performed by: FAMILY MEDICINE

## 2020-10-27 PROCEDURE — 3074F PR MOST RECENT SYSTOLIC BLOOD PRESSURE < 130 MM HG: ICD-10-PCS | Mod: CPTII,S$GLB,, | Performed by: FAMILY MEDICINE

## 2020-10-27 PROCEDURE — 3008F PR BODY MASS INDEX (BMI) DOCUMENTED: ICD-10-PCS | Mod: CPTII,S$GLB,, | Performed by: FAMILY MEDICINE

## 2020-10-27 PROCEDURE — 90686 IIV4 VACC NO PRSV 0.5 ML IM: CPT | Mod: S$GLB,,, | Performed by: FAMILY MEDICINE

## 2020-10-27 PROCEDURE — 99396 PREV VISIT EST AGE 40-64: CPT | Mod: 25,S$GLB,, | Performed by: FAMILY MEDICINE

## 2020-10-27 PROCEDURE — 90472 IMMUNIZATION ADMIN EACH ADD: CPT | Mod: S$GLB,,, | Performed by: FAMILY MEDICINE

## 2020-10-27 PROCEDURE — 99999 PR PBB SHADOW E&M-EST. PATIENT-LVL V: CPT | Mod: PBBFAC,,, | Performed by: FAMILY MEDICINE

## 2020-10-27 RX ORDER — BUDESONIDE AND FORMOTEROL FUMARATE DIHYDRATE 80; 4.5 UG/1; UG/1
2 AEROSOL RESPIRATORY (INHALATION) DAILY
Qty: 10.2 G | Refills: 11 | Status: SHIPPED | OUTPATIENT
Start: 2020-10-27 | End: 2022-04-19 | Stop reason: SDUPTHER
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
## 2020-10-27 NOTE — PROGRESS NOTES
Subjective:      Patient ID: Jesús Galindo is a 45 y.o. male.    Chief Complaint: Annual Exam      HPI:  Jesús Galindo is a 45 year old male with anxiety and depression, mild intermittent asthma, gastroesophageal reflux disease, HLD, HTN, low back pain, morbid obesity, obstructive sleep apnea, prediabetes, and vitamin D deficiency who presents to clinic today for annual physical exam.    Anxiety & Depression:  Prescribed alprazolam 0.5 mg by mouth three times daily as needed.  Symptoms stable.    Asthma:  Prescribed albuterol rescue inhaler.  Endorses intermittent wheezing.  Had PFTs 5/4/20 showing spirometry shows airflow is urban, spirometry is improved following bronchodilator administration. Lung volumes show mild restriction. DLCO is normal.  Denies night time awakenings.  Used rescue inhaler 4x/past week.    GERD:  Prescribed omeprazole 40 mg by mouth daily as needed.    HLD:  Total cholesterol 229, triglycerides 286 on most recent lipid panel 8/27/19.  The 10-year ASCVD risk score ([place] JOY Jr., et al., 2013) is: 2.3%    Values used to calculate the score:      Age: 45 years      Sex: Male      Is Non- : No      Diabetic: No      Tobacco smoker: No      Systolic Blood Pressure: 120 mmHg      Is BP treated: Yes      HDL Cholesterol: 48 mg/dL      Total Cholesterol: 229 mg/dL    HTN:  Prescribed amlodipine 5 mg 2 tablets by mouth daily, metoprolol succinate 50 mg by mouth daily  Participates with digital HTN.  Getting higher numbers with home cuff than when in office (140's/90's at home).  Denies associated headaches/vision changes.    Obesity:  BMI 56.52.  States he has restarted [employer].    JARAD:  Uses CPAP nightly with benefit.    Prediabetes:  Last A1c 6.0% 8/12/20; has repeat ordered/expected 2/12/21.    Vitamin D deficiency:  Noted to 21 5/18/20.  Taking 50,000 units weekly.    Health Care Maintenance:  Influenza vaccination:  Amenable  Last tetanus booster:  10/2/18  Pneumovax:   Amenable      Past Medical History:   Diagnosis Date    Asthma     GERD (gastroesophageal reflux disease)     Hyperlipidemia     Hypertension     Low back pain     Low back pain     Morbid obesity with BMI of 50.0-59.9, adult     JARAD (obstructive sleep apnea)     Varicose veins     Vitamin D deficiency        Past Surgical History:   Procedure Laterality Date    ADENOIDECTOMY      EYE SURGERY      GASTRECTOMY      LAPAROSCOPIC GASTRIC BANDING      In [place]: uncertain of brand/ size    LAPAROSCOPIC REPAIR OF RECURRENT INCARCERATED INCISIONAL HERNIA N/A 2018    Procedure: REPAIR-HERNIA-INCISIONAL-LAPAROSCOPIC WITH MESH;  Surgeon: Vaughn Parker Jr., MD;  Location: [facility] OR [address] [place] [address];  Service: General;  Laterality: N/A;    LASIK         Family History   Problem Relation Age of Onset    Hypertension Mother     Obesity Mother         s/p sleeve : good results    Cancer Maternal Grandfather     Diabetes Paternal Grandmother     No Known Problems Father     No Known Problems Brother     Obesity Brother     No Known Problems Sister        Social History     Socioeconomic History    Marital status: Single     Spouse name: Not on file    Number of children: Not on file    Years of education: Not on file    Highest education level: Not on file   Occupational History    Not on file   Social Needs    Financial resource strain: Somewhat hard    Food insecurity     Worry: Never true     Inability: Never true    Transportation needs     Medical: No     Non-medical: No   Tobacco Use    Smoking status: Former Smoker     Packs/day: 0.25     Years: 5.00     Pack years: 1.25     Quit date: 2009     Years since quittin.8    Smokeless tobacco: Never Used   Substance and Sexual Activity    Alcohol use: Yes     Frequency: 2-3 times a week     Drinks per session: 3 or 4     Binge frequency: Less than monthly     Comment: socially    Drug use: No    Sexual activity: Not  Currently   Lifestyle    Physical activity     Days per week: 3 days     Minutes per session: 60 min    Stress: Very much   Relationships    Social connections     Talks on phone: More than three times a week     Gets together: Twice a week     Attends Bahai service: More than 4 times per year     Active member of club or organization: No     Attends meetings of clubs or organizations: Never     Relationship status:    Other Topics Concern    Not on file   Social History Narrative    Engaged.  Works in sales for [employer].  Previously worked as a .  1 daughter, Yenni (10 years old).       Review of Systems   Constitutional: Positive for activity change and unexpected weight change. Negative for chills, fatigue and fever.   HENT: Negative for congestion, hearing loss, nosebleeds, rhinorrhea, sore throat and trouble swallowing.    Eyes: Negative for pain, discharge and visual disturbance.   Respiratory: Positive for wheezing. Negative for cough, chest tightness and shortness of breath.    Cardiovascular: Negative for chest pain and palpitations.   Gastrointestinal: Negative for abdominal distention, abdominal pain, blood in stool, constipation, diarrhea, nausea and vomiting.   Endocrine: Negative for polydipsia and polyuria.   Genitourinary: Negative for difficulty urinating, dysuria, frequency, hematuria and urgency.   Musculoskeletal: Negative for arthralgias, back pain, joint swelling, myalgias and neck pain.   Skin: Negative for color change and rash.   Neurological: Negative for dizziness, syncope, speech difficulty, weakness, numbness and headaches.   Psychiatric/Behavioral: Negative for agitation, behavioral problems, confusion and dysphoric mood. The patient is not nervous/anxious.      Objective:     Vitals:    10/27/20 1537   BP: 120/88   BP Location: Left arm   Patient Position: Sitting   BP Method: Large (Manual)   Pulse: 74   Temp: 98.3 °F (36.8 °C)   TempSrc: Oral   SpO2: 95%  "  Weight: (!) 168.6 kg (371 lb 11.1 oz)   Height: 5' 8" (1.727 m)       Physical Exam  Vitals signs and nursing note reviewed.   Constitutional:       General: He is not in acute distress.     Appearance: He is well-developed. He is obese.   HENT:      Head: Normocephalic and atraumatic.      Right Ear: Hearing and external ear normal.      Left Ear: Hearing and external ear normal.      Nose: Nose normal. No rhinorrhea.   Eyes:      General: Lids are normal.         Right eye: No discharge.         Left eye: No discharge.      Conjunctiva/sclera: Conjunctivae normal.   Neck:      Musculoskeletal: Neck supple.      Trachea: Trachea normal. No tracheal deviation.   Cardiovascular:      Rate and Rhythm: Normal rate and regular rhythm.      Heart sounds: Normal heart sounds. No murmur. No friction rub. No gallop.    Pulmonary:      Effort: Pulmonary effort is normal. No respiratory distress.      Breath sounds: Normal breath sounds. No wheezing or rales.   Abdominal:      General: Bowel sounds are normal. There is no distension.      Palpations: Abdomen is soft.      Tenderness: There is no abdominal tenderness. There is no guarding or rebound.   Skin:     General: Skin is warm and dry.      Findings: No rash.   Neurological:      Mental Status: He is alert.      Motor: No abnormal muscle tone.   Psychiatric:         Speech: Speech normal.         Behavior: Behavior normal. Behavior is cooperative.         Thought Content: Thought content normal.         Judgment: Judgment normal.        Assessment:      1. Annual physical exam    2. Anxiety and depression    3. Mild persistent asthma without complication    4. Gastroesophageal reflux disease, unspecified whether esophagitis present    5. Other hyperlipidemia    6. Essential hypertension    7. Morbid obesity with BMI of 50.0-59.9, adult    8. Obstructive sleep apnea syndrome    9. Prediabetes    10. Vitamin D insufficiency      Plan:   Jesús was seen today for annual " exam.    Diagnoses and all orders for this visit:    Annual physical exam  -     Lipid Panel; Future  -     Comprehensive Metabolic Panel; Future  -     CBC auto differential; Future  -     Vitamin D; Future  -     Vaccination cards for Fluzone and Pneumovax provided.    Anxiety and depression        -     Stable    Mild persistent asthma without complication  -     Start budesonide-formoterol 80-4.5 mcg (SYMBICORT) 80-4.5 mcg/actuation HFAA; Inhale 2 puffs into the lungs once daily. Controller.  Counseled patient on potential adverse effects.  Continue albuterol PRN.  Return to clinic for any worsening or if no improvement.    Gastroesophageal reflux disease, unspecified whether esophagitis present        -     Stable, continue current regimen.    Other hyperlipidemia  -     Lipid Panel; Future    Essential hypertension        -     Continue current regimen at present and digital HTN program.  To schedule nursing BP check to bring in home cuff for calibration.    Morbid obesity with BMI of 50.0-59.9, adult        -     Counseled patient on the importance of a healthy low calorie diet, increased daily aerobic exercise, and weight loss.    Obstructive sleep apnea syndrome        -     Continue CPAP.    Prediabetes        -     Complete f/u A1c as previously scheduled.    Vitamin D insufficiency  -     Vitamin D; Future

## 2020-11-06 ENCOUNTER — LAB VISIT (OUTPATIENT)
Dept: LAB | Facility: HOSPITAL | Age: 45
End: 2020-11-06
Attending: FAMILY MEDICINE
Payer: COMMERCIAL

## 2020-11-06 ENCOUNTER — CLINICAL SUPPORT (OUTPATIENT)
Dept: INTERNAL MEDICINE | Facility: CLINIC | Age: 45
End: 2020-11-06
Payer: COMMERCIAL

## 2020-11-06 DIAGNOSIS — Z00.00 ANNUAL PHYSICAL EXAM: ICD-10-CM

## 2020-11-06 DIAGNOSIS — E78.49 OTHER HYPERLIPIDEMIA: ICD-10-CM

## 2020-11-06 DIAGNOSIS — E55.9 VITAMIN D INSUFFICIENCY: ICD-10-CM

## 2020-11-06 LAB
25(OH)D3+25(OH)D2 SERPL-MCNC: 19 NG/ML (ref 30–96)
ALBUMIN SERPL BCP-MCNC: 3.5 G/DL (ref 3.5–5.2)
ALP SERPL-CCNC: 121 U/L (ref 55–135)
ALT SERPL W/O P-5'-P-CCNC: 109 U/L (ref 10–44)
ANION GAP SERPL CALC-SCNC: 9 MMOL/L (ref 8–16)
AST SERPL-CCNC: 87 U/L (ref 10–40)
BASOPHILS # BLD AUTO: 0.07 K/UL (ref 0–0.2)
BASOPHILS NFR BLD: 1.1 % (ref 0–1.9)
BILIRUB SERPL-MCNC: 0.9 MG/DL (ref 0.1–1)
BUN SERPL-MCNC: 11 MG/DL (ref 6–20)
CALCIUM SERPL-MCNC: 9 MG/DL (ref 8.7–10.5)
CHLORIDE SERPL-SCNC: 104 MMOL/L (ref 95–110)
CHOLEST SERPL-MCNC: 220 MG/DL (ref 120–199)
CHOLEST/HDLC SERPL: 4.4 {RATIO} (ref 2–5)
CO2 SERPL-SCNC: 23 MMOL/L (ref 23–29)
CREAT SERPL-MCNC: 0.8 MG/DL (ref 0.5–1.4)
DIFFERENTIAL METHOD: ABNORMAL
EOSINOPHIL # BLD AUTO: 0.4 K/UL (ref 0–0.5)
EOSINOPHIL NFR BLD: 5.4 % (ref 0–8)
ERYTHROCYTE [DISTWIDTH] IN BLOOD BY AUTOMATED COUNT: 13.2 % (ref 11.5–14.5)
EST. GFR  (AFRICAN AMERICAN): >60 ML/MIN/1.73 M^2
EST. GFR  (NON AFRICAN AMERICAN): >60 ML/MIN/1.73 M^2
GLUCOSE SERPL-MCNC: 100 MG/DL (ref 70–110)
HCT VFR BLD AUTO: 47.9 % (ref 40–54)
HDLC SERPL-MCNC: 50 MG/DL (ref 40–75)
HDLC SERPL: 22.7 % (ref 20–50)
HGB BLD-MCNC: 15 G/DL (ref 14–18)
IMM GRANULOCYTES # BLD AUTO: 0.02 K/UL (ref 0–0.04)
IMM GRANULOCYTES NFR BLD AUTO: 0.3 % (ref 0–0.5)
LDLC SERPL CALC-MCNC: 152.8 MG/DL (ref 63–159)
LYMPHOCYTES # BLD AUTO: 1.5 K/UL (ref 1–4.8)
LYMPHOCYTES NFR BLD: 22.4 % (ref 18–48)
MCH RBC QN AUTO: 27.7 PG (ref 27–31)
MCHC RBC AUTO-ENTMCNC: 31.3 G/DL (ref 32–36)
MCV RBC AUTO: 89 FL (ref 82–98)
MONOCYTES # BLD AUTO: 0.8 K/UL (ref 0.3–1)
MONOCYTES NFR BLD: 11.7 % (ref 4–15)
NEUTROPHILS # BLD AUTO: 3.8 K/UL (ref 1.8–7.7)
NEUTROPHILS NFR BLD: 59.1 % (ref 38–73)
NONHDLC SERPL-MCNC: 170 MG/DL
NRBC BLD-RTO: 0 /100 WBC
PLATELET # BLD AUTO: 276 K/UL (ref 150–350)
PMV BLD AUTO: 9.7 FL (ref 9.2–12.9)
POTASSIUM SERPL-SCNC: 4.2 MMOL/L (ref 3.5–5.1)
PROT SERPL-MCNC: 6.9 G/DL (ref 6–8.4)
RBC # BLD AUTO: 5.41 M/UL (ref 4.6–6.2)
SODIUM SERPL-SCNC: 136 MMOL/L (ref 136–145)
TRIGL SERPL-MCNC: 86 MG/DL (ref 30–150)
WBC # BLD AUTO: 6.47 K/UL (ref 3.9–12.7)

## 2020-11-06 PROCEDURE — 99999 PR PBB SHADOW E&M-EST. PATIENT-LVL III: CPT | Mod: PBBFAC,,,

## 2020-11-06 PROCEDURE — 82306 VITAMIN D 25 HYDROXY: CPT

## 2020-11-06 PROCEDURE — 80053 COMPREHEN METABOLIC PANEL: CPT

## 2020-11-06 PROCEDURE — 80061 LIPID PANEL: CPT

## 2020-11-06 PROCEDURE — 36415 COLL VENOUS BLD VENIPUNCTURE: CPT

## 2020-11-06 PROCEDURE — 85025 COMPLETE CBC W/AUTO DIFF WBC: CPT

## 2020-11-06 PROCEDURE — 99999 PR PBB SHADOW E&M-EST. PATIENT-LVL III: ICD-10-PCS | Mod: PBBFAC,,,

## 2020-11-06 NOTE — PROGRESS NOTES
Pt in for a blood pressure check. BP- 130/86, P- 87, O2- 96, RA. Pt also bought in his own bf cuff for comparison. BP-138/96 RA, 156/91 LA. PCP notified. Instructed pt to continue same regimen. Low sodium diet, daily exercise, adequate hydration, and to report if bp is consistently 140's/90's. Also advised pt to go to the [facility] and get bp machine checked out. Pt verbalized understanding.

## 2020-11-09 ENCOUNTER — TELEPHONE (OUTPATIENT)
Dept: INTERNAL MEDICINE | Facility: CLINIC | Age: 45
End: 2020-11-09

## 2020-11-09 DIAGNOSIS — E55.9 VITAMIN D INSUFFICIENCY: ICD-10-CM

## 2020-11-09 DIAGNOSIS — R74.8 ELEVATED LIVER ENZYMES: Primary | ICD-10-CM

## 2020-11-09 NOTE — TELEPHONE ENCOUNTER
ABD US, acute hep panel now.  CMP and vit D in 3 months.  Please process/notify patient.  Explanation sent via portal.

## 2020-11-14 ENCOUNTER — LAB VISIT (OUTPATIENT)
Dept: LAB | Facility: HOSPITAL | Age: 45
End: 2020-11-14
Attending: FAMILY MEDICINE
Payer: COMMERCIAL

## 2020-11-14 DIAGNOSIS — R74.8 ELEVATED LIVER ENZYMES: ICD-10-CM

## 2020-11-14 PROCEDURE — 36415 COLL VENOUS BLD VENIPUNCTURE: CPT

## 2020-11-14 PROCEDURE — 80074 ACUTE HEPATITIS PANEL: CPT

## 2020-11-16 ENCOUNTER — PATIENT MESSAGE (OUTPATIENT)
Dept: ADMINISTRATIVE | Facility: OTHER | Age: 45
End: 2020-11-16

## 2020-11-16 ENCOUNTER — PATIENT OUTREACH (OUTPATIENT)
Dept: OTHER | Facility: OTHER | Age: 45
End: 2020-11-16

## 2020-11-16 ENCOUNTER — PATIENT MESSAGE (OUTPATIENT)
Dept: INTERNAL MEDICINE | Facility: CLINIC | Age: 45
End: 2020-11-16

## 2020-11-16 DIAGNOSIS — I10 ESSENTIAL HYPERTENSION: ICD-10-CM

## 2020-11-16 LAB
HAV IGM SERPL QL IA: NEGATIVE
HBV CORE IGM SERPL QL IA: NEGATIVE
HBV SURFACE AG SERPL QL IA: NEGATIVE
HCV AB SERPL QL IA: POSITIVE

## 2020-11-16 RX ORDER — METOPROLOL SUCCINATE 50 MG/1
50 TABLET, EXTENDED RELEASE ORAL DAILY
Qty: 90 TABLET | Refills: 1 | Status: SHIPPED | OUTPATIENT
Start: 2020-11-16 | End: 2022-01-05

## 2020-11-16 RX ORDER — AMLODIPINE BESYLATE 10 MG/1
10 TABLET ORAL DAILY
Qty: 90 TABLET | Refills: 1 | Status: SHIPPED | OUTPATIENT
Start: 2020-11-16 | End: 2021-03-10 | Stop reason: SDUPTHER

## 2020-11-16 NOTE — PROGRESS NOTES
Digital Medicine: Clinician Follow-Up    Called patient to follow up on HDMP. He reports that he is doing well at this time. He requests a medication refill since he is going out of country this week and is running low on his current antihypertensive therapy.    The history is provided by the patient.      Review of patient's allergies indicates:  No Known Allergies  Follow-up reason(s): routine follow up.     Hypertension    Readings are trending up due to lifestyle change.    Patient is not experiencing signs/symptoms of hypotension.  Patient is not experiencing signs/symptoms of hypertension.        Last 5 Patient Entered Readings                                      Current 30 Day Average: 137/93     Recent Readings 11/15/2020 11/13/2020 11/6/2020 11/6/2020 11/6/2020    SBP (mmHg) 139 141 139 156 138    DBP (mmHg) 92 94 90 91 96    Pulse 90 81 90 83 78          Depression Screening  Did not address depression screening.    Sleep Apnea Screening    Did not address sleep apnea screening.     Medication Affordability Screening  Did not address medication affordability screening.     Medication Adherence-Medication adherence was assessed.          ASSESSMENT(S)  Patients BP average is 137/93 mmHg, which is above goal. Patient's BP goal is less than or equal to 130/80.     HTN is appropriately managed with first line antihypertensive agent - DHP CCB - along with cardioselective beta blocker.      Hypertension Plan  Additional monitoring needed.  Continue current therapy.  Continue current diet/physical activity routine.  Provided patient education.  Sent prescription refill for amlodipine and metoprolol to patient's preferred pharmacy. Will follow up with patient after he returns from his trip.     Addressed patient questions and patient has my contact information if needed prior to next outreach. Patient verbalizes understanding.      Explained the importance of self-monitoring and medication adherence. Encouraged  the patient to communicate with their health  for lifestyle modifications to help improve or maintain a healthy lifestyle.               There are no preventive care reminders to display for this patient.  There are no preventive care reminders to display for this patient.      Hypertension Medications             amLODIPine (NORVASC) 10 MG tablet Take 1 tablet (10 mg total) by mouth once daily.    metoprolol succinate (TOPROL-XL) 50 MG 24 hr tablet Take 1 tablet (50 mg total) by mouth once daily.

## 2020-12-08 ENCOUNTER — PATIENT MESSAGE (OUTPATIENT)
Dept: INTERNAL MEDICINE | Facility: CLINIC | Age: 45
End: 2020-12-08

## 2020-12-08 DIAGNOSIS — H00.019 HORDEOLUM EXTERNUM, UNSPECIFIED LATERALITY: Primary | ICD-10-CM

## 2020-12-11 DIAGNOSIS — G47.00 INSOMNIA, UNSPECIFIED TYPE: ICD-10-CM

## 2020-12-11 RX ORDER — ZOLPIDEM TARTRATE 10 MG/1
10 TABLET ORAL NIGHTLY PRN
Qty: 30 TABLET | Refills: 3 | Status: SHIPPED | OUTPATIENT
Start: 2020-12-11 | End: 2021-04-22 | Stop reason: SDUPTHER

## 2020-12-11 NOTE — TELEPHONE ENCOUNTER
LOV-1/21/20     zolpidem (AMBIEN) 10 mg Tab         Sig: Take 1 tablet (10 mg total) by mouth nightly as needed (insomnia).

## 2020-12-18 ENCOUNTER — PATIENT OUTREACH (OUTPATIENT)
Dept: OTHER | Facility: OTHER | Age: 45
End: 2020-12-18

## 2020-12-18 NOTE — PROGRESS NOTES
Digital Medicine: Health  Follow-Up    The history is provided by the patient.             Reason for review: Blood pressure not at goal        Topics Covered on Call: physical activity and Diet    Additional Follow-up details: Avg BP as of Dec 18, 2020 is 141/91. Trending down.    Has been trying to stick with diet and exercise since being back in town.     Last in clinic reading was 120/88. He had gone to the [facility] with his cuff to have them compare/check it out and stated he had a bad experience with them. They told him his technique was good but didn't compare the readings to another machine and told him there was nothing else they could do. Encouraged him to keep checking and we'll compare in clinic readings with his cuff to make sure they don't get too far off.             Diet-no change to diet    No change to diet.        Physical Activity-no change to routine  No change to exercise routine.     Medication Adherence-Medication Adherence not addressed.        Substance, Sleep, Stress-No change  stress-  Details:  Intervention(s):    Sleep-  Details:  Intervention(s):    Alcohol -  Details:  Intervention(s):    Tobacco-  Details:  Intervention(s):          Continue current diet/physical activity routine.       Addressed patient questions and patient has my contact information if needed prior to next outreach. Patient verbalizes understanding.      Explained the importance of self-monitoring and medication adherence. Encouraged the patient to communicate with their health  for lifestyle modifications to help improve or maintain a healthy lifestyle.               There are no preventive care reminders to display for this patient.      Last 5 Patient Entered Readings                                      Current 30 Day Average: 141/91     Recent Readings 12/17/2020 12/14/2020 12/12/2020 12/11/2020 12/10/2020    SBP (mmHg) 141 141 139 137 134    DBP (mmHg) 92 94 90 91 92    Pulse 82 96 78 86 102

## 2021-01-19 ENCOUNTER — PATIENT MESSAGE (OUTPATIENT)
Dept: INTERNAL MEDICINE | Facility: CLINIC | Age: 46
End: 2021-01-19

## 2021-02-12 ENCOUNTER — LAB VISIT (OUTPATIENT)
Dept: LAB | Facility: HOSPITAL | Age: 46
End: 2021-02-12
Attending: FAMILY MEDICINE
Payer: COMMERCIAL

## 2021-02-12 DIAGNOSIS — R73.03 PREDIABETES: ICD-10-CM

## 2021-02-12 DIAGNOSIS — E55.9 VITAMIN D INSUFFICIENCY: ICD-10-CM

## 2021-02-12 LAB
25(OH)D3+25(OH)D2 SERPL-MCNC: 16 NG/ML (ref 30–96)
ESTIMATED AVG GLUCOSE: 123 MG/DL (ref 68–131)
HBA1C MFR BLD: 5.9 % (ref 4–5.6)

## 2021-02-12 PROCEDURE — 36415 COLL VENOUS BLD VENIPUNCTURE: CPT

## 2021-02-12 PROCEDURE — 82306 VITAMIN D 25 HYDROXY: CPT

## 2021-02-12 PROCEDURE — 83036 HEMOGLOBIN GLYCOSYLATED A1C: CPT

## 2021-02-23 ENCOUNTER — TELEPHONE (OUTPATIENT)
Dept: INTERNAL MEDICINE | Facility: CLINIC | Age: 46
End: 2021-02-23

## 2021-02-23 DIAGNOSIS — E55.9 VITAMIN D DEFICIENCY: ICD-10-CM

## 2021-02-23 DIAGNOSIS — R73.03 PREDIABETES: Primary | ICD-10-CM

## 2021-02-23 RX ORDER — VIT C/E/ZN/COPPR/LUTEIN/ZEAXAN 250MG-90MG
2000 CAPSULE ORAL DAILY
Qty: 180 CAPSULE | Refills: 3 | Status: SHIPPED | OUTPATIENT
Start: 2021-02-23 | End: 2021-09-13

## 2021-03-01 RX ORDER — ALPRAZOLAM 0.5 MG/1
0.5 TABLET ORAL 3 TIMES DAILY PRN
Qty: 40 TABLET | Refills: 0 | Status: SHIPPED | OUTPATIENT
Start: 2021-03-01 | End: 2021-04-22 | Stop reason: SDUPTHER

## 2021-03-02 ENCOUNTER — PATIENT MESSAGE (OUTPATIENT)
Dept: INTERNAL MEDICINE | Facility: CLINIC | Age: 46
End: 2021-03-02

## 2021-03-10 DIAGNOSIS — I10 ESSENTIAL HYPERTENSION: ICD-10-CM

## 2021-03-10 RX ORDER — AMLODIPINE BESYLATE 10 MG/1
10 TABLET ORAL DAILY
Qty: 90 TABLET | Refills: 3 | Status: SHIPPED | OUTPATIENT
Start: 2021-03-10 | End: 2022-04-12 | Stop reason: SDUPTHER

## 2021-03-10 RX ORDER — AMLODIPINE BESYLATE 5 MG/1
10 TABLET ORAL DAILY
Qty: 180 TABLET | Refills: 1 | OUTPATIENT
Start: 2021-03-10 | End: 2022-03-10

## 2021-03-13 ENCOUNTER — IMMUNIZATION (OUTPATIENT)
Dept: PRIMARY CARE CLINIC | Facility: CLINIC | Age: 46
End: 2021-03-13
Payer: COMMERCIAL

## 2021-03-13 DIAGNOSIS — Z23 NEED FOR VACCINATION: Primary | ICD-10-CM

## 2021-03-13 PROCEDURE — 91300 PR SARS-COV- 2 COVID-19 VACCINE, NO PRSV, 30MCG/0.3ML, IM: ICD-10-PCS | Mod: S$GLB,,, | Performed by: INTERNAL MEDICINE

## 2021-03-13 PROCEDURE — 0001A PR IMMUNIZ ADMIN, SARS-COV-2 COVID-19 VACC, 30MCG/0.3ML, 1ST DOSE: ICD-10-PCS | Mod: CV19,S$GLB,, | Performed by: INTERNAL MEDICINE

## 2021-03-13 PROCEDURE — 91300 PR SARS-COV- 2 COVID-19 VACCINE, NO PRSV, 30MCG/0.3ML, IM: CPT | Mod: S$GLB,,, | Performed by: INTERNAL MEDICINE

## 2021-03-13 PROCEDURE — 0001A PR IMMUNIZ ADMIN, SARS-COV-2 COVID-19 VACC, 30MCG/0.3ML, 1ST DOSE: CPT | Mod: CV19,S$GLB,, | Performed by: INTERNAL MEDICINE

## 2021-03-13 RX ADMIN — Medication 0.3 ML: at 09:03

## 2021-04-03 ENCOUNTER — IMMUNIZATION (OUTPATIENT)
Dept: PRIMARY CARE CLINIC | Facility: CLINIC | Age: 46
End: 2021-04-03
Payer: COMMERCIAL

## 2021-04-03 DIAGNOSIS — Z23 NEED FOR VACCINATION: Primary | ICD-10-CM

## 2021-04-03 PROCEDURE — 0002A PR IMMUNIZ ADMIN, SARS-COV-2 COVID-19 VACC, 30MCG/0.3ML, 2ND DOSE: CPT | Mod: CV19,S$GLB,, | Performed by: INTERNAL MEDICINE

## 2021-04-03 PROCEDURE — 0002A PR IMMUNIZ ADMIN, SARS-COV-2 COVID-19 VACC, 30MCG/0.3ML, 2ND DOSE: ICD-10-PCS | Mod: CV19,S$GLB,, | Performed by: INTERNAL MEDICINE

## 2021-04-03 PROCEDURE — 91300 PR SARS-COV- 2 COVID-19 VACCINE, NO PRSV, 30MCG/0.3ML, IM: CPT | Mod: S$GLB,,, | Performed by: INTERNAL MEDICINE

## 2021-04-03 PROCEDURE — 91300 PR SARS-COV- 2 COVID-19 VACCINE, NO PRSV, 30MCG/0.3ML, IM: ICD-10-PCS | Mod: S$GLB,,, | Performed by: INTERNAL MEDICINE

## 2021-04-03 RX ADMIN — Medication 0.3 ML: at 09:04

## 2021-04-22 DIAGNOSIS — G47.00 INSOMNIA, UNSPECIFIED TYPE: ICD-10-CM

## 2021-04-23 RX ORDER — ZOLPIDEM TARTRATE 10 MG/1
10 TABLET ORAL NIGHTLY PRN
Qty: 30 TABLET | Refills: 3 | Status: SHIPPED | OUTPATIENT
Start: 2021-04-23 | End: 2021-09-28 | Stop reason: SDUPTHER

## 2021-04-23 RX ORDER — ALPRAZOLAM 0.5 MG/1
0.5 TABLET ORAL 3 TIMES DAILY PRN
Qty: 40 TABLET | Refills: 0 | Status: SHIPPED | OUTPATIENT
Start: 2021-04-23 | End: 2021-07-23

## 2021-08-16 ENCOUNTER — PATIENT MESSAGE (OUTPATIENT)
Dept: SLEEP MEDICINE | Facility: CLINIC | Age: 46
End: 2021-08-16

## 2021-08-16 ENCOUNTER — TELEPHONE (OUTPATIENT)
Dept: SLEEP MEDICINE | Facility: CLINIC | Age: 46
End: 2021-08-16

## 2021-08-24 ENCOUNTER — PATIENT MESSAGE (OUTPATIENT)
Dept: INTERNAL MEDICINE | Facility: CLINIC | Age: 46
End: 2021-08-24

## 2021-08-24 DIAGNOSIS — I10 ESSENTIAL HYPERTENSION: ICD-10-CM

## 2021-08-24 DIAGNOSIS — E78.49 OTHER HYPERLIPIDEMIA: Primary | ICD-10-CM

## 2021-08-26 ENCOUNTER — PATIENT MESSAGE (OUTPATIENT)
Dept: INTERNAL MEDICINE | Facility: CLINIC | Age: 46
End: 2021-08-26

## 2021-08-27 ENCOUNTER — LAB VISIT (OUTPATIENT)
Dept: LAB | Facility: HOSPITAL | Age: 46
End: 2021-08-27
Attending: FAMILY MEDICINE
Payer: COMMERCIAL

## 2021-08-27 DIAGNOSIS — R73.03 PREDIABETES: ICD-10-CM

## 2021-08-27 DIAGNOSIS — E78.49 OTHER HYPERLIPIDEMIA: ICD-10-CM

## 2021-08-27 DIAGNOSIS — I10 ESSENTIAL HYPERTENSION: ICD-10-CM

## 2021-08-27 DIAGNOSIS — E55.9 VITAMIN D DEFICIENCY: ICD-10-CM

## 2021-08-27 LAB
25(OH)D3+25(OH)D2 SERPL-MCNC: 18 NG/ML (ref 30–96)
ALBUMIN SERPL BCP-MCNC: 3.5 G/DL (ref 3.5–5.2)
ALP SERPL-CCNC: 114 U/L (ref 55–135)
ALT SERPL W/O P-5'-P-CCNC: 136 U/L (ref 10–44)
ANION GAP SERPL CALC-SCNC: 12 MMOL/L (ref 8–16)
AST SERPL-CCNC: 115 U/L (ref 10–40)
BASOPHILS # BLD AUTO: 0.08 K/UL (ref 0–0.2)
BASOPHILS NFR BLD: 1.2 % (ref 0–1.9)
BILIRUB SERPL-MCNC: 1.2 MG/DL (ref 0.1–1)
BUN SERPL-MCNC: 9 MG/DL (ref 6–20)
CALCIUM SERPL-MCNC: 9.5 MG/DL (ref 8.7–10.5)
CHLORIDE SERPL-SCNC: 106 MMOL/L (ref 95–110)
CHOLEST SERPL-MCNC: 233 MG/DL (ref 120–199)
CHOLEST/HDLC SERPL: 4.5 {RATIO} (ref 2–5)
CO2 SERPL-SCNC: 22 MMOL/L (ref 23–29)
CREAT SERPL-MCNC: 0.7 MG/DL (ref 0.5–1.4)
DIFFERENTIAL METHOD: ABNORMAL
EOSINOPHIL # BLD AUTO: 0.4 K/UL (ref 0–0.5)
EOSINOPHIL NFR BLD: 5.4 % (ref 0–8)
ERYTHROCYTE [DISTWIDTH] IN BLOOD BY AUTOMATED COUNT: 13.6 % (ref 11.5–14.5)
EST. GFR  (AFRICAN AMERICAN): >60 ML/MIN/1.73 M^2
EST. GFR  (NON AFRICAN AMERICAN): >60 ML/MIN/1.73 M^2
ESTIMATED AVG GLUCOSE: 126 MG/DL (ref 68–131)
GLUCOSE SERPL-MCNC: 104 MG/DL (ref 70–110)
HBA1C MFR BLD: 6 % (ref 4–5.6)
HCT VFR BLD AUTO: 47.6 % (ref 40–54)
HDLC SERPL-MCNC: 52 MG/DL (ref 40–75)
HDLC SERPL: 22.3 % (ref 20–50)
HGB BLD-MCNC: 15.1 G/DL (ref 14–18)
IMM GRANULOCYTES # BLD AUTO: 0.03 K/UL (ref 0–0.04)
IMM GRANULOCYTES NFR BLD AUTO: 0.5 % (ref 0–0.5)
LDLC SERPL CALC-MCNC: 158.6 MG/DL (ref 63–159)
LYMPHOCYTES # BLD AUTO: 1.5 K/UL (ref 1–4.8)
LYMPHOCYTES NFR BLD: 22.2 % (ref 18–48)
MCH RBC QN AUTO: 27.6 PG (ref 27–31)
MCHC RBC AUTO-ENTMCNC: 31.7 G/DL (ref 32–36)
MCV RBC AUTO: 87 FL (ref 82–98)
MONOCYTES # BLD AUTO: 0.8 K/UL (ref 0.3–1)
MONOCYTES NFR BLD: 12.3 % (ref 4–15)
NEUTROPHILS # BLD AUTO: 3.8 K/UL (ref 1.8–7.7)
NEUTROPHILS NFR BLD: 58.4 % (ref 38–73)
NONHDLC SERPL-MCNC: 181 MG/DL
NRBC BLD-RTO: 0 /100 WBC
PLATELET # BLD AUTO: 294 K/UL (ref 150–450)
PMV BLD AUTO: 9.5 FL (ref 9.2–12.9)
POTASSIUM SERPL-SCNC: 3.9 MMOL/L (ref 3.5–5.1)
PROT SERPL-MCNC: 7.2 G/DL (ref 6–8.4)
RBC # BLD AUTO: 5.47 M/UL (ref 4.6–6.2)
SODIUM SERPL-SCNC: 140 MMOL/L (ref 136–145)
TRIGL SERPL-MCNC: 112 MG/DL (ref 30–150)
WBC # BLD AUTO: 6.52 K/UL (ref 3.9–12.7)

## 2021-08-27 PROCEDURE — 80053 COMPREHEN METABOLIC PANEL: CPT | Performed by: FAMILY MEDICINE

## 2021-08-27 PROCEDURE — 85025 COMPLETE CBC W/AUTO DIFF WBC: CPT | Performed by: FAMILY MEDICINE

## 2021-08-27 PROCEDURE — 83036 HEMOGLOBIN GLYCOSYLATED A1C: CPT | Performed by: FAMILY MEDICINE

## 2021-08-27 PROCEDURE — 36415 COLL VENOUS BLD VENIPUNCTURE: CPT | Performed by: FAMILY MEDICINE

## 2021-08-27 PROCEDURE — 82306 VITAMIN D 25 HYDROXY: CPT | Performed by: FAMILY MEDICINE

## 2021-08-27 PROCEDURE — 80061 LIPID PANEL: CPT | Performed by: FAMILY MEDICINE

## 2021-09-13 ENCOUNTER — TELEPHONE (OUTPATIENT)
Dept: INTERNAL MEDICINE | Facility: CLINIC | Age: 46
End: 2021-09-13

## 2021-09-13 DIAGNOSIS — R74.8 ELEVATED LIVER ENZYMES: ICD-10-CM

## 2021-09-13 DIAGNOSIS — E55.9 VITAMIN D INSUFFICIENCY: Primary | ICD-10-CM

## 2021-09-13 DIAGNOSIS — R73.03 PREDIABETES: ICD-10-CM

## 2021-09-13 RX ORDER — ACETAMINOPHEN 500 MG
5000 TABLET ORAL DAILY
Qty: 30 TABLET | Refills: 6 | Status: SHIPPED | OUTPATIENT
Start: 2021-09-13 | End: 2023-08-10

## 2021-09-28 ENCOUNTER — OFFICE VISIT (OUTPATIENT)
Dept: SLEEP MEDICINE | Facility: CLINIC | Age: 46
End: 2021-09-28
Payer: COMMERCIAL

## 2021-09-28 VITALS
BODY MASS INDEX: 47.74 KG/M2 | HEIGHT: 68 IN | HEART RATE: 81 BPM | SYSTOLIC BLOOD PRESSURE: 135 MMHG | WEIGHT: 315 LBS | DIASTOLIC BLOOD PRESSURE: 85 MMHG

## 2021-09-28 DIAGNOSIS — G47.33 OBSTRUCTIVE SLEEP APNEA: Primary | ICD-10-CM

## 2021-09-28 DIAGNOSIS — G47.00 INSOMNIA, UNSPECIFIED TYPE: ICD-10-CM

## 2021-09-28 PROCEDURE — 3044F HG A1C LEVEL LT 7.0%: CPT | Mod: CPTII,S$GLB,, | Performed by: NURSE PRACTITIONER

## 2021-09-28 PROCEDURE — 3079F PR MOST RECENT DIASTOLIC BLOOD PRESSURE 80-89 MM HG: ICD-10-PCS | Mod: CPTII,S$GLB,, | Performed by: NURSE PRACTITIONER

## 2021-09-28 PROCEDURE — 1159F PR MEDICATION LIST DOCUMENTED IN MEDICAL RECORD: ICD-10-PCS | Mod: CPTII,S$GLB,, | Performed by: NURSE PRACTITIONER

## 2021-09-28 PROCEDURE — 3075F SYST BP GE 130 - 139MM HG: CPT | Mod: CPTII,S$GLB,, | Performed by: NURSE PRACTITIONER

## 2021-09-28 PROCEDURE — 99999 PR PBB SHADOW E&M-EST. PATIENT-LVL IV: ICD-10-PCS | Mod: PBBFAC,,, | Performed by: NURSE PRACTITIONER

## 2021-09-28 PROCEDURE — 3079F DIAST BP 80-89 MM HG: CPT | Mod: CPTII,S$GLB,, | Performed by: NURSE PRACTITIONER

## 2021-09-28 PROCEDURE — 1160F RVW MEDS BY RX/DR IN RCRD: CPT | Mod: CPTII,S$GLB,, | Performed by: NURSE PRACTITIONER

## 2021-09-28 PROCEDURE — 1160F PR REVIEW ALL MEDS BY PRESCRIBER/CLIN PHARMACIST DOCUMENTED: ICD-10-PCS | Mod: CPTII,S$GLB,, | Performed by: NURSE PRACTITIONER

## 2021-09-28 PROCEDURE — 3008F PR BODY MASS INDEX (BMI) DOCUMENTED: ICD-10-PCS | Mod: CPTII,S$GLB,, | Performed by: NURSE PRACTITIONER

## 2021-09-28 PROCEDURE — 99999 PR PBB SHADOW E&M-EST. PATIENT-LVL IV: CPT | Mod: PBBFAC,,, | Performed by: NURSE PRACTITIONER

## 2021-09-28 PROCEDURE — 99214 PR OFFICE/OUTPT VISIT, EST, LEVL IV, 30-39 MIN: ICD-10-PCS | Mod: S$GLB,,, | Performed by: NURSE PRACTITIONER

## 2021-09-28 PROCEDURE — 99214 OFFICE O/P EST MOD 30 MIN: CPT | Mod: S$GLB,,, | Performed by: NURSE PRACTITIONER

## 2021-09-28 PROCEDURE — 3008F BODY MASS INDEX DOCD: CPT | Mod: CPTII,S$GLB,, | Performed by: NURSE PRACTITIONER

## 2021-09-28 PROCEDURE — 1159F MED LIST DOCD IN RCRD: CPT | Mod: CPTII,S$GLB,, | Performed by: NURSE PRACTITIONER

## 2021-09-28 PROCEDURE — 3044F PR MOST RECENT HEMOGLOBIN A1C LEVEL <7.0%: ICD-10-PCS | Mod: CPTII,S$GLB,, | Performed by: NURSE PRACTITIONER

## 2021-09-28 PROCEDURE — 3075F PR MOST RECENT SYSTOLIC BLOOD PRESS GE 130-139MM HG: ICD-10-PCS | Mod: CPTII,S$GLB,, | Performed by: NURSE PRACTITIONER

## 2021-09-28 RX ORDER — ZOLPIDEM TARTRATE 10 MG/1
10 TABLET ORAL NIGHTLY PRN
Qty: 30 TABLET | Refills: 5 | Status: SHIPPED | OUTPATIENT
Start: 2021-09-28 | End: 2022-04-13 | Stop reason: SDUPTHER

## 2021-10-07 ENCOUNTER — OFFICE VISIT (OUTPATIENT)
Dept: INTERNAL MEDICINE | Facility: CLINIC | Age: 46
End: 2021-10-07
Payer: COMMERCIAL

## 2021-10-07 VITALS
TEMPERATURE: 99 F | WEIGHT: 315 LBS | SYSTOLIC BLOOD PRESSURE: 128 MMHG | DIASTOLIC BLOOD PRESSURE: 88 MMHG | OXYGEN SATURATION: 98 % | HEART RATE: 95 BPM | HEIGHT: 68 IN | BODY MASS INDEX: 47.74 KG/M2

## 2021-10-07 DIAGNOSIS — F32.A ANXIETY AND DEPRESSION: ICD-10-CM

## 2021-10-07 DIAGNOSIS — R74.8 ELEVATED LIVER ENZYMES: ICD-10-CM

## 2021-10-07 DIAGNOSIS — E55.9 VITAMIN D INSUFFICIENCY: ICD-10-CM

## 2021-10-07 DIAGNOSIS — J45.30 MILD PERSISTENT ASTHMA WITHOUT COMPLICATION: ICD-10-CM

## 2021-10-07 DIAGNOSIS — K92.1 HEMATOCHEZIA: ICD-10-CM

## 2021-10-07 DIAGNOSIS — I10 ESSENTIAL HYPERTENSION: ICD-10-CM

## 2021-10-07 DIAGNOSIS — R73.03 PREDIABETES: ICD-10-CM

## 2021-10-07 DIAGNOSIS — E66.01 MORBID OBESITY WITH BMI OF 50.0-59.9, ADULT: ICD-10-CM

## 2021-10-07 DIAGNOSIS — F41.9 ANXIETY AND DEPRESSION: ICD-10-CM

## 2021-10-07 DIAGNOSIS — E78.49 OTHER HYPERLIPIDEMIA: ICD-10-CM

## 2021-10-07 DIAGNOSIS — Z00.00 ANNUAL PHYSICAL EXAM: ICD-10-CM

## 2021-10-07 PROCEDURE — 3074F SYST BP LT 130 MM HG: CPT | Mod: CPTII,S$GLB,, | Performed by: FAMILY MEDICINE

## 2021-10-07 PROCEDURE — 1160F PR REVIEW ALL MEDS BY PRESCRIBER/CLIN PHARMACIST DOCUMENTED: ICD-10-PCS | Mod: CPTII,S$GLB,, | Performed by: FAMILY MEDICINE

## 2021-10-07 PROCEDURE — 1159F MED LIST DOCD IN RCRD: CPT | Mod: CPTII,S$GLB,, | Performed by: FAMILY MEDICINE

## 2021-10-07 PROCEDURE — 1160F RVW MEDS BY RX/DR IN RCRD: CPT | Mod: CPTII,S$GLB,, | Performed by: FAMILY MEDICINE

## 2021-10-07 PROCEDURE — 99396 PR PREVENTIVE VISIT,EST,40-64: ICD-10-PCS | Mod: S$GLB,,, | Performed by: FAMILY MEDICINE

## 2021-10-07 PROCEDURE — 3044F HG A1C LEVEL LT 7.0%: CPT | Mod: CPTII,S$GLB,, | Performed by: FAMILY MEDICINE

## 2021-10-07 PROCEDURE — 3074F PR MOST RECENT SYSTOLIC BLOOD PRESSURE < 130 MM HG: ICD-10-PCS | Mod: CPTII,S$GLB,, | Performed by: FAMILY MEDICINE

## 2021-10-07 PROCEDURE — 99999 PR PBB SHADOW E&M-EST. PATIENT-LVL V: ICD-10-PCS | Mod: PBBFAC,,, | Performed by: FAMILY MEDICINE

## 2021-10-07 PROCEDURE — 3008F BODY MASS INDEX DOCD: CPT | Mod: CPTII,S$GLB,, | Performed by: FAMILY MEDICINE

## 2021-10-07 PROCEDURE — 3044F PR MOST RECENT HEMOGLOBIN A1C LEVEL <7.0%: ICD-10-PCS | Mod: CPTII,S$GLB,, | Performed by: FAMILY MEDICINE

## 2021-10-07 PROCEDURE — 3079F PR MOST RECENT DIASTOLIC BLOOD PRESSURE 80-89 MM HG: ICD-10-PCS | Mod: CPTII,S$GLB,, | Performed by: FAMILY MEDICINE

## 2021-10-07 PROCEDURE — 1159F PR MEDICATION LIST DOCUMENTED IN MEDICAL RECORD: ICD-10-PCS | Mod: CPTII,S$GLB,, | Performed by: FAMILY MEDICINE

## 2021-10-07 PROCEDURE — 3008F PR BODY MASS INDEX (BMI) DOCUMENTED: ICD-10-PCS | Mod: CPTII,S$GLB,, | Performed by: FAMILY MEDICINE

## 2021-10-07 PROCEDURE — 99396 PREV VISIT EST AGE 40-64: CPT | Mod: S$GLB,,, | Performed by: FAMILY MEDICINE

## 2021-10-07 PROCEDURE — 99999 PR PBB SHADOW E&M-EST. PATIENT-LVL V: CPT | Mod: PBBFAC,,, | Performed by: FAMILY MEDICINE

## 2021-10-07 PROCEDURE — 3079F DIAST BP 80-89 MM HG: CPT | Mod: CPTII,S$GLB,, | Performed by: FAMILY MEDICINE

## 2021-10-07 RX ORDER — ALPRAZOLAM 0.5 MG/1
0.5 TABLET ORAL 3 TIMES DAILY PRN
Qty: 40 TABLET | Refills: 0 | Status: SHIPPED | OUTPATIENT
Start: 2021-10-07 | End: 2022-01-11 | Stop reason: SDUPTHER

## 2021-10-08 ENCOUNTER — TELEPHONE (OUTPATIENT)
Dept: BARIATRICS | Facility: CLINIC | Age: 46
End: 2021-10-08

## 2021-10-21 ENCOUNTER — TELEPHONE (OUTPATIENT)
Dept: BARIATRICS | Facility: CLINIC | Age: 46
End: 2021-10-21

## 2021-12-02 ENCOUNTER — OFFICE VISIT (OUTPATIENT)
Dept: GASTROENTEROLOGY | Facility: CLINIC | Age: 46
End: 2021-12-02
Payer: COMMERCIAL

## 2021-12-02 ENCOUNTER — PATIENT MESSAGE (OUTPATIENT)
Dept: ENDOSCOPY | Facility: HOSPITAL | Age: 46
End: 2021-12-02
Payer: COMMERCIAL

## 2021-12-02 VITALS
WEIGHT: 315 LBS | HEIGHT: 69 IN | SYSTOLIC BLOOD PRESSURE: 134 MMHG | BODY MASS INDEX: 46.65 KG/M2 | DIASTOLIC BLOOD PRESSURE: 89 MMHG | HEART RATE: 80 BPM

## 2021-12-02 DIAGNOSIS — K92.1 HEMATOCHEZIA: ICD-10-CM

## 2021-12-02 DIAGNOSIS — K21.9 GASTROESOPHAGEAL REFLUX DISEASE, UNSPECIFIED WHETHER ESOPHAGITIS PRESENT: Primary | ICD-10-CM

## 2021-12-02 PROCEDURE — 99204 PR OFFICE/OUTPT VISIT, NEW, LEVL IV, 45-59 MIN: ICD-10-PCS | Mod: S$GLB,,, | Performed by: INTERNAL MEDICINE

## 2021-12-02 PROCEDURE — 99204 OFFICE O/P NEW MOD 45 MIN: CPT | Mod: S$GLB,,, | Performed by: INTERNAL MEDICINE

## 2021-12-02 PROCEDURE — 99999 PR PBB SHADOW E&M-EST. PATIENT-LVL IV: CPT | Mod: PBBFAC,,, | Performed by: INTERNAL MEDICINE

## 2021-12-02 PROCEDURE — 99999 PR PBB SHADOW E&M-EST. PATIENT-LVL IV: ICD-10-PCS | Mod: PBBFAC,,, | Performed by: INTERNAL MEDICINE

## 2021-12-11 ENCOUNTER — IMMUNIZATION (OUTPATIENT)
Dept: INTERNAL MEDICINE | Facility: CLINIC | Age: 46
End: 2021-12-11
Payer: COMMERCIAL

## 2021-12-11 PROCEDURE — 90686 IIV4 VACC NO PRSV 0.5 ML IM: CPT | Mod: S$GLB,,, | Performed by: FAMILY MEDICINE

## 2021-12-11 PROCEDURE — 90686 FLU VACCINE (QUAD) GREATER THAN OR EQUAL TO 3YO PRESERVATIVE FREE IM: ICD-10-PCS | Mod: S$GLB,,, | Performed by: FAMILY MEDICINE

## 2021-12-11 PROCEDURE — 90471 IMMUNIZATION ADMIN: CPT | Mod: S$GLB,,, | Performed by: FAMILY MEDICINE

## 2021-12-11 PROCEDURE — 90471 FLU VACCINE (QUAD) GREATER THAN OR EQUAL TO 3YO PRESERVATIVE FREE IM: ICD-10-PCS | Mod: S$GLB,,, | Performed by: FAMILY MEDICINE

## 2021-12-16 ENCOUNTER — PATIENT MESSAGE (OUTPATIENT)
Dept: ENDOSCOPY | Facility: HOSPITAL | Age: 46
End: 2021-12-16
Payer: COMMERCIAL

## 2021-12-16 DIAGNOSIS — Z12.11 SPECIAL SCREENING FOR MALIGNANT NEOPLASMS, COLON: Primary | ICD-10-CM

## 2021-12-16 RX ORDER — SODIUM, POTASSIUM,MAG SULFATES 17.5-3.13G
1 SOLUTION, RECONSTITUTED, ORAL ORAL DAILY
Qty: 1 KIT | Refills: 0 | Status: SHIPPED | OUTPATIENT
Start: 2021-12-16 | End: 2021-12-18

## 2021-12-18 ENCOUNTER — IMMUNIZATION (OUTPATIENT)
Dept: INTERNAL MEDICINE | Facility: CLINIC | Age: 46
End: 2021-12-18
Payer: COMMERCIAL

## 2021-12-18 DIAGNOSIS — Z23 NEED FOR VACCINATION: Primary | ICD-10-CM

## 2021-12-18 PROCEDURE — 91300 COVID-19, MRNA, LNP-S, PF, 30 MCG/0.3 ML DOSE VACCINE: CPT | Mod: PBBFAC | Performed by: INTERNAL MEDICINE

## 2021-12-18 PROCEDURE — 0003A COVID-19, MRNA, LNP-S, PF, 30 MCG/0.3 ML DOSE VACCINE: CPT | Mod: CV19,PBBFAC | Performed by: INTERNAL MEDICINE

## 2022-01-04 DIAGNOSIS — I10 ESSENTIAL HYPERTENSION: ICD-10-CM

## 2022-01-04 NOTE — TELEPHONE ENCOUNTER
Refill Routing Note   Medication(s) are not appropriate for processing by [facility] for the following reason(s):      - Patient managed by Digital Medicine program    OR action(s):  Route          --->Care Gap information included in message below if applicable.   Medication reconciliation completed: No   Automatic Epic Generated Protocol Data:        Requested Prescriptions   Pending Prescriptions Disp Refills    metoprolol succinate (TOPROL-XL) 50 MG 24 hr tablet [Pharmacy Med Name: METOPROLOL SUCC ER 50 MG TAB] 90 tablet 1     Sig: TAKE 1 TABLET BY MOUTH EVERY DAY       Cardiovascular:  Beta Blockers Passed - 1/4/2022 11:01 AM        Passed - Patient is at least 18 years old        Passed - Last BP in normal range within 360 days     BP Readings from Last 1 Encounters:   12/02/21 134/89               Passed - Last Heart Rate in normal range within 360 days     Pulse Readings from Last 1 Encounters:   12/02/21 80              Passed - Valid encounter within last 15 months     Recent Visits  Date Type Provider Dept   10/07/21 Office Visit Natalio Amaya MD [facility]   10/27/20 Office Visit Natalio Amaya MD [facility]   02/11/20 Office Visit Natalio Amaya MD [facility]   Showing recent visits within past 720 days and meeting all other requirements  Future Appointments  No visits were found meeting these conditions.  Showing future appointments within next 150 days and meeting all other requirements      Future Appointments              In 2 months LAB, APPOINTMENT [facility] A[facility]                      Appointments  past 12m or future 3m with PCP    Date Provider   Last Visit   10/7/2021 Natalio Amaya MD   Next Visit   Visit date not found Natalio Amaya MD   ED visits in past 90 days: 0        Note composed:2:10 PM 01/04/2022

## 2022-01-04 NOTE — TELEPHONE ENCOUNTER
No new care gaps identified.  Powered by [employer]. Reference number: [id number].   1/04/2022 11:01:59 AM CST

## 2022-01-05 RX ORDER — METOPROLOL SUCCINATE 50 MG/1
TABLET, EXTENDED RELEASE ORAL
Qty: 90 TABLET | Refills: 0 | Status: SHIPPED | OUTPATIENT
Start: 2022-01-05 | End: 2022-03-31

## 2022-01-06 ENCOUNTER — PATIENT MESSAGE (OUTPATIENT)
Dept: ADMINISTRATIVE | Facility: OTHER | Age: 47
End: 2022-01-06
Payer: COMMERCIAL

## 2022-01-06 ENCOUNTER — LAB VISIT (OUTPATIENT)
Dept: PRIMARY CARE CLINIC | Facility: CLINIC | Age: 47
End: 2022-01-06
Payer: COMMERCIAL

## 2022-01-06 DIAGNOSIS — Z20.822 CONTACT WITH AND (SUSPECTED) EXPOSURE TO COVID-19: ICD-10-CM

## 2022-01-06 LAB
CTP QC/QA: YES
SARS-COV-2 AG RESP QL IA.RAPID: POSITIVE

## 2022-01-06 PROCEDURE — 87811 SARS-COV-2 COVID19 W/OPTIC: CPT

## 2022-01-11 DIAGNOSIS — F41.9 ANXIETY AND DEPRESSION: ICD-10-CM

## 2022-01-11 DIAGNOSIS — F32.A ANXIETY AND DEPRESSION: ICD-10-CM

## 2022-01-11 RX ORDER — ALPRAZOLAM 0.5 MG/1
0.5 TABLET ORAL 3 TIMES DAILY PRN
Qty: 40 TABLET | Refills: 0 | Status: SHIPPED | OUTPATIENT
Start: 2022-01-11 | End: 2022-03-31

## 2022-01-11 NOTE — TELEPHONE ENCOUNTER
No new care gaps identified.  Powered by [employer]. Reference number: [id number].   1/11/2022 4:38:28 PM CST

## 2022-01-23 DIAGNOSIS — J32.9 RHINOSINUSITIS: ICD-10-CM

## 2022-01-23 NOTE — TELEPHONE ENCOUNTER
No new care gaps identified.  Powered by [employer]. Reference number: [id number].   1/23/2022 10:23:07 AM CST

## 2022-01-31 RX ORDER — FLUTICASONE PROPIONATE 50 MCG
SPRAY, SUSPENSION (ML) NASAL
Qty: 48 ML | Refills: 2 | Status: SHIPPED | OUTPATIENT
Start: 2022-01-31 | End: 2023-06-12 | Stop reason: SDUPTHER

## 2022-01-31 NOTE — TELEPHONE ENCOUNTER
Refill Authorization Note   Jseús Patel  is requesting a refill authorization.  Brief Assessment and Rationale for Refill:  Approve     Medication Therapy Plan:       Medication Reconciliation Completed: No   Comments:   --->Care Gap information included below if applicable.       Requested Prescriptions   Pending Prescriptions Disp Refills    fluticasone propionate (FLONASE) 50 mcg/actuation nasal spray [Pharmacy Med Name: FLUTICASONE PROP 50 MCG SPRAY] 48 mL 2     Sig: SPRAY 2 SPRAYS IN EACH NOSTRIL TWICE A DAY       Ear, Nose, and Throat: Nasal Preparations - Corticosteroids Passed - 1/23/2022 10:22 AM        Passed - Patient is at least 18 years old        Passed - Valid encounter within last 15 months     Recent Visits  Date Type Provider Dept   10/07/21 Office Visit Natalio Amaya MD [facility]   10/27/20 Office Visit Natalio Amaya MD [facility]   02/11/20 Office Visit Natalio Amaya MD [facility]   Showing recent visits within past 720 days and meeting all other requirements  Future Appointments  No visits were found meeting these conditions.  Showing future appointments within next 150 days and meeting all other requirements      Future Appointments              In 1 month LAB, APPOINTMENT [facility]                    Appointments  past 12m or future 3m with PCP    Date Provider   Last Visit   10/7/2021 Natalio Amaya MD   Next Visit   Visit date not found Natalio Amaya MD   ED visits in past 90 days: 0     Note composed:3:39 PM 01/31/2022

## 2022-02-01 DIAGNOSIS — Z01.818 PRE-OP TESTING: ICD-10-CM

## 2022-02-08 ENCOUNTER — LAB VISIT (OUTPATIENT)
Dept: PRIMARY CARE CLINIC | Facility: CLINIC | Age: 47
End: 2022-02-08
Payer: COMMERCIAL

## 2022-02-08 DIAGNOSIS — Z01.818 PRE-OP TESTING: ICD-10-CM

## 2022-02-08 LAB
SARS-COV-2 RNA RESP QL NAA+PROBE: NOT DETECTED
SARS-COV-2- CYCLE NUMBER: NORMAL

## 2022-02-08 PROCEDURE — U0003 INFECTIOUS AGENT DETECTION BY NUCLEIC ACID (DNA OR RNA); SEVERE ACUTE RESPIRATORY SYNDROME CORONAVIRUS 2 (SARS-COV-2) (CORONAVIRUS DISEASE [COVID-19]), AMPLIFIED PROBE TECHNIQUE, MAKING USE OF HIGH THROUGHPUT TECHNOLOGIES AS DESCRIBED BY CMS-2020-01-R: HCPCS | Performed by: FAMILY MEDICINE

## 2022-02-08 PROCEDURE — U0005 INFEC AGEN DETEC AMPLI PROBE: HCPCS | Performed by: FAMILY MEDICINE

## 2022-02-11 ENCOUNTER — HOSPITAL ENCOUNTER (OUTPATIENT)
Facility: HOSPITAL | Age: 47
Discharge: HOME | End: 2022-02-11
Attending: INTERNAL MEDICINE | Admitting: INTERNAL MEDICINE
Payer: COMMERCIAL

## 2022-02-11 ENCOUNTER — ANESTHESIA (OUTPATIENT)
Dept: ENDOSCOPY | Facility: HOSPITAL | Age: 47
End: 2022-02-11
Payer: COMMERCIAL

## 2022-02-11 ENCOUNTER — ANESTHESIA EVENT (OUTPATIENT)
Dept: ENDOSCOPY | Facility: HOSPITAL | Age: 47
End: 2022-02-11
Payer: COMMERCIAL

## 2022-02-11 VITALS
WEIGHT: 315 LBS | HEIGHT: 69 IN | SYSTOLIC BLOOD PRESSURE: 149 MMHG | BODY MASS INDEX: 46.65 KG/M2 | DIASTOLIC BLOOD PRESSURE: 93 MMHG | RESPIRATION RATE: 16 BRPM | OXYGEN SATURATION: 96 % | HEART RATE: 82 BPM | TEMPERATURE: 98 F

## 2022-02-11 DIAGNOSIS — K21.9 GERD (GASTROESOPHAGEAL REFLUX DISEASE): ICD-10-CM

## 2022-02-11 PROCEDURE — 43239 EGD BIOPSY SINGLE/MULTIPLE: CPT | Performed by: INTERNAL MEDICINE

## 2022-02-11 PROCEDURE — 37000009 HC ANESTHESIA EA ADD 15 MINS: Performed by: INTERNAL MEDICINE

## 2022-02-11 PROCEDURE — 37000008 HC ANESTHESIA 1ST 15 MINUTES: Performed by: INTERNAL MEDICINE

## 2022-02-11 PROCEDURE — 25000003 PHARM REV CODE 250: Performed by: STUDENT IN AN ORGANIZED HEALTH CARE EDUCATION/TRAINING PROGRAM

## 2022-02-11 PROCEDURE — 88305 TISSUE EXAM BY PATHOLOGIST: CPT | Mod: 26,,, | Performed by: PATHOLOGY

## 2022-02-11 PROCEDURE — 45378 DIAGNOSTIC COLONOSCOPY: CPT | Mod: ,,, | Performed by: INTERNAL MEDICINE

## 2022-02-11 PROCEDURE — 45378 PR COLONOSCOPY,DIAGNOSTIC: ICD-10-PCS | Mod: ,,, | Performed by: INTERNAL MEDICINE

## 2022-02-11 PROCEDURE — D9220A PRA ANESTHESIA: ICD-10-PCS | Mod: ANES,,, | Performed by: ANESTHESIOLOGY

## 2022-02-11 PROCEDURE — 45378 DIAGNOSTIC COLONOSCOPY: CPT | Performed by: INTERNAL MEDICINE

## 2022-02-11 PROCEDURE — 43239 EGD BIOPSY SINGLE/MULTIPLE: CPT | Mod: 51,,, | Performed by: INTERNAL MEDICINE

## 2022-02-11 PROCEDURE — 88305 TISSUE EXAM BY PATHOLOGIST: CPT | Performed by: PATHOLOGY

## 2022-02-11 PROCEDURE — 27201012 HC FORCEPS, HOT/COLD, DISP: Performed by: INTERNAL MEDICINE

## 2022-02-11 PROCEDURE — D9220A PRA ANESTHESIA: Mod: CRNA,,, | Performed by: STUDENT IN AN ORGANIZED HEALTH CARE EDUCATION/TRAINING PROGRAM

## 2022-02-11 PROCEDURE — 88305 TISSUE EXAM BY PATHOLOGIST: ICD-10-PCS | Mod: 26,,, | Performed by: PATHOLOGY

## 2022-02-11 PROCEDURE — 63600175 PHARM REV CODE 636 W HCPCS: Performed by: STUDENT IN AN ORGANIZED HEALTH CARE EDUCATION/TRAINING PROGRAM

## 2022-02-11 PROCEDURE — 43239 PR EGD, FLEX, W/BIOPSY, SGL/MULTI: ICD-10-PCS | Mod: 51,,, | Performed by: INTERNAL MEDICINE

## 2022-02-11 PROCEDURE — D9220A PRA ANESTHESIA: Mod: ANES,,, | Performed by: ANESTHESIOLOGY

## 2022-02-11 PROCEDURE — D9220A PRA ANESTHESIA: ICD-10-PCS | Mod: CRNA,,, | Performed by: STUDENT IN AN ORGANIZED HEALTH CARE EDUCATION/TRAINING PROGRAM

## 2022-02-11 RX ORDER — PROPOFOL 10 MG/ML
VIAL (ML) INTRAVENOUS
Status: DISCONTINUED | OUTPATIENT
Start: 2022-02-11 | End: 2022-02-11

## 2022-02-11 RX ORDER — PROPOFOL 10 MG/ML
VIAL (ML) INTRAVENOUS CONTINUOUS PRN
Status: DISCONTINUED | OUTPATIENT
Start: 2022-02-11 | End: 2022-02-11

## 2022-02-11 RX ORDER — SODIUM CHLORIDE 9 MG/ML
INJECTION, SOLUTION INTRAVENOUS CONTINUOUS
Status: DISCONTINUED | OUTPATIENT
Start: 2022-02-11 | End: 2022-02-11 | Stop reason: HOSPADM

## 2022-02-11 RX ORDER — LIDOCAINE HYDROCHLORIDE 20 MG/ML
INJECTION INTRAVENOUS
Status: DISCONTINUED | OUTPATIENT
Start: 2022-02-11 | End: 2022-02-11

## 2022-02-11 RX ORDER — SODIUM CHLORIDE 0.9 % (FLUSH) 0.9 %
3 SYRINGE (ML) INJECTION
Status: DISCONTINUED | OUTPATIENT
Start: 2022-02-11 | End: 2022-02-11 | Stop reason: HOSPADM

## 2022-02-11 RX ORDER — FENTANYL CITRATE 50 UG/ML
INJECTION, SOLUTION INTRAMUSCULAR; INTRAVENOUS
Status: DISCONTINUED | OUTPATIENT
Start: 2022-02-11 | End: 2022-02-11

## 2022-02-11 RX ORDER — HYDROMORPHONE HYDROCHLORIDE 1 MG/ML
0.2 INJECTION, SOLUTION INTRAMUSCULAR; INTRAVENOUS; SUBCUTANEOUS EVERY 5 MIN PRN
Status: DISCONTINUED | OUTPATIENT
Start: 2022-02-11 | End: 2022-02-11 | Stop reason: HOSPADM

## 2022-02-11 RX ORDER — KETAMINE HCL IN 0.9 % NACL 50 MG/5 ML
SYRINGE (ML) INTRAVENOUS
Status: DISCONTINUED | OUTPATIENT
Start: 2022-02-11 | End: 2022-02-11

## 2022-02-11 RX ADMIN — LIDOCAINE HYDROCHLORIDE 60 MG: 20 INJECTION, SOLUTION INTRAVENOUS at 07:02

## 2022-02-11 RX ADMIN — FENTANYL CITRATE 50 MCG: 50 INJECTION, SOLUTION INTRAMUSCULAR; INTRAVENOUS at 07:02

## 2022-02-11 RX ADMIN — Medication 10 MG: at 08:02

## 2022-02-11 RX ADMIN — TOPICAL ANESTHETIC 1 EACH: 200 SPRAY DENTAL; PERIODONTAL at 07:02

## 2022-02-11 RX ADMIN — Medication 10 MG: at 07:02

## 2022-02-11 RX ADMIN — PROPOFOL 10 MG: 10 INJECTION, EMULSION INTRAVENOUS at 07:02

## 2022-02-11 RX ADMIN — FENTANYL CITRATE 25 MCG: 50 INJECTION, SOLUTION INTRAMUSCULAR; INTRAVENOUS at 08:02

## 2022-02-11 RX ADMIN — Medication 50 MCG/KG/MIN: at 07:02

## 2022-02-11 RX ADMIN — SODIUM CHLORIDE: 0.9 INJECTION, SOLUTION INTRAVENOUS at 07:02

## 2022-02-11 RX ADMIN — PROPOFOL 10 MG: 10 INJECTION, EMULSION INTRAVENOUS at 08:02

## 2022-02-11 RX ADMIN — PROPOFOL 40 MG: 10 INJECTION, EMULSION INTRAVENOUS at 07:02

## 2022-02-11 RX ADMIN — Medication 20 MG: at 07:02

## 2022-02-11 NOTE — TRANSFER OF CARE
"Anesthesia Transfer of Care Note    Patient: Jesús Patel    Procedure(s) Performed: Procedure(s) (LRB):  EGD (ESOPHAGOGASTRODUODENOSCOPY) (N/A)  COLONOSCOPY (N/A)    Patient location: [facility]    Anesthesia Type: general    Transport from OR: Transported from OR on 6-10 L/min O2 by face mask with adequate spontaneous ventilation    Post pain: adequate analgesia    Post assessment: no apparent anesthetic complications and tolerated procedure well    Post vital signs: stable    Level of consciousness: awake, alert and oriented    Nausea/Vomiting: no nausea/vomiting    Complications: none    Transfer of care protocol was followed      Last vitals:   Visit Vitals  /87 (BP Location: Left arm, Patient Position: Lying)   Pulse 85   Temp 36.7 °C (98 °F) (Temporal)   Resp 18   Ht 5' 9" (1.753 m)   Wt (!) 174.6 kg (385 lb)   SpO2 99%   BMI 56.85 kg/m²     "

## 2022-02-11 NOTE — H&P
[facility]  Gastroenterology  H&P    Patient Name: Jesús Patel  MRN: [id number]  Admission Date: 2022  Code Status: Prior    Attending Provider: vaughn aldridge  Primary Care Physician: Natalio Amaya MD  Principal Problem:<principal problem not specified>    Subjective:     History of Present Illness: reflux, rectal bleeding    Past Medical History:   Diagnosis Date    Asthma     GERD (gastroesophageal reflux disease)     Hyperlipidemia     Hypertension     Low back pain     Low back pain     Morbid obesity with BMI of 50.0-59.9, adult     JARAD (obstructive sleep apnea)     Varicose veins     Vitamin D deficiency        Past Surgical History:   Procedure Laterality Date    ADENOIDECTOMY      EYE SURGERY      GASTRECTOMY      LAPAROSCOPIC GASTRIC BANDING      In [place]: uncertain of brand/ size    LAPAROSCOPIC REPAIR OF RECURRENT INCARCERATED INCISIONAL HERNIA N/A 2018    Procedure: REPAIR-HERNIA-INCISIONAL-LAPAROSCOPIC WITH MESH;  Surgeon: Vaughn Parker Jr., MD;  Location: [facility] OR [address] [place] [address];  Service: General;  Laterality: N/A;    LASIK         Review of patient's allergies indicates:  No Known Allergies  Family History     Problem Relation (Age of Onset)    Cancer Maternal Grandfather    Colon cancer Maternal Grandmother    Diabetes Paternal Grandmother    Hypertension Mother    No Known Problems Father, Brother, Sister    Obesity Mother, Brother        Tobacco Use    Smoking status: Former Smoker     Packs/day: 0.25     Years: 5.00     Pack years: 1.25     Quit date: 2009     Years since quittin.1    Smokeless tobacco: Never Used   Substance and Sexual Activity    Alcohol use: Yes     Comment: socially    Drug use: No    Sexual activity: Not Currently     Review of Systems   Respiratory: Negative.    Cardiovascular: Negative.      Objective:     Vital Signs (Most Recent):    Vital Signs (24h Range):           There is no height or weight on  file to calculate BMI.    No intake or output data in the 24 hours ending 02/11/22 0748    Lines/Drains/Airways     Peripheral Intravenous Line                 Peripheral IV - Single Lumen 11/10/16 0545 Right Hand 1919 days         Peripheral IV - Single Lumen 06/21/18 0815 Right Hand 1331 days         Peripheral IV - Single Lumen 06/21/18 1205 Left Hand 1330 days                Physical Exam  Cardiovascular:      Rate and Rhythm: Normal rate and regular rhythm.   Pulmonary:      Effort: Pulmonary effort is normal.      Breath sounds: Normal breath sounds.         Significant Labs:      Significant Imaging:      Assessment/Plan:     There are no hospital problems to display for this patient.      Indication for procedure:    ASA:III  Airway normal  Malampati class:    Personal and family history negative for anesthesia problems    Plan:egd/colonAnesthesia plan: general      Vaughn Cortes MD  Gastroenterology  [facility] - Endoscopy

## 2022-02-11 NOTE — PROVATION PATIENT INSTRUCTIONS
Discharge Summary/Instructions after an Endoscopic Procedure  Patient Name: Jesús Patel  Patient MRN: [id number]  Patient   YOB: 1975 Friday, February 11, 2022  Vaughn Cortes MD  Dear patient,  As a result of recent federal legislation (The Federal Cures Act), you may   receive lab or pathology results from your procedure in your [facility]   account before your physician is able to contact you. Your physician or   their representative will relay the results to you with their   recommendations at their soonest availability.  Thank you,  RESTRICTIONS:  During your procedure today, you received medications for sedation.  These   medications may affect your judgment, balance and coordination.  Therefore,   for 24 hours, you have the following restrictions:   - DO NOT drive a car, operate machinery, make legal/financial decisions,   sign important papers or drink alcohol.    ACTIVITY:  Today: no heavy lifting, straining or running due to procedural   sedation/anesthesia.  The following day: return to full activity including work.  DIET:  Eat and drink normally unless instructed otherwise.     TREATMENT FOR COMMON SIDE EFFECTS:  - Mild abdominal pain, nausea, belching, bloating or excessive gas:  rest,   eat lightly and use a heating pad.  - Sore Throat: treat with throat lozenges and/or gargle with warm salt   water.  - Because air was used during the procedure, expelling large amounts of air   from your rectum or belching is normal.  - If a bowel prep was taken, you may not have a bowel movement for 1-3 days.    This is normal.  SYMPTOMS TO WATCH FOR AND REPORT TO YOUR PHYSICIAN:  1. Abdominal pain or bloating, other than gas cramps.  2. Chest pain.  3. Back pain.  4. Signs of infection such as: chills or fever occurring within 24 hours   after the procedure.  5. Rectal bleeding, which would show as bright red, maroon, or black stools.   (A tablespoon of blood from the rectum is not serious, especially  if   hemorrhoids are present.)  6. Vomiting.  7. Weakness or dizziness.  GO DIRECTLY TO THE NEAREST EMERGENCY ROOM IF YOU HAVE ANY OF THE FOLLOWING:      Difficulty breathing              Chills and/or fever over 101 F   Persistent vomiting and/or vomiting blood   Severe abdominal pain   Severe chest pain   Black, tarry stools   Bleeding- more than one tablespoon   Any other symptom or condition that you feel may need urgent attention  Your doctor recommends these additional instructions:  If any biopsies were taken, your doctors clinic will contact you in 1 to 2   weeks with any results.  - Patient has a contact number available for emergencies.  The signs and   symptoms of potential delayed complications were discussed with the   patient.  Return to normal activities tomorrow.  Written discharge   instructions were provided to the patient.   - Discharge patient to home (ambulatory).   - Resume previous diet.   - Repeat colonoscopy in 10 years for screening purposes.   - Return to referring physician after studies are complete.   - Continue present medications.  For questions, problems or results please call your physician - Vaughn Cortes MD at Work:  [phone].  [facility], EMERGENCY ROOM PHONE NUMBER: [phone]  IF A COMPLICATION OR EMERGENCY SITUATION ARISES AND YOU ARE UNABLE TO REACH   YOUR PHYSICIAN - GO DIRECTLY TO THE EMERGENCY ROOM.  Vaughn Cortes MD  2/11/2022 8:30:55 AM  This report has been verified and signed electronically.  Dear patient,  As a result of recent federal legislation (The Federal Cures Act), you may   receive lab or pathology results from your procedure in your [facility]   account before your physician is able to contact you. Your physician or   their representative will relay the results to you with their   recommendations at their soonest availability.  Thank you,  PROVATION

## 2022-02-11 NOTE — DISCHARGE INSTRUCTIONS
Patient Education       Colonoscopy Discharge Instructions   About this topic   Colonoscopy is done so your doctor can see the inside of your large intestines, also called your colon, and your rectum. It uses a lighted tube called a scope, which has a tiny camera that can be moved through the large intestine. This may be done to:  · Screen for colon cancer or polyps  · Look for the source of rectal bleeding  · Find the cause of changes in your bowel movement  · Find the cause of belly or rectal pain  · Check results from other tests  · Check your response to treatment for other diseases     What care is needed at home?   · Ask your doctor what you need to do when you go home. Make sure you ask questions if you do not understand what the doctor says. This way you will know what you need to do.  · Rest. Do not drive the rest of the day. Do not sign any important papers or make any important decisions for the next 24 hours.  · You may feel groggy. Take extra care when moving about.  · You may have gas or mild cramping. This is normal.  · A small amount of bleeding may happen during the first few days after your procedure.  · Start back on your normal diet unless you need some changes in your diet.  What follow-up care is needed?   · Your doctor will talk to you about your test results. Your doctor may ask you to make visits to the office to check on your progress. Be sure to keep these visits.  · Ask your doctor when you need to have another colonoscopy. The amount of time between tests is based on what was found during this test. People with no polyps may be able to wait 10 years to have another colonoscopy. Other people may need to have this test repeated in 1 year because of the kind of polyps that were found in their colon. Ask your doctor when you need to come back.  What drugs may be needed?   Ask your doctor what drugs you will need to take. Take your drugs as told by your doctor.  Will physical activity be  limited?   Physical activities may be limited for a short time. Rest after the procedure. You may go back to your normal activities within a day or so.  What changes to diet are needed?   · Drink 6 to 8 glasses of water each day.  · Eat food rich in fiber like fresh fruits and vegetables.  What problems could happen?   · Tear inside your colon  · Bleeding can happen up to a few days afterwards  When do I need to call the doctor?   · Fever of 100.4°F (38°C) or higher, chills  · Bleeding during bowel movements (1 teaspoon (5 mL) or more) or maroon stool  · Throwing up more than 3 times in the next 48 hours  · Feeling dizzy  · Feeling weak  · Belly pain that is getting worse  · Not able to have a bowel movement for more than 2 days  · Hard swollen belly  Teach Back: Helping You Understand   The Teach Back Method helps you understand the information we are giving you. After you talk with the staff, tell them in your own words what you learned. This helps to make sure the staff has described each thing clearly. It also helps to explain things that may have been confusing. Before going home, make sure you can do these:  · I can tell you about my procedure.  · I can tell you what signs are normal after my procedure.  · I can tell you what I will do if I throw up more than 3 times in the next 48 hours or I have more belly pain.  Where can I learn more?   American Cancer Society  https://www.cancer.org/cancer/colon-rectal-cancer/xmzawrevm-iuqpsqlpc-dpahddj/hgkothzye-nhxoy-gels.html   American Society of Clinical Oncology  https://www.cancer.net/navigating-cancer-care/diagnosing-cancer/tests-and-procedures/colonoscopy   Last Reviewed Date   2021-03-10  Consumer Information Use and Disclaimer   This information is not specific medical advice and does not replace information you receive from your health care provider. This is only a brief summary of general information. It does NOT include all information about conditions,  illnesses, injuries, tests, procedures, treatments, therapies, discharge instructions or life-style choices that may apply to you. You must talk with your health care provider for complete information about your health and treatment options. This information should not be used to decide whether or not to accept your health care providers advice, instructions or recommendations. Only your health care provider has the knowledge and training to provide advice that is right for you.  Copyright   Copyright © 2021 UpTo[employer], Inc. and its affiliates and/or licensors. All rights reserved.  Patient Education       Upper GI Endoscopy   Why is this procedure done?   This procedure is done to view your upper gastrointestinal (GI) tract. This includes your throat and food pipe (esophagus). It also includes your stomach and the first part of the small bowel. Some people have this test for problems like coughing or throwing up blood. Other people may be having bad belly pain or blood in their stool. You may be having trouble swallowing or problems with acid reflux.  Doctors often use this test to look for problems like:  · Ulcers  · Cancer or tumor growths  · Internal bleeding  · Swelling  · Inflammation  · Infection  · Walsh's esophagus  · Gastroesophageal reflux disease or GERD  · Swallowing problems     What will the results be?   Your doctor may find the problem inside your body that is causing your signs. The doctor can also treat some problems while doing this procedure. This may include things like stopping bleeding or removing a growth.  What happens before the procedure?   Your doctor will take your history and do an exam. Talk to the doctor about:  · All the drugs you are taking. Be sure to include all prescription, over the counter, vitamins, and herbal supplements. Bring a list of drugs you take with you.  · Tell the doctor if you have any drug allergy.  · Any bleeding problems. Be sure to tell your doctor if you are  taking any drugs that may cause bleeding. Some of these are warfarin, rivaroxaban, apixaban, ticagrelor, clopidogrel, ketorolac, ibuprofen, naproxen, or aspirin. Certain vitamins and herbs, such as garlic and fish oil, may also add to the risk for bleeding. You may need to stop these drugs as well. Talk to your doctor about them.  · When you need to stop eating or drinking before your procedure.  You will not be allowed to drive right away after the procedure. Ask a family member or a friend to drive you home.  What happens during the procedure?   · Once you are in the operating room, the staff will put an IV in your arm to give you fluids and drugs. You will be given a drug to make you sleepy. It will also help you stay pain free during the surgery.  · Your doctor may spray a drug in your throat to numb the area.  · You will be asked to lie on your left side. The staff may put a small tube in your nose to help you breathe. Your doctor may place a tool in your mouth to keep it open during the procedure. The staff may place a suction tool in your mouth to lessen saliva flow.  · The doctor will put a special scope in your mouth and down your food pipe. It is a long, thin tube with lights and a small camera. It sends images to a screen in the operating room where the camera is being used.  · To be able to view the site clearly, gas will be pumped into your belly.  · Your doctor will use the scope to see if there are problems in your upper GI tract. Small tools may be used with the scope to fix any problems that are found. Your doctor may stop an area of bleeding or take out a tumor. The doctor may also remove a growth or take tissue samples for biopsy.  · This procedure takes about 15 to 30 minutes.  What happens after the procedure?   · You will go to the Recovery Room and the staff will watch you closely.  · You will be allowed to go home when you are awake and able to eat and drink.  · You may feel bloated after the  procedure. This is from any gas the doctor may have used to help see your GI tract better.  · You may have a sore throat after the procedure. You can drink fluid once the numbing drugs in your throat wear off.  · Ask your doctor when the results will be available. Set up a visit to talk about them.  What drugs may be needed?   The doctor may order drugs to:  · Help with pain  · Decrease the acid in your stomach  What problems could happen?   · Painful swallowing  · Upset stomach  · Injury to food pipe   · Throwing up  · Tear in the esophagus  Where can I learn more?   American College of Gastroenterology  https://gi.org/topics/upper-gi-endoscopy-egd/   Last Reviewed Date   2021-10-05  Consumer Information Use and Disclaimer   This information is not specific medical advice and does not replace information you receive from your health care provider. This is only a brief summary of general information. It does NOT include all information about conditions, illnesses, injuries, tests, procedures, treatments, therapies, discharge instructions or life-style choices that may apply to you. You must talk with your health care provider for complete information about your health and treatment options. This information should not be used to decide whether or not to accept your health care providers advice, instructions or recommendations. Only your health care provider has the knowledge and training to provide advice that is right for you.  Copyright   Copyright © 2021 UpToDate, Inc. and its affiliates and/or licensors. All rights reserved.

## 2022-02-11 NOTE — PLAN OF CARE
Discharge instructions given to and explained to patient and his brother. All questions answered and pt and family member verbalized understanding. IV discontinued with cannula intact. Vital signs stable and pt AOx4.

## 2022-02-11 NOTE — ANESTHESIA PREPROCEDURE EVALUATION
02/11/2022  Jesús Patel is a 46 y.o., male.    Anesthesia Evaluation    I have reviewed the Patient Summary Reports.         Review of Systems  Anesthesia Hx:  No problems with previous Anesthesia    Social:  Non-Smoker    Hematology/Oncology:  Hematology Normal   Oncology Normal     EENT/Dental:EENT/Dental Normal   Cardiovascular:   Hypertension    Pulmonary:   Asthma Sleep Apnea    Renal/:  Renal/ Normal     Hepatic/GI:   GERD    Musculoskeletal:  Musculoskeletal Normal    Neurological:  Neurology Normal    Endocrine:  Endocrine Normal    Dermatological:  Skin Normal    Psych:  Psychiatric Normal           Physical Exam  General:  Well nourished, Morbid Obesity    Airway/Jaw/Neck:  Airway Findings: Mouth Opening: Normal Tongue: Normal  General Airway Assessment: Adult  Mallampati: II  Improves to II with phonation.  TM Distance: Normal, at least 6 cm  Jaw/Neck Findings:  Neck ROM: Normal ROM      Dental:  Dental Findings: In tact   Chest/Lungs:  Chest/Lungs Findings: Clear to auscultation, Normal Respiratory Rate     Heart/Vascular:  Heart Findings: Rate: Normal  Rhythm: Regular Rhythm  Sounds: Normal             Anesthesia Plan  Type of Anesthesia, risks & benefits discussed:  Anesthesia Type:  general    Patient's Preference: General  Plan Factors:          Intra-op Monitoring Plan: standard ASA monitors  Intra-op Monitoring Plan Comments:   Post Op Pain Control Plan: multimodal analgesia  Post Op Pain Control Plan Comments:     Induction:   IV  Beta Blocker:  Patient is not currently on a Beta-Blocker (No further documentation required).       Informed Consent: Patient understands risks and agrees with Anesthesia plan.  Questions answered. Anesthesia consent signed with patient.  ASA Score: 3     Day of Surgery Review of History & Physical: I have interviewed and examined the  patient. I have reviewed the patient's H&P dated:  There are no significant changes.          Ready For Surgery From Anesthesia Perspective.

## 2022-02-11 NOTE — PROVATION PATIENT INSTRUCTIONS
Discharge Summary/Instructions after an Endoscopic Procedure  Patient Name: Jesús Patel  Patient MRN: [id number]  Patient   YOB: 1975 Friday, February 11, 2022  Vaughn Cortes MD  Dear patient,  As a result of recent federal legislation (The Federal Cures Act), you may   receive lab or pathology results from your procedure in your [facility]   account before your physician is able to contact you. Your physician or   their representative will relay the results to you with their   recommendations at their soonest availability.  Thank you,  RESTRICTIONS:  During your procedure today, you received medications for sedation.  These   medications may affect your judgment, balance and coordination.  Therefore,   for 24 hours, you have the following restrictions:   - DO NOT drive a car, operate machinery, make legal/financial decisions,   sign important papers or drink alcohol.    ACTIVITY:  Today: no heavy lifting, straining or running due to procedural   sedation/anesthesia.  The following day: return to full activity including work.  DIET:  Eat and drink normally unless instructed otherwise.     TREATMENT FOR COMMON SIDE EFFECTS:  - Mild abdominal pain, nausea, belching, bloating or excessive gas:  rest,   eat lightly and use a heating pad.  - Sore Throat: treat with throat lozenges and/or gargle with warm salt   water.  - Because air was used during the procedure, expelling large amounts of air   from your rectum or belching is normal.  - If a bowel prep was taken, you may not have a bowel movement for 1-3 days.    This is normal.  SYMPTOMS TO WATCH FOR AND REPORT TO YOUR PHYSICIAN:  1. Abdominal pain or bloating, other than gas cramps.  2. Chest pain.  3. Back pain.  4. Signs of infection such as: chills or fever occurring within 24 hours   after the procedure.  5. Rectal bleeding, which would show as bright red, maroon, or black stools.   (A tablespoon of blood from the rectum is not serious, especially  if   hemorrhoids are present.)  6. Vomiting.  7. Weakness or dizziness.  GO DIRECTLY TO THE NEAREST EMERGENCY ROOM IF YOU HAVE ANY OF THE FOLLOWING:      Difficulty breathing              Chills and/or fever over 101 F   Persistent vomiting and/or vomiting blood   Severe abdominal pain   Severe chest pain   Black, tarry stools   Bleeding- more than one tablespoon   Any other symptom or condition that you feel may need urgent attention  Your doctor recommends these additional instructions:  If any biopsies were taken, your doctors clinic will contact you in 1 to 2   weeks with any results.  - Patient has a contact number available for emergencies.  The signs and   symptoms of potential delayed complications were discussed with the   patient.  Return to normal activities tomorrow.  Written discharge   instructions were provided to the patient.   - Discharge patient to home (ambulatory).   - Resume previous diet.   - Continue present medications.   - Await pathology results.   - Return to referring physician after studies are complete.  For questions, problems or results please call your physician - Vaughn Cortes MD at Work:  [phone].  [facility], EMERGENCY ROOM PHONE NUMBER: [phone]  IF A COMPLICATION OR EMERGENCY SITUATION ARISES AND YOU ARE UNABLE TO REACH   YOUR PHYSICIAN - GO DIRECTLY TO THE EMERGENCY ROOM.  Vaughn Cortes MD  2/11/2022 8:08:16 AM  This report has been verified and signed electronically.  Dear patient,  As a result of recent federal legislation (The Federal Cures Act), you may   receive lab or pathology results from your procedure in your [facility]   account before your physician is able to contact you. Your physician or   their representative will relay the results to you with their   recommendations at their soonest availability.  Thank you,  PROVATION

## 2022-02-14 NOTE — ANESTHESIA POSTPROCEDURE EVALUATION
Anesthesia Post Evaluation    Patient: Jesús Patel    Procedure(s) Performed: Procedure(s) (LRB):  EGD (ESOPHAGOGASTRODUODENOSCOPY) (N/A)  COLONOSCOPY (N/A)    Final Anesthesia Type: general      Patient location during evaluation: PACU  Patient participation: Yes- Able to Participate  Level of consciousness: awake and alert  Post-procedure vital signs: reviewed and stable  Pain control: Pain has been treated.  Airway patency: patent    PONV status: PONV absent or treated.  Anesthetic complications: no      Cardiovascular status: hemodynamically stable  Respiratory status: spontaneous ventilation  Hydration status: euvolemic  Follow-up not needed.          Vitals Value Taken Time   /93 02/11/22 0910   Temp 36.6 °C (97.8 °F) 02/11/22 0910   Pulse 82 02/11/22 0910   Resp 16 02/11/22 0910   SpO2 96 % 02/11/22 0910         No case tracking events are documented in the log.      Pain/Maurice Score: No data recorded

## 2022-02-16 LAB
FINAL PATHOLOGIC DIAGNOSIS: NORMAL
GROSS: NORMAL
Lab: NORMAL

## 2022-02-18 ENCOUNTER — TELEPHONE (OUTPATIENT)
Dept: GASTROENTEROLOGY | Facility: CLINIC | Age: 47
End: 2022-02-18
Payer: COMMERCIAL

## 2022-02-18 RX ORDER — BISMUTH SUBCITRATE POTASSIUM, METRONIDAZOLE, TETRACYCLINE HYDROCHLORIDE 140; 125; 125 MG/1; MG/1; MG/1
3 CAPSULE ORAL
Qty: 120 CAPSULE | Refills: 0 | Status: SHIPPED | OUTPATIENT
Start: 2022-02-18 | End: 2022-02-22

## 2022-02-18 NOTE — TELEPHONE ENCOUNTER
----- Message from Dewayne Escobar MD sent at 2/18/2022  7:31 AM CST -----  Please notify patient, stomach biopsy positive for H.pylori. Treatment sent to the pharmacy. Follow up in 8 weeks (virtual).  ----- Message -----  From: Vaughn Cortes MD  Sent: 2/16/2022  12:29 PM CST  To: Dewayne Escobar MD, [facility] Staff    Dewayne, you saw this patient in the clinic  I hope I sent you a copy of the pathology report  Biopsy was positive for H pylori

## 2022-02-18 NOTE — TELEPHONE ENCOUNTER
MA notified pt about his test results, I also informed him about the treatment sent to his pharcmacy/ to follow-up in 8 weeks virtually.

## 2022-02-22 ENCOUNTER — PATIENT MESSAGE (OUTPATIENT)
Dept: GASTROENTEROLOGY | Facility: CLINIC | Age: 47
End: 2022-02-22
Payer: COMMERCIAL

## 2022-02-22 RX ORDER — METRONIDAZOLE 250 MG/1
250 TABLET ORAL EVERY 6 HOURS
Qty: 40 TABLET | Refills: 0 | Status: SHIPPED | OUTPATIENT
Start: 2022-02-22 | End: 2022-04-04

## 2022-02-22 RX ORDER — OMEPRAZOLE 40 MG/1
40 CAPSULE, DELAYED RELEASE ORAL
Qty: 20 CAPSULE | Refills: 0 | Status: SHIPPED | OUTPATIENT
Start: 2022-02-22 | End: 2022-03-24

## 2022-02-22 RX ORDER — DOXYCYCLINE HYCLATE 100 MG
100 TABLET ORAL 2 TIMES DAILY
Qty: 20 TABLET | Refills: 0 | Status: SHIPPED | OUTPATIENT
Start: 2022-02-22 | End: 2022-04-04

## 2022-03-07 ENCOUNTER — PATIENT MESSAGE (OUTPATIENT)
Dept: BARIATRICS | Facility: CLINIC | Age: 47
End: 2022-03-07
Payer: COMMERCIAL

## 2022-03-10 ENCOUNTER — PATIENT MESSAGE (OUTPATIENT)
Dept: SLEEP MEDICINE | Facility: CLINIC | Age: 47
End: 2022-03-10
Payer: COMMERCIAL

## 2022-03-11 ENCOUNTER — LAB VISIT (OUTPATIENT)
Dept: LAB | Facility: HOSPITAL | Age: 47
End: 2022-03-11
Payer: COMMERCIAL

## 2022-03-11 DIAGNOSIS — R74.8 ELEVATED LIVER ENZYMES: ICD-10-CM

## 2022-03-11 DIAGNOSIS — R73.03 PREDIABETES: ICD-10-CM

## 2022-03-11 LAB
ALBUMIN SERPL BCP-MCNC: 3.5 G/DL (ref 3.5–5.2)
ALP SERPL-CCNC: 124 U/L (ref 55–135)
ALT SERPL W/O P-5'-P-CCNC: 187 U/L (ref 10–44)
ANION GAP SERPL CALC-SCNC: 13 MMOL/L (ref 8–16)
AST SERPL-CCNC: 170 U/L (ref 10–40)
BILIRUB SERPL-MCNC: 1 MG/DL (ref 0.1–1)
BUN SERPL-MCNC: 9 MG/DL (ref 6–20)
CALCIUM SERPL-MCNC: 9.3 MG/DL (ref 8.7–10.5)
CHLORIDE SERPL-SCNC: 104 MMOL/L (ref 95–110)
CO2 SERPL-SCNC: 22 MMOL/L (ref 23–29)
CREAT SERPL-MCNC: 0.8 MG/DL (ref 0.5–1.4)
EST. GFR  (AFRICAN AMERICAN): >60 ML/MIN/1.73 M^2
EST. GFR  (NON AFRICAN AMERICAN): >60 ML/MIN/1.73 M^2
ESTIMATED AVG GLUCOSE: 126 MG/DL (ref 68–131)
GLUCOSE SERPL-MCNC: 112 MG/DL (ref 70–110)
HBA1C MFR BLD: 6 % (ref 4–5.6)
POTASSIUM SERPL-SCNC: 3.7 MMOL/L (ref 3.5–5.1)
PROT SERPL-MCNC: 7 G/DL (ref 6–8.4)
SODIUM SERPL-SCNC: 139 MMOL/L (ref 136–145)

## 2022-03-11 PROCEDURE — 36415 COLL VENOUS BLD VENIPUNCTURE: CPT | Performed by: FAMILY MEDICINE

## 2022-03-11 PROCEDURE — 83036 HEMOGLOBIN GLYCOSYLATED A1C: CPT | Performed by: FAMILY MEDICINE

## 2022-03-11 PROCEDURE — 80053 COMPREHEN METABOLIC PANEL: CPT | Performed by: FAMILY MEDICINE

## 2022-03-14 ENCOUNTER — TELEPHONE (OUTPATIENT)
Dept: INTERNAL MEDICINE | Facility: CLINIC | Age: 47
End: 2022-03-14
Payer: COMMERCIAL

## 2022-03-14 DIAGNOSIS — R74.8 ELEVATED LIVER ENZYMES: Primary | ICD-10-CM

## 2022-03-23 ENCOUNTER — OFFICE VISIT (OUTPATIENT)
Dept: HEPATOLOGY | Facility: CLINIC | Age: 47
End: 2022-03-23
Payer: COMMERCIAL

## 2022-03-23 VITALS
WEIGHT: 315 LBS | HEIGHT: 69 IN | HEART RATE: 84 BPM | BODY MASS INDEX: 46.65 KG/M2 | OXYGEN SATURATION: 95 % | RESPIRATION RATE: 18 BRPM | TEMPERATURE: 98 F | SYSTOLIC BLOOD PRESSURE: 163 MMHG | DIASTOLIC BLOOD PRESSURE: 97 MMHG

## 2022-03-23 DIAGNOSIS — I10 ESSENTIAL HYPERTENSION: ICD-10-CM

## 2022-03-23 DIAGNOSIS — R74.8 ELEVATED LIVER ENZYMES: Primary | ICD-10-CM

## 2022-03-23 DIAGNOSIS — R73.03 PREDIABETES: ICD-10-CM

## 2022-03-23 DIAGNOSIS — E78.49 OTHER HYPERLIPIDEMIA: ICD-10-CM

## 2022-03-23 DIAGNOSIS — E66.01 MORBID OBESITY WITH BMI OF 50.0-59.9, ADULT: ICD-10-CM

## 2022-03-23 PROCEDURE — 3077F PR MOST RECENT SYSTOLIC BLOOD PRESSURE >= 140 MM HG: ICD-10-PCS | Mod: CPTII,S$GLB,, | Performed by: NURSE PRACTITIONER

## 2022-03-23 PROCEDURE — 1159F PR MEDICATION LIST DOCUMENTED IN MEDICAL RECORD: ICD-10-PCS | Mod: CPTII,S$GLB,, | Performed by: NURSE PRACTITIONER

## 2022-03-23 PROCEDURE — 3080F DIAST BP >= 90 MM HG: CPT | Mod: CPTII,S$GLB,, | Performed by: NURSE PRACTITIONER

## 2022-03-23 PROCEDURE — 99999 PR PBB SHADOW E&M-EST. PATIENT-LVL V: CPT | Mod: PBBFAC,,, | Performed by: NURSE PRACTITIONER

## 2022-03-23 PROCEDURE — 3080F PR MOST RECENT DIASTOLIC BLOOD PRESSURE >= 90 MM HG: ICD-10-PCS | Mod: CPTII,S$GLB,, | Performed by: NURSE PRACTITIONER

## 2022-03-23 PROCEDURE — 1160F PR REVIEW ALL MEDS BY PRESCRIBER/CLIN PHARMACIST DOCUMENTED: ICD-10-PCS | Mod: CPTII,S$GLB,, | Performed by: NURSE PRACTITIONER

## 2022-03-23 PROCEDURE — 3008F BODY MASS INDEX DOCD: CPT | Mod: CPTII,S$GLB,, | Performed by: NURSE PRACTITIONER

## 2022-03-23 PROCEDURE — 3044F HG A1C LEVEL LT 7.0%: CPT | Mod: CPTII,S$GLB,, | Performed by: NURSE PRACTITIONER

## 2022-03-23 PROCEDURE — 1159F MED LIST DOCD IN RCRD: CPT | Mod: CPTII,S$GLB,, | Performed by: NURSE PRACTITIONER

## 2022-03-23 PROCEDURE — 3077F SYST BP >= 140 MM HG: CPT | Mod: CPTII,S$GLB,, | Performed by: NURSE PRACTITIONER

## 2022-03-23 PROCEDURE — 3008F PR BODY MASS INDEX (BMI) DOCUMENTED: ICD-10-PCS | Mod: CPTII,S$GLB,, | Performed by: NURSE PRACTITIONER

## 2022-03-23 PROCEDURE — 3044F PR MOST RECENT HEMOGLOBIN A1C LEVEL <7.0%: ICD-10-PCS | Mod: CPTII,S$GLB,, | Performed by: NURSE PRACTITIONER

## 2022-03-23 PROCEDURE — 99999 PR PBB SHADOW E&M-EST. PATIENT-LVL V: ICD-10-PCS | Mod: PBBFAC,,, | Performed by: NURSE PRACTITIONER

## 2022-03-23 PROCEDURE — 1160F RVW MEDS BY RX/DR IN RCRD: CPT | Mod: CPTII,S$GLB,, | Performed by: NURSE PRACTITIONER

## 2022-03-23 PROCEDURE — 99203 PR OFFICE/OUTPT VISIT, NEW, LEVL III, 30-44 MIN: ICD-10-PCS | Mod: S$GLB,,, | Performed by: NURSE PRACTITIONER

## 2022-03-23 PROCEDURE — 99203 OFFICE O/P NEW LOW 30 MIN: CPT | Mod: S$GLB,,, | Performed by: NURSE PRACTITIONER

## 2022-03-23 NOTE — PROGRESS NOTES
[facility] VISIT NEW PT NOTE    REFERRING PROVIDER:  Dr. Natalio Amaya  PCP: Natalio Amaya MD     CHIEF COMPLAINT: elevated liver enzymes    HPI: This is a 46 y.o. White male with PMH noted below, presenting for evaluation of   elevated liver enzymes    Previous serologic w/u negative for viral hepatitis A and B  + Hep C ab in 2020, previous RNA in 2019 negative - will repeat and obtain full sero w/u    Prior serologic workup:   Lab Results   Component Value Date    FESATURATED 48 11/22/2017    HEPBSAG Negative 11/14/2020    HEPCAB Positive (A) 11/14/2020    HEPAIGM Negative 11/14/2020       Liver fibrosis staging:  -- MRI elasto soon     Risk factors for NAFLD include morbid obesity, HTN, HLD, preDM    Interval HPI: Presents today alone  On H pylori treatment  No SSB  Gained weight and increased alcohol intake since 2020  + Herbal medications/weight loss shake (including ashwaganda and dandelion root), started 1 week ago (after labs)    Labs done 3/2022 show elevated transaminase levels (ALT > AST, more significantly elevated since 11/2020)  Platelets WNL, alk phos WNL  Synthetic liver functioning WNL    Lab Results   Component Value Date     (H) 03/11/2022     (H) 03/11/2022    ALKPHOS 124 03/11/2022    BILITOT 1.0 03/11/2022    ALBUMIN 3.5 03/11/2022     08/27/2021       Abd U/S done 3/2018 showed no significant liver abnormality - will obtain MRI    Denies family history of liver disease. Denies alcohol consumption currently or in the past    Immunity to Hep A and B - will check with upcoming labs       Allergy and medication list reviewed and updated     PMHX:  has a past medical history of Asthma, GERD (gastroesophageal reflux disease), Hyperlipidemia, Hypertension, Low back pain, Low back pain, Morbid obesity with BMI of 50.0-59.9, adult, JARAD (obstructive sleep apnea), Varicose veins, and Vitamin D deficiency.    PSHX:  has a past surgical history that includes  "LASIK; Eye surgery; Adenoidectomy; Laparoscopic gastric banding (2002); Gastrectomy; Laparoscopic repair of recurrent incarcerated incisional hernia (N/A, 6/21/2018); Esophagogastroduodenoscopy (N/A, 2/11/2022); and Colonoscopy (N/A, 2/11/2022).    FAMILY HISTORY: Updated and reviewed in [facility]    SOCIAL HISTORY:   Social History     Substance and Sexual Activity   Alcohol Use Yes    Comment: 2 servings daily, in the past 2 years       Social History     Substance and Sexual Activity   Drug Use No       ROS:   GENERAL: Denies fatigue  CARDIOVASCULAR: Denies edema  GI: Denies abdominal pain  SKIN: Denies rash, itching   NEURO: Denies confusion, memory loss, or mood changes    PHYSICAL EXAM:   Friendly White male, in no acute distress; alert and oriented to person, place and time  VITALS: BP (!) 163/97 (BP Location: Right arm, Patient Position: Sitting, BP Method: Medium (Automatic))   Pulse 84   Temp 97.9 °F (36.6 °C) (Oral)   Resp 18   Ht 5' 9" (1.753 m)   Wt (!) 176.7 kg (389 lb 8.9 oz)   SpO2 95%   BMI 57.53 kg/m²   EYES: Sclerae anicteric  GI: Soft, non-tender, non-distended. No ascites.  EXTREMITIES:  No edema.  SKIN: Warm and dry. No jaundice. No telangectasias noted. No palmar erythema.  NEURO:  No asterixis.  PSYCH:  Thought and speech pattern appropriate. Behavior normal      EDUCATION:  See instructions discussed with patient in Instructions section of the After Visit Summary     ASSESSMENT & PLAN:  46 y.o. White male with:  1. Elevated liver enzymes   -- Labs note elevated transaminase levels (ALT > AST), more significantly elevated since 11/2020  --- synthetic liver function WNL  ---Abd U/S done 3/2018 showed no significant liver abnormality - will obtain MRI  -- do not suspect any medications contributing : no but advised to stop shake and avoid any herbal supplements, fat burner, liver cleanse, etc   --- Previous serologic w/u negative for viral hepatitis A and B  + Hep C ab in 2020, previous RNA " in 2019 negative - will repeat and obtain full sero w/u  --- Hep A and B immunity: will check today, will arrange Hep A and B vaccines if needed    -- labs and MRI elasto soon   Orders Placed This Encounter   Procedures    MR Elastography    Hepatitis C RNA, Quantitative, PCR    Hepatitis A antibody, IgG    Hepatitis B Core Antibody, Total    Hepatitis B Surface Ab, Qualitative    Hepatitis A Antibody, IgM    Hepatitis B Surface Antigen    Alpha-1-Antitrypsin    SANTO Screen w/Reflex    Antimitochondrial Antibody    Anti-Smooth Muscle Antibody    Ceruloplasmin    CK    Ferritin    Hemoglobin A1C    Hepatic Function Panel    IgG    Iron and TIBC    Phosphatidylethanol (PETH)      -- MRI elasto soon     2. Morbid obesity, HTN, HLD, preDM  Body mass index is 57.53 kg/m².  -- can increase risk of fatty liver    3. Alcohol use  -- recommended to stop until further notice   Social History     Substance and Sexual Activity   Alcohol Use Yes    Comment: 2 servings daily, in the past 2 years           Labs and MRI elasto soon then   Follow up in about 1 week (around 3/30/2022).   Orders Placed This Encounter   Procedures    MR Elastography    Hepatitis C RNA, Quantitative, PCR    Hepatitis A antibody, IgG    Hepatitis B Core Antibody, Total    Hepatitis B Surface Ab, Qualitative    Hepatitis A Antibody, IgM    Hepatitis B Surface Antigen    Alpha-1-Antitrypsin    SANTO Screen w/Reflex    Antimitochondrial Antibody    Anti-Smooth Muscle Antibody    Ceruloplasmin    CK    Ferritin    Hemoglobin A1C    Hepatic Function Panel    IgG    Iron and TIBC    Phosphatidylethanol (PETH)        Thank you for allowing me to participate in the care of Jesús Galindo CM Yousif    I spent a total of 30 minutes on the day of the visit.This includes face to face time and non-face to face time preparing to see the patient (eg, review of tests), obtaining and/or reviewing separately  obtained history, documenting clinical information in the electronic or other health record, independently interpreting results and communicating results to the patient/family/caregiver, and coordinating care.         CC'ed note to:   Natalio Amaya MD

## 2022-03-23 NOTE — PATIENT INSTRUCTIONS
1. MRI soon to look for fat or scar tissue in the liver before return to clinic   2. Will check immunity markers for Hepatitis A and B and arrange for vaccination if needed  3. Labs on same day as MRI to  check for multiple causes of liver disease. These labs will release to you as soon as they are resulted but we will discuss them in detail at your upcoming visit to discuss what the lab results mean.   4.  Follow up 1 week after labs and MRI

## 2022-03-27 ENCOUNTER — PATIENT MESSAGE (OUTPATIENT)
Dept: INTERNAL MEDICINE | Facility: CLINIC | Age: 47
End: 2022-03-27
Payer: COMMERCIAL

## 2022-03-27 ENCOUNTER — HOSPITAL ENCOUNTER (EMERGENCY)
Facility: HOSPITAL | Age: 47
Discharge: HOME | End: 2022-03-27
Attending: EMERGENCY MEDICINE
Payer: COMMERCIAL

## 2022-03-27 VITALS
OXYGEN SATURATION: 97 % | DIASTOLIC BLOOD PRESSURE: 86 MMHG | BODY MASS INDEX: 57.45 KG/M2 | WEIGHT: 315 LBS | TEMPERATURE: 99 F | RESPIRATION RATE: 18 BRPM | SYSTOLIC BLOOD PRESSURE: 141 MMHG | HEART RATE: 75 BPM

## 2022-03-27 DIAGNOSIS — I83.12 VARICOSE VEINS OF LEFT LOWER EXTREMITY WITH INFLAMMATION: ICD-10-CM

## 2022-03-27 DIAGNOSIS — I80.02 THROMBOPHLEBITIS OF SUPERFICIAL VEINS OF LEFT LOWER EXTREMITY: Primary | ICD-10-CM

## 2022-03-27 DIAGNOSIS — M79.662 PAIN OF LEFT CALF: ICD-10-CM

## 2022-03-27 PROCEDURE — 99284 PR EMERGENCY DEPT VISIT,LEVEL IV: ICD-10-PCS | Mod: ,,, | Performed by: PHYSICIAN ASSISTANT

## 2022-03-27 PROCEDURE — 99284 EMERGENCY DEPT VISIT MOD MDM: CPT | Mod: 25

## 2022-03-27 PROCEDURE — 99284 EMERGENCY DEPT VISIT MOD MDM: CPT | Mod: ,,, | Performed by: PHYSICIAN ASSISTANT

## 2022-03-27 RX ORDER — DICLOFENAC SODIUM 50 MG/1
50 TABLET, DELAYED RELEASE ORAL 3 TIMES DAILY PRN
Qty: 15 TABLET | Refills: 0 | Status: SHIPPED | OUTPATIENT
Start: 2022-03-27 | End: 2023-08-10

## 2022-03-27 RX ORDER — DICLOFENAC SODIUM 50 MG/1
50 TABLET, DELAYED RELEASE ORAL 3 TIMES DAILY PRN
Qty: 15 TABLET | Refills: 0 | Status: SHIPPED | OUTPATIENT
Start: 2022-03-27 | End: 2022-03-27 | Stop reason: SDUPTHER

## 2022-03-27 NOTE — ED NOTES
Jesús Patel, a 46 y.o. male presents to the ED w/ complaint of swelling and redness noted to left calf, pt reports having varicose veins pt reports long drives, not on blood thinners     Triage note:  Chief Complaint   Patient presents with    Leg Pain     Left leg pain, beginning Friday evening. Pt. Has swelling, redness,and bruising  to site.      Review of patient's allergies indicates:  No Known Allergies  Past Medical History:   Diagnosis Date    Asthma     GERD (gastroesophageal reflux disease)     Hyperlipidemia     Hypertension     Low back pain     Morbid obesity with BMI of 50.0-59.9, adult     JARAD (obstructive sleep apnea)     Pre-diabetes     Varicose veins     Vitamin D deficiency      LOC: The patient is awake, alert and aware of environment with an appropriate affect, the patient is oriented x 3 and speaking appropriately.   APPEARANCE: Patient appears comfortable and in no acute distress, patient is clean and well groomed.  SKIN: The skin is warm and dry, color consistent with ethnicity, patient has normal skin turgor and moist mucus membranes, skin intact, no breakdown or bruising noted.   MUSCULOSKELETAL: Patient moving all extremities spontaneously, redness and swelling noted to left calf   RESPIRATORY: Airway is open and patent, respirations are spontaneous, patient has a normal effort and rate, no accessory muscle use noted,  with O2 sats noted at 97% on room air.  CARDIAC:  Patient has a normal rate and regular rhythm, no edema noted, capillary refill < 3 seconds.   GASTRO: Soft and non tender to palpation, no distention noted, normoactive bowel sounds present in all four quadrants. Pt states bowel movements have been regular.  : Pt denies any pain or frequency with urination.  NEURO: Pt opens eyes spontaneously, behavior appropriate to situation, follows commands, facial expression symmetrical, bilateral hand grasp equal and even, purposeful motor response noted,  normal sensation in all extremities when touched with a finger.

## 2022-03-27 NOTE — ED PROVIDER NOTES
Encounter Date: 3/27/2022       History     Chief Complaint   Patient presents with    Leg Pain     Left leg pain, beginning Friday evening. Pt. Has swelling, redness,and bruising  to site.      The patient is a 46 year old male, who has a documented past medical history of asthma, GERD, HTN, HLD, JARAD, obesity, borderline diabetes, and varicose veins. He presents to the ER for an emergent evaluation due to acute localized pain, redness, and swelling to his left leg. No injury or trauma. No previous episodes in the past. No pre-arrival treatment. He states that the degree of pain is mild. He states that symptoms began yesterday. He denies any additional symptoms. He is concerned about a DVT.           Review of patient's allergies indicates:  No Known Allergies  Past Medical History:   Diagnosis Date    Asthma     GERD (gastroesophageal reflux disease)     Hyperlipidemia     Hypertension     Low back pain     Morbid obesity with BMI of 50.0-59.9, adult     JARAD (obstructive sleep apnea)     Pre-diabetes     Varicose veins     Vitamin D deficiency      Past Surgical History:   Procedure Laterality Date    ADENOIDECTOMY      COLONOSCOPY N/A 2/11/2022    Procedure: COLONOSCOPY;  Surgeon: Vaughn Cortes MD;  Location: [facility] ([address] [place] [address]);  Service: Endoscopy;  Laterality: N/A;    ESOPHAGOGASTRODUODENOSCOPY N/A 2/11/2022    Procedure: EGD (ESOPHAGOGASTRODUODENOSCOPY);  Surgeon: Vaughn Cortes MD;  Location: [address]);  Service: Endoscopy;  Laterality: N/A;  BMI-55  Wt:378#-fully vacc-inst portal-tb  COVID screening 2/8/22 [facility]    EYE SURGERY      GASTRECTOMY      LAPAROSCOPIC GASTRIC BANDING  2002    In [place]: uncertain of brand/ size    LAPAROSCOPIC REPAIR OF RECURRENT INCARCERATED INCISIONAL HERNIA N/A 6/21/2018    Procedure: REPAIR-HERNIA-INCISIONAL-LAPAROSCOPIC WITH MESH;  Surgeon: aVughn Parker Jr., MD;  Location: [facility] OR [address] [place] [address];  Service: General;  Laterality: N/A;     LASIK       Family History   Problem Relation Age of Onset    Hypertension Mother     Obesity Mother         s/p sleeve 2013: good results    Cancer Maternal Grandfather         colon cancer    Diabetes Paternal Grandmother     No Known Problems Father     No Known Problems Brother     Colon cancer Maternal Grandmother     Obesity Brother     No Known Problems Sister      Social History     Tobacco Use    Smoking status: Former Smoker     Packs/day: 0.25     Years: 5.00     Pack years: 1.25     Quit date: 2009     Years since quittin.2    Smokeless tobacco: Never Used   Substance Use Topics    Alcohol use: Yes     Comment: 2 servings daily, in the past 2 years    Drug use: No     Review of Systems   Constitutional: Negative for chills and fever.   Respiratory: Negative for shortness of breath.    Cardiovascular: Negative for chest pain and leg swelling.   Gastrointestinal: Negative for abdominal pain, diarrhea, nausea and vomiting.   Genitourinary: Negative for decreased urine volume.   Musculoskeletal: Negative for arthralgias, gait problem and joint swelling.   Skin: Positive for color change. Negative for rash and wound.   Allergic/Immunologic: Negative for immunocompromised state.   Neurological: Negative for dizziness, syncope, weakness, light-headedness, numbness and headaches.   Hematological: Negative for adenopathy.   Psychiatric/Behavioral: Negative for confusion. The patient is nervous/anxious.        Physical Exam     Initial Vitals [22 1618]   BP Pulse Resp Temp SpO2   (!) 141/86 75 18 98.9 °F (37.2 °C) 97 %      MAP       --         Physical Exam    Nursing note and vitals reviewed.  Constitutional: He appears well-developed and well-nourished. He is not diaphoretic.   HENT:   Head: Normocephalic.   Mouth/Throat: Oropharynx is clear and moist.   Eyes: Conjunctivae are normal.   Cardiovascular: Normal rate.   Pulmonary/Chest: No respiratory distress.   Abdominal: He exhibits  no distension.   Musculoskeletal:         General: Normal range of motion.      Comments: Chronic appearing varicose veins noted to left lower extremity. There is a localized area of erythema, pain, and swelling over a varicose vein. See image.      Neurological: He is alert and oriented to person, place, and time. He has normal strength. No sensory deficit.   Skin: Skin is warm and dry.   Psychiatric: He has a normal mood and affect.             ED Course   Procedures  Labs Reviewed - No data to display       Imaging Results          US Lower Extremity Veins Left (Final result)  Result time 03/27/22 19:31:57    Final result by Pop Vidales MD (03/27/22 19:31:57)                 Impression:      No evidence of deep venous thrombosis in the left lower extremity.    Left calf nonocclusive superficial thrombophlebitis.      Electronically signed by: Pop Vidales MD  Date:    03/27/2022  Time:    19:31             Narrative:    EXAMINATION:  US LOWER EXTREMITY VEINS LEFT    CLINICAL HISTORY:  Pain in left lower leg    TECHNIQUE:  Duplex and color flow Doppler evaluation and graded compression of the left lower extremity veins was performed.    COMPARISON:  None    FINDINGS:  Left thigh veins: The common femoral, femoral, popliteal, upper greater saphenous, and deep femoral veins are patent and free of thrombus. The veins are normally compressible and have normal phasic flow and augmentation response.    Left calf veins: At area of concern there is nonocclusive thrombus seen within several prominent superficial vessels at the left calf consistent with superficial thrombophlebitis.  The visualized deep calf veins are patent.    Contralateral CFV: The contralateral (right) common femoral vein is patent and free of thrombus.    Miscellaneous: None                                 Medications - No data to display  Medical Decision Making:   Initial Assessment:   45 yo male, hx of chronic varicose veins, presents to the ER  concerned about possible DVT due to acute pain, redness, and swelling to localized area of left lower leg since yesterday   Differential Diagnosis:   Superficial venous thrombosis, Thrombophlebitis, thrombosed varicosity, DVT, hematoma, abscess/cellulitis, etc   Clinical Tests:   Radiological Study: Ordered and Reviewed  ED Management:  Vital signs reviewed - benign   Records reviewed   Venous US completed -   Pt advised to follow up with primary care in the next 2 days for re-evaluation and further management   Pt advised to return to the ER promptly if unimproved or if worse in any way   Ambulatory referral to vascular provided   Pt advised to follow up with PCP in the next 1-2 days  Pt advised hot compress and NSAIDS   Pt advised to return to the ER if worse in any way        Additional MDM:     Well's Criteria Score:  -Clinical symptoms of DVT (leg swelling, pain with palpation) = 3.0  -Other diagnosis less likely than pulmonary embolism =            0.0  -Heart Rate >100 =   0.0  -Immobilization (= or > than 3 days) or surgery in the previous 4 weeks = 0.0  -Previous DVT/PE = 0.0  -Hemoptysis =          0.0  -Malignancy =           0.0  Well's Probability Score =    3                         Clinical Impression:   Final diagnoses:  [M79.662] Pain of left calf  [I80.02] Thrombophlebitis of superficial veins of left lower extremity (Primary)  [I83.12] Varicose veins of left lower extremity with inflammation          ED Disposition Condition    Discharge Stable        ED Prescriptions     Medication Sig Dispense Start Date End Date Auth. Provider    diclofenac (VOLTAREN) 50 MG EC tablet Take 1 tablet (50 mg total) by mouth 3 (three) times daily as needed (pain). 15 tablet 3/27/2022  Niall Mauricio PA-C        Follow-up Information     Follow up With Specialties Details Why Contact Info Additional Information    Natalio Amaya MD Family Medicine Schedule an appointment as soon as possible for a visit in 2  days  [address]  [facility] [address]  [phone]       Ceasar Ortiz - Vascular Surg [facility] Vascular Surgery Schedule an appointment as soon as possible for a visit  Follow up with vascular surgery at next available for re-evaluation and further management of varicose veins [address]  [facility] [id number] [phone] Main Building, 5th Floor Please park in [facility] and use Clinic elevator    [facility] Ortiz - Emergency Dept Emergency Medicine  If symptoms worsen in any way [address]  [facility] [id number] [phone]            Niall Mauricio PA-C  03/27/22 2000

## 2022-03-28 ENCOUNTER — HOSPITAL ENCOUNTER (OUTPATIENT)
Dept: RADIOLOGY | Facility: HOSPITAL | Age: 47
Discharge: HOME | End: 2022-03-28
Attending: FAMILY MEDICINE
Payer: COMMERCIAL

## 2022-03-28 ENCOUNTER — OFFICE VISIT (OUTPATIENT)
Dept: SPORTS MEDICINE | Facility: CLINIC | Age: 47
End: 2022-03-28
Payer: COMMERCIAL

## 2022-03-28 VITALS — WEIGHT: 315 LBS | HEIGHT: 69 IN | BODY MASS INDEX: 46.65 KG/M2

## 2022-03-28 DIAGNOSIS — G89.29 CHRONIC PAIN OF BOTH KNEES: Primary | ICD-10-CM

## 2022-03-28 DIAGNOSIS — M25.561 CHRONIC PAIN OF BOTH KNEES: Primary | ICD-10-CM

## 2022-03-28 DIAGNOSIS — M25.562 CHRONIC PAIN OF BOTH KNEES: Primary | ICD-10-CM

## 2022-03-28 DIAGNOSIS — G89.29 CHRONIC PATELLOFEMORAL PAIN OF RIGHT KNEE: ICD-10-CM

## 2022-03-28 DIAGNOSIS — M25.562 PAIN IN BOTH KNEES, UNSPECIFIED CHRONICITY: ICD-10-CM

## 2022-03-28 DIAGNOSIS — M25.561 CHRONIC PATELLOFEMORAL PAIN OF RIGHT KNEE: ICD-10-CM

## 2022-03-28 DIAGNOSIS — M25.869 PATELLOFEMORAL DYSFUNCTION, UNSPECIFIED LATERALITY: ICD-10-CM

## 2022-03-28 DIAGNOSIS — M17.0 PRIMARY OSTEOARTHRITIS OF BOTH KNEES: ICD-10-CM

## 2022-03-28 DIAGNOSIS — M25.561 PAIN IN BOTH KNEES, UNSPECIFIED CHRONICITY: ICD-10-CM

## 2022-03-28 PROCEDURE — 73564 X-RAY EXAM KNEE 4 OR MORE: CPT | Mod: TC,50

## 2022-03-28 PROCEDURE — 3044F HG A1C LEVEL LT 7.0%: CPT | Mod: CPTII,S$GLB,, | Performed by: FAMILY MEDICINE

## 2022-03-28 PROCEDURE — 1160F PR REVIEW ALL MEDS BY PRESCRIBER/CLIN PHARMACIST DOCUMENTED: ICD-10-PCS | Mod: CPTII,S$GLB,, | Performed by: FAMILY MEDICINE

## 2022-03-28 PROCEDURE — 99999 PR PBB SHADOW E&M-EST. PATIENT-LVL III: CPT | Mod: PBBFAC,,, | Performed by: FAMILY MEDICINE

## 2022-03-28 PROCEDURE — 1160F RVW MEDS BY RX/DR IN RCRD: CPT | Mod: CPTII,S$GLB,, | Performed by: FAMILY MEDICINE

## 2022-03-28 PROCEDURE — 3008F BODY MASS INDEX DOCD: CPT | Mod: CPTII,S$GLB,, | Performed by: FAMILY MEDICINE

## 2022-03-28 PROCEDURE — 3044F PR MOST RECENT HEMOGLOBIN A1C LEVEL <7.0%: ICD-10-PCS | Mod: CPTII,S$GLB,, | Performed by: FAMILY MEDICINE

## 2022-03-28 PROCEDURE — 1159F PR MEDICATION LIST DOCUMENTED IN MEDICAL RECORD: ICD-10-PCS | Mod: CPTII,S$GLB,, | Performed by: FAMILY MEDICINE

## 2022-03-28 PROCEDURE — 1159F MED LIST DOCD IN RCRD: CPT | Mod: CPTII,S$GLB,, | Performed by: FAMILY MEDICINE

## 2022-03-28 PROCEDURE — 73564 X-RAY EXAM KNEE 4 OR MORE: CPT | Mod: 26,,, | Performed by: RADIOLOGY

## 2022-03-28 PROCEDURE — 3008F PR BODY MASS INDEX (BMI) DOCUMENTED: ICD-10-PCS | Mod: CPTII,S$GLB,, | Performed by: FAMILY MEDICINE

## 2022-03-28 PROCEDURE — 73564 XR KNEE ORTHO BILAT WITH FLEXION: ICD-10-PCS | Mod: 26,,, | Performed by: RADIOLOGY

## 2022-03-28 PROCEDURE — 99999 PR PBB SHADOW E&M-EST. PATIENT-LVL III: ICD-10-PCS | Mod: PBBFAC,,, | Performed by: FAMILY MEDICINE

## 2022-03-28 PROCEDURE — 99204 OFFICE O/P NEW MOD 45 MIN: CPT | Mod: S$GLB,,, | Performed by: FAMILY MEDICINE

## 2022-03-28 PROCEDURE — 99204 PR OFFICE/OUTPT VISIT, NEW, LEVL IV, 45-59 MIN: ICD-10-PCS | Mod: S$GLB,,, | Performed by: FAMILY MEDICINE

## 2022-03-28 RX ORDER — MELOXICAM 15 MG/1
15 TABLET ORAL DAILY
Qty: 20 TABLET | Refills: 0 | Status: SHIPPED | OUTPATIENT
Start: 2022-03-28 | End: 2023-08-10

## 2022-03-28 NOTE — PROGRESS NOTES
"Jesús Patel, a 46 y.o. male, presents today for evaluation of his right knee. Pt reports contusion to his knee about 5 years ago. He completed PT at that time with good relief of his pain, but notes anterior knee clicking since that time. He is active in [employer], notes anteromedial knee pain especially with squats and lunges. Reports his knee feels "tight" and "sore".     History of Present Illness (HPI)  Location: anterior knee, right  Onset: Chronic x5 years  Palliative:    Relative rest   Oral analgesics   Ambulation Assistance - none  Provocative:    ADLS   Prolonged ambulation   sitting  Prior: fPT at [facility] 5 years ago, voltaren gel, NSAIDs  Progression: worsening discomfort  Quality:    pain is a 6 /10 aching pain today.Pain is 8 /10 at its worst.    Radiation: Denies, numbness, tingling, and inability to bear weight.  Severity: per nursing documentation  Timing: intermittent w/ use  Trauma: contusion x5 years ago, clicking since that time    Review of Systems (ROS)  A 10+ review of systems was performed with pertinent positives and negatives noted above in the history of present illness. Other systems were negative unless otherwise specified.    Physical Examination (PE)  General:  The patient is alert and oriented x 3. Mood is pleasant. Observation of ears, eyes and nose reveal no gross abnormalities. HEENT: NCAT, sclera anicteric.   Lungs: Respirations are equal and unlabored.  Gait is coordinated. Patient can toe walk and heel walk without difficulty.    KNEE EXAMINATION    Observation/Inspection  Gait:   Nonantalgic   Alignment:  Neutral   Scars:   None   Muscle atrophy: Mild  Effusion:  None   Warmth:  None   Discoloration:   none     Tenderness / Crepitus (T / C):         T / C      T / C  Patella   - / -   Lateral joint line   - / -     Peripatellar medial  -  Medial joint line    + / -  Peripatellar lateral -  Medial plica   - / -  Patellar tendon -   Popliteal fossa   - / -  Quad " tendon   -   Gastrocnemius   -  Prepatellar Bursa - / -   Quadricep   -  Tibial tubercle  -  Thigh/hamstring  -  Pes anserine/HS -  Fibula    -  ITB   - / -  Tibia     -  Tib/fib joint  - / -  LCL    -    MFC   - / -   MCL: Proximal  -    LFC   - / -   Distal    -          ROM: (* = pain)  PASSIVE   ACTIVE    Left :   5 / 0 / 145   5 / 0 / 145     Right :    5 / 0 / 145   5 / 0 / 145    Patellofemoral examination:  See above noted areas of tenderness.   Patella position    Subluxation / dislocation: Lateral tilt bilaterally       Sup. / Inf;   Normal   Crepitus (PF):    Absent   Patellar Mobility:       Medial-lateral:   Normal    Superior-inferior:  Normal    Inferior tilt   Normal    Patellar tendon:  Normal   Lateral tilt:    Increased bilat  J-sign:     None   Patellofemoral grind:   Pos.    Meniscal Signs:     Pain on terminal extension:  +  Pain on terminal flexion:  +  Shants maneuver:  +*  Squat     NT  Thesaly    NT    Ligament Examination:  ACL / Lachman:  WNL  PCL-Post.  drawer: normal 0 to 2mm  MCL- Valgus:  normal 0 to 2mm  LCL- Varus:    normal 0 to 2mm  Pivot shift:  guarding   Dial Test:   difference c/w other side   At 30° flexion: normal (< 5°)    At 90° flexion: normal (< 5°)   Reverse Pivot Shift:   normal (Equal)     Strength: (* = with pain) Painful Side  Quadriceps   5/5  Hamstrin/5    Extremity Neuro-vascular Examination:   Sensation:  Grossly intact to light touch all dermatomal regions.   Motor Function:  Fully intact motor function at hip, knee, foot and ankle    DTRs;  quadriceps and  achilles 2+.  No clonus and downgoing Babinski.    Vascular status:  DP and PT pulses 2+, brisk capillary refill, symmetric.     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, bilat   W/ patellofem dysfunct    No evidence of neurologic pathology  No evidence of vascular pathology    Imaging studies reviewed:   X-ray knee, bilateral  22.03    Plan  We discussed the importance of appropriate diet, weight, and regular exercise    We discussed options including    Watchful waiting / relative rest    Physical therapy x   Injection therapy    Consultation    The patient chooses As above   x = prescribed  CSI = corticosteroid injection  VSI = viscosupplement injection  PRPI = platelet rich plasma injection  ia = intra articular  R = right  L = left  B = bilateral   nfSx = surgical consultation was recommended, but patient is not interested in consultation at this time    Physical Therapy        Formal (fPT), @ [facility] facility    Formal (fPT), @ [facility] facility [facility] - [employer]       Homegoing (hgPT), per concurrent fPT recommendations    Homegoing (hgPT), per prior fPT recommendations    Homegoing (hgPT), handout provided        w/  (atPT)    [blank] = not prescribed  x = prescribed  b = prescribed, and begin as indicated  t = continue as indicated  r = prescribed, and restart as indicated  p = completed prior as indicated  hs = prescribed, and with high school   col = prescribed, and with college or university   nfPT = physical therapy was recommended, but patient is not interested in PT at this time    Activity (e.g. sports, work) restrictions    [blank] = as tolerated  pt = per physical therapist  at = per   NWB = non weight bearing on affected lower extremity, with crutches assistance for ambulation    Bracing    [blank] = not prescribed  r = recommended, but not fit with at todays visit  f = prescribed and fit with at todays visit  t = continue as indicated  d = d/c  p = as needed  rare = use on rare, as-needed basis; advised against chronic use    Pain management m   [blank] = No prescription necessary. A handout detailing dosing of appropriate   over-the-counter musculoskeletal analgesics was made available to the patient.   m = meloxicam x 14 days  mp = 14 day course of meloxicam  prescribed prior    Follow up 16   [blank] = as needed  [number] = in [number] weeks  CSI = for corticosteroid injection  VSI = for viscosupplement injection or injection series  PRP = for platelet rich plasma injection or injection series  MRI = after MRI imaging  ns = should surgical options be deferred (no surgery)  o = appointment offered, deferred by patient    Should symptoms worsen or fail to resolve, consider    Revisiting the above options and / or csi iaknee     Vocation:   Desk job  crossfit

## 2022-03-30 DIAGNOSIS — F32.A ANXIETY AND DEPRESSION: ICD-10-CM

## 2022-03-30 DIAGNOSIS — I10 ESSENTIAL HYPERTENSION: ICD-10-CM

## 2022-03-30 DIAGNOSIS — F41.9 ANXIETY AND DEPRESSION: ICD-10-CM

## 2022-03-30 NOTE — TELEPHONE ENCOUNTER
No new care gaps identified.  Powered by [employer]. Reference number: [id number].   3/30/2022 1:22:17 PM CDT

## 2022-03-31 RX ORDER — METOPROLOL SUCCINATE 50 MG/1
TABLET, EXTENDED RELEASE ORAL
Qty: 90 TABLET | Refills: 0 | Status: SHIPPED | OUTPATIENT
Start: 2022-03-31 | End: 2022-07-20

## 2022-03-31 RX ORDER — ALPRAZOLAM 0.5 MG/1
0.5 TABLET ORAL 3 TIMES DAILY PRN
Qty: 40 TABLET | Refills: 0 | Status: SHIPPED | OUTPATIENT
Start: 2022-03-31 | End: 2022-05-25

## 2022-03-31 NOTE — TELEPHONE ENCOUNTER
This Rx Request does not qualify for assessment with the OR   Please review protocol details and the Care Due Message for extra clinical information    Reasons Rx Request may be deferred:  Patient has been seen in the ED/Hospital since the last PCP visit  Medication is non-delegated    Note composed:10:38 AM 03/31/2022

## 2022-04-01 ENCOUNTER — TELEPHONE (OUTPATIENT)
Dept: VASCULAR SURGERY | Facility: CLINIC | Age: 47
End: 2022-04-01
Payer: COMMERCIAL

## 2022-04-01 ENCOUNTER — PATIENT MESSAGE (OUTPATIENT)
Dept: HEPATOLOGY | Facility: CLINIC | Age: 47
End: 2022-04-01
Payer: COMMERCIAL

## 2022-04-01 NOTE — TELEPHONE ENCOUNTER
Spoke with the pt and informed him that Dr Oliveira is only in clinic on Wednesdays.Also informed him that based on his diagnosis he should see vascular medicine.Contact information provide for vascular medicine providers.Pt verbalized understanding of information received.  ----- Message from Allison Soto sent at 4/1/2022 10:16 AM CDT -----  Contact: pt  Pt requesting call back , pt needs sooner appt than 5-18. Please call         Confirmed patient's contact info below:  Contact Name: Jesús Guyjosefa Patel  Phone Number: [phone]

## 2022-04-04 ENCOUNTER — OFFICE VISIT (OUTPATIENT)
Dept: CARDIOLOGY | Facility: CLINIC | Age: 47
End: 2022-04-04
Payer: COMMERCIAL

## 2022-04-04 ENCOUNTER — PATIENT MESSAGE (OUTPATIENT)
Dept: SURGERY | Facility: CLINIC | Age: 47
End: 2022-04-04
Payer: COMMERCIAL

## 2022-04-04 ENCOUNTER — HOSPITAL ENCOUNTER (OUTPATIENT)
Dept: CARDIOLOGY | Facility: HOSPITAL | Age: 47
Discharge: HOME | End: 2022-04-04
Attending: INTERNAL MEDICINE
Payer: COMMERCIAL

## 2022-04-04 VITALS
HEART RATE: 80 BPM | SYSTOLIC BLOOD PRESSURE: 133 MMHG | BODY MASS INDEX: 50.62 KG/M2 | WEIGHT: 315 LBS | HEIGHT: 66 IN | DIASTOLIC BLOOD PRESSURE: 82 MMHG

## 2022-04-04 DIAGNOSIS — L03.116 CELLULITIS OF LEFT LOWER EXTREMITY: ICD-10-CM

## 2022-04-04 DIAGNOSIS — I82.812 ACUTE SUPERFICIAL VENOUS THROMBOSIS OF LEFT LOWER EXTREMITY: Primary | ICD-10-CM

## 2022-04-04 DIAGNOSIS — E78.49 OTHER HYPERLIPIDEMIA: ICD-10-CM

## 2022-04-04 DIAGNOSIS — G47.33 OBSTRUCTIVE SLEEP APNEA SYNDROME: ICD-10-CM

## 2022-04-04 DIAGNOSIS — I10 ESSENTIAL HYPERTENSION: ICD-10-CM

## 2022-04-04 DIAGNOSIS — M79.605 LEFT LEG PAIN: ICD-10-CM

## 2022-04-04 DIAGNOSIS — I82.812 ACUTE SUPERFICIAL VENOUS THROMBOSIS OF LEFT LOWER EXTREMITY: ICD-10-CM

## 2022-04-04 DIAGNOSIS — E66.01 MORBID OBESITY WITH BMI OF 50.0-59.9, ADULT: ICD-10-CM

## 2022-04-04 PROCEDURE — 93971 CV US DOPPLER VENOUS LEG LEFT (CUPID ONLY): ICD-10-PCS | Mod: 26,LT,, | Performed by: INTERNAL MEDICINE

## 2022-04-04 PROCEDURE — 99205 PR OFFICE/OUTPT VISIT, NEW, LEVL V, 60-74 MIN: ICD-10-PCS | Mod: S$GLB,,, | Performed by: INTERNAL MEDICINE

## 2022-04-04 PROCEDURE — 93971 EXTREMITY STUDY: CPT | Mod: 26,LT,, | Performed by: INTERNAL MEDICINE

## 2022-04-04 PROCEDURE — 3079F PR MOST RECENT DIASTOLIC BLOOD PRESSURE 80-89 MM HG: ICD-10-PCS | Mod: CPTII,S$GLB,, | Performed by: INTERNAL MEDICINE

## 2022-04-04 PROCEDURE — 3075F SYST BP GE 130 - 139MM HG: CPT | Mod: CPTII,S$GLB,, | Performed by: INTERNAL MEDICINE

## 2022-04-04 PROCEDURE — 3008F BODY MASS INDEX DOCD: CPT | Mod: CPTII,S$GLB,, | Performed by: INTERNAL MEDICINE

## 2022-04-04 PROCEDURE — 99999 PR PBB SHADOW E&M-EST. PATIENT-LVL IV: ICD-10-PCS | Mod: PBBFAC,,, | Performed by: INTERNAL MEDICINE

## 2022-04-04 PROCEDURE — 3008F PR BODY MASS INDEX (BMI) DOCUMENTED: ICD-10-PCS | Mod: CPTII,S$GLB,, | Performed by: INTERNAL MEDICINE

## 2022-04-04 PROCEDURE — 99999 PR PBB SHADOW E&M-EST. PATIENT-LVL IV: CPT | Mod: PBBFAC,,, | Performed by: INTERNAL MEDICINE

## 2022-04-04 PROCEDURE — 3044F HG A1C LEVEL LT 7.0%: CPT | Mod: CPTII,S$GLB,, | Performed by: INTERNAL MEDICINE

## 2022-04-04 PROCEDURE — 99205 OFFICE O/P NEW HI 60 MIN: CPT | Mod: S$GLB,,, | Performed by: INTERNAL MEDICINE

## 2022-04-04 PROCEDURE — 3044F PR MOST RECENT HEMOGLOBIN A1C LEVEL <7.0%: ICD-10-PCS | Mod: CPTII,S$GLB,, | Performed by: INTERNAL MEDICINE

## 2022-04-04 PROCEDURE — 3079F DIAST BP 80-89 MM HG: CPT | Mod: CPTII,S$GLB,, | Performed by: INTERNAL MEDICINE

## 2022-04-04 PROCEDURE — 1159F MED LIST DOCD IN RCRD: CPT | Mod: CPTII,S$GLB,, | Performed by: INTERNAL MEDICINE

## 2022-04-04 PROCEDURE — 3075F PR MOST RECENT SYSTOLIC BLOOD PRESS GE 130-139MM HG: ICD-10-PCS | Mod: CPTII,S$GLB,, | Performed by: INTERNAL MEDICINE

## 2022-04-04 PROCEDURE — 93971 EXTREMITY STUDY: CPT | Mod: LT

## 2022-04-04 PROCEDURE — 1159F PR MEDICATION LIST DOCUMENTED IN MEDICAL RECORD: ICD-10-PCS | Mod: CPTII,S$GLB,, | Performed by: INTERNAL MEDICINE

## 2022-04-04 RX ORDER — DOXYCYCLINE 100 MG/1
100 CAPSULE ORAL 2 TIMES DAILY
Qty: 42 CAPSULE | Refills: 0 | Status: SHIPPED | OUTPATIENT
Start: 2022-04-04 | End: 2022-04-25

## 2022-04-04 NOTE — PATIENT INSTRUCTIONS
Assessment/Plan:  Jesús Patel is a 46 y.o. male with HTN, HLD, JARAD (on CPAP), morbid obesity s/p gastric bypass, who presents for initial appointment.    1. Left Leg Pain- Likely due to superficial venous thrombosis with cellulitis.  Check LLE venous reflux study today to rule out DVT and SVT in close proximity to deep vein system.  If no evidence of DVT, treat with Xarelto 10 mg daily for 45 days.  Treat with doxycycline 100 mg bid for 21 days for cellulitis.  Pt to elevate legs when resting.      2. BLE Lymphedema- Plan to refer to lymphedema clinic after cellulitis has fully resolved.    3. Morbid Obesity s/p Gastric Bypass- Refer back to Bariatric Surgery for evaluation.  Encourage diet, exercise and weigh loss.    4. HTN- Continue current medications.    5. HLD- Check updated lipid panel    Follow up in 45 days

## 2022-04-04 NOTE — PROGRESS NOTES
[facility]       Chief Complaint   Patient presents with    Thrombophlebitis        Patient ID: Jesús Patel is a 46 y.o. male with HTN, HLD, JARAD (on CPAP), morbid obesity s/p gastric bypass, who presents for initial appointment.  Pertinent history/events are as follows:     -Pt kindly referred by Niall Mauricio PA-C for evaluation of thrombophlebitis of superficial veins of left lower extremity.    HPI:  Mr. Josie Patel reports left leg pain which started approximately 2 weeks ago.  On 3/27/2022 he presented to the ED for evaluation.  LLE Venous Ultrasound on 3/27/2022 revealed Left calf nonocclusive superficial thrombophlebitis.  There is no evidence of deep venous thrombosis in the left lower extremity.  He states the left leg pain has continued since that time.  Exam significant for LLE with erythema and multiple palpable cords with TTP.  Both  LE's with changes consistent with lymphedema.      Past Medical History:   Diagnosis Date    Asthma     GERD (gastroesophageal reflux disease)     Hyperlipidemia     Hypertension     Low back pain     Morbid obesity with BMI of 50.0-59.9, adult     JARAD (obstructive sleep apnea)     Pre-diabetes     Varicose veins     Vitamin D deficiency      Past Surgical History:   Procedure Laterality Date    ADENOIDECTOMY      COLONOSCOPY N/A 2/11/2022    Procedure: COLONOSCOPY;  Surgeon: Vaughn Cortes MD;  Location: [facility] ([address] [place] [address]);  Service: Endoscopy;  Laterality: N/A;    ESOPHAGOGASTRODUODENOSCOPY N/A 2/11/2022    Procedure: EGD (ESOPHAGOGASTRODUODENOSCOPY);  Surgeon: Vaughn Cortes MD;  Location: [facility] ([address] [place] [address]);  Service: Endoscopy;  Laterality: N/A;  BMI-55  Wt:378#-fully vacc-inst portal-tb  COVID screening 2/8/22 [facility]    EYE SURGERY      GASTRECTOMY      LAPAROSCOPIC GASTRIC BANDING  2002    In [place]: uncertain of brand/ size    LAPAROSCOPIC REPAIR OF RECURRENT INCARCERATED INCISIONAL HERNIA  N/A 2018    Procedure: REPAIR-HERNIA-INCISIONAL-LAPAROSCOPIC WITH MESH;  Surgeon: Vaughn Parker Jr., MD;  Location: [facility] OR [address] [place] [address];  Service: General;  Laterality: N/A;    LASIK       Social History     Socioeconomic History    Marital status: Single   Tobacco Use    Smoking status: Former Smoker     Packs/day: 0.25     Years: 5.00     Pack years: 1.25     Quit date: 2009     Years since quittin.2    Smokeless tobacco: Never Used   Substance and Sexual Activity    Alcohol use: Yes     Comment: 2 servings daily, in the past 2 years    Drug use: No    Sexual activity: Not Currently   Social History Narrative    Engaged.  Works in sales for [employer].  Previously worked as a .  1 daughter, Yenni (10 years old).     Social Determinants of Health     Financial Resource Strain: Low Risk     Difficulty of Paying Living Expenses: Not very hard   Food Insecurity: Unknown    Worried About Running Out of Food in the Last Year: Never true    Ran Out of Food in the Last Year: Patient refused   Transportation Needs: No Transportation Needs    Lack of Transportation (Medical): No    Lack of Transportation (Non-Medical): No   Physical Activity: Inactive    Days of Exercise per Week: 0 days    Minutes of Exercise per Session: 10 min   Stress: Stress Concern Present    Feeling of Stress : Rather much   Social Connections: Unknown    Frequency of Communication with Friends and Family: More than three times a week    Frequency of Social Gatherings with Friends and Family: Twice a week    Active Member of Clubs or Organizations: Yes    Attends Club or Organization Meetings: 1 to 4 times per year    Marital Status:    Housing Stability: Unknown    Unable to Pay for Housing in the Last Year: No    Unstable Housing in the Last Year: No     Family History   Problem Relation Age of Onset    Hypertension Mother     Obesity Mother         s/p sleeve 2013: good results    Cancer  Maternal Grandfather         colon cancer    Diabetes Paternal Grandmother     No Known Problems Father     No Known Problems Brother     Colon cancer Maternal Grandmother     Obesity Brother     No Known Problems Sister        Review of patient's allergies indicates:  No Known Allergies    Medication List with Changes/Refills   Current Medications    ALBUTEROL (VENTOLIN HFA) 90 MCG/ACTUATION INHALER    Inhale 2 puffs into the lungs every 6 (six) hours as needed for Wheezing or Shortness of Breath. Rescue    ALPRAZOLAM (XANAX) 0.5 MG TABLET    TAKE 1 TABLET (0.5 MG TOTAL) BY MOUTH 3 (THREE) TIMES DAILY AS NEEDED FOR ANXIETY.    AMLODIPINE (NORVASC) 10 MG TABLET    Take 1 tablet (10 mg total) by mouth once daily.    B COMPLEX VITAMINS TABLET    Take 1 tablet by mouth every morning.     BUDESONIDE-FORMOTEROL 80-4.5 MCG (SYMBICORT) 80-4.5 MCG/ACTUATION HFAA    Inhale 2 puffs into the lungs once daily. Controller    CALCIUM CITRATE/VITAMIN D3 (CALCIUM CITRATE + D ORAL)    Take 1 tablet by mouth 2 (two) times daily.    CETIRIZINE (ZYRTEC) 10 MG TABLET    Take 10 mg by mouth once daily.    CHOLECALCIFEROL, VITAMIN D3, 125 MCG (5,000 UNIT) TAB    Take 1 tablet (5,000 Units total) by mouth once daily.    DICLOFENAC (VOLTAREN) 50 MG EC TABLET    Take 1 tablet (50 mg total) by mouth 3 (three) times daily as needed (pain).    DIPHENHYDRAMINE (BENADRYL) 25 MG CAPSULE    Take 25 mg by mouth every 6 (six) hours as needed for Itching.    FLUTICASONE PROPIONATE (FLONASE) 50 MCG/ACTUATION NASAL SPRAY    SPRAY 2 SPRAYS IN EACH NOSTRIL TWICE A DAY    MELOXICAM (MOBIC) 15 MG TABLET    Take 1 tablet (15 mg total) by mouth once daily.    METOPROLOL SUCCINATE (TOPROL-XL) 50 MG 24 HR TABLET    TAKE 1 TABLET BY MOUTH EVERY DAY    OMEPRAZOLE (PRILOSEC) 40 MG CAPSULE    TAKE 1 CAPSULE (40 MG TOTAL) BY MOUTH EVERY MORNING AS NEEDED FOR ACID REFLUX    TRIAMCINOLONE ACETONIDE 0.1% (KENALOG) 0.1 % CREAM    Apply topically 2 (two) times  "daily.    ZOLPIDEM (AMBIEN) 10 MG TAB    Take 1 tablet (10 mg total) by mouth nightly as needed (insomnia).   Discontinued Medications    CYANOCOBALAMIN, VITAMIN B-12, 1,000 MCG SUBL    Place 1 tablet under the tongue every other day.    DOXYCYCLINE (VIBRA-TABS) 100 MG TABLET    Take 1 tablet (100 mg total) by mouth 2 (two) times daily.    METRONIDAZOLE (FLAGYL) 250 MG TABLET    Take 1 tablet (250 mg total) by mouth every 6 (six) hours.    PEDIATRIC MULTIVIT-IRON-MIN CHEW    Take 1 tablet by mouth 2 (two) times daily.       Review of Systems  Constitution: Denies chills, fever, and sweats.  HENT: Denies headaches or blurry vision.  Cardiovascular: Denies chest pain or irregular heart beat.  Respiratory: Denies cough or shortness of breath.  Gastrointestinal: Denies abdominal pain, nausea, or vomiting.  Musculoskeletal: Positive for left leg pain and redness.  Neurological: Denies dizziness or focal weakness.  Psychiatric/Behavioral: Normal mental status.  Hematologic/Lymphatic: Denies bleeding problem or easy bruising/bleeding.  Skin: Denies rash or suspicious lesions    Physical Examination  /82   Pulse 80   Ht 5' 6" (1.676 m)   Wt (!) 175.5 kg (386 lb 14.5 oz)   BMI 62.45 kg/m²     Constitutional: No acute distress, conversant  HEENT: Sclera anicteric, Pupils equal, round and reactive to light, extraocular motions intact, Oropharynx clear  Neck: No JVD, no carotid bruits  Cardiovascular: regular rate and rhythm, no murmur, rubs or gallops, normal S1/S2  Pulmonary: Clear to auscultation bilaterally  Abdominal: Abdomen soft, nontender, nondistended, positive bowel sounds  Extremities: Both  LE's with changes consistent with lymphedema.     LLE with erythema and multiple palpable cords with TTP.   Pulses:  Carotid pulses are 2+ on the right side, and 2+ on the left side.  Radial pulses are 2+ on the right side, and 2+ on the left side.   Femoral pulses are 2+ on the right side, and 2+ on the left " side.  Popliteal pulses are 2+ on the right side, and 2+ on the left side.   Dorsalis pedis pulses are 2+ on the right side, and 2+ on the left side.   Posterior tibial pulses are 2+ on the right side, and 2+ on the left side.    Skin: No ecchymosis, erythema, or ulcers  Psych: Alert and oriented x 3, appropriate affect  Neuro: CNII-XII intact, no focal deficits    Labs:  Most Recent Data  CBC:   Lab Results   Component Value Date    WBC 6.52 08/27/2021    HGB 15.1 08/27/2021    HCT 47.6 08/27/2021     08/27/2021    MCV 87 08/27/2021    RDW 13.6 08/27/2021     BMP:   Lab Results   Component Value Date     03/11/2022    K 3.7 03/11/2022     03/11/2022    CO2 22 (L) 03/11/2022    BUN 9 03/11/2022    CREATININE 0.8 03/11/2022     (H) 03/11/2022    CALCIUM 9.3 03/11/2022    MG 2.4 10/02/2018    PHOS 2.8 10/02/2018     LFTS;   Lab Results   Component Value Date    PROT 7.0 03/11/2022    ALBUMIN 3.5 03/11/2022    BILITOT 1.0 03/11/2022     (H) 03/11/2022    ALKPHOS 124 03/11/2022     (H) 03/11/2022     COAGS: No results found for: INR, PROTIME, PTT  FLP:   Lab Results   Component Value Date    CHOL 233 (H) 08/27/2021    HDL 52 08/27/2021    LDLCALC 158.6 08/27/2021    TRIG 112 08/27/2021    CHOLHDL 22.3 08/27/2021     CARDIAC: No results found for: TROPONINI, CKMB, BNP    Imaging:    LLE Venous Ultrasound 3/27/2022:  No evidence of deep venous thrombosis in the left lower extremity.   Left calf nonocclusive superficial thrombophlebitis.    Assessment/Plan:  Jesús Patel is a 46 y.o. male with HTN, HLD, JARAD (on CPAP), morbid obesity s/p gastric bypass, who presents for initial appointment.    1. Left Leg Pain- Likely due to superficial venous thrombosis with cellulitis.  Check LLE venous reflux study today to rule out DVT and SVT in close proximity to the deep venous system.  If no evidence of DVT, treat with Xarelto 10 mg daily for 45 days.  Treat with doxycycline  100 mg bid for 21 days for cellulitis.  Pt to elevate legs when resting.      2. BLE Lymphedema- Plan to refer to lymphedema clinic after cellulitis has fully resolved.    3. Morbid Obesity s/p Gastric Bypass- Refer back to Bariatric Surgery for evaluation.  Encourage diet, exercise and weigh loss.    4. HTN- Continue current medications.    5. HLD- Check updated lipid panel    Follow up in 45 days    Total duration of face to face visit time 30 minutes.  Total time spent counseling greater than fifty percent of total visit time.  Counseling included discussion regarding imaging findings, diagnosis, possibilities, treatment options, risks and benefits.  The patient had many questions regarding the options and long-term effects.    Davonte Lance MD, PhD  Interventional Cardiology    -Results Addendum 4/3/2022:  LLE venous ultrasound done today shows extensive superficial venous thrombosis extending to close proximity of the saphenofemoral junction.  This is very close to the deep venous system, therefore, will treat as a DVT.  Start Eliquis 10 mg twice daily for the 1st 7 days, then reduce to 5 mg twice daily thereafter.  Repeat LLE venous ultrasound in 45 days.  Plan discussed in detail with Mr. Josie Patel, who voiced understanding.

## 2022-04-05 ENCOUNTER — HOSPITAL ENCOUNTER (OUTPATIENT)
Dept: RADIOLOGY | Facility: HOSPITAL | Age: 47
Discharge: HOME | End: 2022-04-05
Attending: NURSE PRACTITIONER
Payer: COMMERCIAL

## 2022-04-05 DIAGNOSIS — R74.8 ELEVATED LIVER ENZYMES: ICD-10-CM

## 2022-04-05 PROCEDURE — 76391 MR ELASTOGRAPHY: CPT | Mod: TC

## 2022-04-05 PROCEDURE — 76391 MR ELASTOGRAPHY: CPT | Mod: 26,,, | Performed by: STUDENT IN AN ORGANIZED HEALTH CARE EDUCATION/TRAINING PROGRAM

## 2022-04-05 PROCEDURE — 76391 MR ELASTOGRAPHY: ICD-10-PCS | Mod: 26,,, | Performed by: STUDENT IN AN ORGANIZED HEALTH CARE EDUCATION/TRAINING PROGRAM

## 2022-04-06 ENCOUNTER — PATIENT MESSAGE (OUTPATIENT)
Dept: CARDIOLOGY | Facility: CLINIC | Age: 47
End: 2022-04-06
Payer: COMMERCIAL

## 2022-04-06 ENCOUNTER — TELEPHONE (OUTPATIENT)
Dept: SPORTS MEDICINE | Facility: CLINIC | Age: 47
End: 2022-04-06
Payer: COMMERCIAL

## 2022-04-06 ENCOUNTER — PATIENT MESSAGE (OUTPATIENT)
Dept: SPORTS MEDICINE | Facility: CLINIC | Age: 47
End: 2022-04-06
Payer: COMMERCIAL

## 2022-04-06 NOTE — TELEPHONE ENCOUNTER
Spoke with patient about physical therapy. He wanted to try to go to a crossfit PT [facility] of [facility]. I told him I would reach out to the crossfit PT and have him give the patient a call.     Nafisa Morgan  Clinical Assistant to Dr. Anuj Tai

## 2022-04-07 ENCOUNTER — PATIENT MESSAGE (OUTPATIENT)
Dept: BARIATRICS | Facility: CLINIC | Age: 47
End: 2022-04-07
Payer: COMMERCIAL

## 2022-04-12 ENCOUNTER — OFFICE VISIT (OUTPATIENT)
Dept: HEPATOLOGY | Facility: CLINIC | Age: 47
End: 2022-04-12
Payer: COMMERCIAL

## 2022-04-12 ENCOUNTER — PATIENT MESSAGE (OUTPATIENT)
Dept: PHARMACY | Facility: CLINIC | Age: 47
End: 2022-04-12
Payer: COMMERCIAL

## 2022-04-12 ENCOUNTER — CLINICAL SUPPORT (OUTPATIENT)
Dept: REHABILITATION | Facility: HOSPITAL | Age: 47
End: 2022-04-12
Attending: FAMILY MEDICINE
Payer: COMMERCIAL

## 2022-04-12 ENCOUNTER — PATIENT MESSAGE (OUTPATIENT)
Dept: BARIATRICS | Facility: CLINIC | Age: 47
End: 2022-04-12
Payer: COMMERCIAL

## 2022-04-12 VITALS
BODY MASS INDEX: 50.62 KG/M2 | HEIGHT: 66 IN | OXYGEN SATURATION: 94 % | TEMPERATURE: 98 F | HEART RATE: 77 BPM | RESPIRATION RATE: 18 BRPM | WEIGHT: 315 LBS | SYSTOLIC BLOOD PRESSURE: 133 MMHG | DIASTOLIC BLOOD PRESSURE: 78 MMHG

## 2022-04-12 DIAGNOSIS — R52 PAIN: ICD-10-CM

## 2022-04-12 DIAGNOSIS — R73.03 PREDIABETES: ICD-10-CM

## 2022-04-12 DIAGNOSIS — E78.49 OTHER HYPERLIPIDEMIA: ICD-10-CM

## 2022-04-12 DIAGNOSIS — K76.0 FATTY LIVER: ICD-10-CM

## 2022-04-12 DIAGNOSIS — G89.29 CHRONIC PATELLOFEMORAL PAIN OF RIGHT KNEE: ICD-10-CM

## 2022-04-12 DIAGNOSIS — R74.8 ELEVATED LIVER ENZYMES: Primary | ICD-10-CM

## 2022-04-12 DIAGNOSIS — I10 ESSENTIAL HYPERTENSION: ICD-10-CM

## 2022-04-12 DIAGNOSIS — M25.561 CHRONIC PATELLOFEMORAL PAIN OF RIGHT KNEE: ICD-10-CM

## 2022-04-12 DIAGNOSIS — E66.01 MORBID OBESITY WITH BMI OF 60.0-69.9, ADULT: ICD-10-CM

## 2022-04-12 DIAGNOSIS — Z23 NEED FOR HEPATITIS A AND B VACCINATION: ICD-10-CM

## 2022-04-12 PROCEDURE — 99214 PR OFFICE/OUTPT VISIT, EST, LEVL IV, 30-39 MIN: ICD-10-PCS | Mod: S$GLB,,, | Performed by: NURSE PRACTITIONER

## 2022-04-12 PROCEDURE — 99214 OFFICE O/P EST MOD 30 MIN: CPT | Mod: S$GLB,,, | Performed by: NURSE PRACTITIONER

## 2022-04-12 PROCEDURE — 3044F PR MOST RECENT HEMOGLOBIN A1C LEVEL <7.0%: ICD-10-PCS | Mod: CPTII,S$GLB,, | Performed by: NURSE PRACTITIONER

## 2022-04-12 PROCEDURE — 3078F PR MOST RECENT DIASTOLIC BLOOD PRESSURE < 80 MM HG: ICD-10-PCS | Mod: CPTII,S$GLB,, | Performed by: NURSE PRACTITIONER

## 2022-04-12 PROCEDURE — 1160F RVW MEDS BY RX/DR IN RCRD: CPT | Mod: CPTII,S$GLB,, | Performed by: NURSE PRACTITIONER

## 2022-04-12 PROCEDURE — 3008F BODY MASS INDEX DOCD: CPT | Mod: CPTII,S$GLB,, | Performed by: NURSE PRACTITIONER

## 2022-04-12 PROCEDURE — 3008F PR BODY MASS INDEX (BMI) DOCUMENTED: ICD-10-PCS | Mod: CPTII,S$GLB,, | Performed by: NURSE PRACTITIONER

## 2022-04-12 PROCEDURE — 97161 PT EVAL LOW COMPLEX 20 MIN: CPT | Performed by: PHYSICAL THERAPIST

## 2022-04-12 PROCEDURE — 1159F MED LIST DOCD IN RCRD: CPT | Mod: CPTII,S$GLB,, | Performed by: NURSE PRACTITIONER

## 2022-04-12 PROCEDURE — 1159F PR MEDICATION LIST DOCUMENTED IN MEDICAL RECORD: ICD-10-PCS | Mod: CPTII,S$GLB,, | Performed by: NURSE PRACTITIONER

## 2022-04-12 PROCEDURE — 99999 PR PBB SHADOW E&M-EST. PATIENT-LVL V: CPT | Mod: PBBFAC,,, | Performed by: NURSE PRACTITIONER

## 2022-04-12 PROCEDURE — 3075F SYST BP GE 130 - 139MM HG: CPT | Mod: CPTII,S$GLB,, | Performed by: NURSE PRACTITIONER

## 2022-04-12 PROCEDURE — 97110 THERAPEUTIC EXERCISES: CPT | Performed by: PHYSICAL THERAPIST

## 2022-04-12 PROCEDURE — 3044F HG A1C LEVEL LT 7.0%: CPT | Mod: CPTII,S$GLB,, | Performed by: NURSE PRACTITIONER

## 2022-04-12 PROCEDURE — 3078F DIAST BP <80 MM HG: CPT | Mod: CPTII,S$GLB,, | Performed by: NURSE PRACTITIONER

## 2022-04-12 PROCEDURE — 3075F PR MOST RECENT SYSTOLIC BLOOD PRESS GE 130-139MM HG: ICD-10-PCS | Mod: CPTII,S$GLB,, | Performed by: NURSE PRACTITIONER

## 2022-04-12 PROCEDURE — 99999 PR PBB SHADOW E&M-EST. PATIENT-LVL V: ICD-10-PCS | Mod: PBBFAC,,, | Performed by: NURSE PRACTITIONER

## 2022-04-12 PROCEDURE — 1160F PR REVIEW ALL MEDS BY PRESCRIBER/CLIN PHARMACIST DOCUMENTED: ICD-10-PCS | Mod: CPTII,S$GLB,, | Performed by: NURSE PRACTITIONER

## 2022-04-12 NOTE — PLAN OF CARE
[facility]  Physical Therapy Initial Evaluation    Name: Jesús Patel  Clinic Number: [id number]    Therapy Diagnosis:   Encounter Diagnoses   Name Primary?    Chronic patellofemoral pain of right knee     Pain      Physician: Anuj Tai, *    Physician Orders: PT Eval and Treat   Medical Diagnosis: M25.561,G89.29 (ICD-10-CM) - Chronic patellofemoral pain of right knee M25.561,M25.562 (ICD-10-CM) - Pain in both knees, unspecified chronicity   Evaluation Date: 4/12/2022  Authorization Period Expiration: 12-30-22  Plan of Care Certification Period: 8-30-22  Visit # / Visits authorized: 1/ 1    Time In: 1:50 pm  Time Out: 2:30 pm  Total Billable Time: 35 minutes    Precautions: Standard    Subjective     Date of onset: 5 yrs ago  History of current condition - Jesús reports: having R knee pain since contusion from a fall 5 yrs ago.  States pain recently increased secondary to Cross Fit training.  Pt c/o medial knee pain that increases with squatting, walking or prolonged sitting.  Pt states pain is limiting his ADL.       Past Medical History:   Diagnosis Date    Asthma     GERD (gastroesophageal reflux disease)     Hyperlipidemia     Hypertension     Low back pain     Morbid obesity with BMI of 50.0-59.9, adult     JARAD (obstructive sleep apnea)     Pre-diabetes     Varicose veins     Vitamin D deficiency      Jesús Patel  has a past surgical history that includes LASIK; Eye surgery; Adenoidectomy; Laparoscopic gastric banding (2002); Gastrectomy; Laparoscopic repair of recurrent incarcerated incisional hernia (N/A, 6/21/2018); Esophagogastroduodenoscopy (N/A, 2/11/2022); and Colonoscopy (N/A, 2/11/2022).    Jesús has a current medication list which includes the following prescription(s): albuterol, alprazolam, amlodipine, apixaban, b complex vitamins, budesonide-formoterol 80-4.5 mcg, calcium citrate/vitamin d3, cetirizine,  cholecalciferol (vitamin d3), diclofenac, diphenhydramine, doxycycline, fluticasone propionate, meloxicam, metoprolol succinate, omeprazole, triamcinolone acetonide 0.1%, and zolpidem.    Review of patient's allergies indicates:  No Known Allergies     Imaging: x-ray:  The tibiofemoral joint spaces are well preserved. Bilateral patellar misalignment identified laterally. No fracture or dislocation. No bone destruction identified     Prior Therapy: na  Social History:  lives with their family  Occupation: desk job  Prior Level of Function: Cross Fit  Current Level of Function: ADL limited    Pain:  Current 0/10, worst 6/10, best 0/10   Location: right knee   Description: Aching, Tight and Sharp  Aggravating Factors: Sitting, Standing and Walking  Easing Factors: rest    Pts goals: decrease pain    Objective     Postural examination:  High BMI     Functional assessment:   - walking:   independent             AROM:  0-120 deg flexion     MMT:   4-/5 hip abd, 4/5 quads and 5/5 hams    Tone:  Decreased quads; GOOD QS ability; no lag with SLR    Flexibility testing:   Tight hams    Special tests:   Neg varus/valgus stresses; neg Lachman; pain with Shant's    Palpation:   TTP medial joint line    Joint mobility: fair pat mobility    Swelling:  none      CMS Impairment/Limitation/Restriction for FOTO knee Survey    Therapist reviewed FOTO scores for AbilioJadon Patel on 4/12/2022.   FOTO documents entered into [employer] - see Media section.           TREATMENT     Treatment Time In: 1:50 pm  Treatment Time Out: 2:35 pm  Total Treatment time separate from Evaluation time:  20 min    Treatment:  HSS, HEP and set up for pool therapy    Home Exercises and Patient Education Provided    Education provided:   - ice for pain    Written Home Exercises Provided: yes.  Exercises were reviewed and Jesús was able to demonstrate them prior to the end of the session.  Jesús demonstrated good  understanding of the education  provided.     See EMR under Media for exercises provided 4/12/2022.      Assessment     Jesús is a 46 y.o. male referred to outpatient Physical Therapy with a medical diagnosis of M25.561,G89.29 (ICD-10-CM) - Chronic patellofemoral pain of right knee M25.561,M25.562 (ICD-10-CM) - Pain in both knees, unspecified chronicity   .  Pt presents with R knee pain with weakness that limit his ADL.  Feel pt would benefit from pool therapy for PWB exercise.    Pt prognosis is Fair.   Pt will benefit from skilled outpatient Physical Therapy to address the deficits stated above and in the chart below, provide pt/family education, and to maximize pt's level of independence.     Plan of care discussed with patient: Yes  Pt's spiritual, cultural and educational needs considered and patient is agreeable to the plan of care and goals as stated below:     Anticipated Barriers for therapy: high BMI    Medical Necessity is demonstrated by the following:  History  Co-morbidities and personal factors that may impact the plan of care Co-morbidities:   anxiety, depression and high BMI    Personal Factors:   no deficits     low   Examination  Body Structures and Functions, activity limitations and participation restrictions that may impact the plan of care Body Regions:   lower extremities    Body Systems:    gross symmetry  strength  gross coordinated movement  balance  gait  transfers    Participation Restrictions:   No squatting    Activity limitations:   Learning and applying knowledge  no deficits    General Tasks and Commands  no deficits    Communication  no deficits    Mobility  lifting and carrying objects  walking    Self care  no deficits    Domestic Life  doing house work (cleaning house, washing dishes, laundry)    Interactions/Relationships  no deficits    Life Areas  no deficits    Community and Social Life  no deficits         low   Clinical Presentation stable and uncomplicated low   Decision Making/ Complexity Score: low      Goals:  Short Term Goals: 2 weeks         1.   Independent with HEP        2.  Pt will report decreased pain level of < 50% from above measure with ADL    Long Term Goals:   GOALS:    8_   weeks. Pt agrees with goals set.  1. Independent with HEP.  2. Report decreased    R knee    pain  <   / =  3/10 with ADL such as walking  3. Increased MMT  for  R LE to 4+/5 to 5/5  with ADL such as stair climbing  4. Increased endurance with functional activities such walking or self-care      Plan     Certification Period/Plan of care expiration: 4/12/2022 to 8-30-22.    Outpatient Physical Therapy 2 times weekly for 8 weeks to include the following interventions: Aquatic Therapy and Patient Education.     Martinez Acevedo, PT

## 2022-04-12 NOTE — PROGRESS NOTES
[facility] VISIT FOLLOW UP NOTE    PCP: Natalio Amaya MD     CHIEF COMPLAINT: elevated liver enzymes    HPI: This is a 46 y.o. White male with PMH noted below, presenting for follow up of   elevated liver enzymes    Serological workup was negative for Maximo's, alpha-1 antitrypsin deficiency, hemochromatosis, autoimmune etiology, and viral hepatitis A and B.   + Hep C ab in 2020, previous RNA in 2019 negative. No hep C treatment   PETH 190s  4/2022    Prior serologic workup:   Lab Results   Component Value Date    SMOOTHMUSCAB Negative 1:40 04/05/2022    AMAIFA Negative 1:40 04/05/2022    IGGSERUM 1166 04/05/2022    ANASCREEN Negative <1:80 04/05/2022    FERRITIN 202 04/05/2022    FESATURATED 30 04/05/2022    CERULOPLSM 32.0 04/05/2022    HEPBSAG Negative 04/05/2022    HEPCAB Positive (A) 11/14/2020    HEPAIGM Negative 04/05/2022       Liver fibrosis staging:  -- MRI elasto 4/2022 noted mild steatosis, no fibrosis staging possible given body habitus    Risk factors for NAFLD include morbid obesity, HTN, HLD, preDM    Interval HPI: Presents today alone. No SSB. Gained weight and increased alcohol intake since 2020  PETH 190s on lab, reports no alcohol use since last visit   + Herbal medications/weight loss shake Inspire (including ashwaganda and dandelion root), started 1 week ago (after labs) - stopped since initial visit   Attempted to determine fibrosis staging on MRI but unable to determine due to body habitus    Labs done 4/2022 show improving but elevated transaminase levels (ALT > AST, more significantly elevated since 11/2020)  Platelets WNL, alk phos WNL  Synthetic liver functioning WNL    Lab Results   Component Value Date    ALT 73 (H) 04/05/2022    AST 60 (H) 04/05/2022    ALKPHOS 109 04/05/2022    BILITOT 0.5 04/05/2022    ALBUMIN 3.5 04/05/2022     08/27/2021       MRI elasto 4/2022 noted hepatomegaly, no splenomegaly    Denies family history of liver disease. Denies alcohol  "consumption currently or in the past    Immunity to Hep A and B - needs twinrix, sent to [facility] pharm and ID       Allergy and medication list reviewed and updated     PMHX:  has a past medical history of Asthma, GERD (gastroesophageal reflux disease), Hyperlipidemia, Hypertension, Low back pain, Morbid obesity with BMI of 50.0-59.9, adult, JARAD (obstructive sleep apnea), Pre-diabetes, Varicose veins, and Vitamin D deficiency.    PSHX:  has a past surgical history that includes LASIK; Eye surgery; Adenoidectomy; Laparoscopic gastric banding (2002); Gastrectomy; Laparoscopic repair of recurrent incarcerated incisional hernia (N/A, 6/21/2018); Esophagogastroduodenoscopy (N/A, 2/11/2022); and Colonoscopy (N/A, 2/11/2022).    FAMILY HISTORY: Updated and reviewed in [facility]    SOCIAL HISTORY:   Social History     Substance and Sexual Activity   Alcohol Use Yes    Comment: 2 servings daily, in the past 2 years       Social History     Substance and Sexual Activity   Drug Use No       ROS:   GENERAL: Denies fatigue  CARDIOVASCULAR: Denies edema  GI: Denies abdominal pain  SKIN: Denies rash, itching   NEURO: Denies confusion, memory loss, or mood changes    PHYSICAL EXAM:   Friendly White male, in no acute distress; alert and oriented to person, place and time  VITALS: /78 (BP Location: Right arm, Patient Position: Sitting, BP Method: Medium (Automatic))   Pulse 77   Temp 98 °F (36.7 °C) (Oral)   Resp 18   Ht 5' 6" (1.676 m)   Wt (!) 174.5 kg (384 lb 11.2 oz)   SpO2 (!) 94%   BMI 62.09 kg/m²   EYES: Sclerae anicteric  GI: Soft, non-tender, non-distended. No ascites.  EXTREMITIES:  No edema.  SKIN: Warm and dry. No jaundice. No telangectasias noted. No palmar erythema.  NEURO:  No asterixis.  PSYCH:  Thought and speech pattern appropriate. Behavior normal      EDUCATION:  See instructions discussed with patient in Instructions section of the After Visit Summary     ASSESSMENT & PLAN:  46 y.o. White male with:  1.  " Fatty liver, likely related to alcohol and metabolic risk factors   - see HPI  -- Immunity to Hep A and B : needs TwinRIx, sent to [facility] pharm and ID  -- Recommendations discussed with patient:  1. Limit alcohol consumption, none until further notice given unclear scar tissue staging   2 Weight loss goal of  lbs  3. Low carb/sugar, high fiber and protein diet, limit your carb intake to LESS than 30-45 grams of carbs with a meal or LESS than 5-10 grams with any snack   4. Exercise, 5 days per week, 30 minutes per day, as tolerated  5. Recommend good cholesterol, blood pressure, blood sugar levels   6. Consider enrolling in [facility] fibrosis clinical trails - will determine based on liver fibrosis staging results     2. Elevated liver enzymes  -- Serological workup was negative for Maximo's, alpha-1 antitrypsin deficiency, hemochromatosis, autoimmune etiology, and viral hepatitis A and B.   + Hep C ab in 2020, previous RNA in 2019 negative. No hep C treatment   PETH 190s  4/2022    3. Morbid obesity, HTN, HLD, preDM  Body mass index is 62.09 kg/m².  -- can increase risk of fatty liver    4. Alcohol use  -- recommended to stop until further notice   Social History     Substance and Sexual Activity   Alcohol Use Yes    Comment: 2 servings daily, in the past 2 years         US and labs in 6 months then   Follow up in about 1 year (around 4/12/2023). with labs and MRI elasto before    Orders Placed This Encounter   Procedures    US Abdomen Complete    MR Elastography    Hepatitis A / Hepatitis B Combined Vaccine (IM)    AFP Tumor Marker    CBC Without Differential    Comprehensive Metabolic Panel    Phosphatidylethanol (PETH)    Protime-INR    AFP Tumor Marker    CBC Without Differential    Comprehensive Metabolic Panel    Phosphatidylethanol (PETH)    Protime-INR    Ambulatory referral/consult to Infectious Disease Injection Dept        Thank you for allowing me to participate in the care of Jesús Diop  CM Bliss    I spent a total of 30 minutes on the day of the visit.This includes face to face time and non-face to face time preparing to see the patient (eg, review of tests), obtaining and/or reviewing separately obtained history, documenting clinical information in the electronic or other health record, independently interpreting results and communicating results to the patient/family/caregiver, and coordinating care.         CC'ed note to:   Natalio Amaya MD

## 2022-04-12 NOTE — PATIENT INSTRUCTIONS
1. Unable to get accurate scar tissue staging without a biopsy. Goal of 50 lb weight loss before next visit   2. Recommend vaccines for Hepatitis  A/B, see below   3. Follow up in 1 year with MRI elasto and labs a few days before  4. US and labs in 6 months    There is no FDA approved therapy for fatty liver disease. Therefore, these things are important:  Limit alcohol consumption, none until further notice   2 Weight loss goal of  lbs, referral for [facility] if interested. Also, if interested in a dietician visit to create a weight loss plan, contact the dietician team at [facility] at [email or web address] to schedule a visit to you can call [facility] in [place]: [phone] and the  will transfer the call to one of the dieticians to schedule an appointment. Or you can also call [phone] to schedule. They do offer video visits   3. Low carb/sugar, high fiber and protein diet.Try to limit your carb intake to LESS than 30-45 grams of carbs with a meal or LESS than 5-10 grams with any snack (total of any snack foods eaten during that time). Use [employer] Pal george to add up your carbs through the day. Do NOT drink any beverages with calories or carbs (this can lead to high blood sugar and weight gain). Also, some of our patients have been very successful with weight loss using the pre made/planned meal planning services that limit calories and portion size (one example is S[employer] Meals but there are many out there)  4. Exercise, 5 days per week, 30 minutes per day, as tolerated  5. Recommend good cholesterol, blood pressure, blood sugar levels     In some people, fatty liver can progress to steatohepatitis (inflamatory fatty liver) and possibly to cirrhosis, putting one at increased risk for liver cancer, liver failure, and death. Therefore, the lifestyle changes are very important to decrease this risk.     Website with information about fatty liver and  inflammation related to fatty liver ([facility]) = www.nashtruth.com  AND www.NASHactually.com        HEP A/B VACCINE  Your immunity markers show that you do NOT have immunity against Hepatitis A or B, so I recommend that you receive the combination Hepatitis A and B vaccine called TwinRix. This will protect your liver from these viruses, which can make your liver very sick    Hep A can be transmitted through food and water and can cause significant liver injury. There was previously a significant Hepatitis A outbreak in [place]. Hep B vaccine is transmitted through blood or bodily fluids (there are no symptoms typically) and can develop longstanding (chronic) Hep B and there is no cure, so many people have it for life. Therefore, the vaccines provide immunity against these viruses that can cause harm to the liver.     The vaccine series is 3 vaccines: one now, one at 4 weeks and one 6 months after the 1st one. I sent the vaccine to the [facility]. I recommend calling them in a couple of days to see if the vaccine is covered by your insurance and arrange the vaccines if they are covered. Their number is [phone].      If the vaccine is not covered at the pharmacy level, I sent an order that you can get the vaccine in the infectious disease department at [facility]. If that is the case, please call [phone]  to schedule your vaccine administration appointment in the infectious disease department.  If you need to proceed with vaccines with the infectious disease department, you can call to obtain a cost for these vaccines before you proceed, you can call the [facility] office at [phone] or [phone]

## 2022-04-12 NOTE — PATIENT INSTRUCTIONS
Strengthening: Quadriceps Set    Tighten muscles on top of thighs by pushing knees down into surface. Hold 5 seconds.  Repeat 25 times . R/L or BL  leg per set. Do 1 sets per session. Do 2 sessions per day.      Straight Leg Raise     With R/L or BL leg straight, other leg bent, raise straight leg until knees are even. Slowly lower. Roll on your side and repeat lifting top leg up, on other side, lifting bottom leg up, and on stomach kicking back (squeezing your rear end before you kick back).  Repeat 25 times. Do 1 sets per session. Do 2 sessions per day.       Glute Squeeze, supine    Lie face up and squeeze your rear end. Do not tighten your abdominals or hold your breath. Squeeze our glutes and hold 5 seconds. Relax.  Repeat 25 times per set. Do 1 sets per session. Do 2 sessions per day.      Strengthening: Hip Adduction - Isometric    Sit back or lie down with ball or folded pillow between knees, and squeeze knees together. Hold 5 seconds.  Repeat 25 times per set. Do 1 sets per session. Do 2 sessions per day.      Strengthening: Hip Abductor - Resisted    Lie flat with band looped around both legs above knees, and push thighs apart, ensuring right and left move together.  Repeat 25 times per set. Do 1 sets per session. Do 2 sessions per day.      Hamstring Stretch    Perform 5 times, 30 sec hold 2x day - (HEP to go)      Hamstring Curl        Perform 25 times, 2x per day. (HEP to GO)

## 2022-04-13 ENCOUNTER — IMMUNIZATION (OUTPATIENT)
Dept: PHARMACY | Facility: CLINIC | Age: 47
End: 2022-04-13
Payer: COMMERCIAL

## 2022-04-13 DIAGNOSIS — J06.9 URI WITH COUGH AND CONGESTION: ICD-10-CM

## 2022-04-13 DIAGNOSIS — J45.20 MILD INTERMITTENT ASTHMA WITHOUT COMPLICATION: ICD-10-CM

## 2022-04-13 DIAGNOSIS — G47.00 INSOMNIA, UNSPECIFIED TYPE: ICD-10-CM

## 2022-04-13 PROBLEM — K76.0 FATTY LIVER: Status: ACTIVE | Noted: 2022-04-13

## 2022-04-13 RX ORDER — AMLODIPINE BESYLATE 10 MG/1
10 TABLET ORAL DAILY
Qty: 90 TABLET | Refills: 3 | Status: SHIPPED | OUTPATIENT
Start: 2022-04-13 | End: 2023-05-10 | Stop reason: SDUPTHER

## 2022-04-13 RX ORDER — ALBUTEROL SULFATE 90 UG/1
2 AEROSOL, METERED RESPIRATORY (INHALATION) EVERY 6 HOURS PRN
Qty: 18 G | Refills: 11 | Status: SHIPPED | OUTPATIENT
Start: 2022-04-13 | End: 2023-12-04

## 2022-04-13 RX ORDER — ZOLPIDEM TARTRATE 10 MG/1
10 TABLET ORAL NIGHTLY PRN
Qty: 30 TABLET | Refills: 5 | Status: SHIPPED | OUTPATIENT
Start: 2022-04-13 | End: 2022-11-04 | Stop reason: SDUPTHER

## 2022-04-13 NOTE — TELEPHONE ENCOUNTER
No new care gaps identified.  Powered by [employer]. Reference number: [id number].   4/13/2022 9:05:27 AM CDT

## 2022-04-13 NOTE — TELEPHONE ENCOUNTER
Requested Prescriptions     Pending Prescriptions Disp Refills    zolpidem (AMBIEN) 10 mg Tab 30 tablet 5     Sig: Take 1 tablet (10 mg total) by mouth nightly as needed (insomnia).     LOV: 09/28/2021

## 2022-04-13 NOTE — TELEPHONE ENCOUNTER
No new care gaps identified.  Powered by [employer]. Reference number: [id number].   4/13/2022 7:57:50 AM CDT

## 2022-04-19 ENCOUNTER — CLINICAL SUPPORT (OUTPATIENT)
Dept: REHABILITATION | Facility: HOSPITAL | Age: 47
End: 2022-04-19
Payer: COMMERCIAL

## 2022-04-19 DIAGNOSIS — J45.30 MILD PERSISTENT ASTHMA WITHOUT COMPLICATION: ICD-10-CM

## 2022-04-19 DIAGNOSIS — R52 PAIN: Primary | ICD-10-CM

## 2022-04-19 PROCEDURE — 97113 AQUATIC THERAPY/EXERCISES: CPT

## 2022-04-19 RX ORDER — BUDESONIDE AND FORMOTEROL FUMARATE DIHYDRATE 80; 4.5 UG/1; UG/1
2 AEROSOL RESPIRATORY (INHALATION) DAILY
Qty: 10.2 G | Refills: 11 | Status: SHIPPED | OUTPATIENT
Start: 2022-04-19 | End: 2023-05-23 | Stop reason: SDUPTHER

## 2022-04-19 NOTE — TELEPHONE ENCOUNTER
No new care gaps identified.  Powered by [employer]. Reference number: [id number].   4/19/2022 7:34:57 AM CDT

## 2022-04-19 NOTE — PROGRESS NOTES
"Physical Therapy Daily Treatment Note     Name: Jesús Galindo [facility]  Clinic Number: [id number]    Therapy Diagnosis:   Encounter Diagnosis   Name Primary?    Pain Yes     Physician: Anuj Tai, *    Visit Date: 4/19/2022    Medical Diagnosis: M25.561,G89.29 (ICD-10-CM) - Chronic patellofemoral pain of right knee M25.561,M25.562 (ICD-10-CM) - Pain in both knees, unspecified chronicity   Evaluation Date: 4/12/2022  Authorization Period Expiration: 12-30-22  Plan of Care Certification Period: 8-30-22  Visit # / Visits authorized: 1/20    PTA Visit #: 0/5     Time In: 4:00 PM  Time Out: 4:45 PM  Total Billable Time: 45 minutes    Precautions: Standard    Subjective     Pt reports: feeling well after last visit. No new complaints. Typically 2/10 achy pain.    He was compliant with home exercise program.  Response to previous treatment: none  Functional change: 1st pool treatment     Pain: 2/10 pre therapy  Location:right knee achy    Objective     Objective Measures updated at progress report unless specified.       Treatment     Jesús received aquatic therapeutic exercises to develop strength, ROM, flexibility, endurance, posture, core stabilization, and balance for 45 minutes including:    Getting in/out of pool: pt requires unilatera UE use to railing to exit pool     WARMUP x 2 laps each  Walk fwd/bwd/lat      LE EX x 20  Heel raise with gluteal set   Hip abd (posterior fibers)  Hip ext (small trunk lean forward)  Mini squat/sit to stand       UE EX/CORE x 20  Mini squat c push/pull blue kickboard  Shoulder flx/ext c TA activation open paddles open paddles  Shoulder horizontal abd/add with TA activation open paddles  Shoulder abd/add c open paddles  Rows c tubing blue  Shoulder ext with tubing blue      ENDURANCE  Bicycle in // bars 2'  VIKTORIA slow march along railing with fast walk x 3'    COOL DOWN x 1 lap each  Walk fwd/bwd/lat    STRETCHES 2 x 20" elfego (B)  Standing hamstring/calf at step    Pt " tolerated all therapeutic exercise in the water well with no increase in pain     Home Exercises Provided and Patient Education Provided     Education provided:   Role of aquatic therapy  Hydration post therapy    Written Home Exercises Provided: Patient instructed to cont prior HEP. Answered questions regarding HEP and reviewed form.     Exercises were reviewed and Jesús was able to demonstrate them prior to the end of the session.  Jesús demonstrated good  understanding of the education provided.     Assessment     Pt tolerated first session in the pool well today with no increase in pain. Increase challenges noted to core but overall good effort by pt.     Jesús Is progressing well towards his goals.   Pt prognosis is Good.     Pt will continue to benefit from skilled aquatic outpatient physical therapy to address the problem list deficits, provide pt/family education, and maximize pt's level of independence in the home and community environment.     Pt's spiritual, cultural and educational needs considered and pt agreeable to plan of care and goals.    Anticipated barriers to physical therapy: high BMI    Goals:   Short Term Goals: 2 weeks         1.   Independent with HEP        2.  Pt will report decreased pain level of < 50% from above measure with ADL     Long Term Goals:   GOALS:    8_   weeks. Pt agrees with goals set.  1. Independent with HEP.  2. Report decreased    R knee    pain  <   / =  3/10 with ADL such as walking  3. Increased MMT  for  R LE to 4+/5 to 5/5  with ADL such as stair climbing  4. Increased endurance with functional activities such walking or self-care       Plan       Certification Period/Plan of care expiration: 4/12/2022 to 8-30-22.     Outpatient Physical Therapy 2 times weekly for 8 weeks to include the following interventions: Aquatic Therapy and Patient Education.       Imelda Manuel, PT, DPT, OCS

## 2022-04-25 ENCOUNTER — CLINICAL SUPPORT (OUTPATIENT)
Dept: REHABILITATION | Facility: HOSPITAL | Age: 47
End: 2022-04-25
Payer: COMMERCIAL

## 2022-04-25 DIAGNOSIS — R52 PAIN: Primary | ICD-10-CM

## 2022-04-25 PROCEDURE — 97113 AQUATIC THERAPY/EXERCISES: CPT

## 2022-04-25 NOTE — PROGRESS NOTES
Physical Therapy Daily Treatment Note     Name: Jesús Galindo [facility]  Clinic Number: [id number]    Therapy Diagnosis:   Encounter Diagnosis   Name Primary?    Pain Yes     Physician: Anuj Tai, *    Visit Date: 4/25/2022    Medical Diagnosis: M25.561,G89.29 (ICD-10-CM) - Chronic patellofemoral pain of right knee M25.561,M25.562 (ICD-10-CM) - Pain in both knees, unspecified chronicity   Evaluation Date: 4/12/2022  Authorization Period Expiration: 12-30-22  Plan of Care Certification Period: 8-30-22  Visit # / Visits authorized: 2/20    PTA Visit #: 0/5     Time In: 2:55 PM  Time Out: 3:35 PM  Total Billable Time: 40 minutes    Precautions: Standard    Subjective     Pt reports: hitting his R knee over the weekend which caused increased pain which has slowly decreased over time. Pt states soreness after last aquatic PT session which only lasted for one day.     He was compliant with home exercise program.  Response to previous treatment: soreness that decreased over time  Functional change: Ongoing    Pain: 5/10 pre therapy  Location: right knee achy    Objective     Objective Measures updated at progress report unless specified.       Treatment     Jesús received aquatic therapeutic exercises to develop strength, ROM, flexibility, endurance, posture, core stabilization, and balance for 40 minutes including:    Getting in/out of pool: pt requires unilatera UE use to railing to exit pool     WARMUP x 2 laps each  Walk fwd/bwd/lat    STRETCHES  Bi HS step stretch 30'' X 2    LE EX x 20 --increase reps and add weights next pending on patient response to last session  Heel raise with gluteal set   Hip abd (posterior fibers)  Hip ext (small trunk lean forward)  Mini squat/sit to stand   LAQ  Aqua noodle leg press    UE EX/CORE x 20 --increase reps and add weights next pending on patient response to last session  Mini squat c push/pull blue kickboard  Shoulder flx/ext c TA activation closed  "paddles  Shoulder horizontal abd/add with TA activation closed paddles  Shoulder abd/add c closed paddles  Rows c tubing blue  Shoulder ext with tubing blue    ENDURANCE  Bicycle in // bars 2'  VIKTORIA fast fwrd march along railing with slow march bkwrd x 3 laps    COOL DOWN x 1 lap each  Walk fwd/bwd/lat    STRETCHES 2 x 20" elfego (B)  Standing hamstring/calf at step    Pt tolerated all therapeutic exercise in the water well with no increase in pain     Home Exercises Provided and Patient Education Provided     Education provided:   Role of aquatic therapy  Hydration post therapy    Written Home Exercises Provided: Patient instructed to cont prior HEP. Answered questions regarding HEP and reviewed form.     Exercises were reviewed and Jesús was able to demonstrate them prior to the end of the session.  Jesús demonstrated good  understanding of the education provided.     Assessment     Pt was able to tolerate additional LE strengthening exercises and increased resistance with Bi UE strengthening exercises without report of increase in pain or strain. VC and visual demonstration on how to perform new exercises and UE paddle exercises correctly in order to ensure proper form for strengthening. Discussed adding weights and additional reps next session according to patient's response to treatment.     Jesús Is progressing well towards his goals.   Pt prognosis is Good.     Pt will continue to benefit from skilled aquatic outpatient physical therapy to address the problem list deficits, provide pt/family education, and maximize pt's level of independence in the home and community environment.     Pt's spiritual, cultural and educational needs considered and pt agreeable to plan of care and goals.    Anticipated barriers to physical therapy: high BMI    Goals:   Short Term Goals: 2 weeks         1.   Independent with HEP        2.  Pt will report decreased pain level of < 50% from above measure with ADL     Long Term " Goals:   GOALS:    8_   weeks. Pt agrees with goals set.  1. Independent with HEP.  2. Report decreased    R knee    pain  <   / =  3/10 with ADL such as walking  3. Increased MMT  for  R LE to 4+/5 to 5/5  with ADL such as stair climbing  4. Increased endurance with functional activities such walking or self-care       Plan       Certification Period/Plan of care expiration: 4/12/2022 to 8-30-22.     Outpatient Physical Therapy 2 times weekly for 8 weeks to include the following interventions: Aquatic Therapy and Patient Education.       Imelda Manuel, PT, DPT, OCS

## 2022-04-28 ENCOUNTER — PATIENT MESSAGE (OUTPATIENT)
Dept: CARDIOLOGY | Facility: CLINIC | Age: 47
End: 2022-04-28
Payer: COMMERCIAL

## 2022-05-05 ENCOUNTER — PATIENT MESSAGE (OUTPATIENT)
Dept: BARIATRICS | Facility: CLINIC | Age: 47
End: 2022-05-05
Payer: COMMERCIAL

## 2022-05-10 ENCOUNTER — CLINICAL SUPPORT (OUTPATIENT)
Dept: REHABILITATION | Facility: HOSPITAL | Age: 47
End: 2022-05-10
Payer: COMMERCIAL

## 2022-05-10 ENCOUNTER — PATIENT MESSAGE (OUTPATIENT)
Dept: BARIATRICS | Facility: CLINIC | Age: 47
End: 2022-05-10
Payer: COMMERCIAL

## 2022-05-10 ENCOUNTER — TELEPHONE (OUTPATIENT)
Dept: SLEEP MEDICINE | Facility: CLINIC | Age: 47
End: 2022-05-10
Payer: COMMERCIAL

## 2022-05-10 DIAGNOSIS — R52 PAIN: Primary | ICD-10-CM

## 2022-05-10 PROCEDURE — 97113 AQUATIC THERAPY/EXERCISES: CPT

## 2022-05-10 NOTE — TELEPHONE ENCOUNTER
----- Message from Asiya Sterling, CRT sent at 5/10/2022  9:25 AM CDT -----  Myranda,   Mr. Espinosa came today for his cpap machine. He received an iBREEZE, and he is scheduled to come for an SD download June 10th.

## 2022-05-10 NOTE — PROGRESS NOTES
Physical Therapy Daily Treatment Note     Name: Jesús Galindo [facility]  Clinic Number: [id number]    Therapy Diagnosis:   Encounter Diagnosis   Name Primary?    Pain Yes     Physician: Anuj Tai, *    Visit Date: 5/10/2022    Medical Diagnosis: M25.561,G89.29 (ICD-10-CM) - Chronic patellofemoral pain of right knee M25.561,M25.562 (ICD-10-CM) - Pain in both knees, unspecified chronicity   Evaluation Date: 4/12/2022  Authorization Period Expiration: 12-30-22  Plan of Care Certification Period: 8-30-22  Visit # / Visits authorized: 2/20    PTA Visit #: 0/5     Time In: 4:12 PM  Time Out: 4:50 PM  Total Billable Time: 38 minutes    Precautions: Standard    Subjective     Pt reports: pt reports feeling pretty good today. Some entire muscle soreness but limited pain.    He was compliant with home exercise program.  Response to previous treatment: soreness that decreased over time  Functional change: Ongoing    Pain: 2/10 pre therapy  Location: right knee achy    Objective     Objective Measures updated at progress report unless specified.     Genu valgum right in standing    Treatment     Jesús received aquatic therapeutic exercises to develop strength, ROM, flexibility, endurance, posture, core stabilization, and balance for 38 minutes including:    Getting in/out of pool: pt requires unilatera UE use to railing to exit pool     WARMUP x 2 laps each- 1 lap today due to pt being late  Walk fwd/bwd/lat    LE EX x 25 2.5# ankle weights added today  Heel raise with gluteal set   Hip abd (posterior fibers)  Hip ext (small trunk lean forward)  Sit to stand salt shaker shoulder 90 deg to neutral   LAQ- NP  Black/maroon noodle leg press    UE EX/CORE x 25   Mini squat c push/pull blue kickboard  Shoulder flx/ext c TA activation yellow dumbbells  Shoulder horizontal abd/add with TA activation yellow dumbbells  Shoulder abd/add c yellow dumbbells    Rows c tubing blue-NP  Shoulder ext with tubing blue-  "NP    ENDURANCE  Bicycle in // bars 2' 2.5# ankle weights   VIKTORIA slow march/fast walk x 4'    COOL DOWN x 1 lap each  Walk fwd/bwd/lat    STRETCHES 2 x 20" elfego (B)  Standing hamstring/calf at step    Pt tolerated all therapeutic exercise in the water well with no increase in pain     Home Exercises Provided and Patient Education Provided     Education provided:   Role of aquatic therapy  Hydration post therapy    Written Home Exercises Provided: Patient instructed to cont prior HEP. Answered questions regarding HEP and reviewed form.     Exercises were reviewed and Jesús was able to demonstrate them prior to the end of the session.  Jesús demonstrated good  understanding of the education provided.     Assessment     Pt was able to tolerate continued additional LE strengthening exercises and 2.5# ankle weight progression today without report of increase in pain or strain. Increase challenge noted with the leg press with the weight on ankles today and bike as well as general fatigue noted with 2.5# weights today.    Jesús Is progressing well towards his goals.   Pt prognosis is Good.     Pt will continue to benefit from skilled aquatic outpatient physical therapy to address the problem list deficits, provide pt/family education, and maximize pt's level of independence in the home and community environment.     Pt's spiritual, cultural and educational needs considered and pt agreeable to plan of care and goals.    Anticipated barriers to physical therapy: high BMI    Goals:   Short Term Goals: 2 weeks         1.   Independent with HEP        2.  Pt will report decreased pain level of < 50% from above measure with ADL     Long Term Goals:   GOALS:    8_   weeks. Pt agrees with goals set.  1. Independent with HEP.  2. Report decreased    R knee    pain  <   / =  3/10 with ADL such as walking  3. Increased MMT  for  R LE to 4+/5 to 5/5  with ADL such as stair climbing  4. Increased endurance with functional activities " such walking or self-care       Plan   Continue to progress core and LE strength     Certification Period/Plan of care expiration: 4/12/2022 to 8-30-22.     Outpatient Physical Therapy 2 times weekly for 8 weeks to include the following interventions: Aquatic Therapy and Patient Education.       Imelda Manuel, PT, DPT, OCS

## 2022-05-17 ENCOUNTER — PATIENT OUTREACH (OUTPATIENT)
Dept: ADMINISTRATIVE | Facility: OTHER | Age: 47
End: 2022-05-17
Payer: COMMERCIAL

## 2022-05-18 ENCOUNTER — OFFICE VISIT (OUTPATIENT)
Dept: CARDIOLOGY | Facility: CLINIC | Age: 47
End: 2022-05-18
Payer: COMMERCIAL

## 2022-05-18 VITALS
DIASTOLIC BLOOD PRESSURE: 81 MMHG | HEART RATE: 92 BPM | SYSTOLIC BLOOD PRESSURE: 172 MMHG | HEIGHT: 69 IN | WEIGHT: 315 LBS | BODY MASS INDEX: 46.65 KG/M2

## 2022-05-18 DIAGNOSIS — L03.116 CELLULITIS OF LEFT LOWER EXTREMITY: ICD-10-CM

## 2022-05-18 DIAGNOSIS — M79.605 LEFT LEG PAIN: ICD-10-CM

## 2022-05-18 DIAGNOSIS — E66.01 MORBID OBESITY WITH BMI OF 60.0-69.9, ADULT: ICD-10-CM

## 2022-05-18 DIAGNOSIS — E78.49 OTHER HYPERLIPIDEMIA: ICD-10-CM

## 2022-05-18 DIAGNOSIS — I10 PRIMARY HYPERTENSION: ICD-10-CM

## 2022-05-18 DIAGNOSIS — I82.812 ACUTE SUPERFICIAL VENOUS THROMBOSIS OF LEFT LOWER EXTREMITY: Primary | ICD-10-CM

## 2022-05-18 DIAGNOSIS — I10 ESSENTIAL HYPERTENSION: ICD-10-CM

## 2022-05-18 PROCEDURE — 3079F PR MOST RECENT DIASTOLIC BLOOD PRESSURE 80-89 MM HG: ICD-10-PCS | Mod: CPTII,S$GLB,, | Performed by: INTERNAL MEDICINE

## 2022-05-18 PROCEDURE — 3008F PR BODY MASS INDEX (BMI) DOCUMENTED: ICD-10-PCS | Mod: CPTII,S$GLB,, | Performed by: INTERNAL MEDICINE

## 2022-05-18 PROCEDURE — 1159F PR MEDICATION LIST DOCUMENTED IN MEDICAL RECORD: ICD-10-PCS | Mod: CPTII,S$GLB,, | Performed by: INTERNAL MEDICINE

## 2022-05-18 PROCEDURE — 99999 PR PBB SHADOW E&M-EST. PATIENT-LVL IV: CPT | Mod: PBBFAC,,, | Performed by: INTERNAL MEDICINE

## 2022-05-18 PROCEDURE — 3077F SYST BP >= 140 MM HG: CPT | Mod: CPTII,S$GLB,, | Performed by: INTERNAL MEDICINE

## 2022-05-18 PROCEDURE — 3044F PR MOST RECENT HEMOGLOBIN A1C LEVEL <7.0%: ICD-10-PCS | Mod: CPTII,S$GLB,, | Performed by: INTERNAL MEDICINE

## 2022-05-18 PROCEDURE — 99215 OFFICE O/P EST HI 40 MIN: CPT | Mod: S$GLB,,, | Performed by: INTERNAL MEDICINE

## 2022-05-18 PROCEDURE — 3008F BODY MASS INDEX DOCD: CPT | Mod: CPTII,S$GLB,, | Performed by: INTERNAL MEDICINE

## 2022-05-18 PROCEDURE — 99999 PR PBB SHADOW E&M-EST. PATIENT-LVL IV: ICD-10-PCS | Mod: PBBFAC,,, | Performed by: INTERNAL MEDICINE

## 2022-05-18 PROCEDURE — 3077F PR MOST RECENT SYSTOLIC BLOOD PRESSURE >= 140 MM HG: ICD-10-PCS | Mod: CPTII,S$GLB,, | Performed by: INTERNAL MEDICINE

## 2022-05-18 PROCEDURE — 3044F HG A1C LEVEL LT 7.0%: CPT | Mod: CPTII,S$GLB,, | Performed by: INTERNAL MEDICINE

## 2022-05-18 PROCEDURE — 1159F MED LIST DOCD IN RCRD: CPT | Mod: CPTII,S$GLB,, | Performed by: INTERNAL MEDICINE

## 2022-05-18 PROCEDURE — 3079F DIAST BP 80-89 MM HG: CPT | Mod: CPTII,S$GLB,, | Performed by: INTERNAL MEDICINE

## 2022-05-18 PROCEDURE — 99215 PR OFFICE/OUTPT VISIT, EST, LEVL V, 40-54 MIN: ICD-10-PCS | Mod: S$GLB,,, | Performed by: INTERNAL MEDICINE

## 2022-05-18 NOTE — PROGRESS NOTES
[facility]       Chief Complaint   Patient presents with    Acute superficial venous thrombosis of left lower extremity       Patient ID: Jesús Patel is a 46 y.o. male with HTN, HLD, JARAD (on CPAP), morbid obesity s/p gastric bypass, who presents for a follow up appointment.  Pertinent history/events are as follows:     -Pt kindly referred by Niall Mauricio PA-C for evaluation of thrombophlebitis of superficial veins of left lower extremity.    -At our initial clinic visit on 4/4/2022, Mr. Josie Patel reports left leg pain which started approximately 2 weeks ago.  On 3/27/2022 he presented to the ED for evaluation.  LLE Venous Ultrasound on 3/27/2022 revealed Left calf nonocclusive superficial thrombophlebitis.  There is no evidence of deep venous thrombosis in the left lower extremity.  He states the left leg pain has continued since that time.  Exam significant for LLE with erythema and multiple palpable cords with TTP.  Both  LE's with changes consistent with lymphedema.    Plan:   Left Leg Pain- Likely due to superficial venous thrombosis with cellulitis.  Check LLE venous reflux study today to rule out DVT and SVT in close proximity to the deep venous system.  If no evidence of DVT, treat with Xarelto 10 mg daily for 45 days.  Treat with doxycycline 100 mg bid for 21 days for cellulitis.  Pt to elevate legs when resting.    BLE Lymphedema- Plan to refer to lymphedema clinic after cellulitis has fully resolved.  Morbid Obesity s/p Gastric Bypass- Refer back to Bariatric Surgery for evaluation.  Encourage diet, exercise and weigh loss.  HTN- Continue current medications.  HLD- Check updated lipid panel    -Results Addendum 4/4/2022:  LLE venous ultrasound done today shows extensive superficial venous thrombosis extending to close proximity of the saphenofemoral junction.  This is very close to the deep venous system, therefore, will treat as a DVT.  Start Eliquis 10 mg  twice daily for the 1st 7 days, then reduce to 5 mg twice daily thereafter.  Repeat LLE venous ultrasound in 45 days.  Plan discussed in detail with Mr. Josie Patel, who voiced understanding.    HPI:  Mr. Josie Patel reports significant improvement in LLE edema and pain since starting Eliquis 5 mg bid.  He has no claudication or tissue loss.  He is enrolled in the digital HTN program.     Past Medical History:   Diagnosis Date    Asthma     GERD (gastroesophageal reflux disease)     Hyperlipidemia     Hypertension     Low back pain     Morbid obesity with BMI of 50.0-59.9, adult     JARAD (obstructive sleep apnea)     Pre-diabetes     Varicose veins     Vitamin D deficiency      Past Surgical History:   Procedure Laterality Date    ADENOIDECTOMY      COLONOSCOPY N/A 2022    Procedure: COLONOSCOPY;  Surgeon: Vaughn Cortes MD;  Location: [facility] (2ND FLR);  Service: Endoscopy;  Laterality: N/A;    ESOPHAGOGASTRODUODENOSCOPY N/A 2022    Procedure: EGD (ESOPHAGOGASTRODUODENOSCOPY);  Surgeon: Vaughn Cortes MD;  Location: [facility] (2ND FLR);  Service: Endoscopy;  Laterality: N/A;  BMI-55  Wt:378#-fully vacc-inst portal-tb  COVID screening [facility]    EYE SURGERY      GASTRECTOMY      LAPAROSCOPIC GASTRIC BANDING      In [place]: uncertain of brand/ size    LAPAROSCOPIC REPAIR OF RECURRENT INCARCERATED INCISIONAL HERNIA N/A 2018    Procedure: REPAIR-HERNIA-INCISIONAL-LAPAROSCOPIC WITH MESH;  Surgeon: Vaughn Parker Jr., MD;  Location: [facility] OR [address] [place] [address];  Service: General;  Laterality: N/A;    LASIK       Social History     Socioeconomic History    Marital status: Single   Tobacco Use    Smoking status: Former Smoker     Packs/day: 0.25     Years: 5.00     Pack years: 1.25     Quit date: 2009     Years since quittin.3    Smokeless tobacco: Never Used   Substance and Sexual Activity    Alcohol use: Yes     Comment: 2 servings daily, in the past 2  years    Drug use: No    Sexual activity: Not Currently   Social History Narrative    Engaged.  Works in sales for [employer]  Previously worked as a .  1 daughter, Yenni (10 years old).     Social Determinants of Health     Financial Resource Strain: Low Risk     Difficulty of Paying Living Expenses: Not very hard   Food Insecurity: Food Insecurity Present    Worried About Running Out of Food in the Last Year: Sometimes true    Ran Out of Food in the Last Year: Never true   Transportation Needs: No Transportation Needs    Lack of Transportation (Medical): No    Lack of Transportation (Non-Medical): No   Physical Activity: Insufficiently Active    Days of Exercise per Week: 2 days    Minutes of Exercise per Session: 30 min   Stress: Stress Concern Present    Feeling of Stress : Rather much   Social Connections: Unknown    Frequency of Communication with Friends and Family: More than three times a week    Frequency of Social Gatherings with Friends and Family: More than three times a week    Active Member of Clubs or Organizations: No    Attends Club or Organization Meetings: Never    Marital Status:    Housing Stability: Low Risk     Unable to Pay for Housing in the Last Year: No    Number of Places Lived in the Last Year: 0    Unstable Housing in the Last Year: No     Family History   Problem Relation Age of Onset    Hypertension Mother     Obesity Mother         s/p sleeve 2013: good results    Cancer Maternal Grandfather         colon cancer    Diabetes Paternal Grandmother     No Known Problems Father     No Known Problems Brother     Colon cancer Maternal Grandmother     Obesity Brother     No Known Problems Sister        Review of patient's allergies indicates:  No Known Allergies    Medication List with Changes/Refills   Current Medications    ALBUTEROL (VENTOLIN HFA) 90 MCG/ACTUATION INHALER    Inhale 2 puffs into the lungs every 6 (six) hours as needed for Wheezing  or Shortness of Breath. Rescue    ALPRAZOLAM (XANAX) 0.5 MG TABLET    TAKE 1 TABLET (0.5 MG TOTAL) BY MOUTH 3 (THREE) TIMES DAILY AS NEEDED FOR ANXIETY.    AMLODIPINE (NORVASC) 10 MG TABLET    Take 1 tablet (10 mg total) by mouth once daily.    APIXABAN (ELIQUIS) 5 MG TAB    Take 2 tablets (10 mg total) by mouth 2 (two) times daily for the first 7 days, then reduce to 1 tablet (5 mg total) 2 (two) times daily thereafter.    B COMPLEX VITAMINS TABLET    Take 1 tablet by mouth every morning.     BUDESONIDE-FORMOTEROL 80-4.5 MCG (SYMBICORT) 80-4.5 MCG/ACTUATION HFAA    Inhale 2 puffs into the lungs once daily. Controller    CALCIUM CITRATE/VITAMIN D3 (CALCIUM CITRATE + D ORAL)    Take 1 tablet by mouth 2 (two) times daily.    CETIRIZINE (ZYRTEC) 10 MG TABLET    Take 10 mg by mouth once daily.    CHOLECALCIFEROL, VITAMIN D3, 125 MCG (5,000 UNIT) TAB    Take 1 tablet (5,000 Units total) by mouth once daily.    DICLOFENAC (VOLTAREN) 50 MG EC TABLET    Take 1 tablet (50 mg total) by mouth 3 (three) times daily as needed (pain).    DIPHENHYDRAMINE (BENADRYL) 25 MG CAPSULE    Take 25 mg by mouth every 6 (six) hours as needed for Itching.    FLUTICASONE PROPIONATE (FLONASE) 50 MCG/ACTUATION NASAL SPRAY    SPRAY 2 SPRAYS IN EACH NOSTRIL TWICE A DAY    MELOXICAM (MOBIC) 15 MG TABLET    Take 1 tablet (15 mg total) by mouth once daily.    METOPROLOL SUCCINATE (TOPROL-XL) 50 MG 24 HR TABLET    TAKE 1 TABLET BY MOUTH EVERY DAY    OMEPRAZOLE (PRILOSEC) 40 MG CAPSULE    TAKE 1 CAPSULE (40 MG TOTAL) BY MOUTH EVERY MORNING AS NEEDED FOR ACID REFLUX    TRIAMCINOLONE ACETONIDE 0.1% (KENALOG) 0.1 % CREAM    Apply topically 2 (two) times daily.    ZOLPIDEM (AMBIEN) 10 MG TAB    Take 1 tablet (10 mg total) by mouth nightly as needed (insomnia).       Review of Systems  Constitution: Denies chills, fever, and sweats.  HENT: Denies headaches or blurry vision.  Cardiovascular: Denies chest pain or irregular heart beat.  Respiratory: Denies  "cough or shortness of breath.  Gastrointestinal: Denies abdominal pain, nausea, or vomiting.  Musculoskeletal: Positive for left leg pain and redness.  Neurological: Denies dizziness or focal weakness.  Psychiatric/Behavioral: Normal mental status.  Hematologic/Lymphatic: Denies bleeding problem or easy bruising/bleeding.  Skin: Denies rash or suspicious lesions    Physical Examination  BP (!) 172/81   Pulse 92   Ht 5' 9" (1.753 m)   Wt (!) 174.9 kg (385 lb 9.4 oz)   BMI 56.94 kg/m²     Constitutional: No acute distress, conversant  HEENT: Sclera anicteric, Pupils equal, round and reactive to light, extraocular motions intact, Oropharynx clear  Neck: No JVD, no carotid bruits  Cardiovascular: regular rate and rhythm, no murmur, rubs or gallops, normal S1/S2  Pulmonary: Clear to auscultation bilaterally  Abdominal: Abdomen soft, nontender, nondistended, positive bowel sounds  Extremities: Both  LE's with changes consistent with lymphedema.     LLE with erythema and multiple palpable cords with TTP.   Pulses:  Carotid pulses are 2+ on the right side, and 2+ on the left side.  Radial pulses are 2+ on the right side, and 2+ on the left side.   Femoral pulses are 2+ on the right side, and 2+ on the left side.  Popliteal pulses are 2+ on the right side, and 2+ on the left side.   Dorsalis pedis pulses are 2+ on the right side, and 2+ on the left side.   Posterior tibial pulses are 2+ on the right side, and 2+ on the left side.    Skin: No ecchymosis, erythema, or ulcers  Psych: Alert and oriented x 3, appropriate affect  Neuro: CNII-XII intact, no focal deficits    Labs:  Most Recent Data  CBC:   Lab Results   Component Value Date    WBC 6.52 08/27/2021    HGB 15.1 08/27/2021    HCT 47.6 08/27/2021     08/27/2021    MCV 87 08/27/2021    RDW 13.6 08/27/2021     BMP:   Lab Results   Component Value Date     03/11/2022    K 3.7 03/11/2022     03/11/2022    CO2 22 (L) 03/11/2022    BUN 9 03/11/2022    " CREATININE 0.8 03/11/2022     (H) 03/11/2022    CALCIUM 9.3 03/11/2022    MG 2.4 10/02/2018    PHOS 2.8 10/02/2018     LFTS;   Lab Results   Component Value Date    PROT 7.1 04/05/2022    ALBUMIN 3.5 04/05/2022    BILITOT 0.5 04/05/2022    AST 60 (H) 04/05/2022    ALKPHOS 109 04/05/2022    ALT 73 (H) 04/05/2022     COAGS: No results found for: INR, PROTIME, PTT  FLP:   Lab Results   Component Value Date    CHOL 233 (H) 08/27/2021    HDL 52 08/27/2021    LDLCALC 158.6 08/27/2021    TRIG 112 08/27/2021    CHOLHDL 22.3 08/27/2021     CARDIAC: No results found for: TROPONINI, CKMB, BNP    Imaging:    LLE Venous Ultrasound 3/27/2022:  No evidence of deep venous thrombosis in the left lower extremity.   Left calf nonocclusive superficial thrombophlebitis.    Assessment/Plan:  Jesús Patel is a 46 y.o. male with HTN, HLD, JARAD (on CPAP), morbid obesity s/p gastric bypass, who presents for a follow up appointment.    1. Left Leg Pain- Due to superficial venous thrombosis with cellulitis. Symptoms now significantly improved.  LLE venous ultrasound done today shows extensive superficial venous thrombosis extending to close proximity of the saphenofemoral junction.  This is very close to the deep venous system, therefore, will treat as a DVT.  Continue Eliquis 5 mg twice daily.  Repeat LLE venous ultrasound in 3 months.    2. BLE Lymphedema- Plan to refer to lymphedema clinic after cellulitis and superficial venous thrombosis has fully resolved.    3. Morbid Obesity s/p Gastric Bypass- Refer back to Bariatric Surgery for evaluation.  Encourage diet, exercise and weigh loss.    4. HTN- Continue current medications.    5. HLD- Check updated lipid panel.    Follow up in 3 months with BLE venous ultrasound prior    Total duration of face to face visit time 30 minutes.  Total time spent counseling greater than fifty percent of total visit time.  Counseling included discussion regarding imaging findings,  diagnosis, possibilities, treatment options, risks and benefits.  The patient had many questions regarding the options and long-term effects.    Davonte Lance MD, PhD  Interventional Cardiology

## 2022-05-24 DIAGNOSIS — F32.A ANXIETY AND DEPRESSION: ICD-10-CM

## 2022-05-24 DIAGNOSIS — F41.9 ANXIETY AND DEPRESSION: ICD-10-CM

## 2022-05-25 RX ORDER — ALPRAZOLAM 0.5 MG/1
0.5 TABLET ORAL 3 TIMES DAILY PRN
Qty: 40 TABLET | Refills: 0 | Status: SHIPPED | OUTPATIENT
Start: 2022-05-25 | End: 2022-08-19 | Stop reason: SDUPTHER

## 2022-05-25 NOTE — TELEPHONE ENCOUNTER
Hi, please call the patient and explain that the patient's doctor has left [facility], Dr. Natalio Amaya MD.  Please offer an appt with a new doctor.  I will send in one prescription of this medicine, but patient needs to be seen for any further prescriptions.  Thank you, Lester Awan

## 2022-06-10 ENCOUNTER — PATIENT MESSAGE (OUTPATIENT)
Dept: BARIATRICS | Facility: CLINIC | Age: 47
End: 2022-06-10
Payer: COMMERCIAL

## 2022-06-14 ENCOUNTER — PATIENT MESSAGE (OUTPATIENT)
Dept: BARIATRICS | Facility: CLINIC | Age: 47
End: 2022-06-14
Payer: COMMERCIAL

## 2022-06-16 ENCOUNTER — TELEPHONE (OUTPATIENT)
Dept: SLEEP MEDICINE | Facility: CLINIC | Age: 47
End: 2022-06-16
Payer: COMMERCIAL

## 2022-06-16 ENCOUNTER — PATIENT MESSAGE (OUTPATIENT)
Dept: SLEEP MEDICINE | Facility: CLINIC | Age: 47
End: 2022-06-16
Payer: COMMERCIAL

## 2022-07-05 ENCOUNTER — PATIENT MESSAGE (OUTPATIENT)
Dept: BARIATRICS | Facility: CLINIC | Age: 47
End: 2022-07-05
Payer: COMMERCIAL

## 2022-07-20 ENCOUNTER — OFFICE VISIT (OUTPATIENT)
Dept: INTERNAL MEDICINE | Facility: CLINIC | Age: 47
End: 2022-07-20
Payer: COMMERCIAL

## 2022-07-20 VITALS
HEART RATE: 80 BPM | HEIGHT: 68 IN | SYSTOLIC BLOOD PRESSURE: 130 MMHG | BODY MASS INDEX: 47.74 KG/M2 | DIASTOLIC BLOOD PRESSURE: 80 MMHG | WEIGHT: 315 LBS

## 2022-07-20 DIAGNOSIS — F32.A ANXIETY AND DEPRESSION: Primary | ICD-10-CM

## 2022-07-20 DIAGNOSIS — I10 ESSENTIAL HYPERTENSION: ICD-10-CM

## 2022-07-20 DIAGNOSIS — F41.9 ANXIETY AND DEPRESSION: Primary | ICD-10-CM

## 2022-07-20 PROCEDURE — 1160F PR REVIEW ALL MEDS BY PRESCRIBER/CLIN PHARMACIST DOCUMENTED: ICD-10-PCS | Mod: CPTII,S$GLB,, | Performed by: INTERNAL MEDICINE

## 2022-07-20 PROCEDURE — 3044F HG A1C LEVEL LT 7.0%: CPT | Mod: CPTII,S$GLB,, | Performed by: INTERNAL MEDICINE

## 2022-07-20 PROCEDURE — 3008F PR BODY MASS INDEX (BMI) DOCUMENTED: ICD-10-PCS | Mod: CPTII,S$GLB,, | Performed by: INTERNAL MEDICINE

## 2022-07-20 PROCEDURE — 3008F BODY MASS INDEX DOCD: CPT | Mod: CPTII,S$GLB,, | Performed by: INTERNAL MEDICINE

## 2022-07-20 PROCEDURE — 3079F DIAST BP 80-89 MM HG: CPT | Mod: CPTII,S$GLB,, | Performed by: INTERNAL MEDICINE

## 2022-07-20 PROCEDURE — 99999 PR PBB SHADOW E&M-EST. PATIENT-LVL V: ICD-10-PCS | Mod: PBBFAC,,, | Performed by: INTERNAL MEDICINE

## 2022-07-20 PROCEDURE — 99999 PR PBB SHADOW E&M-EST. PATIENT-LVL V: CPT | Mod: PBBFAC,,, | Performed by: INTERNAL MEDICINE

## 2022-07-20 PROCEDURE — 3044F PR MOST RECENT HEMOGLOBIN A1C LEVEL <7.0%: ICD-10-PCS | Mod: CPTII,S$GLB,, | Performed by: INTERNAL MEDICINE

## 2022-07-20 PROCEDURE — 99214 PR OFFICE/OUTPT VISIT, EST, LEVL IV, 30-39 MIN: ICD-10-PCS | Mod: S$GLB,,, | Performed by: INTERNAL MEDICINE

## 2022-07-20 PROCEDURE — 1159F MED LIST DOCD IN RCRD: CPT | Mod: CPTII,S$GLB,, | Performed by: INTERNAL MEDICINE

## 2022-07-20 PROCEDURE — 3079F PR MOST RECENT DIASTOLIC BLOOD PRESSURE 80-89 MM HG: ICD-10-PCS | Mod: CPTII,S$GLB,, | Performed by: INTERNAL MEDICINE

## 2022-07-20 PROCEDURE — 99214 OFFICE O/P EST MOD 30 MIN: CPT | Mod: S$GLB,,, | Performed by: INTERNAL MEDICINE

## 2022-07-20 PROCEDURE — 3075F SYST BP GE 130 - 139MM HG: CPT | Mod: CPTII,S$GLB,, | Performed by: INTERNAL MEDICINE

## 2022-07-20 PROCEDURE — 3075F PR MOST RECENT SYSTOLIC BLOOD PRESS GE 130-139MM HG: ICD-10-PCS | Mod: CPTII,S$GLB,, | Performed by: INTERNAL MEDICINE

## 2022-07-20 PROCEDURE — 1160F RVW MEDS BY RX/DR IN RCRD: CPT | Mod: CPTII,S$GLB,, | Performed by: INTERNAL MEDICINE

## 2022-07-20 PROCEDURE — 1159F PR MEDICATION LIST DOCUMENTED IN MEDICAL RECORD: ICD-10-PCS | Mod: CPTII,S$GLB,, | Performed by: INTERNAL MEDICINE

## 2022-07-20 RX ORDER — HYDROCHLOROTHIAZIDE 12.5 MG/1
12.5 TABLET ORAL DAILY
Qty: 90 TABLET | Refills: 11 | Status: SHIPPED | OUTPATIENT
Start: 2022-07-20 | End: 2022-09-27 | Stop reason: SDUPTHER

## 2022-07-20 NOTE — PROGRESS NOTES
Subjective:       Patient ID: Jesús Patel is a 47 y.o. male.    Chief Complaint: Establish Care and Headache    Here for annual exam    Inc anxiety lately, from much stress.    Using anxiety pills more frequ -- xanax.    Had lap banding done a couple yrs ago, his insurance will not cover bariatric bypass surgery.    Takes excedrin for frequ headaches.    Review of Systems   Constitutional: Negative for appetite change and unexpected weight change.   Respiratory: Negative for chest tightness and shortness of breath.    Cardiovascular: Negative for chest pain.   Gastrointestinal: Negative for abdominal pain.   Genitourinary: Negative for difficulty urinating, scrotal swelling and testicular pain.   Skin:        No lesions   Neurological: Positive for headaches.   Psychiatric/Behavioral: The patient is nervous/anxious.            Objective:      Physical Exam  Vitals reviewed.   Constitutional:       General: He is not in acute distress.     Appearance: He is well-developed. He is obese.   HENT:      Head: Normocephalic and atraumatic.      Right Ear: Hearing normal.      Left Ear: Hearing normal.      Nose: Nose normal. No rhinorrhea.   Eyes:      General: Lids are normal.         Right eye: No discharge.         Left eye: No discharge.      Conjunctiva/sclera: Conjunctivae normal.   Neck:      Trachea: Trachea normal. No tracheal deviation.   Cardiovascular:      Rate and Rhythm: Normal rate and regular rhythm.      Heart sounds: Normal heart sounds. No murmur heard.    No friction rub. No gallop.   Pulmonary:      Effort: Pulmonary effort is normal. No respiratory distress.      Breath sounds: Normal breath sounds. No wheezing or rales.   Abdominal:      General: Bowel sounds are normal. There is no distension.      Palpations: Abdomen is soft.      Tenderness: There is no abdominal tenderness. There is no guarding or rebound.   Genitourinary:     Rectum: No external hemorrhoid.   Musculoskeletal:       Cervical back: Neck supple.   Lymphadenopathy:      Cervical: No cervical adenopathy.   Skin:     General: Skin is warm and dry.      Findings: No rash.   Neurological:      Mental Status: He is alert.      Motor: No abnormal muscle tone.   Psychiatric:         Speech: Speech normal.         Behavior: Behavior normal. Behavior is cooperative.         Thought Content: Thought content normal.         Judgment: Judgment normal.         Assessment:       1. Anxiety and depression    2. Essential hypertension        Plan:       Jesús was seen today for establish care and headache.    Diagnoses and all orders for this visit:    Anxiety and depression  -     Ambulatory referral/consult to Psychiatry; Future  I explained my concern about rxing xanax and zolpidem along with excess etoh    Essential hypertension  -     hydroCHLOROthiazide (HYDRODIURIL) 12.5 MG Tab; Take 1 tablet (12.5 mg total) by mouth once daily.  controlled      Health Maintenance       Date Due Completion Date    Pneumococcal Vaccines (Age 0-64) (2 - PCV) 10/27/2021 10/27/2020    COVID-19 Vaccine (4 - Booster for Pfizer series) 03/18/2022 12/18/2021    Lipid Panel 08/27/2022 8/27/2021    Influenza Vaccine (1) 09/01/2022 12/11/2021    TETANUS VACCINE 10/02/2028 10/2/2018    Colorectal Cancer Screening 02/11/2032 2/11/2022          Follow up in about 4 months (around 11/20/2022).    Future Appointments   Date Time Provider Department Center   8/31/2022  2:00 PM VASCULAR, [place] [facility] [place]   9/7/2022 11:30 AM Davonte Lance MD PhD MET [facility] [place]   10/12/2022  7:15 AM [facility] OIC-US1 MASTER [facility] ULTR [facility]   10/12/2022  8:00 AM LAB, APPOINTMENT [facility]ED [facility] IM [facility] PC[facility]   4/12/2023  7:15 AM [facility] OIC-MRI2 NOM MRI [facility]   4/12/2023  8:20 AM LAB, APPOINTMENT [facility] IM [facility] L[facility]pez PCW   4/19/2023  3:30 PM Chantell Sal NP [facility] HEPAT [facility]

## 2022-08-02 ENCOUNTER — PATIENT MESSAGE (OUTPATIENT)
Dept: BARIATRICS | Facility: CLINIC | Age: 47
End: 2022-08-02
Payer: COMMERCIAL

## 2022-08-04 ENCOUNTER — PATIENT MESSAGE (OUTPATIENT)
Dept: BARIATRICS | Facility: CLINIC | Age: 47
End: 2022-08-04
Payer: COMMERCIAL

## 2022-08-19 DIAGNOSIS — F41.9 ANXIETY AND DEPRESSION: ICD-10-CM

## 2022-08-19 DIAGNOSIS — F32.A ANXIETY AND DEPRESSION: ICD-10-CM

## 2022-08-19 RX ORDER — ALPRAZOLAM 0.5 MG/1
0.5 TABLET ORAL 3 TIMES DAILY PRN
Qty: 40 TABLET | Refills: 0 | Status: SHIPPED | OUTPATIENT
Start: 2022-08-19 | End: 2022-11-29

## 2022-08-19 NOTE — TELEPHONE ENCOUNTER
[facility] request was [facility].     Please see the attached refill request.

## 2022-09-07 ENCOUNTER — PATIENT MESSAGE (OUTPATIENT)
Dept: BARIATRICS | Facility: CLINIC | Age: 47
End: 2022-09-07
Payer: COMMERCIAL

## 2022-10-06 ENCOUNTER — PATIENT MESSAGE (OUTPATIENT)
Dept: BARIATRICS | Facility: CLINIC | Age: 47
End: 2022-10-06
Payer: COMMERCIAL

## 2022-10-06 ENCOUNTER — PATIENT MESSAGE (OUTPATIENT)
Dept: RESEARCH | Facility: HOSPITAL | Age: 47
End: 2022-10-06
Payer: COMMERCIAL

## 2022-11-04 ENCOUNTER — PATIENT MESSAGE (OUTPATIENT)
Dept: BARIATRICS | Facility: CLINIC | Age: 47
End: 2022-11-04
Payer: COMMERCIAL

## 2022-11-04 DIAGNOSIS — G47.00 INSOMNIA, UNSPECIFIED TYPE: ICD-10-CM

## 2022-11-04 RX ORDER — ZOLPIDEM TARTRATE 10 MG/1
10 TABLET ORAL NIGHTLY PRN
Qty: 30 TABLET | Refills: 1 | Status: SHIPPED | OUTPATIENT
Start: 2022-11-04 | End: 2023-01-12 | Stop reason: SDUPTHER

## 2022-11-14 ENCOUNTER — TELEPHONE (OUTPATIENT)
Dept: OPHTHALMOLOGY | Facility: CLINIC | Age: 47
End: 2022-11-14
Payer: COMMERCIAL

## 2022-11-29 ENCOUNTER — PATIENT MESSAGE (OUTPATIENT)
Dept: INTERNAL MEDICINE | Facility: CLINIC | Age: 47
End: 2022-11-29
Payer: COMMERCIAL

## 2022-11-29 ENCOUNTER — OFFICE VISIT (OUTPATIENT)
Dept: OPTOMETRY | Facility: CLINIC | Age: 47
End: 2022-11-29
Payer: COMMERCIAL

## 2022-11-29 DIAGNOSIS — F41.9 ANXIETY AND DEPRESSION: ICD-10-CM

## 2022-11-29 DIAGNOSIS — H04.123 DRY EYE SYNDROME OF BILATERAL LACRIMAL GLANDS: Primary | ICD-10-CM

## 2022-11-29 DIAGNOSIS — H52.4 PRESBYOPIA OF BOTH EYES: ICD-10-CM

## 2022-11-29 DIAGNOSIS — Z46.0 FITTING AND ADJUSTMENT OF SPECTACLES AND CONTACT LENSES: Primary | ICD-10-CM

## 2022-11-29 DIAGNOSIS — Z97.3 WEARS CONTACT LENSES: ICD-10-CM

## 2022-11-29 DIAGNOSIS — F32.A ANXIETY AND DEPRESSION: ICD-10-CM

## 2022-11-29 PROCEDURE — 92310 CONTACT LENS FITTING OU: CPT | Mod: CSM,S$GLB,, | Performed by: OPTOMETRIST

## 2022-11-29 PROCEDURE — 3044F HG A1C LEVEL LT 7.0%: CPT | Mod: CPTII,S$GLB,, | Performed by: OPTOMETRIST

## 2022-11-29 PROCEDURE — 99999 PR PBB SHADOW E&M-EST. PATIENT-LVL III: ICD-10-PCS | Mod: PBBFAC,,, | Performed by: OPTOMETRIST

## 2022-11-29 PROCEDURE — 92015 PR REFRACTION: ICD-10-PCS | Mod: S$GLB,,, | Performed by: OPTOMETRIST

## 2022-11-29 PROCEDURE — 99999 PR PBB SHADOW E&M-EST. PATIENT-LVL I: ICD-10-PCS | Mod: PBBFAC,,, | Performed by: OPTOMETRIST

## 2022-11-29 PROCEDURE — 1159F PR MEDICATION LIST DOCUMENTED IN MEDICAL RECORD: ICD-10-PCS | Mod: CPTII,S$GLB,, | Performed by: OPTOMETRIST

## 2022-11-29 PROCEDURE — 99499 NO LOS: ICD-10-PCS | Mod: S$GLB,,, | Performed by: OPTOMETRIST

## 2022-11-29 PROCEDURE — 92004 COMPRE OPH EXAM NEW PT 1/>: CPT | Mod: S$GLB,,, | Performed by: OPTOMETRIST

## 2022-11-29 PROCEDURE — 99999 PR PBB SHADOW E&M-EST. PATIENT-LVL I: CPT | Mod: PBBFAC,,, | Performed by: OPTOMETRIST

## 2022-11-29 PROCEDURE — 3044F PR MOST RECENT HEMOGLOBIN A1C LEVEL <7.0%: ICD-10-PCS | Mod: CPTII,S$GLB,, | Performed by: OPTOMETRIST

## 2022-11-29 PROCEDURE — 99999 PR PBB SHADOW E&M-EST. PATIENT-LVL III: CPT | Mod: PBBFAC,,, | Performed by: OPTOMETRIST

## 2022-11-29 PROCEDURE — 99499 UNLISTED E&M SERVICE: CPT | Mod: S$GLB,,, | Performed by: OPTOMETRIST

## 2022-11-29 PROCEDURE — 92004 PR EYE EXAM, NEW PATIENT,COMPREHESV: ICD-10-PCS | Mod: S$GLB,,, | Performed by: OPTOMETRIST

## 2022-11-29 PROCEDURE — 92015 DETERMINE REFRACTIVE STATE: CPT | Mod: S$GLB,,, | Performed by: OPTOMETRIST

## 2022-11-29 PROCEDURE — 92310 PR CONTACT LENS FITTING (NO CHANGE): ICD-10-PCS | Mod: CSM,S$GLB,, | Performed by: OPTOMETRIST

## 2022-11-29 PROCEDURE — 1159F MED LIST DOCD IN RCRD: CPT | Mod: CPTII,S$GLB,, | Performed by: OPTOMETRIST

## 2022-11-29 RX ORDER — ALPRAZOLAM 0.5 MG/1
0.5 TABLET ORAL 3 TIMES DAILY PRN
Qty: 40 TABLET | Refills: 0 | Status: SHIPPED | OUTPATIENT
Start: 2022-11-29 | End: 2023-08-02

## 2022-11-29 NOTE — TELEPHONE ENCOUNTER
No new care gaps identified.  [facility] Embedded Care Gaps. Reference number: [id number]. 11/29/2022   5:49:55 AM CST

## 2022-11-29 NOTE — PROGRESS NOTES
HPI    CC: eye examination and contact lens follow up  JEFE: 1 year ago   (+) Changes in vision   (-) Pain  (-) Irritation   (-) Itching   (-) Flashes  (-) Floaters  (+) Glasses wearer  (+) CL wearer Air optix plus hydraglyde  (+) Uses eye gtts refresh and meme  Does patient want a refraction today? Yes   (-) Eye injury  (+) Eye surgery LASIK OU 2000  (-)POHx  (+)FOHx mother cataract   (-)DM  Last edited by Claudette Lange, OD on 11/29/2022  9:31 AM.            Assessment /Plan     For exam results, see Encounter Report.    Dry eye syndrome of bilateral lacrimal glands    Presbyopia of both eyes    Wears contact lenses      1. Pt educated on condition and visual status S/P LASIK OU. Switched pt to Biofinity Toric OS lens ONLY.  2.   Eyeglass Final Rx       Eyeglass Final Rx         Sphere Cylinder Axis Dist VA Add    Right -1.00 +0.75 100 20/20 +1.50    Left -1.75 +1.00 075 20/20 +1.50      Type: PAL    Expiration Date: 11/29/2023                 3.   Contact Lens Current Rx       Current Contact Lens Rx         Brand Base Curve Diameter Sphere Cylinder [place] Add Dist VA Near VA    Right Air Optix Plus HydraGlyde 8.6 14.2 [place]   Low      Left Air Optix Plus HydraGlyde 8.6 14.2 [place] -1.25 160                 Current Contact Lens Rx #2 (Trial Lens, Dispensed)         Brand Base Curve Diameter Sphere Cylinder [place] Add Dist VA Near VA    Right        20/25 OU     Left Biofinity Toric 8.7 14.5 +2.00 -0.75 170   20/20 OU              Current Contact Lens Rx Comments    Rx1= HABITUAL RX  Rx2= TODAY'S MONOVISION TRIAL OD DOMINANT (no lens) OS NEAR                 RTC in 1 year for annual eye exam unless changes noted sooner.

## 2022-12-07 ENCOUNTER — PATIENT MESSAGE (OUTPATIENT)
Dept: BARIATRICS | Facility: CLINIC | Age: 47
End: 2022-12-07
Payer: COMMERCIAL

## 2022-12-08 ENCOUNTER — PATIENT MESSAGE (OUTPATIENT)
Dept: OPTOMETRY | Facility: CLINIC | Age: 47
End: 2022-12-08
Payer: COMMERCIAL

## 2023-01-09 ENCOUNTER — PATIENT MESSAGE (OUTPATIENT)
Dept: BARIATRICS | Facility: CLINIC | Age: 48
End: 2023-01-09
Payer: COMMERCIAL

## 2023-01-13 ENCOUNTER — PATIENT MESSAGE (OUTPATIENT)
Dept: OPTOMETRY | Facility: CLINIC | Age: 48
End: 2023-01-13
Payer: COMMERCIAL

## 2023-02-09 ENCOUNTER — PATIENT MESSAGE (OUTPATIENT)
Dept: BARIATRICS | Facility: CLINIC | Age: 48
End: 2023-02-09
Payer: COMMERCIAL

## 2023-02-14 ENCOUNTER — PATIENT MESSAGE (OUTPATIENT)
Dept: BARIATRICS | Facility: CLINIC | Age: 48
End: 2023-02-14
Payer: COMMERCIAL

## 2023-02-16 ENCOUNTER — OFFICE VISIT (OUTPATIENT)
Dept: OPTOMETRY | Facility: CLINIC | Age: 48
End: 2023-02-16
Payer: COMMERCIAL

## 2023-02-16 DIAGNOSIS — Z97.3 WEARS CONTACT LENSES: Primary | ICD-10-CM

## 2023-02-16 PROCEDURE — 1159F MED LIST DOCD IN RCRD: CPT | Mod: CPTII,S$GLB,, | Performed by: OPTOMETRIST

## 2023-02-16 PROCEDURE — 99499 NO LOS: ICD-10-PCS | Mod: S$GLB,,, | Performed by: OPTOMETRIST

## 2023-02-16 PROCEDURE — 1159F PR MEDICATION LIST DOCUMENTED IN MEDICAL RECORD: ICD-10-PCS | Mod: CPTII,S$GLB,, | Performed by: OPTOMETRIST

## 2023-02-16 PROCEDURE — 99499 UNLISTED E&M SERVICE: CPT | Mod: S$GLB,,, | Performed by: OPTOMETRIST

## 2023-02-16 NOTE — PROGRESS NOTES
HPI    Jesús Patel is here today for contact lens f/u. Patient states   that glasses Rx is working well for him, but he is having trouble with   contacts. Reports that he can see far away with glasses but cannot see up   close well. Patient reports that VA is blurry with contact and that he   seems to have a clearer vision without contact OS. States that he can see   close objects fine with contacts but distance VA is blurred. Reports that   it sometimes feels as if a random blur line appear OS. Reports that he has   not been wearing the contact due to discomfort VA.   DLS: 11/29/2022- Dr. Lange  (-)Flashes (-)Floaters (-)Diplopia (-)Headaches   (-)Itching (-)Tearing (-) Burning (-)Dryness (-) Photophobia  (-)Glare  Past Eye Sx: Lasik 25 Years Ago OU  Eye Meds: Refresh PRN OS while wearing contact  Last edited by Claudette Lange, OD on 2/16/2023 12:17 PM.            Assessment /Plan     For exam results, see Encounter Report.    Wears contact lenses      Contact Lens Current Rx       Current Contact Lens Rx         Brand Base Curve Diameter Sphere Cylinder [place] Dist VA Near VA Centration    Right             Left Biofinity Toric 8.7 14.5 +2.00 -0.75 170           Movement Rotation Over-Sphere Over-Cyl Over-Dist VA Over-Near VA    Right          Left   -0.50 Sphere 20/20easy [email or web address]              Current Contact Lens Rx #2 (Trial Lens, Dispensed)         Brand Base Curve Diameter Sphere Cylinder [place] Dist VA Near VA Centration    Right       20/20 OU      Left Biofinity Toric 8.7 14.5 +1.50 -0.75 170  20/20- OU Well-centered        Movement Rotation Over-Sphere Over-Cyl Over-Dist VA Over-Near VA    Right          Left Adequate Centers                      Updated CL Rx. Initially good comfort and vision. Given CL trials to take home. If happy with comfort and vision of trial lenses, may order annual supply. If issues arise, RTC for CL f/u. Reviewed proper CL care and hygiene. Monitor yearly unless  changes noted sooner.

## 2023-02-22 ENCOUNTER — PATIENT MESSAGE (OUTPATIENT)
Dept: BARIATRICS | Facility: CLINIC | Age: 48
End: 2023-02-22
Payer: COMMERCIAL

## 2023-02-22 ENCOUNTER — PATIENT MESSAGE (OUTPATIENT)
Dept: OPTOMETRY | Facility: CLINIC | Age: 48
End: 2023-02-22
Payer: COMMERCIAL

## 2023-03-01 ENCOUNTER — PATIENT MESSAGE (OUTPATIENT)
Dept: INTERNAL MEDICINE | Facility: CLINIC | Age: 48
End: 2023-03-01
Payer: COMMERCIAL

## 2023-03-01 DIAGNOSIS — K21.9 GASTROESOPHAGEAL REFLUX DISEASE: ICD-10-CM

## 2023-03-01 RX ORDER — OMEPRAZOLE 40 MG/1
40 CAPSULE, DELAYED RELEASE ORAL EVERY MORNING
Qty: 90 CAPSULE | Refills: 1 | Status: SHIPPED | OUTPATIENT
Start: 2023-03-01 | End: 2023-08-27 | Stop reason: SDUPTHER

## 2023-03-01 NOTE — TELEPHONE ENCOUNTER
Care Due:                  Date            Visit Type   Department     Provider  --------------------------------------------------------------------------------                                EP -                              PRIMARY      [facility]  Last Visit: 07-      CARE ([facility])   MEDICINE       Lester Awan  Next Visit: None Scheduled  None         None Found                                                            Last  Test          Frequency    Reason                     Performed    Due Date  --------------------------------------------------------------------------------    CMP.........  12 months..  hydroCHLOROthiazide......  03- 03-    [facility] Embedded Care Gaps. Reference number: [id number]. 3/01/2023   9:07:03 AM CST

## 2023-03-03 ENCOUNTER — PATIENT MESSAGE (OUTPATIENT)
Dept: SLEEP MEDICINE | Facility: CLINIC | Age: 48
End: 2023-03-03
Payer: COMMERCIAL

## 2023-03-03 ENCOUNTER — TELEPHONE (OUTPATIENT)
Dept: SLEEP MEDICINE | Facility: CLINIC | Age: 48
End: 2023-03-03
Payer: COMMERCIAL

## 2023-03-03 DIAGNOSIS — G47.33 OBSTRUCTIVE SLEEP APNEA: Primary | ICD-10-CM

## 2023-03-08 ENCOUNTER — PATIENT MESSAGE (OUTPATIENT)
Dept: BARIATRICS | Facility: CLINIC | Age: 48
End: 2023-03-08
Payer: COMMERCIAL

## 2023-03-14 ENCOUNTER — PATIENT MESSAGE (OUTPATIENT)
Dept: BARIATRICS | Facility: CLINIC | Age: 48
End: 2023-03-14
Payer: COMMERCIAL

## 2023-03-24 ENCOUNTER — PATIENT MESSAGE (OUTPATIENT)
Dept: OPTOMETRY | Facility: CLINIC | Age: 48
End: 2023-03-24
Payer: COMMERCIAL

## 2023-04-04 ENCOUNTER — OFFICE VISIT (OUTPATIENT)
Dept: SLEEP MEDICINE | Facility: CLINIC | Age: 48
End: 2023-04-04
Payer: COMMERCIAL

## 2023-04-04 ENCOUNTER — PATIENT MESSAGE (OUTPATIENT)
Dept: OTHER | Facility: OTHER | Age: 48
End: 2023-04-04
Payer: COMMERCIAL

## 2023-04-04 VITALS
HEART RATE: 107 BPM | DIASTOLIC BLOOD PRESSURE: 83 MMHG | BODY MASS INDEX: 47.74 KG/M2 | HEIGHT: 68 IN | WEIGHT: 315 LBS | SYSTOLIC BLOOD PRESSURE: 128 MMHG

## 2023-04-04 DIAGNOSIS — I10 ESSENTIAL HYPERTENSION: Primary | ICD-10-CM

## 2023-04-04 DIAGNOSIS — G47.33 OBSTRUCTIVE SLEEP APNEA SYNDROME: ICD-10-CM

## 2023-04-04 PROCEDURE — 3008F PR BODY MASS INDEX (BMI) DOCUMENTED: ICD-10-PCS | Mod: CPTII,S$GLB,, | Performed by: NURSE PRACTITIONER

## 2023-04-04 PROCEDURE — 3008F BODY MASS INDEX DOCD: CPT | Mod: CPTII,S$GLB,, | Performed by: NURSE PRACTITIONER

## 2023-04-04 PROCEDURE — 3079F DIAST BP 80-89 MM HG: CPT | Mod: CPTII,S$GLB,, | Performed by: NURSE PRACTITIONER

## 2023-04-04 PROCEDURE — 1159F MED LIST DOCD IN RCRD: CPT | Mod: CPTII,S$GLB,, | Performed by: NURSE PRACTITIONER

## 2023-04-04 PROCEDURE — 3079F PR MOST RECENT DIASTOLIC BLOOD PRESSURE 80-89 MM HG: ICD-10-PCS | Mod: CPTII,S$GLB,, | Performed by: NURSE PRACTITIONER

## 2023-04-04 PROCEDURE — 99999 PR PBB SHADOW E&M-EST. PATIENT-LVL III: ICD-10-PCS | Mod: PBBFAC,,, | Performed by: NURSE PRACTITIONER

## 2023-04-04 PROCEDURE — 99214 OFFICE O/P EST MOD 30 MIN: CPT | Mod: S$GLB,,, | Performed by: NURSE PRACTITIONER

## 2023-04-04 PROCEDURE — 99214 PR OFFICE/OUTPT VISIT, EST, LEVL IV, 30-39 MIN: ICD-10-PCS | Mod: S$GLB,,, | Performed by: NURSE PRACTITIONER

## 2023-04-04 PROCEDURE — 99999 PR PBB SHADOW E&M-EST. PATIENT-LVL III: CPT | Mod: PBBFAC,,, | Performed by: NURSE PRACTITIONER

## 2023-04-04 PROCEDURE — 3074F PR MOST RECENT SYSTOLIC BLOOD PRESSURE < 130 MM HG: ICD-10-PCS | Mod: CPTII,S$GLB,, | Performed by: NURSE PRACTITIONER

## 2023-04-04 PROCEDURE — 3074F SYST BP LT 130 MM HG: CPT | Mod: CPTII,S$GLB,, | Performed by: NURSE PRACTITIONER

## 2023-04-04 PROCEDURE — 1159F PR MEDICATION LIST DOCUMENTED IN MEDICAL RECORD: ICD-10-PCS | Mod: CPTII,S$GLB,, | Performed by: NURSE PRACTITIONER

## 2023-04-04 NOTE — PROGRESS NOTES
"Jesús Patel  returns today regarding the management of obstructive sleep apnea. Since last seen he continues to use nightly 12-18cm. Got resvent machine but not until 10/2022. Hard to sleep with it with FFM he got/flap issue on mask or something. Got DS2 in mail so began using this one and different FFM w/o flap issue. Hx of gastric sleeve 11/10/16. Wgt gain.  Still taking ambien 10mg to help sleep onset. Sleep remains consolidated and he denies complex sleep behaviors. Improved excessive daytime sleepiness.    Remote- 30d avg DS 4:22h/n AHI 3.8, 90% tile 16cm.       PSG (372#) 11/9/15:AHI was 48.2 with an oxygen kandy of 76.0%. Initial improvement was observed on 15 cm H2O controlling respiratory events in supine REM sleep with residual hypopneas. Improvement was observed on 15 cm H2O, but the patient was not tested on that pressure for the sufficient amount of time, thus the effective pressure was not determined.       /83 (BP Location: Left arm, Patient Position: Sitting, BP Method: Medium (Automatic))   Pulse 107   Ht 5' 8" (1.727 m)   Wt (!) 178 kg (392 lb 6.7 oz)   BMI 59.67 kg/m²     ASSESSMENT:   1. Obstructive sleep apnea, severe significant, continued excellent adherence, having continued improvement of his symptoms, but recent pressure intolerance given 50# loss since surgery last month. 7/23/19: Ready to resume consistent use if pressure is not too high. Needs new supplies 1/21/20: Adherent with apap, benefiting from use. AHI<5. 9/28/21: adherent, benefiting from PAP. AHI<5. Eligible new machine 3/2023: got Resvent machine 10/31/22 but mask exhalation flap issues disrupting sleep so began using replacement machine since then that came in, oral drying though  2. insomnia  Medical comorbidities include: morbid obesity        PLAN:   1.continue apap 12-18cm  ok to return rEsvent machine and continue DS2 machine use. Get climate hose. [facility] DME. Discussed control of JARAD  2.continue  " AMbien 10mg prn qhs,safe use

## 2023-04-05 ENCOUNTER — PATIENT MESSAGE (OUTPATIENT)
Dept: BARIATRICS | Facility: CLINIC | Age: 48
End: 2023-04-05
Payer: COMMERCIAL

## 2023-04-11 ENCOUNTER — PATIENT MESSAGE (OUTPATIENT)
Dept: BARIATRICS | Facility: CLINIC | Age: 48
End: 2023-04-11
Payer: COMMERCIAL

## 2023-04-24 ENCOUNTER — LAB VISIT (OUTPATIENT)
Dept: LAB | Facility: HOSPITAL | Age: 48
End: 2023-04-24
Payer: COMMERCIAL

## 2023-04-24 ENCOUNTER — PATIENT MESSAGE (OUTPATIENT)
Dept: OTHER | Facility: OTHER | Age: 48
End: 2023-04-24
Payer: COMMERCIAL

## 2023-04-24 ENCOUNTER — OFFICE VISIT (OUTPATIENT)
Dept: INTERNAL MEDICINE | Facility: CLINIC | Age: 48
End: 2023-04-24
Payer: COMMERCIAL

## 2023-04-24 ENCOUNTER — TELEPHONE (OUTPATIENT)
Dept: BEHAVIORAL HEALTH | Facility: CLINIC | Age: 48
End: 2023-04-24
Payer: COMMERCIAL

## 2023-04-24 VITALS
WEIGHT: 315 LBS | BODY MASS INDEX: 47.74 KG/M2 | RESPIRATION RATE: 18 BRPM | HEART RATE: 96 BPM | SYSTOLIC BLOOD PRESSURE: 128 MMHG | DIASTOLIC BLOOD PRESSURE: 76 MMHG | OXYGEN SATURATION: 98 % | HEIGHT: 68 IN

## 2023-04-24 DIAGNOSIS — F10.10 ALCOHOL ABUSE: ICD-10-CM

## 2023-04-24 DIAGNOSIS — F41.9 ANXIETY: ICD-10-CM

## 2023-04-24 DIAGNOSIS — F33.9 DEPRESSION, RECURRENT: ICD-10-CM

## 2023-04-24 DIAGNOSIS — Z13.9 ENCOUNTER FOR MEDICAL SCREENING EXAMINATION: ICD-10-CM

## 2023-04-24 DIAGNOSIS — Z13.9 ENCOUNTER FOR MEDICAL SCREENING EXAMINATION: Primary | ICD-10-CM

## 2023-04-24 DIAGNOSIS — E66.01 MORBID OBESITY WITH BMI OF 50.0-59.9, ADULT: ICD-10-CM

## 2023-04-24 LAB
ALBUMIN SERPL BCP-MCNC: 3.6 G/DL (ref 3.5–5.2)
ALP SERPL-CCNC: 134 U/L (ref 55–135)
ALT SERPL W/O P-5'-P-CCNC: 286 U/L (ref 10–44)
ANION GAP SERPL CALC-SCNC: 7 MMOL/L (ref 8–16)
AST SERPL-CCNC: 254 U/L (ref 10–40)
BASOPHILS # BLD AUTO: 0.12 K/UL (ref 0–0.2)
BASOPHILS NFR BLD: 1.1 % (ref 0–1.9)
BILIRUB SERPL-MCNC: 0.7 MG/DL (ref 0.1–1)
BUN SERPL-MCNC: 9 MG/DL (ref 6–20)
CALCIUM SERPL-MCNC: 10.1 MG/DL (ref 8.7–10.5)
CHLORIDE SERPL-SCNC: 101 MMOL/L (ref 95–110)
CHOLEST SERPL-MCNC: 238 MG/DL (ref 120–199)
CHOLEST/HDLC SERPL: 5.2 {RATIO} (ref 2–5)
CO2 SERPL-SCNC: 28 MMOL/L (ref 23–29)
CREAT SERPL-MCNC: 0.7 MG/DL (ref 0.5–1.4)
DIFFERENTIAL METHOD: ABNORMAL
EOSINOPHIL # BLD AUTO: 0.1 K/UL (ref 0–0.5)
EOSINOPHIL NFR BLD: 1.3 % (ref 0–8)
ERYTHROCYTE [DISTWIDTH] IN BLOOD BY AUTOMATED COUNT: 14.4 % (ref 11.5–14.5)
EST. GFR  (NO RACE VARIABLE): >60 ML/MIN/1.73 M^2
ESTIMATED AVG GLUCOSE: 120 MG/DL (ref 68–131)
GLUCOSE SERPL-MCNC: 110 MG/DL (ref 70–110)
HBA1C MFR BLD: 5.8 % (ref 4–5.6)
HCT VFR BLD AUTO: 47.4 % (ref 40–54)
HDLC SERPL-MCNC: 46 MG/DL (ref 40–75)
HDLC SERPL: 19.3 % (ref 20–50)
HGB BLD-MCNC: 15.2 G/DL (ref 14–18)
IMM GRANULOCYTES # BLD AUTO: 0.03 K/UL (ref 0–0.04)
IMM GRANULOCYTES NFR BLD AUTO: 0.3 % (ref 0–0.5)
LDLC SERPL CALC-MCNC: 166.6 MG/DL (ref 63–159)
LYMPHOCYTES # BLD AUTO: 1.3 K/UL (ref 1–4.8)
LYMPHOCYTES NFR BLD: 12.7 % (ref 18–48)
MCH RBC QN AUTO: 28.2 PG (ref 27–31)
MCHC RBC AUTO-ENTMCNC: 32.1 G/DL (ref 32–36)
MCV RBC AUTO: 88 FL (ref 82–98)
MONOCYTES # BLD AUTO: 0.9 K/UL (ref 0.3–1)
MONOCYTES NFR BLD: 8.9 % (ref 4–15)
NEUTROPHILS # BLD AUTO: 8 K/UL (ref 1.8–7.7)
NEUTROPHILS NFR BLD: 75.7 % (ref 38–73)
NONHDLC SERPL-MCNC: 192 MG/DL
NRBC BLD-RTO: 0 /100 WBC
PLATELET # BLD AUTO: 392 K/UL (ref 150–450)
PMV BLD AUTO: 10.1 FL (ref 9.2–12.9)
POTASSIUM SERPL-SCNC: 3.4 MMOL/L (ref 3.5–5.1)
PROT SERPL-MCNC: 8 G/DL (ref 6–8.4)
RBC # BLD AUTO: 5.39 M/UL (ref 4.6–6.2)
SODIUM SERPL-SCNC: 136 MMOL/L (ref 136–145)
TRIGL SERPL-MCNC: 127 MG/DL (ref 30–150)
WBC # BLD AUTO: 10.55 K/UL (ref 3.9–12.7)

## 2023-04-24 PROCEDURE — 80053 COMPREHEN METABOLIC PANEL: CPT

## 2023-04-24 PROCEDURE — 3008F PR BODY MASS INDEX (BMI) DOCUMENTED: ICD-10-PCS | Mod: CPTII,S$GLB,, | Performed by: INTERNAL MEDICINE

## 2023-04-24 PROCEDURE — 36415 COLL VENOUS BLD VENIPUNCTURE: CPT

## 2023-04-24 PROCEDURE — 3044F PR MOST RECENT HEMOGLOBIN A1C LEVEL <7.0%: ICD-10-PCS | Mod: CPTII,S$GLB,, | Performed by: INTERNAL MEDICINE

## 2023-04-24 PROCEDURE — 99999 PR PBB SHADOW E&M-EST. PATIENT-LVL V: ICD-10-PCS | Mod: PBBFAC,,, | Performed by: INTERNAL MEDICINE

## 2023-04-24 PROCEDURE — 83036 HEMOGLOBIN GLYCOSYLATED A1C: CPT

## 2023-04-24 PROCEDURE — 3078F DIAST BP <80 MM HG: CPT | Mod: CPTII,S$GLB,, | Performed by: INTERNAL MEDICINE

## 2023-04-24 PROCEDURE — 3008F BODY MASS INDEX DOCD: CPT | Mod: CPTII,S$GLB,, | Performed by: INTERNAL MEDICINE

## 2023-04-24 PROCEDURE — 1159F PR MEDICATION LIST DOCUMENTED IN MEDICAL RECORD: ICD-10-PCS | Mod: CPTII,S$GLB,, | Performed by: INTERNAL MEDICINE

## 2023-04-24 PROCEDURE — 99999 PR PBB SHADOW E&M-EST. PATIENT-LVL V: CPT | Mod: PBBFAC,,, | Performed by: INTERNAL MEDICINE

## 2023-04-24 PROCEDURE — 3074F PR MOST RECENT SYSTOLIC BLOOD PRESSURE < 130 MM HG: ICD-10-PCS | Mod: CPTII,S$GLB,, | Performed by: INTERNAL MEDICINE

## 2023-04-24 PROCEDURE — 80061 LIPID PANEL: CPT

## 2023-04-24 PROCEDURE — 3078F PR MOST RECENT DIASTOLIC BLOOD PRESSURE < 80 MM HG: ICD-10-PCS | Mod: CPTII,S$GLB,, | Performed by: INTERNAL MEDICINE

## 2023-04-24 PROCEDURE — 99214 OFFICE O/P EST MOD 30 MIN: CPT | Mod: S$GLB,,, | Performed by: INTERNAL MEDICINE

## 2023-04-24 PROCEDURE — 3044F HG A1C LEVEL LT 7.0%: CPT | Mod: CPTII,S$GLB,, | Performed by: INTERNAL MEDICINE

## 2023-04-24 PROCEDURE — 85025 COMPLETE CBC W/AUTO DIFF WBC: CPT

## 2023-04-24 PROCEDURE — 99214 PR OFFICE/OUTPT VISIT, EST, LEVL IV, 30-39 MIN: ICD-10-PCS | Mod: S$GLB,,, | Performed by: INTERNAL MEDICINE

## 2023-04-24 PROCEDURE — 3074F SYST BP LT 130 MM HG: CPT | Mod: CPTII,S$GLB,, | Performed by: INTERNAL MEDICINE

## 2023-04-24 PROCEDURE — 1159F MED LIST DOCD IN RCRD: CPT | Mod: CPTII,S$GLB,, | Performed by: INTERNAL MEDICINE

## 2023-04-24 RX ORDER — NALTREXONE HYDROCHLORIDE 50 MG/1
50 TABLET, FILM COATED ORAL DAILY
Qty: 30 TABLET | Refills: 0 | Status: SHIPPED | OUTPATIENT
Start: 2023-04-24 | End: 2023-05-09

## 2023-04-24 RX ORDER — ACAMPROSATE CALCIUM 333 MG/1
666 TABLET, DELAYED RELEASE ORAL 3 TIMES DAILY
Qty: 180 TABLET | Refills: 11 | Status: CANCELLED | OUTPATIENT
Start: 2023-04-24 | End: 2024-04-23

## 2023-04-24 RX ORDER — SEMAGLUTIDE 0.68 MG/ML
0.5 INJECTION, SOLUTION SUBCUTANEOUS
Qty: 3 ML | Refills: 11 | Status: SHIPPED | OUTPATIENT
Start: 2023-04-24 | End: 2023-08-10

## 2023-04-24 NOTE — PROGRESS NOTES
INTERNAL MEDICINE RESIDENT CLINIC  CLINIC NOTE    Patient Name: Jesús Patel  YOB: 1975    PRESENTING HISTORY       History of Present Illness:  Mr. Jesús Patel is a 47 y.o. male w/ a PMHx of anxiety, depression, HTN, and JARAD (on CPAP), HLD obesity s/p gastric bypass (2017), superficial venous thrombosis (on eliquis), BLE lymphedema who presents for follow up.     EtOH: Pt has lowered his EtOH use. Normally uses 65 drinks per week but has cut down by 15 drinks to now to 50 drinks per week. Has been on this program for 2 weeks.     HTN: pt states that the home machine reading are around 150 - 140s, however his reading at clinics are 120s (128/83). Patient will take his BP on his arm with his feet flat and sitting up right in the chair.     Bariatrics: pt states that with the sleeve he lost weight but it came back. Insurance does not cover sleeve. Minimal fast food intake and cooks at home. Avoids fats. He tries to keep foods in balance. Pt would like to exercise more. Pt is interested in ozempic.  Last A1c. Pt was trying to do crossfit but because of knee pain is he unable to.     Anxiety: pt takes xanax for anxiety about 2-3 times per week. Family situation and finances are causing anxiety. Pt would like to take small steps towards change and is actively working towards those goals.     R foot burning and dry skin: onset 2 mos ago. Has tried creams and foot powders. No pain but burning between the toes. No recent exposures, no falls. Pt does wear socks and shows every day and has diaphoresis.     Review of Systems   Constitutional:  Negative for chills, fever and weight loss.   HENT:  Negative for congestion, hearing loss and sore throat.    Eyes:  Negative for blurred vision and discharge.   Respiratory:  Negative for cough, shortness of breath and wheezing.    Cardiovascular:  Negative for chest pain, palpitations and leg swelling.   Gastrointestinal:  Negative  "for blood in stool, constipation, diarrhea, nausea and vomiting.   Genitourinary:  Negative for dysuria, frequency, hematuria and urgency.   Musculoskeletal:  Negative for back pain and neck pain.   Skin:  Negative for rash.   Neurological:  Negative for dizziness, loss of consciousness, weakness and headaches.   Endo/Heme/Allergies:  Negative for polydipsia.   Psychiatric/Behavioral:  The patient is not nervous/anxious and does not have insomnia.      OBJECTIVE:   Vital Signs:  Vitals:    04/24/23 1325   BP: 128/76   Pulse: 96   Resp: 18   SpO2: 98%   Weight: (!) 176 kg (388 lb 0.2 oz)   Height: 5' 8" (1.727 m)       No results found for this or any previous visit (from the past 24 hour(s)).      Physical Exam   Constitutional: He is oriented to person, place, and time. No distress.   HENT:   Head: Normocephalic and atraumatic.   Right Ear: External ear normal.   Left Ear: External ear normal.   Nose: Nose normal.   Eyes: Conjunctivae are normal. Right eye exhibits no discharge. Left eye exhibits no discharge.   Cardiovascular: Normal rate, regular rhythm and normal heart sounds. Pulmonary:      Effort: Pulmonary effort is normal. No respiratory distress.      Breath sounds: Normal breath sounds.     Abdominal: Soft. He exhibits no distension and no mass. There is no abdominal tenderness. There is no rebound and no guarding. No hernia.   Musculoskeletal:         General: Normal range of motion.      Cervical back: Normal range of motion.      Right lower leg: No edema.      Left lower leg: No edema.      Comments: R leg bruise, improving per patient. 3 cm in diameter   Feet:   Right Foot:   Skin Integrity: Positive for skin breakdown and dry skin. Negative for ulcer, blister or erythema.   Left Foot:   Skin Integrity: Negative for ulcer, blister, skin breakdown or dry skin.   Neurological: He is alert and oriented to person, place, and time.   Skin: Skin is warm and dry. No erythema.   Psychiatric: His behavior is " normal. Mood normal.      ASSESSMENT & PLAN:         Jesús was seen today for annual exam.    Diagnoses and all orders for this visit:    Encounter for medical screening examination  -     HEMOGLOBIN A1C; Future  -     CBC W/ AUTO DIFFERENTIAL; Future  -     COMPREHENSIVE METABOLIC PANEL; Future  -     LIPID PANEL; Future    Anxiety  -     Ambulatory referral/consult to Primary Care Behavioral Health (Non-Opioids); Future    Alcohol abuse  -     Ambulatory referral/consult to Addiction Psychiatry; Future  -     naltrexone (DEPADE) 50 mg tablet; Take 50 mg by mouth once daily.    Morbid obesity with BMI of 50.0-59.9, adult  -     semaglutide (OZEMPIC) 0.25 mg or 0.5 mg (2 mg/3 mL) pen injector; Inject 0.5 mg into the skin every 7 days.      Other Orders Placed This Visit:  Orders Placed This Encounter   Procedures    HEMOGLOBIN A1C    CBC W/ AUTO DIFFERENTIAL    COMPREHENSIVE METABOLIC PANEL    LIPID PANEL    Ambulatory referral/consult to Primary Care Behavioral Health (Non-Opioids)    Ambulatory referral/consult to Addiction Psychiatry           Discussed with Dr. Awan  Will contact w/ lab results and will Follow up in about 3 months (around 7/24/2023).     Signed:    Keri Flores DO  Internal Medicine PGY 1  Answers submitted by the patient for this visit:  Review of Systems Questionnaire (Submitted on 4/24/2023)  activity change: Yes  unexpected weight change: Yes  rhinorrhea: No  trouble swallowing: No  visual disturbance: No  chest tightness: No  polyuria: No  difficulty urinating: No  joint swelling: No  arthralgias: Yes  confusion: No  dysphoric mood: Yes      I have reviewed and concur with the resident's history, physical, assessment, and plan.  I have personally interviewed and examined the patient at bedside.  See below addendum for my evaluation and additional findings.      Depression, recurrent  See mental health        Follow up in about 3 months (around 7/24/2023).    Future Appointments   Date  Time Provider Department Center   5/9/2023  7:10 AM LAB, APPOINTMENT [facility]   5/17/2023  2:[facility]   8/2/2023  1:00 PM Lester Awan MD [facility] LINDSE[facility]

## 2023-04-24 NOTE — PROGRESS NOTES
Behavioral Health Community Health Worker  Initial Assessment  Completed by:  Gustavo Ventura    Date:  4/24/2023    Patient Enrollment in Behavioral Health Program:  Patient verbalized understanding of Behavioral Health Integration services to include:  Patient understands that CHW, LCSW, PharmD and consulting Psychiatrist are members of the care team working collaboratively with his/her primary care provider: Yes  Patient understands that activation of their [employer] patient portal account is required for accessing the full scope of team services: Yes  Patient understands that some counseling sessions may occur via video: Yes  Clinic visits with the psychiatrist may be subject to a co-pay based on your insurance: Yes  Patient consents to enroll in BHI program: Yes    Assessments     Single Item Health Literacy Scale:  How often do you need to have someone help you read instructions, pamphlets or other written material from your doctor or pharmacy?: Never    Promis 10:  Promis 10 Responses  In general, would you say your health is: Fair  In general, would you say your quality of life is: Good  In general, how would you rate your physical health?: Fair  In general, how would you rate your mental health, including your mood and your ability to think?: Fair  In general, how would you rate your satisfaction with your social activities and relationships?: Good  In general, please rate how well you carry out your usual social activities and roles. (This includes activities at home, at work and in your community, and responsibilities as a parent, child, spouse, employee, friend, etc.): Excellent  To what extent are you able to carry out your everyday physical activities such as walking, climbing stairs, carrying groceries, or moving a chair? : Moderately  How often have you been bothered by emotional problems such as feeling anxious, depressed or irritable?: Often  In the past 7 days, how would you rate your fatigue on  average?: Mild  In the past 7 days, on a scale of 0 to 10 (where 0 is no pain and 10 is the worst pain imaginable) how would you rate your pain on average?: 0  Global Physical Health: 7  Global Mental health Score: 12    Depression PHQ:  PHQ9 4/24/2023   Little interest or pleasure in doing things: -   Feeling down, depressed or hopeless: -   Trouble falling asleep, staying asleep, or sleeping too much: -   Feeling tired or having little energy: -   Poor appetite or overeating: -   Feeling bad about yourself - or that you are a failure or have let yourself or family down: -   Trouble concentrating on things, such as reading the newspaper or watching television: -   Moving or speaking so slowly that other people could have noticed. Or the opposite,being so fidgety or restless that you have been moving around a lot more than usual: -   Thoughts that you would be better off dead or hurting yourself in some way: -   If you indicated you have experienced any of the previous problems, how difficult have these problems made it for you to do your work, take care of things at home or get along with other people? -   Total Score 10         Generalized Anxiety Disorder 7-Item Scale:  GAD7 4/24/2023   1. Feeling nervous, anxious, or on edge? 2   2. Not being able to stop or control worrying? 3   3. Worrying too much about different things? 3   4. Trouble relaxing? 2   5. Being so restless that it is hard to sit still? 2   6. Becoming easily annoyed or irritable? 0   7. Feeling afraid as if something awful might happen? 2   8. If you checked off any problems, how difficult have these problems made it for you to do your work, take care of things at home, or get along with other people? 1   GISELLE-7 Score 14       History     Social History     Socioeconomic History    Marital status: Single   Tobacco Use    Smoking status: Former     Packs/day: 0.25     Years: 5.00     Pack years: 1.25     Types: Cigarettes     Quit date: 1/1/2009  "    Years since quittin.3    Smokeless tobacco: Never   Substance and Sexual Activity    Alcohol use: Yes     Comment: 2-6 servings daily, heavier on weekends    Drug use: No    Sexual activity: Not Currently   Social History Narrative    Works Outright.  Previously worked as a .  1 daughter, Yenni (18 years old). He is . Lives with brother. Not much exercise     Social Determinants of Health     Financial Resource Strain: Low Risk     Difficulty of Paying Living Expenses: Not very hard   Food Insecurity: Food Insecurity Present    Worried About Running Out of Food in the Last Year: Sometimes true    Ran Out of Food in the Last Year: Sometimes true   Transportation Needs: No Transportation Needs    Lack of Transportation (Medical): No    Lack of Transportation (Non-Medical): No   Physical Activity: Inactive    Days of Exercise per Week: 1 day    Minutes of Exercise per Session: 0 min   Stress: No Stress Concern Present    Feeling of Stress : Not at all   Social Connections: Unknown    Frequency of Communication with Friends and Family: More than three times a week    Frequency of Social Gatherings with Friends and Family: Never    Active Member of Clubs or Organizations: No    Attends Club or Organization Meetings: 1 to 4 times per year    Marital Status:    Housing Stability: Low Risk     Unable to Pay for Housing in the Last Year: No    Number of Places Lived in the Last Year: 0    Unstable Housing in the Last Year: No       Call Summary     Patient was referred to the BHI (Non-opioid) program by Primary Care Provider, Dr. Flores CHW contacted Jesús Patel who reports anxiety that limits [his/her] activities of daily living (ADLs).   Patient scored "10" on the PHQ9 and "14" on the GISELLE 7. Based on these scores patient is eligible for the Behavioral health Integration (Non-opioid) Program. JILL completed the intake and scheduled an appointment " for patient with Marya Mayfield LPC, on 5/1/23. Crisis number given

## 2023-04-25 ENCOUNTER — PATIENT MESSAGE (OUTPATIENT)
Dept: OTHER | Facility: OTHER | Age: 48
End: 2023-04-25
Payer: COMMERCIAL

## 2023-04-25 ENCOUNTER — PATIENT MESSAGE (OUTPATIENT)
Dept: INTERNAL MEDICINE | Facility: CLINIC | Age: 48
End: 2023-04-25
Payer: COMMERCIAL

## 2023-04-25 DIAGNOSIS — R79.89 ELEVATED LFTS: Primary | ICD-10-CM

## 2023-04-25 NOTE — TELEPHONE ENCOUNTER
Hi, please contact the patient to assist in scheduling    Orders Placed This Encounter    Comprehensive Metabolic Panel   For 2 weeks from now    Thank you, Lester Awan    I called him and explained he should stop naltrexone for now since LFTs very elevated. He has not been taking tylenol, he has not drank etoh since when I saw him yesterday and again he denies w/d symptoms..  He will not yet fill ozempic and will not yet start it/fill it.  He will enroll in IOP next week  ?s answered

## 2023-04-26 ENCOUNTER — PATIENT MESSAGE (OUTPATIENT)
Dept: OTHER | Facility: OTHER | Age: 48
End: 2023-04-26
Payer: COMMERCIAL

## 2023-04-26 ENCOUNTER — PATIENT MESSAGE (OUTPATIENT)
Dept: INTERNAL MEDICINE | Facility: CLINIC | Age: 48
End: 2023-04-26
Payer: COMMERCIAL

## 2023-04-28 NOTE — PROGRESS NOTES
"Primary Care Behavioral Health Integration: Initial  Date:  4/28/2023  Patient Name: Jesús Patel  Referral Source:  Lester Awan MD  Type of Visit:  {[facility] VISIT TYPE:03326}  Site:  [facility]  Referral Source:  Lester Awan MD    Chief complaint/reason for encounter: {PSY SW CHIEF COMPLAINTS:79493}      History of Present Illness:  Jesús Patel, a 47 y.o.  male with history of {BHI DIAGNOSIS LIST:42763} referred by Lester Awan MD.  Patient was seen, examined and chart was reviewed.    Met with {Relatives of adult:20512::"patient"}.     Past Medical History:   Diagnosis Date    Asthma     GERD (gastroesophageal reflux disease)     Hyperlipidemia     Hypertension     Low back pain     Morbid obesity with BMI of 50.0-59.9, adult     JARAD (obstructive sleep apnea)     Pre-diabetes     Varicose veins     Vitamin D deficiency          Current Outpatient Medications:     albuterol (VENTOLIN HFA) 90 mcg/actuation inhaler, Inhale 2 puffs into the lungs every 6 (six) hours as needed for Wheezing or Shortness of Breath. Rescue, Disp: 18 g, Rfl: 11    ALPRAZolam (XANAX) 0.5 MG tablet, TAKE 1 TABLET (0.5 MG TOTAL) BY MOUTH 3 (THREE) TIMES DAILY AS NEEDED FOR ANXIETY., Disp: 40 tablet, Rfl: 0    amLODIPine (NORVASC) 10 MG tablet, Take 1 tablet (10 mg total) by mouth once daily., Disp: 90 tablet, Rfl: 3    apixaban (ELIQUIS) 5 mg Tab, Take 2 tablets (10 mg total) by mouth 2 (two) times daily for the first 7 days, then reduce to 1 tablet (5 mg total) 2 (two) times daily thereafter., Disp: 60 tablet, Rfl: 6    b complex vitamins tablet, Take 1 tablet by mouth every morning. , Disp: , Rfl:     budesonide-formoterol 80-4.5 mcg (SYMBICORT) 80-4.5 mcg/actuation HFAA, Inhale 2 puffs into the lungs once daily. Controller, Disp: 10.2 g, Rfl: 11    CALCIUM CITRATE/VITAMIN D3 (CALCIUM CITRATE + D ORAL), Take 1 tablet by mouth 2 (two) times daily., Disp: , Rfl:     cetirizine (ZYRTEC) 10 MG tablet, Take 10 " mg by mouth once daily., Disp: , Rfl:     cholecalciferol, vitamin D3, 125 mcg (5,000 unit) Tab, Take 1 tablet (5,000 Units total) by mouth once daily., Disp: 30 tablet, Rfl: 6    diclofenac (VOLTAREN) 50 MG EC tablet, Take 1 tablet (50 mg total) by mouth 3 (three) times daily as needed (pain). (Patient not taking: Reported on 4/24/2023), Disp: 15 tablet, Rfl: 0    diphenhydrAMINE (BENADRYL) 25 mg capsule, Take 25 mg by mouth every 6 (six) hours as needed for Itching., Disp: , Rfl:     fluticasone propionate (FLONASE) 50 mcg/actuation nasal spray, SPRAY 2 SPRAYS IN EACH NOSTRIL TWICE A DAY, Disp: 48 mL, Rfl: 2    hydroCHLOROthiazide (HYDRODIURIL) 12.5 MG Tab, Take 1 tablet (12.5 mg total) by mouth once daily., Disp: 90 tablet, Rfl: 1    meloxicam (MOBIC) 15 MG tablet, Take 1 tablet (15 mg total) by mouth once daily. (Patient not taking: Reported on 4/24/2023), Disp: 20 tablet, Rfl: 0    naltrexone (DEPADE) 50 mg tablet, Take 50 mg by mouth once daily., Disp: 30 tablet, Rfl: 0    omeprazole (PRILOSEC) 40 MG capsule, Take 1 capsule (40 mg total) by mouth every morning., Disp: 90 capsule, Rfl: 1    semaglutide (OZEMPIC) 0.25 mg or 0.5 mg (2 mg/3 mL) pen injector, Inject 0.5 mg into the skin every 7 days., Disp: 3 mL, Rfl: 11    triamcinolone acetonide 0.1% (KENALOG) 0.1 % cream, Apply topically 2 (two) times daily. (Patient not taking: Reported on 4/24/2023), Disp: 45 g, Rfl: 2    zolpidem (AMBIEN) 10 mg Tab, TAKE 1 TABLET BY MOUTH NIGHTLY AS NEEDED (INSOMNIA)., Disp: 30 tablet, Rfl: 1    zolpidem (AMBIEN) 10 mg Tab, TAKE 1 TABLET BY MOUTH NIGHTLY AS NEEDED (INSOMNIA). (Patient not taking: Reported on 4/24/2023), Disp: 30 tablet, Rfl: 1    Current Symptoms:  Depression: {Depression Symptoms:30443}.    Anxiety: {Anxiety Symptoms:63245}.    Sleep: {Insomnia Sx:4673889161}.    Alannah/Hypomania:  {Bipolar SX:12266}.    Psychosis: {Psychosis SX:85000}.  Suicidal thoughts/behaviors: {DENIED or CONFIRMED:78662}    Patient  "noted agreement to call 911/and or present to the ED if she experienced suicidal or homicidal ideation, plan or intent.    Self-injury:  {DENIED or CONFIRMED:77746}   Trauma: {DENIED or CONFIRMED:36189}     Risk Assessment:  {parameters:49250::"Patient reports no suicidal ideation","Patient reports no homicidal ideation","Patient reports no self-injurious behavior","Patient reports no violent behavior"}    Patient advised to call 911/988 or present the the nearest ED if they experience suicidal or homicidal ideation, plan or intent.    Discussed and agreed on a safety plan.    Psychiatric History:  Is the patient taking psychiatric medication: {YES **/NO:89194}  {Psychiatric Medications:51899} They {Actions; are/are not:00652} interested in medication changes.  Previous Medication Trials: {YES:36764} {Medication Trials:56254}  Previous Psychiatric Outpatient Treatment:  {YES **/NO:88532}  Previous Psychiatric Hospitalizations:  {YES **/NO:17189}  Previous Suicide Attempts:  {YES **/NO:93088}  History of Trauma:  {YES **/NO:66125}  Access to a Firearm:  {YES **/NO:68726}    Substance Use History:  Tobacco/Nicotine:  {YES **/NO:62997}   Alcohol: {HISTORY; Alcohol Freq:11169}  Illicit Substances: {YES **/NO:54559}  Misuse of Prescription Medications:  {YES **/NO:76866}  Caffeine: {YES **/NO:67747}    Mental Status Exam  General Appearance:  {appearance:62664::"unremarkable","age appropriate"}     Speech: {findings; speech psych:48002::"normal tone","normal rate","normal pitch","normal volume"}         Level of Cooperation: {cooperation:33972}        Thought Processes: {thought process:37658::"normal and logical"}      Mood: {mood:81933}        Thought Content: {thought content:29859::"normal, no suicidality, no homicidality, delusions, or paranoia"}     Affect: {desc; affect:62900::"congruent and appropriate"}    Orientation: {orientation:81151}     Memory: {memory:34194}     Attention Span & Concentration: {attention " span:55888}     Fund of General Knowledge: {education:57557}   Abstract Reasoning: {abstract:73776}   Judgment & Insight: {judgment/insight:51547}       Language:  {PSY LANGUAGE:42506}       No flowsheet data found.     GAD7 4/24/2023 1/21/2020 7/17/2019   1. Feeling nervous, anxious, or on edge? 2 1 2   2. Not being able to stop or control worrying? 3 1 3   3. Worrying too much about different things? 3 1 3   4. Trouble relaxing? 2 1 2   5. Being so restless that it is hard to sit still? 2 1 3   6. Becoming easily annoyed or irritable? 0 1 0   7. Feeling afraid as if something awful might happen? 2 1 0   8. If you checked off any problems, how difficult have these problems made it for you to do your work, take care of things at home, or get along with other people? 1 0 0   GISELLE-7 Score 14 7 13       Impression:   My diagnostic impression is       Provisional Diagnosis:  No diagnosis found.    Treatment Goals and Plan: Initial appointment focused on gathering history, identifying treatment goals and developing a treatment plan.      {[facility] TREATMENT GOALS:89034}    Future treatment will utilize {Therapy Types:22788}.      Return to Clinic: {return:86363}    Plan to discuss case with [facility] consulting psychiatrist and further recommendations to be potentially be made at that time.  Refer to psychiatry    Length of Appointment: {Length of Service:57651}

## 2023-05-01 ENCOUNTER — OFFICE VISIT (OUTPATIENT)
Dept: BEHAVIORAL HEALTH | Facility: CLINIC | Age: 48
End: 2023-05-01
Payer: COMMERCIAL

## 2023-05-01 ENCOUNTER — PATIENT MESSAGE (OUTPATIENT)
Dept: INTERNAL MEDICINE | Facility: CLINIC | Age: 48
End: 2023-05-01
Payer: COMMERCIAL

## 2023-05-01 ENCOUNTER — PATIENT MESSAGE (OUTPATIENT)
Dept: BEHAVIORAL HEALTH | Facility: CLINIC | Age: 48
End: 2023-05-01

## 2023-05-01 DIAGNOSIS — F33.1 MAJOR DEPRESSIVE DISORDER, RECURRENT EPISODE, MODERATE: ICD-10-CM

## 2023-05-01 DIAGNOSIS — F41.9 ANXIETY: Primary | ICD-10-CM

## 2023-05-01 DIAGNOSIS — R79.89 ELEVATED LFTS: Primary | ICD-10-CM

## 2023-05-01 PROCEDURE — 99999 PR PBB SHADOW E&M-EST. PATIENT-LVL II: ICD-10-PCS | Mod: PBBFAC,,, | Performed by: COUNSELOR

## 2023-05-01 PROCEDURE — 90791 PR PSYCHIATRIC DIAGNOSTIC EVALUATION: ICD-10-PCS | Mod: S$GLB,,, | Performed by: COUNSELOR

## 2023-05-01 PROCEDURE — 3044F HG A1C LEVEL LT 7.0%: CPT | Mod: CPTII,S$GLB,, | Performed by: COUNSELOR

## 2023-05-01 PROCEDURE — 99999 PR PBB SHADOW E&M-EST. PATIENT-LVL II: CPT | Mod: PBBFAC,,, | Performed by: COUNSELOR

## 2023-05-01 PROCEDURE — 3044F PR MOST RECENT HEMOGLOBIN A1C LEVEL <7.0%: ICD-10-PCS | Mod: CPTII,S$GLB,, | Performed by: COUNSELOR

## 2023-05-01 PROCEDURE — 90791 PSYCH DIAGNOSTIC EVALUATION: CPT | Mod: S$GLB,,, | Performed by: COUNSELOR

## 2023-05-01 NOTE — PROGRESS NOTES
Primary Care Behavioral Health Integration: Initial  Date:  6/14/2023  Patient Name: Jesús Patel  Referral Source:  Lester Awan MD  Type of Visit:  In person  Site:  [facility]  Referral Source:  Lester Awan MD    Chief complaint/reason for encounter: depression, mood elevation, anxiety, and sleep      History of Present Illness:  Jesús Patel, a 47 y.o.  male with history of Anxiety disorders; generalized anxiety disorder [F41.1] and Major Depressive Disorder, Recurrent, Mild (F33.0) referred by Lester Awan MD.  Patient was seen, examined and chart was reviewed.    Met with patient.     Past Medical History:   Diagnosis Date    Asthma     GERD (gastroesophageal reflux disease)     Hyperlipidemia     Hypertension     Low back pain     Morbid obesity with BMI of 50.0-59.9, adult     JARAD (obstructive sleep apnea)     Pre-diabetes     Varicose veins     Vitamin D deficiency          Current Outpatient Medications:     albuterol (VENTOLIN HFA) 90 mcg/actuation inhaler, Inhale 2 puffs into the lungs every 6 (six) hours as needed for Wheezing or Shortness of Breath. Rescue, Disp: 18 g, Rfl: 11    ALPRAZolam (XANAX) 0.5 MG tablet, TAKE 1 TABLET (0.5 MG TOTAL) BY MOUTH 3 (THREE) TIMES DAILY AS NEEDED FOR ANXIETY., Disp: 40 tablet, Rfl: 0    amLODIPine (NORVASC) 10 MG tablet, Take 1 tablet (10 mg total) by mouth once daily., Disp: 90 tablet, Rfl: 3    apixaban (ELIQUIS) 5 mg Tab, Take 2 tablets (10 mg total) by mouth 2 (two) times daily for the first 7 days, then reduce to 1 tablet (5 mg total) 2 (two) times daily thereafter., Disp: 60 tablet, Rfl: 6    b complex vitamins tablet, Take 1 tablet by mouth every morning. , Disp: , Rfl:     budesonide-formoterol 80-4.5 mcg (SYMBICORT) 80-4.5 mcg/actuation HFAA, Inhale 2 puffs into the lungs once daily. Controller, Disp: 10.2 g, Rfl: 11    CALCIUM CITRATE/VITAMIN D3 (CALCIUM CITRATE + D ORAL), Take 1 tablet by mouth 2 (two) times daily.,  Disp: , Rfl:     cetirizine (ZYRTEC) 10 MG tablet, Take 10 mg by mouth once daily., Disp: , Rfl:     cholecalciferol, vitamin D3, 125 mcg (5,000 unit) Tab, Take 1 tablet (5,000 Units total) by mouth once daily., Disp: 30 tablet, Rfl: 6    diclofenac (VOLTAREN) 50 MG EC tablet, Take 1 tablet (50 mg total) by mouth 3 (three) times daily as needed (pain)., Disp: 15 tablet, Rfl: 0    diphenhydrAMINE (BENADRYL) 25 mg capsule, Take 25 mg by mouth every 6 (six) hours as needed for Itching., Disp: , Rfl:     fluticasone propionate (FLONASE) 50 mcg/actuation nasal spray, SPRAY 2 SPRAYS IN EACH NOSTRIL TWICE A DAY, Disp: 48 mL, Rfl: 11    hydroCHLOROthiazide (HYDRODIURIL) 12.5 MG Tab, Take 1 tablet (12.5 mg total) by mouth once daily., Disp: 90 tablet, Rfl: 1    meloxicam (MOBIC) 15 MG tablet, Take 1 tablet (15 mg total) by mouth once daily., Disp: 20 tablet, Rfl: 0    omeprazole (PRILOSEC) 40 MG capsule, Take 1 capsule (40 mg total) by mouth every morning., Disp: 90 capsule, Rfl: 1    semaglutide (OZEMPIC) 0.25 mg or 0.5 mg (2 mg/3 mL) pen injector, Inject 0.5 mg into the skin every 7 days., Disp: 3 mL, Rfl: 11    triamcinolone acetonide 0.1% (KENALOG) 0.1 % cream, Apply topically 2 (two) times daily., Disp: 45 g, Rfl: 2    zolpidem (AMBIEN CR) 12.5 MG CR tablet, Take 1 tablet (12.5 mg total) by mouth nightly as needed for Insomnia., Disp: 30 tablet, Rfl: 5    Current Symptoms:  Depression: insomnia, worthlessness/guilt, and fatigue.    Anxiety: excessive worrying, restlessness, obsessions/compulsions, and flashbacks/nightmares.    Sleep: difficulty falling asleep.    Alannah/Hypomania:  hyperactivity, flight of ideas, easy distractibility, and racing thoughts or rumination.    Psychosis: denies .  Suicidal thoughts/behaviors: Denied    Patient noted agreement to call 911/and or present to the ED if she experienced suicidal or homicidal ideation, plan or intent.    Self-injury:  Denied   Trauma: Confirmed     Risk  Assessment:  Patient reports no suicidal ideation  Patient reports no homicidal ideation  Patient reports no self-injurious behavior  Patient reports no violent behavior    Patient advised to call 911/198 or present the the nearest ED if they experience suicidal or homicidal ideation, plan or intent.    Discussed and agreed on a safety plan.    Psychiatric History:  Is the patient taking psychiatric medication: Yes - Xanax  They are interested in medication changes.  Previous Medication Trials: No   Previous Psychiatric Outpatient Treatment:  No  Previous Psychiatric Hospitalizations:  No  Previous Suicide Attempts:  No  History of Trauma:  Yes  Access to a Firearm:  No    Substance Use History:  Tobacco/Nicotine:  No   Alcohol:  8-10 drinks of Vodka a night. Has been sober for 4 days  Illicit Substances: No  Misuse of Prescription Medications:  No  Caffeine: Yes - Coffee    Mental Status Exam  General Appearance:  age appropriate, casually dressed, obese     Speech: normal tone, normal rate, normal pitch, loud         Level of Cooperation: cooperative        Thought Processes: normal and logical      Mood: steady        Thought Content: normal, no suicidality, no homicidality, delusions, or paranoia     Affect: congruent and appropriate, full    Orientation: Oriented x3     Memory: recent >  intact, remote >  intact     Attention Span & Concentration: intact     Fund of General Knowledge: intact and appropriate to age and level of education   Abstract Reasoning: interpretation of similarities was abstract, interpretation of proverbs was abstract   Judgment & Insight: good       Language:  intact       Results of the PHQ8 6/6/2023   1. Little interest or pleasure in doing things More than half the days   2. Feeling down, depressed, or hopeless Not at all   3. Trouble falling or staying asleep, or sleeping too much Several days   4. Feeling tired or having little energy More than half the days   5. poor appetite or  overeating More than half the days   6. Feeling bad about yourself - or that you are a failure or have let yourself or your family down Not at all   7. Trouble concentrating on things, such as reading the newspaper or watching television Not at all   8. Moving or speaking so slowly that other people could have noticed? Or the opposite - being so fidgety or restless that you have been moving around a lot more than usual Not at all   Total Score  7      Depression Patient Health Questionnaire 4/24/2023 4/4/2023 5/18/2022 12/2/2021 9/28/2021 1/21/2020 7/17/2019   Over the last two weeks how often have you been bothered by little interest or pleasure in doing things Not at all Several days Not at all Not at all Not at all More than half the days Not at all   Over the last two weeks how often have you been bothered by feeling down, depressed or hopeless Several days Several days Not at all Not at all Not at all Several days Not at all   PHQ-2 Total Score 1 2 0 0 0 3 0   PHQ-9 Total Score 10 - - - - 10 12   PHQ-9 Interpretation Moderate - - - - - -      GAD7 6/6/2023 4/24/2023 1/21/2020   1. Feeling nervous, anxious, or on edge? 2 2 1   2. Not being able to stop or control worrying? 2 3 1   3. Worrying too much about different things? 2 3 1   4. Trouble relaxing? 2 2 1   5. Being so restless that it is hard to sit still? 2 2 1   6. Becoming easily annoyed or irritable? 0 0 1   7. Feeling afraid as if something awful might happen? 0 2 1   8. If you checked off any problems, how difficult have these problems made it for you to do your work, take care of things at home, or get along with other people? - 1 0   GISELLE-7 Score 10 14 7       Impression:   My diagnostic impression is patient comes to the appointment with worries about his anxiety, depression and alcohol consumption. Drinks 8-10 drinks of Vodka a night but has been sober for 4 days. Does not crave alcohol or is tempted. Motivating factor is liver problems. He reports  having a very stressful job and worries a lot about finances. He supports his mother, father, brother and daughter. Daughter has recently transferred out of state for college and that has added financial stress to patient. Describes himself as putting everyone else first. Feels obligated to support family financially. States that he had one panic attack a few months ago. Has had difficulty going to work on Mondays after drinking binge all weekend.      Provisional Diagnosis:  1. Anxiety    2. Major depressive disorder, recurrent episode, moderate        Treatment Goals and Plan: Initial appointment focused on gathering history, identifying treatment goals and developing a treatment plan.      Anxiety: reducing negative automatic thoughts and reducing time spent worrying (<30 minutes/day)  Depression: reducing fatigue and reducing negative automatic thoughts    Future treatment will utilize CBT and Solution-focused Therapy.      Return to Clinic: 2 weeks    Length of Appointment: 45

## 2023-05-03 ENCOUNTER — PATIENT MESSAGE (OUTPATIENT)
Dept: BARIATRICS | Facility: CLINIC | Age: 48
End: 2023-05-03
Payer: COMMERCIAL

## 2023-05-03 PROBLEM — F33.9 DEPRESSION, RECURRENT: Status: ACTIVE | Noted: 2023-05-03

## 2023-05-09 ENCOUNTER — LAB VISIT (OUTPATIENT)
Dept: LAB | Facility: HOSPITAL | Age: 48
End: 2023-05-09
Attending: INTERNAL MEDICINE
Payer: COMMERCIAL

## 2023-05-09 ENCOUNTER — PATIENT MESSAGE (OUTPATIENT)
Dept: INTERNAL MEDICINE | Facility: CLINIC | Age: 48
End: 2023-05-09
Payer: COMMERCIAL

## 2023-05-09 ENCOUNTER — PATIENT MESSAGE (OUTPATIENT)
Dept: BARIATRICS | Facility: CLINIC | Age: 48
End: 2023-05-09
Payer: COMMERCIAL

## 2023-05-09 DIAGNOSIS — R79.89 ELEVATED LFTS: ICD-10-CM

## 2023-05-09 LAB
ALBUMIN SERPL BCP-MCNC: 3.1 G/DL (ref 3.5–5.2)
ALP SERPL-CCNC: 128 U/L (ref 55–135)
ALT SERPL W/O P-5'-P-CCNC: 248 U/L (ref 10–44)
ANION GAP SERPL CALC-SCNC: 7 MMOL/L (ref 8–16)
AST SERPL-CCNC: 248 U/L (ref 10–40)
BILIRUB SERPL-MCNC: 0.7 MG/DL (ref 0.1–1)
BUN SERPL-MCNC: 10 MG/DL (ref 6–20)
CALCIUM SERPL-MCNC: 9.3 MG/DL (ref 8.7–10.5)
CHLORIDE SERPL-SCNC: 104 MMOL/L (ref 95–110)
CO2 SERPL-SCNC: 27 MMOL/L (ref 23–29)
CREAT SERPL-MCNC: 0.7 MG/DL (ref 0.5–1.4)
EST. GFR  (NO RACE VARIABLE): >60 ML/MIN/1.73 M^2
GLUCOSE SERPL-MCNC: 113 MG/DL (ref 70–110)
POTASSIUM SERPL-SCNC: 3.8 MMOL/L (ref 3.5–5.1)
PROT SERPL-MCNC: 7 G/DL (ref 6–8.4)
SODIUM SERPL-SCNC: 138 MMOL/L (ref 136–145)

## 2023-05-09 PROCEDURE — 80053 COMPREHEN METABOLIC PANEL: CPT | Performed by: INTERNAL MEDICINE

## 2023-05-09 PROCEDURE — 36415 COLL VENOUS BLD VENIPUNCTURE: CPT | Performed by: INTERNAL MEDICINE

## 2023-05-10 ENCOUNTER — PATIENT MESSAGE (OUTPATIENT)
Dept: INTERNAL MEDICINE | Facility: CLINIC | Age: 48
End: 2023-05-10
Payer: COMMERCIAL

## 2023-05-10 DIAGNOSIS — I10 ESSENTIAL HYPERTENSION: ICD-10-CM

## 2023-05-10 RX ORDER — AMLODIPINE BESYLATE 10 MG/1
10 TABLET ORAL DAILY
Qty: 90 TABLET | Refills: 3 | Status: SHIPPED | OUTPATIENT
Start: 2023-05-10 | End: 2024-05-09

## 2023-05-10 NOTE — TELEPHONE ENCOUNTER
No care due was identified.  [facility] Embedded Care Due Messages. Reference number: [id number].   5/10/2023 9:08:47 AM CDT

## 2023-05-11 ENCOUNTER — OFFICE VISIT (OUTPATIENT)
Dept: HEPATOLOGY | Facility: CLINIC | Age: 48
End: 2023-05-11
Payer: COMMERCIAL

## 2023-05-11 VITALS
WEIGHT: 315 LBS | HEIGHT: 68 IN | BODY MASS INDEX: 47.74 KG/M2 | RESPIRATION RATE: 18 BRPM | DIASTOLIC BLOOD PRESSURE: 86 MMHG | SYSTOLIC BLOOD PRESSURE: 149 MMHG

## 2023-05-11 DIAGNOSIS — K76.0 FATTY LIVER: ICD-10-CM

## 2023-05-11 DIAGNOSIS — E78.49 OTHER HYPERLIPIDEMIA: ICD-10-CM

## 2023-05-11 DIAGNOSIS — I10 ESSENTIAL HYPERTENSION: ICD-10-CM

## 2023-05-11 DIAGNOSIS — R74.8 ELEVATED LIVER ENZYMES: Primary | ICD-10-CM

## 2023-05-11 DIAGNOSIS — R79.89 ELEVATED LFTS: ICD-10-CM

## 2023-05-11 DIAGNOSIS — E66.01 MORBID OBESITY WITH BMI OF 50.0-59.9, ADULT: ICD-10-CM

## 2023-05-11 PROCEDURE — 3008F PR BODY MASS INDEX (BMI) DOCUMENTED: ICD-10-PCS | Mod: CPTII,S$GLB,, | Performed by: NURSE PRACTITIONER

## 2023-05-11 PROCEDURE — 1160F RVW MEDS BY RX/DR IN RCRD: CPT | Mod: CPTII,S$GLB,, | Performed by: NURSE PRACTITIONER

## 2023-05-11 PROCEDURE — 3079F PR MOST RECENT DIASTOLIC BLOOD PRESSURE 80-89 MM HG: ICD-10-PCS | Mod: CPTII,S$GLB,, | Performed by: NURSE PRACTITIONER

## 2023-05-11 PROCEDURE — 99999 PR PBB SHADOW E&M-EST. PATIENT-LVL V: CPT | Mod: PBBFAC,,, | Performed by: NURSE PRACTITIONER

## 2023-05-11 PROCEDURE — 3044F PR MOST RECENT HEMOGLOBIN A1C LEVEL <7.0%: ICD-10-PCS | Mod: CPTII,S$GLB,, | Performed by: NURSE PRACTITIONER

## 2023-05-11 PROCEDURE — 3008F BODY MASS INDEX DOCD: CPT | Mod: CPTII,S$GLB,, | Performed by: NURSE PRACTITIONER

## 2023-05-11 PROCEDURE — 99999 PR PBB SHADOW E&M-EST. PATIENT-LVL V: ICD-10-PCS | Mod: PBBFAC,,, | Performed by: NURSE PRACTITIONER

## 2023-05-11 PROCEDURE — 1159F PR MEDICATION LIST DOCUMENTED IN MEDICAL RECORD: ICD-10-PCS | Mod: CPTII,S$GLB,, | Performed by: NURSE PRACTITIONER

## 2023-05-11 PROCEDURE — 3077F SYST BP >= 140 MM HG: CPT | Mod: CPTII,S$GLB,, | Performed by: NURSE PRACTITIONER

## 2023-05-11 PROCEDURE — 1160F PR REVIEW ALL MEDS BY PRESCRIBER/CLIN PHARMACIST DOCUMENTED: ICD-10-PCS | Mod: CPTII,S$GLB,, | Performed by: NURSE PRACTITIONER

## 2023-05-11 PROCEDURE — 3077F PR MOST RECENT SYSTOLIC BLOOD PRESSURE >= 140 MM HG: ICD-10-PCS | Mod: CPTII,S$GLB,, | Performed by: NURSE PRACTITIONER

## 2023-05-11 PROCEDURE — 3079F DIAST BP 80-89 MM HG: CPT | Mod: CPTII,S$GLB,, | Performed by: NURSE PRACTITIONER

## 2023-05-11 PROCEDURE — 99214 PR OFFICE/OUTPT VISIT, EST, LEVL IV, 30-39 MIN: ICD-10-PCS | Mod: S$GLB,,, | Performed by: NURSE PRACTITIONER

## 2023-05-11 PROCEDURE — 99214 OFFICE O/P EST MOD 30 MIN: CPT | Mod: S$GLB,,, | Performed by: NURSE PRACTITIONER

## 2023-05-11 PROCEDURE — 1159F MED LIST DOCD IN RCRD: CPT | Mod: CPTII,S$GLB,, | Performed by: NURSE PRACTITIONER

## 2023-05-11 PROCEDURE — 3044F HG A1C LEVEL LT 7.0%: CPT | Mod: CPTII,S$GLB,, | Performed by: NURSE PRACTITIONER

## 2023-05-11 NOTE — Clinical Note
Ok to use Ozempic. I suspect his acutely elevated liver enzymes are bc of his heavy alcohol use recently.

## 2023-05-11 NOTE — PROGRESS NOTES
[facility] VISIT FOLLOW UP NOTE    PCP: Lester Awan MD     CHIEF COMPLAINT: elevated liver enzymes, fatty liver     HPI: This is a 47 y.o. White male with PMH noted below, presenting for follow up of above    Serological workup was negative for Maximo's, alpha-1 antitrypsin deficiency, hemochromatosis, autoimmune etiology, and viral hepatitis A and B.   + Hep C ab in 2020, previous RNA in 2019 negative. No hep C treatment   PETH 190s  4/2022 - need to repeat     Prior serologic workup:   Lab Results   Component Value Date    SMOOTHMUSCAB Negative 1:40 04/05/2022    AMAIFA Negative 1:40 04/05/2022    IGGSERUM 1166 04/05/2022    ANASCREEN Negative <1:80 04/05/2022    FERRITIN 202 04/05/2022    FESATURATED 30 04/05/2022    CERULOPLSM 32.0 04/05/2022    HEPBSAG Negative 04/05/2022    HEPCAB Positive (A) 11/14/2020    HEPAIGM Negative 04/05/2022       Liver fibrosis staging:  -- MRI elasto 4/2022 noted mild steatosis, no fibrosis staging possible given body habitus  -- needs repeat MRI    Risk factors for NAFLD include morbid obesity, HTN, HLD, preDM    Interval HPI: Presents today alone. No SSB. No weight loss since last visit. Heavy alcohol use lately, stopped 4/29  Has plexus herbals but didn't restart, do not recommend at this time   Did not repeat MRI, needs to reschedule   Avoiding carbs, trying to eat smaller portions    Labs done 5/2023 show markedly elevated transaminase levels (ALT = AST, more significantly elevated since 11/2020)  Platelets WNL, alk phos WNL  Synthetic liver functioning WNL    Lab Results   Component Value Date     (H) 05/09/2023     (H) 05/09/2023    ALKPHOS 128 05/09/2023    BILITOT 0.7 05/09/2023    ALBUMIN 3.1 (L) 05/09/2023     04/24/2023       MRI elasto 4/2022 noted hepatomegaly, no splenomegaly - needs to repeat    Denies family history of liver disease. + alcohol   Social History     Substance and Sexual Activity   Alcohol Use Yes    Comment: 10+  "servings daily, heavier on weekends, stopped 4/26/2023         Immunity to Hep A and B - needs twinrix, sent to Psychiatric pharm and ID       Allergy and medication list reviewed and updated     PMHX:  has a past medical history of Asthma, GERD (gastroesophageal reflux disease), Hyperlipidemia, Hypertension, Low back pain, Morbid obesity with BMI of 50.0-59.9, adult, JARAD (obstructive sleep apnea), Pre-diabetes, Varicose veins, and Vitamin D deficiency.    PSHX:  has a past surgical history that includes LASIK; Eye surgery; Adenoidectomy; Laparoscopic gastric banding (2002); Gastrectomy; Laparoscopic repair of recurrent incarcerated incisional hernia (N/A, 6/21/2018); Esophagogastroduodenoscopy (N/A, 2/11/2022); and Colonoscopy (N/A, 2/11/2022).    FAMILY HISTORY: Updated and reviewed in [facility]    SOCIAL HISTORY:   Social History     Substance and Sexual Activity   Alcohol Use Yes    Comment: 10+ servings daily, heavier on weekends, stopped 4/26/2023       Social History     Substance and Sexual Activity   Drug Use No       ROS:   GENERAL: Denies fatigue  CARDIOVASCULAR: Denies edema  GI: Denies abdominal pain  SKIN: Denies rash, itching   NEURO: Denies confusion, memory loss, or mood changes    PHYSICAL EXAM:   Friendly White male, in no acute distress; alert and oriented to person, place and time  VITALS: BP (!) 149/86 (BP Location: Left arm, Patient Position: Sitting, BP Method: Medium (Automatic))   Resp 18   Ht 5' 8" (1.727 m)   Wt (!) 173.5 kg (382 lb 8 oz)   BMI 58.16 kg/m²   EYES: Sclerae anicteric  GI: Soft, non-tender, non-distended. No ascites.  EXTREMITIES:  No edema.  SKIN: Warm and dry. No jaundice. No telangectasias noted. No palmar erythema.  NEURO:  No asterixis.  PSYCH:  Thought and speech pattern appropriate. Behavior normal      EDUCATION:  See instructions discussed with patient in Instructions section of the After Visit Summary     ASSESSMENT & PLAN:  47 y.o. White male with:  1.  Fatty liver, likely " related to alcohol and metabolic risk factors   - see HPI  -- Immunity to Hep A and B : see HPI  -- Recommendations discussed with patient:  Limit alcohol consumption, none until further notice given unclear scar tissue staging and markedly elevated liver enzymes   2 Weight loss goal of  lbs  3. Low carb/sugar, high fiber and protein diet, limit your carb intake to LESS than 30-45 grams of carbs with a meal or LESS than 5-10 grams with any snack   4. Exercise, 5 days per week, 30 minutes per day, as tolerated  5. Recommend good cholesterol, blood pressure, blood sugar levels   6. Consider enrolling in [facility] fibrosis clinical trails - will determine based on liver fibrosis staging results     2. Elevated liver enzymes  -- Serological workup in HPI    3. Morbid obesity, HTN, HLD, preDM  -- ok to use GLP-1  Body mass index is 58.16 kg/m².  -- can increase risk of fatty liver    4. Alcohol use  -- abstain completely until further notice  Social History     Substance and Sexual Activity   Alcohol Use Yes    Comment: 2-6 servings daily, heavier on weekends         Follow up in about 3 months (around 8/11/2023). with labs and MRI elasto before    Orders Placed This Encounter   Procedures    MR Elastography    Comprehensive Metabolic Panel    Protime-INR    Phosphatidylethanol (PETH)    AFP Tumor Marker    Hepatitis Panel, Acute        Thank you for allowing me to participate in the care of CM Mcelroy    I spent a total of 30 minutes on the day of the visit.This includes face to face time and non-face to face time preparing to see the patient (eg, review of tests), obtaining and/or reviewing separately obtained history, documenting clinical information in the electronic or other health record, independently interpreting results and communicating results to the patient/family/caregiver, and coordinating care.         CC'ed note to:   Lester Awan MD

## 2023-05-11 NOTE — PATIENT INSTRUCTIONS
1. Unable to get accurate scar tissue staging without a biopsy. Can try MRI again. Goal of 30 lb weight loss before next visit   2.  Follow up in 3 months with labs and MRI 1 week before  3. Work with your PCP to increase your Ozempic monthly to a goal of 2 mg weekly if tolerated     There is no FDA approved therapy for fatty liver disease. Therefore, these things are important:  Limit alcohol consumption, none until further notice   2 Weight loss goal of  lbs, referral for [facility] if interested. Also, if interested in a dietician visit to create a weight loss plan, contact the dietician team at [facility] at [email or web address] to schedule a visit to you can call [facility] in [place]: [phone] and the  will transfer the call to one of the dieticians to schedule an appointment. Or you can also call [phone] to schedule. They do offer video visits   3. Low carb/sugar, high fiber and protein diet.Try to limit your carb intake to LESS than 30-45 grams of carbs with a meal or LESS than 5-10 grams with any snack (total of any snack foods eaten during that time). Use MyFitness Pal george to add up your carbs through the day. Do NOT drink any beverages with calories or carbs (this can lead to high blood sugar and weight gain). Also, some of our patients have been very successful with weight loss using the pre made/planned meal planning services that limit calories and portion size (one example is Sensible Meals but there are many out there)  4. Exercise, 5 days per week, 30 minutes per day, as tolerated  5. Recommend good cholesterol, blood pressure, blood sugar levels     In some people, fatty liver can progress to steatohepatitis (inflamatory fatty liver) and possibly to cirrhosis, putting one at increased risk for liver cancer, liver failure, and death. Therefore, the lifestyle changes are very important to decrease this risk.     Website with information  about fatty liver and inflammation related to fatty liver ([place]) = www.nashtruth.com  AND www.[email or web address]

## 2023-05-15 ENCOUNTER — PATIENT MESSAGE (OUTPATIENT)
Dept: HEPATOLOGY | Facility: CLINIC | Age: 48
End: 2023-05-15
Payer: COMMERCIAL

## 2023-05-17 ENCOUNTER — PATIENT MESSAGE (OUTPATIENT)
Dept: SLEEP MEDICINE | Facility: CLINIC | Age: 48
End: 2023-05-17
Payer: COMMERCIAL

## 2023-05-17 ENCOUNTER — PATIENT MESSAGE (OUTPATIENT)
Dept: BEHAVIORAL HEALTH | Facility: CLINIC | Age: 48
End: 2023-05-17
Payer: COMMERCIAL

## 2023-05-17 ENCOUNTER — OFFICE VISIT (OUTPATIENT)
Dept: BEHAVIORAL HEALTH | Facility: CLINIC | Age: 48
End: 2023-05-17
Payer: COMMERCIAL

## 2023-05-17 DIAGNOSIS — F33.9 DEPRESSION, RECURRENT: Primary | ICD-10-CM

## 2023-05-17 DIAGNOSIS — F41.1 GAD (GENERALIZED ANXIETY DISORDER): ICD-10-CM

## 2023-05-17 PROCEDURE — 99999 PR PBB SHADOW E&M-EST. PATIENT-LVL I: ICD-10-PCS | Mod: PBBFAC,,, | Performed by: COUNSELOR

## 2023-05-17 PROCEDURE — 90832 PSYTX W PT 30 MINUTES: CPT | Mod: S$GLB,,, | Performed by: COUNSELOR

## 2023-05-17 PROCEDURE — 90832 PR PSYCHOTHERAPY W/PATIENT, 30 MIN: ICD-10-PCS | Mod: S$GLB,,, | Performed by: COUNSELOR

## 2023-05-17 PROCEDURE — 3044F PR MOST RECENT HEMOGLOBIN A1C LEVEL <7.0%: ICD-10-PCS | Mod: CPTII,S$GLB,, | Performed by: COUNSELOR

## 2023-05-17 PROCEDURE — 3044F HG A1C LEVEL LT 7.0%: CPT | Mod: CPTII,S$GLB,, | Performed by: COUNSELOR

## 2023-05-17 PROCEDURE — 99999 PR PBB SHADOW E&M-EST. PATIENT-LVL I: CPT | Mod: PBBFAC,,, | Performed by: COUNSELOR

## 2023-05-17 NOTE — PROGRESS NOTES
Primary Care Behavioral Health Integration: Follow-up  Date:  5/17/2023  Patient Name: Jesús Patel  Type of Visit:  In person  Site:  [facility]  Referral Source:  Lester Awan MD    History of Present Illness:  Jesús Patel, a 47 y.o.  male with history of Anxiety disorders; generalized anxiety disorder [F41.1] and Major Depressive Disorder, Recurrent, Moderate (F33.1) referred by Lester Awan MD.  Patient was seen, examined and chart was reviewed.    Met with patient.     No flowsheet data found.  Depression Patient Health Questionnaire 4/24/2023 4/4/2023 5/18/2022 12/2/2021 9/28/2021 1/21/2020 7/17/2019   Over the last two weeks how often have you been bothered by little interest or pleasure in doing things Not at all Several days Not at all Not at all Not at all More than half the days Not at all   Over the last two weeks how often have you been bothered by feeling down, depressed or hopeless Several days Several days Not at all Not at all Not at all Several days Not at all   PHQ-2 Total Score 1 2 0 0 0 3 0   PHQ-9 Total Score 10 - - - - 10 12   PHQ-9 Interpretation Moderate - - - - - -      GAD7 4/24/2023 1/21/2020 7/17/2019   1. Feeling nervous, anxious, or on edge? 2 1 2   2. Not being able to stop or control worrying? 3 1 3   3. Worrying too much about different things? 3 1 3   4. Trouble relaxing? 2 1 2   5. Being so restless that it is hard to sit still? 2 1 3   6. Becoming easily annoyed or irritable? 0 1 0   7. Feeling afraid as if something awful might happen? 2 1 0   8. If you checked off any problems, how difficult have these problems made it for you to do your work, take care of things at home, or get along with other people? 1 0 0   GISELLE-7 Score 14 7 13       Treatment plan:  Target symptoms: depression, anxiety   Why chosen therapy is appropriate versus another modality: relevant to diagnosis  Outcome monitoring methods: self-report  Therapeutic intervention type:  supportive psychotherapy    Risk parameters:  Patient reports no suicidal ideation  Patient reports no homicidal ideation  Patient reports no self-injurious behavior  Patient reports no violent behavior    Verbal deficits: None    Patient's response to intervention:  The patient's response to intervention is accepting.    Progress toward goals and other mental status changes:  The patient's progress toward goals is good.    Patient advised to call [phone] or present the the nearest ED if they experience suicidal or homicidal ideation, plan or intent.       Impression:  My diagnostic impression is patient is doing much better with his alcohol use and anxiety. Is sober 3 weeks and has attended AA meetings. Reports anxiety is better because activities in his life have slowed down. His daughter is transferring to [employer] and he hopes the tuition is better than when she was out of state. Discussed her future plans with a degree in marine biology. Brother graduated from college yesterday and has plans to continue school through the .      Diagnosis:   Anxiety disorders; generalized anxiety disorder [F41.1] and Major Depressive Disorder, Recurrent, Moderate (F33.1)    Treatment Goals and Plan: Pt plans to continue CBT and Solution-focused Therapy    Future treatment will utilize CBT and Solution-focused Therapy    Return to Clinic: 2 weeks    Length of Appointment: 45

## 2023-05-18 ENCOUNTER — TELEPHONE (OUTPATIENT)
Dept: INTERNAL MEDICINE | Facility: CLINIC | Age: 48
End: 2023-05-18
Payer: COMMERCIAL

## 2023-05-18 DIAGNOSIS — G47.00 INSOMNIA, UNSPECIFIED TYPE: Primary | ICD-10-CM

## 2023-05-18 RX ORDER — ZOLPIDEM TARTRATE 12.5 MG/1
12.5 TABLET, FILM COATED, EXTENDED RELEASE ORAL NIGHTLY PRN
Qty: 30 TABLET | Refills: 5 | Status: SHIPPED | OUTPATIENT
Start: 2023-05-18 | End: 2023-06-14 | Stop reason: SDUPTHER

## 2023-05-18 NOTE — TELEPHONE ENCOUNTER
----- Message from Yajaira Abdi sent at 5/18/2023 10:12 AM CDT -----  Contact: [phone] Patient  Pt is calling in regards to having a question about his medication. Pt would like for Tameka to call him back please. Thank you.

## 2023-05-18 NOTE — TELEPHONE ENCOUNTER
Called patient regarding his Ozempic and the dosage he is to use. Patient stated he was confused and admin 0.25 mg Sub-Q on Saturday instead of the 0.5 mg. I explained to patient the dosage instructions and to begin with the correct dosage on Saturday. I will also let  know of my instructions. Patient expressed understanding of the correct dosage amount.- Tameka PERALTA MA

## 2023-05-23 ENCOUNTER — PATIENT MESSAGE (OUTPATIENT)
Dept: INTERNAL MEDICINE | Facility: CLINIC | Age: 48
End: 2023-05-23
Payer: COMMERCIAL

## 2023-05-23 DIAGNOSIS — J45.30 MILD PERSISTENT ASTHMA WITHOUT COMPLICATION: ICD-10-CM

## 2023-05-23 NOTE — TELEPHONE ENCOUNTER
No care due was identified.  [facility] Embedded Care Due Messages. Reference number: [id number].   5/23/2023 4:29:36 PM CDT

## 2023-05-24 RX ORDER — BUDESONIDE AND FORMOTEROL FUMARATE DIHYDRATE 80; 4.5 UG/1; UG/1
2 AEROSOL RESPIRATORY (INHALATION) DAILY
Qty: 10.2 G | Refills: 11 | Status: SHIPPED | OUTPATIENT
Start: 2023-05-24 | End: 2024-05-23

## 2023-06-06 ENCOUNTER — PATIENT MESSAGE (OUTPATIENT)
Dept: BEHAVIORAL HEALTH | Facility: CLINIC | Age: 48
End: 2023-06-06
Payer: COMMERCIAL

## 2023-06-07 ENCOUNTER — OFFICE VISIT (OUTPATIENT)
Dept: BEHAVIORAL HEALTH | Facility: CLINIC | Age: 48
End: 2023-06-07
Payer: COMMERCIAL

## 2023-06-07 ENCOUNTER — PATIENT MESSAGE (OUTPATIENT)
Dept: BARIATRICS | Facility: CLINIC | Age: 48
End: 2023-06-07
Payer: COMMERCIAL

## 2023-06-07 DIAGNOSIS — F33.9 DEPRESSION, RECURRENT: Primary | ICD-10-CM

## 2023-06-07 DIAGNOSIS — F41.1 GAD (GENERALIZED ANXIETY DISORDER): ICD-10-CM

## 2023-06-07 PROCEDURE — 99999 PR PBB SHADOW E&M-EST. PATIENT-LVL I: CPT | Mod: PBBFAC,,, | Performed by: COUNSELOR

## 2023-06-07 PROCEDURE — 90834 PSYTX W PT 45 MINUTES: CPT | Mod: S$GLB,,, | Performed by: COUNSELOR

## 2023-06-07 PROCEDURE — 90834 PR PSYCHOTHERAPY W/PATIENT, 45 MIN: ICD-10-PCS | Mod: S$GLB,,, | Performed by: COUNSELOR

## 2023-06-07 PROCEDURE — 99999 PR PBB SHADOW E&M-EST. PATIENT-LVL I: ICD-10-PCS | Mod: PBBFAC,,, | Performed by: COUNSELOR

## 2023-06-07 PROCEDURE — 3044F PR MOST RECENT HEMOGLOBIN A1C LEVEL <7.0%: ICD-10-PCS | Mod: CPTII,S$GLB,, | Performed by: COUNSELOR

## 2023-06-07 PROCEDURE — 3044F HG A1C LEVEL LT 7.0%: CPT | Mod: CPTII,S$GLB,, | Performed by: COUNSELOR

## 2023-06-07 NOTE — PROGRESS NOTES
Primary Care Behavioral Health Integration: Follow-up  Date:  6/7/2023  Patient Name: Jesús Patel  Type of Visit:  In person  Site:  [facility]  Referral Source:  Lester Awan MD    History of Present Illness:  Jesús Patel, a 47 y.o.  male with history of Anxiety disorders; generalized anxiety disorder [F41.1] and Major Depressive Disorder, Recurrent, Moderate (F33.1) referred by Lester Awan MD.  Patient was seen, examined and chart was reviewed.    Met with patient.     Results of the PHQ8 6/6/2023   1. Little interest or pleasure in doing things More than half the days   2. Feeling down, depressed, or hopeless Not at all   3. Trouble falling or staying asleep, or sleeping too much Several days   4. Feeling tired or having little energy More than half the days   5. poor appetite or overeating More than half the days   6. Feeling bad about yourself - or that you are a failure or have let yourself or your family down Not at all   7. Trouble concentrating on things, such as reading the newspaper or watching television Not at all   8. Moving or speaking so slowly that other people could have noticed? Or the opposite - being so fidgety or restless that you have been moving around a lot more than usual Not at all   Total Score  7     Depression Patient Health Questionnaire 4/24/2023 4/4/2023 5/18/2022 12/2/2021 9/28/2021 1/21/2020 7/17/2019   Over the last two weeks how often have you been bothered by little interest or pleasure in doing things Not at all Several days Not at all Not at all Not at all More than half the days Not at all   Over the last two weeks how often have you been bothered by feeling down, depressed or hopeless Several days Several days Not at all Not at all Not at all Several days Not at all   PHQ-2 Total Score 1 2 0 0 0 3 0   PHQ-9 Total Score 10 - - - - 10 12   PHQ-9 Interpretation Moderate - - - - - -      GAD7 6/6/2023 4/24/2023 1/21/2020   1. Feeling  nervous, anxious, or on edge? 2 2 1   2. Not being able to stop or control worrying? 2 3 1   3. Worrying too much about different things? 2 3 1   4. Trouble relaxing? 2 2 1   5. Being so restless that it is hard to sit still? 2 2 1   6. Becoming easily annoyed or irritable? 0 0 1   7. Feeling afraid as if something awful might happen? 0 2 1   8. If you checked off any problems, how difficult have these problems made it for you to do your work, take care of things at home, or get along with other people? - 1 0   GISELLE-7 Score 10 14 7       Treatment plan:  Target symptoms: depression, anxiety , grief  Why chosen therapy is appropriate versus another modality: relevant to diagnosis  Outcome monitoring methods: self-report  Therapeutic intervention type: supportive psychotherapy    Risk parameters:  Patient reports no suicidal ideation  Patient reports no homicidal ideation  Patient reports no self-injurious behavior  Patient reports no violent behavior    Verbal deficits: None    Patient's response to intervention:  The patient's response to intervention is accepting.    Progress toward goals and other mental status changes:  The patient's progress toward goals is good.    Patient advised to call [phone] or present the the nearest ED if they experience suicidal or homicidal ideation, plan or intent.       Impression:  My diagnostic impression is patient reports that he lost his best friend suddenly on Sunday. He didn't drink over it and went to an AA meeting instead. He spoke for the first time in the meeting after attending 7 meetings. Reports it helped with his feelings of grief along with talking to his mother. Discussed being interested in a woman and talking to her occasionally although not ready to make that next step. A previous girlfriend who lives in [facility] has been talking to him every day and is planning a visit. Says his new dosage of his sleep meds take longer to kick in and sometimes he gets sleepy in  the morning.    Diagnosis:   Anxiety disorders; generalized anxiety disorder [F41.1] and Major Depressive Disorder, Recurrent, Moderate (F33.1)    Treatment Goals and Plan: Pt plans to continue CBT and Solution-focused Therapy    Future treatment will utilize CBT and Solution-focused Therapy    Return to Clinic: 2 weeks    Plan to discuss case with [facility] consulting psychiatrist and further recommendations to be potentially be made at that time.  Refer to psychiatry    Length of Appointment: 45

## 2023-06-12 ENCOUNTER — PATIENT MESSAGE (OUTPATIENT)
Dept: SLEEP MEDICINE | Facility: CLINIC | Age: 48
End: 2023-06-12
Payer: COMMERCIAL

## 2023-06-12 ENCOUNTER — PATIENT MESSAGE (OUTPATIENT)
Dept: INTERNAL MEDICINE | Facility: CLINIC | Age: 48
End: 2023-06-12
Payer: COMMERCIAL

## 2023-06-12 DIAGNOSIS — D22.9 CHANGE IN SKIN MOLE: Primary | ICD-10-CM

## 2023-06-12 DIAGNOSIS — J32.9 RHINOSINUSITIS: ICD-10-CM

## 2023-06-12 RX ORDER — FLUTICASONE PROPIONATE 50 MCG
SPRAY, SUSPENSION (ML) NASAL
Qty: 48 ML | Refills: 11 | Status: SHIPPED | OUTPATIENT
Start: 2023-06-12

## 2023-06-12 NOTE — TELEPHONE ENCOUNTER
Hi, please contact the patient to assist in scheduling    Orders Placed This Encounter    Ambulatory referral/consult to Dermatology       Thank you, Lester Awan

## 2023-06-12 NOTE — TELEPHONE ENCOUNTER
No care due was identified.  [facility] Embedded Care Due Messages. Reference number: [id number].   6/12/2023 1:29:39 PM CDT

## 2023-06-13 ENCOUNTER — PATIENT MESSAGE (OUTPATIENT)
Dept: BARIATRICS | Facility: CLINIC | Age: 48
End: 2023-06-13
Payer: COMMERCIAL

## 2023-06-13 ENCOUNTER — PATIENT MESSAGE (OUTPATIENT)
Dept: INTERNAL MEDICINE | Facility: CLINIC | Age: 48
End: 2023-06-13
Payer: COMMERCIAL

## 2023-06-14 DIAGNOSIS — G47.00 INSOMNIA, UNSPECIFIED TYPE: ICD-10-CM

## 2023-06-14 RX ORDER — ZOLPIDEM TARTRATE 12.5 MG/1
12.5 TABLET, FILM COATED, EXTENDED RELEASE ORAL NIGHTLY PRN
Qty: 30 TABLET | Refills: 5 | Status: SHIPPED | OUTPATIENT
Start: 2023-06-14 | End: 2023-12-11 | Stop reason: SDUPTHER

## 2023-06-20 ENCOUNTER — OFFICE VISIT (OUTPATIENT)
Dept: DERMATOLOGY | Facility: CLINIC | Age: 48
End: 2023-06-20
Payer: COMMERCIAL

## 2023-06-20 DIAGNOSIS — L82.1 SK (SEBORRHEIC KERATOSIS): ICD-10-CM

## 2023-06-20 DIAGNOSIS — L81.4 LENTIGO: ICD-10-CM

## 2023-06-20 DIAGNOSIS — D22.9 CHANGE IN SKIN MOLE: ICD-10-CM

## 2023-06-20 DIAGNOSIS — L91.8 SKIN TAG: Primary | ICD-10-CM

## 2023-06-20 DIAGNOSIS — L73.9 FOLLICULITIS: ICD-10-CM

## 2023-06-20 DIAGNOSIS — Z76.89 ENCOUNTER FOR SKIN CARE: ICD-10-CM

## 2023-06-20 PROCEDURE — 1160F RVW MEDS BY RX/DR IN RCRD: CPT | Mod: CPTII,S$GLB,, | Performed by: DERMATOLOGY

## 2023-06-20 PROCEDURE — 3044F HG A1C LEVEL LT 7.0%: CPT | Mod: CPTII,S$GLB,, | Performed by: DERMATOLOGY

## 2023-06-20 PROCEDURE — 99999 PR PBB SHADOW E&M-EST. PATIENT-LVL IV: ICD-10-PCS | Mod: PBBFAC,,, | Performed by: DERMATOLOGY

## 2023-06-20 PROCEDURE — 3044F PR MOST RECENT HEMOGLOBIN A1C LEVEL <7.0%: ICD-10-PCS | Mod: CPTII,S$GLB,, | Performed by: DERMATOLOGY

## 2023-06-20 PROCEDURE — 99204 OFFICE O/P NEW MOD 45 MIN: CPT | Mod: S$GLB,,, | Performed by: DERMATOLOGY

## 2023-06-20 PROCEDURE — 1160F PR REVIEW ALL MEDS BY PRESCRIBER/CLIN PHARMACIST DOCUMENTED: ICD-10-PCS | Mod: CPTII,S$GLB,, | Performed by: DERMATOLOGY

## 2023-06-20 PROCEDURE — 1159F PR MEDICATION LIST DOCUMENTED IN MEDICAL RECORD: ICD-10-PCS | Mod: CPTII,S$GLB,, | Performed by: DERMATOLOGY

## 2023-06-20 PROCEDURE — 99204 PR OFFICE/OUTPT VISIT, NEW, LEVL IV, 45-59 MIN: ICD-10-PCS | Mod: S$GLB,,, | Performed by: DERMATOLOGY

## 2023-06-20 PROCEDURE — 99999 PR PBB SHADOW E&M-EST. PATIENT-LVL IV: CPT | Mod: PBBFAC,,, | Performed by: DERMATOLOGY

## 2023-06-20 PROCEDURE — 1159F MED LIST DOCD IN RCRD: CPT | Mod: CPTII,S$GLB,, | Performed by: DERMATOLOGY

## 2023-06-20 RX ORDER — ERYTHROMYCIN 20 MG/G
GEL TOPICAL 2 TIMES DAILY
Qty: 60 G | Refills: 3 | Status: SHIPPED | OUTPATIENT
Start: 2023-06-20

## 2023-06-20 NOTE — PATIENT INSTRUCTIONS
No hot water bathing reviewed.    Use an electric shaver.    Patient to start using sulfur bar soap for the face or affected area 1-2 x day.  For scalp usage lather from the soap to be applied to the roots of the scalp and allow to sit for 3-5 minutes regularly.  Same instructions for other affected areas.

## 2023-06-20 NOTE — PROGRESS NOTES
Subjective:      Patient ID:  Jesús Patel is a 47 y.o. male who presents for   Chief Complaint   Patient presents with    Spot     R neck, R side of face     Spot - Initial  Affected locations: face and neck  Duration: 2+ YEARS.  Signs / symptoms: itching and growing  Severity: mild to moderate  Timing: constant and recurrent  Relieving factors/Treatments tried: nothing    Review of Systems   Constitutional:  Negative for fever and chills.   HENT:  Negative for sore throat.    Respiratory:  Negative for cough.      Objective:   Physical Exam   Constitutional: He appears well-developed and well-nourished.   Neurological: He is alert and oriented to person, place, and time.   Psychiatric: He has a normal mood and affect.   Skin:             Diagram Legend     Erythematous scaling macule/papule c/w actinic keratosis       Vascular papule c/w angioma      Pigmented verrucoid papule/plaque c/w seborrheic keratosis      Yellow umbilicated papule c/w sebaceous hyperplasia      Irregularly shaped tan macule c/w lentigo     1-2 mm smooth white papules consistent with Milia      Movable subcutaneous cyst with punctum c/w epidermal inclusion cyst      Subcutaneous movable cyst c/w pilar cyst      Firm pink to brown papule c/w dermatofibroma      Pedunculated fleshy papule(s) c/w skin tag(s)      Evenly pigmented macule c/w junctional nevus     Mildly variegated pigmented, slightly irregular-bordered macule c/w mildly atypical nevus      Flesh colored to evenly pigmented papule c/w intradermal nevus       Pink pearly papule/plaque c/w basal cell carcinoma      Erythematous hyperkeratotic cursted plaque c/w SCC      Surgical scar with no sign of skin cancer recurrence      Open and closed comedones      Inflammatory papules and pustules      Verrucoid papule consistent consistent with wart     Erythematous eczematous patches and plaques     Dystrophic onycholytic nail with subungual debris c/w onychomycosis      Umbilicated papule    Erythematous-base heme-crusted tan verrucoid plaque consistent with inflamed seborrheic keratosis     Erythematous Silvery Scaling Plaque c/w Psoriasis     See annotation        Assessment / Plan:        Skin tag  -     salicylic acid 17 % gel; Apply topically once daily. Apply focally to skin tag/warts, not normal skin, and cover with tape or Band Aid until scab forms.  Allow for healing and watch for parts of tag/wart to fall off.  For warts okay to peel away dead skin painlessly.  Repeat as needed until tag/wart completely falls off.  Dispense: 7 g; Refill: 2  Discussed with patient the benign nature of these lesions and that no treatment is indicated.  Chronic nature of this condition discussed with patient.  Patient can also consider folk treatment of string strangulation at home.  20-40% salicylic acid can be used with occlusion for tags/warts daily with breaks as needed for discomfort or sensitivity.  This can be done on a protracted basis for clearance over time.  Prn cosmetic hyfrecation of 2 tags on the r neck.  Costs $150.  Scar and recurrence reviewed.    Change in skin mole  -     Ambulatory referral/consult to Dermatology  Previous [facility] labs and or records and notes reviewed and considered for their impact on our clinical decision making today.    SK (seborrheic keratosis)  Discussed with patient the benign nature of these lesions and that no treatment is indicated.    Lentigo  Discussed with patient the benign nature of these lesions and that no treatment is indicated.    Encounter for skin care  No hot water bathing reviewed.  Discussed with the patient gentle shaving techniques with lukewarm water and organic coconut oil.  Downward shaving direction.  Consider electric shaver for less irritation.    Folliculitis  -     erythromycin with ethanoL (EMGEL) 2 % gel; Apply topically 2 (two) times daily. Prn neck break outs  Dispense: 60 g; Refill: 3  Discussed with patient the  etiology and pathogenesis of the disease or skin lesion(s) and possible treatments and aggravators.    Reviewed with patient different treatment options and associated risks.  Proper application of medications and or care for affected area(s) and condition(s) reviewed.  Patient to start using sulfur bar soap for the face or affected area 1-2 x day.  For scalp usage lather from the soap to be applied to the roots of the scalp and allow to sit for 3-5 minutes regularly.  Same instructions for other affected areas.             Follow up in about 1 year (around 6/20/2024).

## 2023-06-29 ENCOUNTER — TELEPHONE (OUTPATIENT)
Dept: PHARMACY | Facility: CLINIC | Age: 48
End: 2023-06-29
Payer: COMMERCIAL

## 2023-07-06 ENCOUNTER — OFFICE VISIT (OUTPATIENT)
Dept: BEHAVIORAL HEALTH | Facility: CLINIC | Age: 48
End: 2023-07-06
Payer: COMMERCIAL

## 2023-07-06 DIAGNOSIS — F41.1 GAD (GENERALIZED ANXIETY DISORDER): ICD-10-CM

## 2023-07-06 DIAGNOSIS — F33.9 DEPRESSION, RECURRENT: Primary | ICD-10-CM

## 2023-07-06 PROCEDURE — 90834 PR PSYCHOTHERAPY W/PATIENT, 45 MIN: ICD-10-PCS | Mod: S$GLB,,, | Performed by: COUNSELOR

## 2023-07-06 PROCEDURE — 3044F PR MOST RECENT HEMOGLOBIN A1C LEVEL <7.0%: ICD-10-PCS | Mod: CPTII,S$GLB,, | Performed by: COUNSELOR

## 2023-07-06 PROCEDURE — 99999 PR PBB SHADOW E&M-EST. PATIENT-LVL I: ICD-10-PCS | Mod: PBBFAC,,, | Performed by: COUNSELOR

## 2023-07-06 PROCEDURE — 3044F HG A1C LEVEL LT 7.0%: CPT | Mod: CPTII,S$GLB,, | Performed by: COUNSELOR

## 2023-07-06 PROCEDURE — 90834 PSYTX W PT 45 MINUTES: CPT | Mod: S$GLB,,, | Performed by: COUNSELOR

## 2023-07-06 PROCEDURE — 99999 PR PBB SHADOW E&M-EST. PATIENT-LVL I: CPT | Mod: PBBFAC,,, | Performed by: COUNSELOR

## 2023-07-06 NOTE — PROGRESS NOTES
Primary Care Behavioral Health Integration: Follow-up  Date:  7/6/2023  Patient Name: Jesús Patel  Type of Visit:  In person  Site:  [facility]  Referral Source:  Lester Awan MD    History of Present Illness:  Jesús Patel, a 47 y.o.  male with history of Anxiety disorders; generalized anxiety disorder [F41.1] and Major Depressive Disorder, Recurrent, Moderate (F33.1) referred by Lester Awan MD.  Patient was seen, examined and chart was reviewed.    Met with patient.     Results of the PHQ8 7/5/2023 6/20/2023 6/6/2023   1. Little interest or pleasure in doing things Not at all Not at all More than half the days   2. Feeling down, depressed, or hopeless Not at all Not at all Not at all   3. Trouble falling or staying asleep, or sleeping too much More than half the days More than half the days Several days   4. Feeling tired or having little energy Not at all Not at all More than half the days   5. poor appetite or overeating More than half the days More than half the days More than half the days   6. Feeling bad about yourself - or that you are a failure or have let yourself or your family down Not at all Not at all Not at all   7. Trouble concentrating on things, such as reading the newspaper or watching television Not at all Not at all Not at all   8. Moving or speaking so slowly that other people could have noticed? Or the opposite - being so fidgety or restless that you have been moving around a lot more than usual Not at all Not at all Not at all   Total Score  4 4 7     Depression Patient Health Questionnaire 4/24/2023 4/4/2023 5/18/2022 12/2/2021 9/28/2021 1/21/2020 7/17/2019   Over the last two weeks how often have you been bothered by little interest or pleasure in doing things Not at all Several days Not at all Not at all Not at all More than half the days Not at all   Over the last two weeks how often have you been bothered by feeling down, depressed or hopeless  Several days Several days Not at all Not at all Not at all Several days Not at all   PHQ-2 Total Score 1 2 0 0 0 3 0   PHQ-9 Total Score 10 - - - - 10 12   PHQ-9 Interpretation Moderate - - - - - -      GAD7 7/5/2023 6/20/2023 6/20/2023   1. Feeling nervous, anxious, or on edge? 2 2 2   2. Not being able to stop or control worrying? 2 2 2   3. Worrying too much about different things? 2 2 2   4. Trouble relaxing? 0 0 0   5. Being so restless that it is hard to sit still? 0 0 0   6. Becoming easily annoyed or irritable? 0 0 0   7. Feeling afraid as if something awful might happen? 2 2 2   8. If you checked off any problems, how difficult have these problems made it for you to do your work, take care of things at home, or get along with other people? - - -   GISELLE-7 Score 8 8 8       Treatment plan:  Target symptoms: depression, anxiety   Why chosen therapy is appropriate versus another modality: relevant to diagnosis  Outcome monitoring methods: self-report  Therapeutic intervention type: supportive psychotherapy    Risk parameters:  Patient reports no suicidal ideation  Patient reports no homicidal ideation  Patient reports no self-injurious behavior  Patient reports no violent behavior    Verbal deficits: None    Patient's response to intervention:  The patient's response to intervention is accepting.    Progress toward goals and other mental status changes:  The patient's progress toward goals is fair .    Patient advised to call [phone] or present the the nearest ED if they experience suicidal or homicidal ideation, plan or intent.       Impression:  My diagnostic impression: Patient reports being frustrated with his younger brother that lives with him. Describes how his younger brother is dirty and messy and pt has had enough of it. Discussed how to talk to him without letting his anger rule the conversation. Also, talked about an old girlfriend whom he met up with on Saturday but said he didn't think there was  any chance of them getting back together.     Diagnosis:   Anxiety disorders; generalized anxiety disorder [F41.1] and Major Depressive Disorder, Recurrent, Moderate (F33.1)    Treatment Goals and Plan: Pt plans to continue CBT and Solution-focused Therapy    Future treatment will utilize CBT and Solution-focused Therapy    Return to Clinic: 2 weeks    Length of Appointment: 45

## 2023-07-10 ENCOUNTER — PATIENT MESSAGE (OUTPATIENT)
Dept: OTHER | Facility: OTHER | Age: 48
End: 2023-07-10
Payer: COMMERCIAL

## 2023-07-19 ENCOUNTER — PATIENT MESSAGE (OUTPATIENT)
Dept: INTERNAL MEDICINE | Facility: CLINIC | Age: 48
End: 2023-07-19
Payer: COMMERCIAL

## 2023-07-20 ENCOUNTER — OFFICE VISIT (OUTPATIENT)
Dept: BEHAVIORAL HEALTH | Facility: CLINIC | Age: 48
End: 2023-07-20
Payer: COMMERCIAL

## 2023-07-20 DIAGNOSIS — F41.1 GAD (GENERALIZED ANXIETY DISORDER): ICD-10-CM

## 2023-07-20 DIAGNOSIS — F33.9 DEPRESSION, RECURRENT: Primary | ICD-10-CM

## 2023-07-20 PROCEDURE — 90834 PSYTX W PT 45 MINUTES: CPT | Mod: S$GLB,,, | Performed by: COUNSELOR

## 2023-07-20 PROCEDURE — 3044F HG A1C LEVEL LT 7.0%: CPT | Mod: CPTII,S$GLB,, | Performed by: COUNSELOR

## 2023-07-20 PROCEDURE — 3044F PR MOST RECENT HEMOGLOBIN A1C LEVEL <7.0%: ICD-10-PCS | Mod: CPTII,S$GLB,, | Performed by: COUNSELOR

## 2023-07-20 PROCEDURE — 90834 PR PSYCHOTHERAPY W/PATIENT, 45 MIN: ICD-10-PCS | Mod: S$GLB,,, | Performed by: COUNSELOR

## 2023-07-20 NOTE — PROGRESS NOTES
Primary Care Behavioral Health Integration: Follow-up  Date:  7/20/2023  Patient Name: Jesús Patel  Type of Visit:  In person  Site:  [facility]  Referral Source:  Lester Awan MD    History of Present Illness:  Jesús Patel, a 48 y.o.  male with history of Anxiety disorders; generalized anxiety disorder [F41.1] and Major Depressive Disorder, Recurrent, Moderate (F33.1) referred by Lester Awan MD.  Patient was seen, examined and chart was reviewed.    Met with patient.     Results of the PHQ8 7/17/2023 7/17/2023 7/5/2023 6/20/2023 6/6/2023   1. Little interest or pleasure in doing things Not at all Several days Not at all Not at all More than half the days   2. Feeling down, depressed, or hopeless Several days Several days Not at all Not at all Not at all   3. Trouble falling or staying asleep, or sleeping too much Several days Several days More than half the days More than half the days Several days   4. Feeling tired or having little energy Several days Several days Not at all Not at all More than half the days   5. poor appetite or overeating Several days Several days More than half the days More than half the days More than half the days   6. Feeling bad about yourself - or that you are a failure or have let yourself or your family down Several days Several days Not at all Not at all Not at all   7. Trouble concentrating on things, such as reading the newspaper or watching television Several days Several days Not at all Not at all Not at all   8. Moving or speaking so slowly that other people could have noticed? Or the opposite - being so fidgety or restless that you have been moving around a lot more than usual Not at all Not at all Not at all Not at all Not at all   Total Score  6 7 4 4 7     Depression Patient Health Questionnaire 4/24/2023 4/4/2023 5/18/2022 12/2/2021 9/28/2021 1/21/2020 7/17/2019   Over the last two weeks how often have you been bothered by little  interest or pleasure in doing things Not at all Several days Not at all Not at all Not at all More than half the days Not at all   Over the last two weeks how often have you been bothered by feeling down, depressed or hopeless Several days Several days Not at all Not at all Not at all Several days Not at all   PHQ-2 Total Score 1 2 0 0 0 3 0   PHQ-9 Total Score 10 - - - - 10 12   PHQ-9 Interpretation Moderate - - - - - -      GAD7 7/17/2023 7/17/2023 7/5/2023   1. Feeling nervous, anxious, or on edge? 1 1 2   2. Not being able to stop or control worrying? 1 1 2   3. Worrying too much about different things? 1 1 2   4. Trouble relaxing? 1 1 0   5. Being so restless that it is hard to sit still? 1 1 0   6. Becoming easily annoyed or irritable? 0 0 0   7. Feeling afraid as if something awful might happen? 1 1 2   8. If you checked off any problems, how difficult have these problems made it for you to do your work, take care of things at home, or get along with other people? - - -   GISELLE-7 Score 6 6 8       Treatment plan:  Target symptoms: depression, anxiety   Why chosen therapy is appropriate versus another modality: relevant to diagnosis  Outcome monitoring methods: self-report  Therapeutic intervention type: supportive psychotherapy    Risk parameters:  Patient reports no suicidal ideation  Patient reports no homicidal ideation  Patient reports no self-injurious behavior  Patient reports no violent behavior    Verbal deficits: None    Patient's response to intervention:  The patient's response to intervention is accepting.    Progress toward goals and other mental status changes:  The patient's progress toward goals is fair .    Patient advised to call [phone] or present the the nearest ED if they experience suicidal or homicidal ideation, plan or intent.       Impression:  My diagnostic impression: Patient reported having temptations to drink yesterday because of undue stress at work. But he thought about the  consequences and decided it wasn't worth it. Has started dating. Will be taking a trip to see his family in [place] [facility] in the fall. Has been losing weight, has more energy and is sleeping well.    Diagnosis:   Anxiety disorders; generalized anxiety disorder [F41.1] and Major Depressive Disorder, Recurrent, Moderate (F33.1)    Treatment Goals and Plan: Pt plans to continue CBT and Solution-focused Therapy    Future treatment will utilize CBT and Solution-focused Therapy    Return to Clinic: 2 weeks    Length of Appointment: 45

## 2023-07-21 ENCOUNTER — TELEPHONE (OUTPATIENT)
Dept: INTERNAL MEDICINE | Facility: CLINIC | Age: 48
End: 2023-07-21
Payer: COMMERCIAL

## 2023-07-21 NOTE — TELEPHONE ENCOUNTER
Called and spoke with pt, he was speaking of the [employer]. I've started a prior authorization for it

## 2023-07-26 ENCOUNTER — PATIENT MESSAGE (OUTPATIENT)
Dept: INTERNAL MEDICINE | Facility: CLINIC | Age: 48
End: 2023-07-26
Payer: COMMERCIAL

## 2023-07-26 ENCOUNTER — TELEPHONE (OUTPATIENT)
Dept: INTERNAL MEDICINE | Facility: CLINIC | Age: 48
End: 2023-07-26
Payer: COMMERCIAL

## 2023-08-02 ENCOUNTER — PATIENT MESSAGE (OUTPATIENT)
Dept: BARIATRICS | Facility: CLINIC | Age: 48
End: 2023-08-02
Payer: COMMERCIAL

## 2023-08-02 ENCOUNTER — OFFICE VISIT (OUTPATIENT)
Dept: INTERNAL MEDICINE | Facility: CLINIC | Age: 48
End: 2023-08-02
Payer: COMMERCIAL

## 2023-08-02 VITALS
BODY MASS INDEX: 47.74 KG/M2 | HEART RATE: 84 BPM | OXYGEN SATURATION: 96 % | DIASTOLIC BLOOD PRESSURE: 86 MMHG | WEIGHT: 315 LBS | HEIGHT: 68 IN | SYSTOLIC BLOOD PRESSURE: 128 MMHG

## 2023-08-02 DIAGNOSIS — F10.10 ALCOHOL ABUSE: ICD-10-CM

## 2023-08-02 DIAGNOSIS — N52.9 ERECTILE DYSFUNCTION, UNSPECIFIED ERECTILE DYSFUNCTION TYPE: Primary | ICD-10-CM

## 2023-08-02 DIAGNOSIS — I10 ESSENTIAL HYPERTENSION: ICD-10-CM

## 2023-08-02 PROCEDURE — 3079F PR MOST RECENT DIASTOLIC BLOOD PRESSURE 80-89 MM HG: ICD-10-PCS | Mod: CPTII,S$GLB,, | Performed by: INTERNAL MEDICINE

## 2023-08-02 PROCEDURE — 99999 PR PBB SHADOW E&M-EST. PATIENT-LVL V: ICD-10-PCS | Mod: PBBFAC,,, | Performed by: INTERNAL MEDICINE

## 2023-08-02 PROCEDURE — 1160F PR REVIEW ALL MEDS BY PRESCRIBER/CLIN PHARMACIST DOCUMENTED: ICD-10-PCS | Mod: CPTII,S$GLB,, | Performed by: INTERNAL MEDICINE

## 2023-08-02 PROCEDURE — 3008F PR BODY MASS INDEX (BMI) DOCUMENTED: ICD-10-PCS | Mod: CPTII,S$GLB,, | Performed by: INTERNAL MEDICINE

## 2023-08-02 PROCEDURE — 3079F DIAST BP 80-89 MM HG: CPT | Mod: CPTII,S$GLB,, | Performed by: INTERNAL MEDICINE

## 2023-08-02 PROCEDURE — 1159F MED LIST DOCD IN RCRD: CPT | Mod: CPTII,S$GLB,, | Performed by: INTERNAL MEDICINE

## 2023-08-02 PROCEDURE — 1160F RVW MEDS BY RX/DR IN RCRD: CPT | Mod: CPTII,S$GLB,, | Performed by: INTERNAL MEDICINE

## 2023-08-02 PROCEDURE — 3044F PR MOST RECENT HEMOGLOBIN A1C LEVEL <7.0%: ICD-10-PCS | Mod: CPTII,S$GLB,, | Performed by: INTERNAL MEDICINE

## 2023-08-02 PROCEDURE — 3044F HG A1C LEVEL LT 7.0%: CPT | Mod: CPTII,S$GLB,, | Performed by: INTERNAL MEDICINE

## 2023-08-02 PROCEDURE — 99214 OFFICE O/P EST MOD 30 MIN: CPT | Mod: S$GLB,,, | Performed by: INTERNAL MEDICINE

## 2023-08-02 PROCEDURE — 99999 PR PBB SHADOW E&M-EST. PATIENT-LVL V: CPT | Mod: PBBFAC,,, | Performed by: INTERNAL MEDICINE

## 2023-08-02 PROCEDURE — 99214 PR OFFICE/OUTPT VISIT, EST, LEVL IV, 30-39 MIN: ICD-10-PCS | Mod: S$GLB,,, | Performed by: INTERNAL MEDICINE

## 2023-08-02 PROCEDURE — 3008F BODY MASS INDEX DOCD: CPT | Mod: CPTII,S$GLB,, | Performed by: INTERNAL MEDICINE

## 2023-08-02 PROCEDURE — 3074F SYST BP LT 130 MM HG: CPT | Mod: CPTII,S$GLB,, | Performed by: INTERNAL MEDICINE

## 2023-08-02 PROCEDURE — 1159F PR MEDICATION LIST DOCUMENTED IN MEDICAL RECORD: ICD-10-PCS | Mod: CPTII,S$GLB,, | Performed by: INTERNAL MEDICINE

## 2023-08-02 PROCEDURE — 3074F PR MOST RECENT SYSTOLIC BLOOD PRESSURE < 130 MM HG: ICD-10-PCS | Mod: CPTII,S$GLB,, | Performed by: INTERNAL MEDICINE

## 2023-08-02 RX ORDER — TADALAFIL 20 MG/1
TABLET ORAL
Qty: 10 TABLET | Refills: 11 | Status: SHIPPED | OUTPATIENT
Start: 2023-08-02

## 2023-08-02 RX ORDER — HYDROCHLOROTHIAZIDE 12.5 MG/1
12.5 TABLET ORAL DAILY
Qty: 90 TABLET | Refills: 11 | Status: SHIPPED | OUTPATIENT
Start: 2023-08-02

## 2023-08-02 NOTE — PROGRESS NOTES
Subjective:       Patient ID: Jesús Patel is a 48 y.o. male.    Chief Complaint: Follow-up    Patient is here for followup for chronic conditions.    Attending mtgs, working with counselor. Has been sober since last appt.    No cravings      Future Appointments  8/4/2023   6:15 AM    [facility] OI-MRI2              [facility] MRI IC         Imaging Ctr  8/4/2023   7:20 AM    LAB, APPOINTMENT [facility] INT* [facility] LAB IM         Ceasar López PCW  8/9/2023   3:30 PM    Marya Mayfield, MOSHE      [facility]         [facility]   8/14/2023  2:30 PM    Chantell Sal, NP       [facility] HEPAT          Ceasar López      Review of Systems   Constitutional:  Negative for appetite change and unexpected weight change.   Respiratory:  Negative for chest tightness and shortness of breath.    Cardiovascular:  Negative for chest pain.   Gastrointestinal:  Negative for abdominal pain.   Genitourinary:  Negative for difficulty urinating, scrotal swelling and testicular pain.   Skin:         No lesions   Neurological:  Negative for headaches.   Psychiatric/Behavioral:  The patient is nervous/anxious (improved).            Objective:      Physical Exam  Vitals reviewed.   Constitutional:       General: He is not in acute distress.     Appearance: He is well-developed. He is obese.   HENT:      Head: Normocephalic and atraumatic.      Right Ear: Hearing normal.      Left Ear: Hearing normal.      Nose: Nose normal. No rhinorrhea.   Eyes:      General: Lids are normal.         Right eye: No discharge.         Left eye: No discharge.      Conjunctiva/sclera: Conjunctivae normal.   Neck:      Trachea: Trachea normal. No tracheal deviation.   Cardiovascular:      Rate and Rhythm: Normal rate and regular rhythm.      Heart sounds: Normal heart sounds. No murmur heard.     No friction rub. No gallop.   Pulmonary:      Effort: Pulmonary effort is normal. No respiratory distress.      Breath sounds: Normal breath sounds. No wheezing or rales.   Abdominal:       General: Bowel sounds are normal. There is no distension.      Palpations: Abdomen is soft.      Tenderness: There is no abdominal tenderness. There is no guarding or rebound.   Genitourinary:     Rectum: No external hemorrhoid.   Musculoskeletal:      Cervical back: Neck supple.   Lymphadenopathy:      Cervical: No cervical adenopathy.   Skin:     General: Skin is warm and dry.      Findings: No rash.   Neurological:      Mental Status: He is alert.      Motor: No abnormal muscle tone.   Psychiatric:         Speech: Speech normal.         Behavior: Behavior normal. Behavior is cooperative.         Thought Content: Thought content normal.         Judgment: Judgment normal.         Assessment:       1. Erectile dysfunction, unspecified erectile dysfunction type    2. Essential hypertension    3. Alcohol abuse        Plan:       Jesús was seen today for follow-up.    Diagnoses and all orders for this visit:    Erectile dysfunction, unspecified erectile dysfunction type  -     tadalafiL (CIALIS) 20 MG Tab; Use one tablet every 36 hours    Essential hypertension  -     hydroCHLOROthiazide (HYDRODIURIL) 12.5 MG Tab; Take 1 tablet (12.5 mg total) by mouth once daily.  controlled    Alcohol abuse  In early recovery  Discussed getting a sponsor  C/w counselor    Donna LFTs -- has hep f/u      Follow up in about 1 year (around 8/2/2024).    Future Appointments   Date Time Provider Department Center   8/9/2023  3:30 PM [facility]   8/14/2023  2:30 [facility]   8/28/2023  3:15 PM [facility]

## 2023-08-03 ENCOUNTER — PATIENT MESSAGE (OUTPATIENT)
Dept: INTERNAL MEDICINE | Facility: CLINIC | Age: 48
End: 2023-08-03
Payer: COMMERCIAL

## 2023-08-04 ENCOUNTER — PATIENT MESSAGE (OUTPATIENT)
Dept: INTERNAL MEDICINE | Facility: CLINIC | Age: 48
End: 2023-08-04
Payer: COMMERCIAL

## 2023-08-04 ENCOUNTER — HOSPITAL ENCOUNTER (OUTPATIENT)
Dept: RADIOLOGY | Facility: HOSPITAL | Age: 48
Discharge: HOME | End: 2023-08-04
Attending: NURSE PRACTITIONER
Payer: COMMERCIAL

## 2023-08-04 DIAGNOSIS — K76.0 FATTY LIVER: ICD-10-CM

## 2023-08-04 DIAGNOSIS — R74.8 ELEVATED LIVER ENZYMES: ICD-10-CM

## 2023-08-04 DIAGNOSIS — E66.01 MORBID OBESITY WITH BMI OF 50.0-59.9, ADULT: ICD-10-CM

## 2023-08-04 PROCEDURE — 76391 MR ELASTOGRAPHY: CPT | Mod: TC

## 2023-08-04 PROCEDURE — 76391 MR ELASTOGRAPHY: CPT | Mod: 26,,, | Performed by: STUDENT IN AN ORGANIZED HEALTH CARE EDUCATION/TRAINING PROGRAM

## 2023-08-04 PROCEDURE — 76391 MR ELASTOGRAPHY: ICD-10-PCS | Mod: 26,,, | Performed by: STUDENT IN AN ORGANIZED HEALTH CARE EDUCATION/TRAINING PROGRAM

## 2023-08-06 PROBLEM — F10.10 ALCOHOL ABUSE: Status: ACTIVE | Noted: 2023-08-06

## 2023-08-06 PROBLEM — F10.20 ALCOHOLISM: Status: RESOLVED | Noted: 2023-08-06 | Resolved: 2023-08-06

## 2023-08-06 PROBLEM — F10.20 ALCOHOLISM: Status: ACTIVE | Noted: 2023-08-06

## 2023-08-09 ENCOUNTER — OFFICE VISIT (OUTPATIENT)
Dept: BEHAVIORAL HEALTH | Facility: CLINIC | Age: 48
End: 2023-08-09
Payer: COMMERCIAL

## 2023-08-09 DIAGNOSIS — F33.9 DEPRESSION, RECURRENT: Primary | ICD-10-CM

## 2023-08-09 DIAGNOSIS — F41.1 GAD (GENERALIZED ANXIETY DISORDER): ICD-10-CM

## 2023-08-09 PROCEDURE — 3044F PR MOST RECENT HEMOGLOBIN A1C LEVEL <7.0%: ICD-10-PCS | Mod: CPTII,S$GLB,, | Performed by: COUNSELOR

## 2023-08-09 PROCEDURE — 90834 PR PSYCHOTHERAPY W/PATIENT, 45 MIN: ICD-10-PCS | Mod: S$GLB,,, | Performed by: COUNSELOR

## 2023-08-09 PROCEDURE — 3044F HG A1C LEVEL LT 7.0%: CPT | Mod: CPTII,S$GLB,, | Performed by: COUNSELOR

## 2023-08-09 PROCEDURE — 90834 PSYTX W PT 45 MINUTES: CPT | Mod: S$GLB,,, | Performed by: COUNSELOR

## 2023-08-09 NOTE — PROGRESS NOTES
Primary Care Behavioral Health Integration: Follow-up  Date:  8/9/2023  Patient Name: Jesús Patel  Type of Visit:  In person  Site:  [facility]  Referral Source:  Lester Awan MD    History of Present Illness:  Jesús Patel, a 48 y.o.  male with history of Anxiety disorders; generalized anxiety disorder [F41.1] and Major Depressive Disorder, Recurrent, Moderate (F33.1) referred by Lester Awan MD.  Patient was seen, examined and chart was reviewed.    Met with patient.     Results of the PHQ8 7/17/2023 7/17/2023 7/5/2023 6/20/2023 6/6/2023   1. Little interest or pleasure in doing things Not at all Several days Not at all Not at all More than half the days   2. Feeling down, depressed, or hopeless Several days Several days Not at all Not at all Not at all   3. Trouble falling or staying asleep, or sleeping too much Several days Several days More than half the days More than half the days Several days   4. Feeling tired or having little energy Several days Several days Not at all Not at all More than half the days   5. poor appetite or overeating Several days Several days More than half the days More than half the days More than half the days   6. Feeling bad about yourself - or that you are a failure or have let yourself or your family down Several days Several days Not at all Not at all Not at all   7. Trouble concentrating on things, such as reading the newspaper or watching television Several days Several days Not at all Not at all Not at all   8. Moving or speaking so slowly that other people could have noticed? Or the opposite - being so fidgety or restless that you have been moving around a lot more than usual Not at all Not at all Not at all Not at all Not at all   Total Score  6 7 4 4 7     Depression Patient Health Questionnaire 4/24/2023 4/4/2023 5/18/2022 12/2/2021 9/28/2021 1/21/2020 7/17/2019   Over the last two weeks how often have you been bothered by little  interest or pleasure in doing things Not at all Several days Not at all Not at all Not at all More than half the days Not at all   Over the last two weeks how often have you been bothered by feeling down, depressed or hopeless Several days Several days Not at all Not at all Not at all Several days Not at all   PHQ-2 Total Score 1 2 0 0 0 3 0   PHQ-9 Total Score 10 - - - - 10 12   PHQ-9 Interpretation Moderate - - - - - -      GAD7 7/17/2023 7/17/2023 7/5/2023   1. Feeling nervous, anxious, or on edge? 1 1 2   2. Not being able to stop or control worrying? 1 1 2   3. Worrying too much about different things? 1 1 2   4. Trouble relaxing? 1 1 0   5. Being so restless that it is hard to sit still? 1 1 0   6. Becoming easily annoyed or irritable? 0 0 0   7. Feeling afraid as if something awful might happen? 1 1 2   8. If you checked off any problems, how difficult have these problems made it for you to do your work, take care of things at home, or get along with other people? - - -   GISELLE-7 Score 6 6 8       Treatment plan:  Target symptoms: depression, anxiety   Why chosen therapy is appropriate versus another modality: relevant to diagnosis  Outcome monitoring methods: self-report  Therapeutic intervention type: supportive psychotherapy    Risk parameters:  Patient reports no suicidal ideation  Patient reports no homicidal ideation  Patient reports no self-injurious behavior  Patient reports no violent behavior    Verbal deficits: None    Patient's response to intervention:  The patient's response to intervention is accepting.    Progress toward goals and other mental status changes:  The patient's progress toward goals is good.    Patient advised to call [phone] or present the the nearest ED if they experience suicidal or homicidal ideation, plan or intent.       Impression:  My diagnostic impression: Patient reported doing good and happy he is sober as he's feeling physically and mentally better. Is eager to get the  results of his MRI on Monday to see if his liver has improved. Has a trip planned to [facility] in October. Daughter is starting [employer]. Will go with his brother to [place] to watch a [place] game in November. Likes staying busy.    Diagnosis:   Anxiety disorders; generalized anxiety disorder [F41.1] and Major Depressive Disorder, Recurrent, Moderate (F33.1)    Last appointment with [facility]    Length of Appointment: 45

## 2023-08-10 ENCOUNTER — OFFICE VISIT (OUTPATIENT)
Dept: CARDIOLOGY | Facility: CLINIC | Age: 48
End: 2023-08-10
Payer: COMMERCIAL

## 2023-08-10 VITALS
BODY MASS INDEX: 49.44 KG/M2 | HEIGHT: 67 IN | WEIGHT: 315 LBS | SYSTOLIC BLOOD PRESSURE: 112 MMHG | HEART RATE: 80 BPM | DIASTOLIC BLOOD PRESSURE: 66 MMHG

## 2023-08-10 DIAGNOSIS — R60.0 EDEMA OF BOTH LOWER EXTREMITIES: ICD-10-CM

## 2023-08-10 DIAGNOSIS — E78.49 OTHER HYPERLIPIDEMIA: ICD-10-CM

## 2023-08-10 DIAGNOSIS — I82.812 ACUTE SUPERFICIAL VENOUS THROMBOSIS OF LEFT LOWER EXTREMITY: Primary | ICD-10-CM

## 2023-08-10 DIAGNOSIS — L03.116 CELLULITIS OF LEFT LOWER EXTREMITY: ICD-10-CM

## 2023-08-10 DIAGNOSIS — I10 ESSENTIAL HYPERTENSION: ICD-10-CM

## 2023-08-10 DIAGNOSIS — I10 PRIMARY HYPERTENSION: ICD-10-CM

## 2023-08-10 DIAGNOSIS — M79.605 LEFT LEG PAIN: ICD-10-CM

## 2023-08-10 DIAGNOSIS — G47.33 OBSTRUCTIVE SLEEP APNEA SYNDROME: ICD-10-CM

## 2023-08-10 DIAGNOSIS — I89.0 LYMPHEDEMA OF BOTH LOWER EXTREMITIES: ICD-10-CM

## 2023-08-10 PROCEDURE — 3078F PR MOST RECENT DIASTOLIC BLOOD PRESSURE < 80 MM HG: ICD-10-PCS | Mod: CPTII,S$GLB,, | Performed by: INTERNAL MEDICINE

## 2023-08-10 PROCEDURE — 99215 PR OFFICE/OUTPT VISIT, EST, LEVL V, 40-54 MIN: ICD-10-PCS | Mod: S$GLB,,, | Performed by: INTERNAL MEDICINE

## 2023-08-10 PROCEDURE — 99999 PR PBB SHADOW E&M-EST. PATIENT-LVL IV: ICD-10-PCS | Mod: PBBFAC,,, | Performed by: INTERNAL MEDICINE

## 2023-08-10 PROCEDURE — 3074F PR MOST RECENT SYSTOLIC BLOOD PRESSURE < 130 MM HG: ICD-10-PCS | Mod: CPTII,S$GLB,, | Performed by: INTERNAL MEDICINE

## 2023-08-10 PROCEDURE — 3008F PR BODY MASS INDEX (BMI) DOCUMENTED: ICD-10-PCS | Mod: CPTII,S$GLB,, | Performed by: INTERNAL MEDICINE

## 2023-08-10 PROCEDURE — 3078F DIAST BP <80 MM HG: CPT | Mod: CPTII,S$GLB,, | Performed by: INTERNAL MEDICINE

## 2023-08-10 PROCEDURE — 1159F MED LIST DOCD IN RCRD: CPT | Mod: CPTII,S$GLB,, | Performed by: INTERNAL MEDICINE

## 2023-08-10 PROCEDURE — 99215 OFFICE O/P EST HI 40 MIN: CPT | Mod: S$GLB,,, | Performed by: INTERNAL MEDICINE

## 2023-08-10 PROCEDURE — 3074F SYST BP LT 130 MM HG: CPT | Mod: CPTII,S$GLB,, | Performed by: INTERNAL MEDICINE

## 2023-08-10 PROCEDURE — 1159F PR MEDICATION LIST DOCUMENTED IN MEDICAL RECORD: ICD-10-PCS | Mod: CPTII,S$GLB,, | Performed by: INTERNAL MEDICINE

## 2023-08-10 PROCEDURE — 3044F HG A1C LEVEL LT 7.0%: CPT | Mod: CPTII,S$GLB,, | Performed by: INTERNAL MEDICINE

## 2023-08-10 PROCEDURE — 99999 PR PBB SHADOW E&M-EST. PATIENT-LVL IV: CPT | Mod: PBBFAC,,, | Performed by: INTERNAL MEDICINE

## 2023-08-10 PROCEDURE — 3008F BODY MASS INDEX DOCD: CPT | Mod: CPTII,S$GLB,, | Performed by: INTERNAL MEDICINE

## 2023-08-10 PROCEDURE — 3044F PR MOST RECENT HEMOGLOBIN A1C LEVEL <7.0%: ICD-10-PCS | Mod: CPTII,S$GLB,, | Performed by: INTERNAL MEDICINE

## 2023-08-10 RX ORDER — ATORVASTATIN CALCIUM 80 MG/1
80 TABLET, FILM COATED ORAL DAILY
Qty: 90 TABLET | Refills: 3 | Status: SHIPPED | OUTPATIENT
Start: 2023-08-10 | End: 2024-08-09

## 2023-08-10 NOTE — PATIENT INSTRUCTIONS
Assessment/Plan:  Jesús Patel is a 48 y.o. male with HTN, HLD, JARAD (on CPAP), morbid obesity s/p gastric bypass, who presents for a follow up appointment.    1. Left Leg Pain- Due to superficial venous thrombosis with cellulitis. Symptoms now significantly improved.  Cellulitis has resolved.  LLE venous ultrasound done today shows extensive superficial venous thrombosis extending to close proximity of the saphenofemoral junction.  This is very close to the deep venous system, therefore, will treat as a DVT.  Check BLE venous ultrasound.  If not SVT or DVT, will stop Eliquis 5 mg twice daily.      2. BLE Lymphedema- Refer to lymphedema clinic.  Refer to lymphedema clinic. Recommend wearing graduated compression hose.  Limit sodium intake to 2000 mg daily.  Limit volume intake to 1.5 L daily.  Elevate legs when resting.    3. Morbid Obesity s/p Gastric Bypass- Refer back to Bariatric Surgery for evaluation.  Encourage diet, exercise and weigh loss.    4. HTN- Continue current medications.    5. HLD- Start atorvastatin 80 mg daily.      Will call  pt with results of ultrasound  Otherwise, follow up in 3 months with lipids prior

## 2023-08-10 NOTE — PROGRESS NOTES
[facility]       Chief Complaint   Patient presents with    venous thrombosis       Patient ID: Jesús Patel is a 48 y.o. male with HTN, HLD, JARAD (on CPAP), morbid obesity s/p gastric bypass, who presents for a follow up appointment.  Pertinent history/events are as follows:     -Pt kindly referred by Niall Mauricio PA-C for evaluation of thrombophlebitis of superficial veins of left lower extremity.    -At our initial clinic visit on 4/4/2022, Mr. Josie Patel reports left leg pain which started approximately 2 weeks ago.  On 3/27/2022 he presented to the ED for evaluation.  LLE Venous Ultrasound on 3/27/2022 revealed Left calf nonocclusive superficial thrombophlebitis.  There is no evidence of deep venous thrombosis in the left lower extremity.  He states the left leg pain has continued since that time.  Exam significant for LLE with erythema and multiple palpable cords with TTP.  Both  LE's with changes consistent with lymphedema.    Plan:   Left Leg Pain- Likely due to superficial venous thrombosis with cellulitis.  Check LLE venous reflux study today to rule out DVT and SVT in close proximity to the deep venous system.  If no evidence of DVT, treat with Xarelto 10 mg daily for 45 days.  Treat with doxycycline 100 mg bid for 21 days for cellulitis.  Pt to elevate legs when resting.    BLE Lymphedema- Plan to refer to lymphedema clinic after cellulitis has fully resolved.  Morbid Obesity s/p Gastric Bypass- Refer back to Bariatric Surgery for evaluation.  Encourage diet, exercise and weigh loss.  HTN- Continue current medications.  HLD- Check updated lipid panel    -Results Addendum 4/4/2022:  LLE venous ultrasound done today shows extensive superficial venous thrombosis extending to close proximity of the saphenofemoral junction.  This is very close to the deep venous system, therefore, will treat as a DVT.  Start Eliquis 10 mg twice daily for the 1st 7 days, then reduce  to 5 mg twice daily thereafter.  Repeat LLE venous ultrasound in 45 days.  Plan discussed in detail with Mr. Josie Patel, who voiced understanding.    -At follow up clinic visit on 5/18/2022, Mr. Josie Patel reports significant improvement in LLE edema and pain since starting Eliquis 5 mg bid.  He has no claudication or tissue loss.  He is enrolled in the digital HTN program.   Plan:   Left Leg Pain- Due to superficial venous thrombosis with cellulitis. Symptoms now significantly improved.  LLE venous ultrasound done today shows extensive superficial venous thrombosis extending to close proximity of the saphenofemoral junction.  This is very close to the deep venous system, therefore, will treat as a DVT.  Continue Eliquis 5 mg twice daily.  Repeat LLE venous ultrasound in 3 months.  BLE Lymphedema- Plan to refer to lymphedema clinic after cellulitis and superficial venous thrombosis has fully resolved.  Morbid Obesity s/p Gastric Bypass- Refer back to Bariatric Surgery for evaluation.  Encourage diet, exercise and weigh loss.  HTN- Continue current medications.  HLD- Check updated lipid panel.    HPI:  Mr. Josie Patel reports no leg pain or edema.  He would like to stop taking Eliquis.      Past Medical History:   Diagnosis Date    Asthma     GERD (gastroesophageal reflux disease)     Hyperlipidemia     Hypertension     Low back pain     Morbid obesity with BMI of 50.0-59.9, adult     JARAD (obstructive sleep apnea)     Pre-diabetes     Varicose veins     Vitamin D deficiency      Past Surgical History:   Procedure Laterality Date    ADENOIDECTOMY      COLONOSCOPY N/A 2/11/2022    Procedure: COLONOSCOPY;  Surgeon: Vaughn Cortes MD;  Location: [address]);  Service: Endoscopy;  Laterality: N/A;    ESOPHAGOGASTRODUODENOSCOPY N/A 2/11/2022    Procedure: EGD (ESOPHAGOGASTRODUODENOSCOPY);  Surgeon: Vaughn Cortes MD;  Location: [facility] ([address] [place] [address]);  Service: Endoscopy;  Laterality: N/A;  BMI-55   Wt:378#-fully vacc-inst portal-tb  COVID screening [facility]    EYE SURGERY      GASTRECTOMY      LAPAROSCOPIC GASTRIC BANDING      In [place]: uncertain of brand/ size    LAPAROSCOPIC REPAIR OF RECURRENT INCARCERATED INCISIONAL HERNIA N/A 2018    Procedure: REPAIR-HERNIA-INCISIONAL-LAPAROSCOPIC WITH MESH;  Surgeon: Vaughn Parker Jr., MD;  Location: [facility] OR [address] [place] [address];  Service: General;  Laterality: N/A;    LASIK       Social History     Socioeconomic History    Marital status: Single   Tobacco Use    Smoking status: Former     Current packs/day: 0.00     Average packs/day: 0.3 packs/day for 5.0 years (1.3 ttl pk-yrs)     Types: Cigarettes     Start date: 2004     Quit date: 2009     Years since quittin.6    Smokeless tobacco: Never   Substance and Sexual Activity    Alcohol use: Yes     Comment: 10+ servings daily, heavier on weekends, stopped 2023    Drug use: No    Sexual activity: Not Currently   Social History Narrative    Works [employer].  Previously worked as a .  1 daughter, Yenni (18 years old). He is . Lives with brother. Not much exercise     Social Determinants of Health     Financial Resource Strain: Low Risk  (2023)    Overall Financial Resource Strain (CARDIA)     Difficulty of Paying Living Expenses: Not very hard   Food Insecurity: Food Insecurity Present (2023)    Hunger Vital Sign     Worried About Running Out of Food in the Last Year: Never true     Ran Out of Food in the Last Year: Sometimes true   Transportation Needs: No Transportation Needs (2023)    PRAPARE - Transportation     Lack of Transportation (Medical): No     Lack of Transportation (Non-Medical): No   Physical Activity: Insufficiently Active (2023)    Exercise Vital Sign     Days of Exercise per Week: 1 day     Minutes of Exercise per Session: 40 min   Stress: Stress Concern Present (2023)    [employer]  [employer] - Occupational Stress Questionnaire     Feeling of Stress : To some extent   Social Connections: Unknown (8/9/2023)    Social Connection and Isolation Panel [NHANES]     Frequency of Communication with Friends and Family: More than three times a week     Frequency of Social Gatherings with Friends and Family: Twice a week     Active Member of Clubs or Organizations: Yes     Attends Club or Organization Meetings: More than 4 times per year     Marital Status:    Housing Stability: Low Risk  (8/9/2023)    Housing Stability Vital Sign     Unable to Pay for Housing in the Last Year: No     Number of Places Lived in the Last Year: 0     Unstable Housing in the Last Year: No   Recent Concern: Housing Stability - High Risk (7/17/2023)    Housing Stability Vital Sign     Unable to Pay for Housing in the Last Year: No     Number of Places Lived in the Last Year: 0     Unstable Housing in the Last Year: Yes     Family History   Problem Relation Age of Onset    Hypertension Mother     Obesity Mother         s/p sleeve 2013: good results    Cancer Maternal Grandfather         colon cancer    Diabetes Paternal Grandmother     No Known Problems Father     No Known Problems Brother     Colon cancer Maternal Grandmother     Obesity Brother     No Known Problems Sister        Review of patient's allergies indicates:  No Known Allergies    Medication List with Changes/Refills   Current Medications    ALBUTEROL (VENTOLIN HFA) 90 MCG/ACTUATION INHALER    Inhale 2 puffs into the lungs every 6 (six) hours as needed for Wheezing or Shortness of Breath. Rescue    AMLODIPINE (NORVASC) 10 MG TABLET    Take 1 tablet (10 mg total) by mouth once daily.    APIXABAN (ELIQUIS) 5 MG TAB    Take 2 tablets (10 mg total) by mouth 2 (two) times daily for the first 7 days, then reduce to 1 tablet (5 mg total) 2 (two) times daily thereafter.    BUDESONIDE-FORMOTEROL 80-4.5 MCG (SYMBICORT) 80-4.5 MCG/ACTUATION HFAA    Inhale 2 puffs into  the lungs once daily. Controller    CETIRIZINE (ZYRTEC) 10 MG TABLET    Take 10 mg by mouth as needed.    DIPHENHYDRAMINE (BENADRYL) 25 MG CAPSULE    Take 25 mg by mouth every 6 (six) hours as needed for Itching.    ERYTHROMYCIN WITH ETHANOL (EMGEL) 2 % GEL    Apply topically 2 (two) times daily. Prn neck break outs    FLUTICASONE PROPIONATE (FLONASE) 50 MCG/ACTUATION NASAL SPRAY    SPRAY 2 SPRAYS IN EACH NOSTRIL TWICE A DAY    HYDROCHLOROTHIAZIDE (HYDRODIURIL) 12.5 MG TAB    Take 1 tablet (12.5 mg total) by mouth once daily.    OMEPRAZOLE (PRILOSEC) 40 MG CAPSULE    Take 1 capsule (40 mg total) by mouth every morning.    SALICYLIC ACID 17 % GEL    Apply topically once daily. Apply focally to skin tag/warts, not normal skin, and cover with tape or Band Aid until scab forms.  Allow for healing and watch for parts of tag/wart to fall off.  For warts okay to peel away dead skin painlessly.  Repeat as needed until tag/wart completely falls off.    TADALAFIL (CIALIS) 20 MG TAB    Use one tablet every 36 hours    ZOLPIDEM (AMBIEN CR) 12.5 MG CR TABLET    Take 1 tablet (12.5 mg total) by mouth nightly as needed for Insomnia.   Discontinued Medications    B COMPLEX VITAMINS TABLET    Take 1 tablet by mouth every morning.     CALCIUM CITRATE/VITAMIN D3 (CALCIUM CITRATE + D ORAL)    Take 1 tablet by mouth 2 (two) times daily.    CHOLECALCIFEROL, VITAMIN D3, 125 MCG (5,000 UNIT) TAB    Take 1 tablet (5,000 Units total) by mouth once daily.    DICLOFENAC (VOLTAREN) 50 MG EC TABLET    Take 1 tablet (50 mg total) by mouth 3 (three) times daily as needed (pain).    MELOXICAM (MOBIC) 15 MG TABLET    Take 1 tablet (15 mg total) by mouth once daily.    SEMAGLUTIDE (OZEMPIC) 0.25 MG OR 0.5 MG (2 MG/3 ML) PEN INJECTOR    Inject 0.5 mg into the skin every 7 days.    TRIAMCINOLONE ACETONIDE 0.1% (KENALOG) 0.1 % CREAM    Apply topically 2 (two) times daily.       Review of Systems  Constitution: Denies chills, fever, and sweats.  HENT:  "Denies headaches or blurry vision.  Cardiovascular: Denies chest pain or irregular heart beat.  Respiratory: Denies cough or shortness of breath.  Gastrointestinal: Denies abdominal pain, nausea, or vomiting.  Musculoskeletal: Positive for left leg pain and redness.  Neurological: Denies dizziness or focal weakness.  Psychiatric/Behavioral: Normal mental status.  Hematologic/Lymphatic: Denies bleeding problem or easy bruising/bleeding.  Skin: Denies rash or suspicious lesions    Physical Examination  /66   Pulse 80   Ht 5' 7" (1.702 m)   Wt (!) 169.7 kg (374 lb 1.9 oz)   BMI 58.60 kg/m²     Constitutional: No acute distress, conversant  HEENT: Sclera anicteric, Pupils equal, round and reactive to light, extraocular motions intact, Oropharynx clear  Neck: No JVD, no carotid bruits  Cardiovascular: regular rate and rhythm, no murmur, rubs or gallops, normal S1/S2  Pulmonary: Clear to auscultation bilaterally  Abdominal: Abdomen soft, nontender, nondistended, positive bowel sounds  Extremities: Both  LE's with changes consistent with lymphedema.     LLE with erythema and multiple palpable cords with TTP.   Pulses:  Carotid pulses are 2+ on the right side, and 2+ on the left side.  Radial pulses are 2+ on the right side, and 2+ on the left side.   Femoral pulses are 2+ on the right side, and 2+ on the left side.  Popliteal pulses are 2+ on the right side, and 2+ on the left side.   Dorsalis pedis pulses are 2+ on the right side, and 2+ on the left side.   Posterior tibial pulses are 2+ on the right side, and 2+ on the left side.    Skin: No ecchymosis, erythema, or ulcers  Psych: Alert and oriented x 3, appropriate affect  Neuro: CNII-XII intact, no focal deficits    Labs:  Most Recent Data  CBC:   Lab Results   Component Value Date    WBC 10.55 04/24/2023    HGB 15.2 04/24/2023    HCT 47.4 04/24/2023     04/24/2023    MCV 88 04/24/2023    RDW 14.4 04/24/2023     BMP:   Lab Results   Component Value " "Date     08/04/2023    K 3.7 08/04/2023     08/04/2023    CO2 22 (L) 08/04/2023    BUN 13 08/04/2023    CREATININE 0.8 08/04/2023    GLU 99 08/04/2023    CALCIUM 9.4 08/04/2023    MG 2.4 10/02/2018    PHOS 2.8 10/02/2018     LFTS;   Lab Results   Component Value Date    PROT 7.1 08/04/2023    ALBUMIN 3.4 (L) 08/04/2023    BILITOT 0.7 08/04/2023    AST 25 08/04/2023    ALKPHOS 101 08/04/2023    ALT 21 08/04/2023     COAGS:   Lab Results   Component Value Date    INR 1.0 08/04/2023     FLP:   Lab Results   Component Value Date    CHOL 238 (H) 04/24/2023    HDL 46 04/24/2023    LDLCALC 166.6 (H) 04/24/2023    TRIG 127 04/24/2023    CHOLHDL 19.3 (L) 04/24/2023     CARDIAC: No results found for: "TROPONINI", "CKTOTAL", "CKMB", "BNP"    Imaging:    LLE Venous Ultrasound 3/27/2022:  No evidence of deep venous thrombosis in the left lower extremity.   Left calf nonocclusive superficial thrombophlebitis.    Assessment/Plan:  Jesús Patel is a 48 y.o. male with HTN, HLD, JARAD (on CPAP), morbid obesity s/p gastric bypass, who presents for a follow up appointment.    1. Left Leg Pain- Due to superficial venous thrombosis with cellulitis. Symptoms now significantly improved.  Cellulitis has resolved.  LLE venous ultrasound done today shows extensive superficial venous thrombosis extending to close proximity of the saphenofemoral junction.  This is very close to the deep venous system, therefore, will treat as a DVT.  Check BLE venous ultrasound.  If not SVT or DVT, will stop Eliquis 5 mg twice daily.      2. BLE Lymphedema- Refer to lymphedema clinic.  Refer to lymphedema clinic. Recommend wearing graduated compression hose.  Limit sodium intake to 2000 mg daily.  Limit volume intake to 1.5 L daily.  Elevate legs when resting.    3. Morbid Obesity s/p Gastric Bypass- Refer back to Bariatric Surgery for evaluation.  Encourage diet, exercise and weigh loss.    4. HTN- Continue current medications.    5. " HLD- Start atorvastatin 80 mg daily.      Will call  pt with results of ultrasound  Otherwise, follow up in 3 months with lipids prior    Total duration of face to face visit time 30 minutes.  Total time spent counseling greater than fifty percent of total visit time.  Counseling included discussion regarding imaging findings, diagnosis, possibilities, treatment options, risks and benefits.  The patient had many questions regarding the options and long-term effects.    Davonte Lance MD, PhD  Interventional Cardiology

## 2023-08-14 ENCOUNTER — OFFICE VISIT (OUTPATIENT)
Dept: HEPATOLOGY | Facility: CLINIC | Age: 48
End: 2023-08-14
Payer: COMMERCIAL

## 2023-08-14 VITALS — HEIGHT: 67 IN | BODY MASS INDEX: 49.44 KG/M2 | WEIGHT: 315 LBS

## 2023-08-14 DIAGNOSIS — K76.0 FATTY LIVER: ICD-10-CM

## 2023-08-14 DIAGNOSIS — R74.8 ELEVATED LIVER ENZYMES: ICD-10-CM

## 2023-08-14 DIAGNOSIS — I10 ESSENTIAL HYPERTENSION: ICD-10-CM

## 2023-08-14 DIAGNOSIS — K74.00 LIVER FIBROSIS: Primary | ICD-10-CM

## 2023-08-14 DIAGNOSIS — E66.01 MORBID OBESITY WITH BMI OF 50.0-59.9, ADULT: ICD-10-CM

## 2023-08-14 DIAGNOSIS — E78.49 OTHER HYPERLIPIDEMIA: ICD-10-CM

## 2023-08-14 DIAGNOSIS — Z87.898 HISTORY OF ALCOHOL USE: ICD-10-CM

## 2023-08-14 PROCEDURE — 1159F MED LIST DOCD IN RCRD: CPT | Mod: CPTII,S$GLB,, | Performed by: NURSE PRACTITIONER

## 2023-08-14 PROCEDURE — 3008F BODY MASS INDEX DOCD: CPT | Mod: CPTII,S$GLB,, | Performed by: NURSE PRACTITIONER

## 2023-08-14 PROCEDURE — 3044F HG A1C LEVEL LT 7.0%: CPT | Mod: CPTII,S$GLB,, | Performed by: NURSE PRACTITIONER

## 2023-08-14 PROCEDURE — 1160F RVW MEDS BY RX/DR IN RCRD: CPT | Mod: CPTII,S$GLB,, | Performed by: NURSE PRACTITIONER

## 2023-08-14 PROCEDURE — 3044F PR MOST RECENT HEMOGLOBIN A1C LEVEL <7.0%: ICD-10-PCS | Mod: CPTII,S$GLB,, | Performed by: NURSE PRACTITIONER

## 2023-08-14 PROCEDURE — 3008F PR BODY MASS INDEX (BMI) DOCUMENTED: ICD-10-PCS | Mod: CPTII,S$GLB,, | Performed by: NURSE PRACTITIONER

## 2023-08-14 PROCEDURE — 99215 OFFICE O/P EST HI 40 MIN: CPT | Mod: S$GLB,,, | Performed by: NURSE PRACTITIONER

## 2023-08-14 PROCEDURE — 1159F PR MEDICATION LIST DOCUMENTED IN MEDICAL RECORD: ICD-10-PCS | Mod: CPTII,S$GLB,, | Performed by: NURSE PRACTITIONER

## 2023-08-14 PROCEDURE — 99215 PR OFFICE/OUTPT VISIT, EST, LEVL V, 40-54 MIN: ICD-10-PCS | Mod: S$GLB,,, | Performed by: NURSE PRACTITIONER

## 2023-08-14 PROCEDURE — 99999 PR PBB SHADOW E&M-EST. PATIENT-LVL V: ICD-10-PCS | Mod: PBBFAC,,, | Performed by: NURSE PRACTITIONER

## 2023-08-14 PROCEDURE — 1160F PR REVIEW ALL MEDS BY PRESCRIBER/CLIN PHARMACIST DOCUMENTED: ICD-10-PCS | Mod: CPTII,S$GLB,, | Performed by: NURSE PRACTITIONER

## 2023-08-14 PROCEDURE — 99999 PR PBB SHADOW E&M-EST. PATIENT-LVL V: CPT | Mod: PBBFAC,,, | Performed by: NURSE PRACTITIONER

## 2023-08-14 NOTE — PATIENT INSTRUCTIONS
In a nutshell, a liver biopsy is a same day procedure. Someone needs to bring you, stay with you, and bring you home because they give you medication to make you sleepy for the procedure. They give you sedation to make you sleepy but do not put you fully to sleep. They numb the side of your neck and pass a needle into a vein in you neck and through the vessel to the liver to obtain a piece of liver tissue that can be evaluated under a microscope by a pathologist.   Possible complications associated with liver biopsy include pain, bleeding, infection, and organ perforation - although not common and risk is low.     They keep you for 4 hours after the biopsy to assure that you are stable to return home. It is a same day procedure.     I am recommending the biopsy to confirm the diagnosis and staging of liver disease so pt can be appropriately followed from this point forward.    Do NOT take any aspirin, NSAIDS (including advil, aleve, ibuprofen, motrin, naproxen) and fish oil for 7 days before and after biopsy

## 2023-08-14 NOTE — PROGRESS NOTES
HISTORY OF PRESENT ILLNESS   Jesús Patel, a 48 y.o. male, presents today for evaluation of his left WRIST and HAND. Patient is right hand dominant.    Patient reports onset of chronic and insidious  pain beginning 2 years ago.. Patient reports no known injury or trauma. Pain is located along radial and dorsal aspect of  thumb/1st CMC . Pain is 5/10 at present & up to 8/10 with provacative activity including all activity including ADLs, gripping, grasping. Pain is described as sharp. 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    Associated symptoms include stiffness, decreased ROM, instability, weakness, popping, and clicking. Pain is aggravated by activities above & occur constantly. Symptoms do interfere with sleep. Patient reports pain & symptoms are getting worse. Patient reports no prior surgery to WRIST and HAND.     Prior treatment Jesús Ptael has tried   OTC Acetaminophen - No  OTC NSAID - Yes - ibuprofen   Rx NSAID - No   Rx Narcotic/Other - No   Brace - No   Injection - Cortisone - No   Injection - Biologics - No   Activity Modification - Yes  Physical Therapy - No   Home Exercise Program - No  Assistive Device - No  Other - ice and heat    Review of systems (ROS):  A 10+ review of systems was performed with pertinent positives and negatives noted above in the history of present illness. Other systems were negative unless otherwise specified.      PHYSICAL EXAMINATION  General:  The patient is alert and oriented x 3. Mood is pleasant. Observation of ears, eyes and nose reveal no gross abnormalities. HEENT: NCAT, sclera anicteric. Lungs: Respirations are equal and unlabored.    left Wrist/Hand Exam    Observation:     Swelling  none  Deformity  none   Discoloration  none   Atrophy  none   Scars   none             Erythema                    none    Tenderness:  Radial:  DRUJ    Neg  Radial  Styloid   Neg  Radial Shaft                            Neg  1st dorsal Compartment Neg  Stef's Tubercle  Neg  Basal Joint Thumb  Neg  ECRL Tendon   Neg  FCR Tendon   Neg  Snuff Box   Neg  Lunate    Neg     Ulnar:  ECU Sheath   Neg  FCU Tendon   Neg  Pisiform   Neg  TFCC    Neg  Ulnar Styloid   Neg  Ulnar Shaft   Neg    Hand:  Palmar Fascia   Neg  Metacarpal   Neg  MCP    POS 1st,   Phalanx   Neg  PIP    Neg  DIP    Neg  A1- Pulley   Neg  Flexor Tendons  Neg  Finger Tip   Neg         ROM: (AROM)  Right    Left        Wrist Flexion   80°       80°     Wrist Extension   75°      75°  Radial Deviation  30°     30°   Ulnar Deviation  30°      30°     Pronation   90     90  Supination   90    90    Strength:   RIGHT    LEFT   Wrist Flexion   5/5    5/5   Wrist Extension  5/5    5/5  Hand    5/5    5/5*  Opposition   5/5    5/5*    Special tests:  Phalens     Neg  Durkan Carpal Compression   Neg  Tinel (wrist)     Neg  Finkelstein     Neg  Piano Wahl (ulnar translation)   Neg  Doss Scaphoid Shift   Neg  Wood Test     Normal  Froment Sign     Neg  CMC Grind     Neg  Narda (Intrinsic Tightness)   Neg     Extremity Neuro-vascular Testing: Sensation grossly intact to light touch all dermatomal regions. DTR 2+ Biceps, Triceps, BR and Negative China's sign. Grossly intact motor function at Elbow, Wrist and Hand. Distal pulses radial and ulnar 2+, brisk cap refill, symmetric.    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, left, nondominant     No evidence of osseous pathology  No evidence of neurologic pathology  No evidence of vascular pathology    Imaging studies reviewed:   X-ray  hand , left 23.08    Plan:    We discussed the importance of appropriate diet, weight, and regular exercise    We discussed options including    Watchful waiting / relative rest    Physical therapy X   Injection therapy    Consultation    The patient  chooses As above   x = prescribed  CSI = corticosteroid injection  VSI = viscosupplement injection  PRPI = platelet rich plasma injection  ia = intra articular  R = right  L = left  B = bilateral   nfSx = surgical consultation was recommended, but patient is not interested in consultation at this time    Physical Therapy        Formal (fPT), @ [facility] facility    Formal (fPT), @ [facility] facility        Homegoing (hgPT), per concurrent fPT recommendations    Homegoing (hgPT), per prior fPT recommendations    Homegoing (hgPT), handout provided        w/  (atPT)    [blank] = not prescribed  x = prescribed  b = prescribed, and begin as indicated  t = continue as indicated  r = prescribed, and restart as indicated  p = completed prior as indicated  hs = prescribed, and with high school   col = prescribed, and with [facility] or university   nfPT = physical therapy was recommended, but patient is not interested in PT at this time    Activity (e.g. sports, work) restrictions    [blank] = as tolerated  pt = per physical therapist  at = per     Bracing F - Thumb spica brace   [blank] = not prescribed  r = recommended, but not fit with at todays visit  f = prescribed and fit with at todays visit  t = continue as indicated  p = prn use on rare, as-needed basis; advised against chronic use    Pain management M   [blank] = No prescription necessary. A handout detailing dosing of appropriate   over-the-counter musculoskeletal analgesics was made available to the patient.   m = meloxicam x 14 days  mp = 14 day course of meloxicam prescribed prior    Follow up 4 weeks    [blank] = as needed  [number] = in [number] weeks  CSI = for corticosteroid injection  VSI = for viscosupplement injection or injection series  PRP = for platelet rich plasma injection or injection series  MRI = after MRI imaging  ns = should surgical options be deferred (no surgery)  o = appointment  offered, deferred by patient    Should symptoms worsen or fail to resolve, consider    Revisiting the above options and / or [facility] [place] [facility]     Vocation:   Desk job  crossfit

## 2023-08-14 NOTE — PROGRESS NOTES
[facility]    PCP: Lester Awan MD     CHIEF COMPLAINT:  fatty liver (?), liver fibrosis (?)    HPI: This is a 48 y.o. White male with PMH noted below, presenting for follow up of above    Serological workup was negative for Maximo's, alpha-1 antitrypsin deficiency, hemochromatosis, autoimmune etiology, and viral hepatitis A and B.   + Hep C ab in 2020, previous RNA in 2019 and 2022 negative. No hep C treatment   PETH 190s  4/2022. Negative 8/2023 on repeat     Prior serologic workup:   Lab Results   Component Value Date    SMOOTHMUSCAB Negative 1:40 04/05/2022    AMAIFA Negative 1:40 04/05/2022    IGGSERUM 1166 04/05/2022    ANASCREEN Negative <1:80 04/05/2022    FERRITIN 202 04/05/2022    FESATURATED 30 04/05/2022    CERULOPLSM 32.0 04/05/2022    HEPBSAG Non-reactive 08/04/2023    HEPCAB Equivocal (A) 08/04/2023    HEPAIGM Non-reactive 08/04/2023       Liver fibrosis staging:  -- MRI elasto 4/2022 noted mild steatosis, no fibrosis staging possible given body habitus  -- MRI elasto 4/2023 noted F3-F4 fibrosis    Risk factors for NAFLD include morbid obesity, HTN, HLD, preDM    Interval HPI: Presents today alone. No SSB. ~10 lb weight loss since last visit. Heavy alcohol use lately, stopped 4/29/23, doing great without alcohol, peth negative  Repeat MRI elasto notes no significant fatty liver but note cirrhosis, so unsure since fibrosis staging conflicting   Avoiding carbs, trying to eat smaller portions  No fried/fast foods   Liver enzymes normalized without alcohol   Recently started Lipitor with cardiology 8/2023      Labs done 8/2023 show normalized transaminase levels (previously significantly elevated since 11/20202542-3429)  Platelets WNL, alk phos WNL  Synthetic liver functioning WNL    Lab Results   Component Value Date    ALT 21 08/04/2023    AST 25 08/04/2023    ALKPHOS 101 08/04/2023    BILITOT 0.7 08/04/2023    ALBUMIN 3.4 (L) 08/04/2023    INR 1.0 08/04/2023    PLT  "392 04/24/2023       MRI elasto 8/2023 noted hepatomegaly, no splenomegaly     Denies family history of liver disease. + previous alcohol use  Social History     Substance and Sexual Activity   Alcohol Use Yes    Comment: 10+ servings daily, heavier on weekends, stopped 4/26/2023         Immunity to Hep A and B - completed 2 twinrix vaccines >1 year ago, need to repeat titers with next labs        Allergy and medication list reviewed and updated     PMHX:  has a past medical history of Asthma, GERD (gastroesophageal reflux disease), Hyperlipidemia, Hypertension, Low back pain, Morbid obesity with BMI of 50.0-59.9, adult, JARAD (obstructive sleep apnea), Pre-diabetes, Varicose veins, and Vitamin D deficiency.    PSHX:  has a past surgical history that includes LASIK; Eye surgery; Adenoidectomy; Laparoscopic gastric banding (2002); Gastrectomy; Laparoscopic repair of recurrent incarcerated incisional hernia (N/A, 6/21/2018); Esophagogastroduodenoscopy (N/A, 2/11/2022); and Colonoscopy (N/A, 2/11/2022).    FAMILY HISTORY: Updated and reviewed in [facility]    SOCIAL HISTORY:   Social History     Substance and Sexual Activity   Alcohol Use Yes    Comment: 10+ servings daily, heavier on weekends, stopped 4/26/2023       Social History     Substance and Sexual Activity   Drug Use No       ROS:   GENERAL: Denies fatigue  CARDIOVASCULAR: Denies edema  GI: Denies abdominal pain  SKIN: Denies rash, itching   NEURO: Denies confusion, memory loss, or mood changes    PHYSICAL EXAM:   Friendly White male, in no acute distress; alert and oriented to person, place and time  VITALS: Ht 5' 7" (1.702 m)   Wt (!) 168.4 kg (371 lb 4.1 oz)   BMI 58.15 kg/m²   EYES: Sclerae anicteric  GI: Soft, non-tender, non-distended. No ascites.  EXTREMITIES:  No edema.  SKIN: Warm and dry. No jaundice. No telangectasias noted. No palmar erythema.  NEURO:  No asterixis.  PSYCH:  Thought and speech pattern appropriate. Behavior normal      EDUCATION:  See " instructions discussed with patient in Instructions section of the After Visit Summary     ASSESSMENT & PLAN:  48 y.o. White male with:  1.  Fatty liver (?),possibly related to previous alcohol use and metabolic risk factors   -- although recent MRI without fatty liver?   - see HPI  -- Immunity to Hep A and B : see HPI  -- Recommendations discussed with patient:  Limit alcohol consumption, continue complete abstinence   2 Weight loss goal of  lbs  3. Low carb/sugar, high fiber and protein diet, limit your carb intake to LESS than 30-45 grams of carbs with a meal or LESS than 5-10 grams with any snack   4. Exercise, 5 days per week, 30 minutes per day, as tolerated  5. Recommend good cholesterol, blood pressure, blood sugar levels   6. Consider enrolling in [facility] fibrosis clinical trails - will determine based on liver fibrosis staging results     2. Elevated liver enzymes  -- likely due to alcohol since they normalized with abstinence    3. Liver fibrosis/cirrhosis (?)  -- per MRI but conflicting fibrosis staging, will proceed with TJ liver biopsy   Discussed liver biopsy procedure and possible complications associated with liver biopsy including pain, bleeding, infection, and organ perforation. Reviewed the role of the procedure including confirming of diagnosis and staging of liver disease so pt can be appropriately followed from this point forward.    Instructed pt to not take any aspirin, NSAIDS (including advil, aleve, ibuprofen, motrin, naproxen) and fish oil for 7 days before and after biopsy      4. Morbid obesity, HTN, HLD, preDM  Body mass index is 58.15 kg/m².  -- can increase risk of fatty liver        Follow up in about 2 weeks (around 8/28/2023) for after liver biopsy completed.   Orders Placed This Encounter   Procedures    IR Transjugular Liver Biopsy    CBC Without Differential    Protime-INR    Ambulatory referral/consult  to [facility] Interventional RAD        Thank you for allowing me to  participate in the care of Jesús FengCM Mcneill    I spent a total of 40 minutes on the day of the visit.This includes face to face time and non-face to face time preparing to see the patient (eg, review of tests), obtaining and/or reviewing separately obtained history, documenting clinical information in the electronic or other health record, independently interpreting results and communicating results to the patient/family/caregiver, and coordinating care.         CC'ed note to:   Lester Awan MD Amanda Medina RN

## 2023-08-16 ENCOUNTER — TELEPHONE (OUTPATIENT)
Dept: INTERVENTIONAL RADIOLOGY/VASCULAR | Facility: CLINIC | Age: 48
End: 2023-08-16
Payer: COMMERCIAL

## 2023-08-16 NOTE — TELEPHONE ENCOUNTER
Spoke to pt on phone,  Pt is scheduled on 9/1/2023 for IR procedure with anes at  location.  Preop instructions given (NPO after midnight, MUST have a ride home, Nurse will call 1-2  days before to go over instructions and medications), instructed pt to stay off Eliquis for 2 days, pt verbally understood. Pt aware and confirmed, Thanks

## 2023-08-27 DIAGNOSIS — K21.9 GASTROESOPHAGEAL REFLUX DISEASE: ICD-10-CM

## 2023-08-27 NOTE — TELEPHONE ENCOUNTER
No care due was identified.  [facility] Embedded Care Due Messages. Reference number: [id number].   8/27/2023 8:44:25 AM CDT

## 2023-08-28 ENCOUNTER — HOSPITAL ENCOUNTER (OUTPATIENT)
Dept: RADIOLOGY | Facility: HOSPITAL | Age: 48
Discharge: HOME | End: 2023-08-28
Attending: FAMILY MEDICINE
Payer: COMMERCIAL

## 2023-08-28 ENCOUNTER — OFFICE VISIT (OUTPATIENT)
Dept: SPORTS MEDICINE | Facility: CLINIC | Age: 48
End: 2023-08-28
Payer: COMMERCIAL

## 2023-08-28 VITALS — TEMPERATURE: 98 F

## 2023-08-28 DIAGNOSIS — M79.642 LEFT HAND PAIN: ICD-10-CM

## 2023-08-28 DIAGNOSIS — M19.032 LOCALIZED PRIMARY OSTEOARTHRITIS OF CARPOMETACARPAL (CMC) JOINT OF LEFT WRIST: Primary | ICD-10-CM

## 2023-08-28 DIAGNOSIS — M25.532 CHRONIC PAIN OF LEFT WRIST: ICD-10-CM

## 2023-08-28 DIAGNOSIS — G89.29 CHRONIC PAIN OF LEFT WRIST: ICD-10-CM

## 2023-08-28 PROCEDURE — 1159F PR MEDICATION LIST DOCUMENTED IN MEDICAL RECORD: ICD-10-PCS | Mod: CPTII,S$GLB,, | Performed by: FAMILY MEDICINE

## 2023-08-28 PROCEDURE — 99999 PR PBB SHADOW E&M-EST. PATIENT-LVL III: CPT | Mod: PBBFAC,,, | Performed by: FAMILY MEDICINE

## 2023-08-28 PROCEDURE — 73130 XR HAND COMPLETE 3 VIEW LEFT: ICD-10-PCS | Mod: 26,LT,, | Performed by: RADIOLOGY

## 2023-08-28 PROCEDURE — 3044F HG A1C LEVEL LT 7.0%: CPT | Mod: CPTII,S$GLB,, | Performed by: FAMILY MEDICINE

## 2023-08-28 PROCEDURE — 99214 OFFICE O/P EST MOD 30 MIN: CPT | Mod: S$GLB,,, | Performed by: FAMILY MEDICINE

## 2023-08-28 PROCEDURE — 99999 PR PBB SHADOW E&M-EST. PATIENT-LVL III: ICD-10-PCS | Mod: PBBFAC,,, | Performed by: FAMILY MEDICINE

## 2023-08-28 PROCEDURE — 1159F MED LIST DOCD IN RCRD: CPT | Mod: CPTII,S$GLB,, | Performed by: FAMILY MEDICINE

## 2023-08-28 PROCEDURE — 73130 X-RAY EXAM OF HAND: CPT | Mod: 26,LT,, | Performed by: RADIOLOGY

## 2023-08-28 PROCEDURE — 3044F PR MOST RECENT HEMOGLOBIN A1C LEVEL <7.0%: ICD-10-PCS | Mod: CPTII,S$GLB,, | Performed by: FAMILY MEDICINE

## 2023-08-28 PROCEDURE — 73130 X-RAY EXAM OF HAND: CPT | Mod: TC,LT

## 2023-08-28 PROCEDURE — 99214 PR OFFICE/OUTPT VISIT, EST, LEVL IV, 30-39 MIN: ICD-10-PCS | Mod: S$GLB,,, | Performed by: FAMILY MEDICINE

## 2023-08-28 RX ORDER — OMEPRAZOLE 40 MG/1
40 CAPSULE, DELAYED RELEASE ORAL EVERY MORNING
Qty: 90 CAPSULE | Refills: 11 | Status: SHIPPED | OUTPATIENT
Start: 2023-08-28

## 2023-08-28 RX ORDER — MELOXICAM 15 MG/1
15 TABLET ORAL DAILY
Qty: 15 TABLET | Refills: 0 | Status: SHIPPED | OUTPATIENT
Start: 2023-08-28

## 2023-08-29 ENCOUNTER — HOSPITAL ENCOUNTER (OUTPATIENT)
Dept: CARDIOLOGY | Facility: HOSPITAL | Age: 48
Discharge: HOME | End: 2023-08-29
Attending: INTERNAL MEDICINE
Payer: COMMERCIAL

## 2023-08-29 DIAGNOSIS — I82.812 ACUTE SUPERFICIAL VENOUS THROMBOSIS OF LEFT LOWER EXTREMITY: ICD-10-CM

## 2023-08-29 PROCEDURE — 93970 EXTREMITY STUDY: CPT | Mod: 26,,, | Performed by: INTERNAL MEDICINE

## 2023-08-29 PROCEDURE — 93970 CV US DOPPLER VENOUS LEGS BILATERAL (CUPID ONLY): ICD-10-PCS | Mod: 26,,, | Performed by: INTERNAL MEDICINE

## 2023-08-29 PROCEDURE — 93970 EXTREMITY STUDY: CPT

## 2023-08-31 ENCOUNTER — PATIENT MESSAGE (OUTPATIENT)
Dept: HEPATOLOGY | Facility: CLINIC | Age: 48
End: 2023-08-31
Payer: COMMERCIAL

## 2023-08-31 ENCOUNTER — LAB VISIT (OUTPATIENT)
Dept: LAB | Facility: HOSPITAL | Age: 48
End: 2023-08-31
Payer: COMMERCIAL

## 2023-08-31 DIAGNOSIS — K74.00 LIVER FIBROSIS: ICD-10-CM

## 2023-08-31 DIAGNOSIS — R74.8 ELEVATED LIVER ENZYMES: ICD-10-CM

## 2023-08-31 DIAGNOSIS — K76.0 FATTY LIVER: ICD-10-CM

## 2023-08-31 LAB
ALBUMIN SERPL BCP-MCNC: 3.6 G/DL (ref 3.5–5.2)
ALP SERPL-CCNC: 116 U/L (ref 55–135)
ALT SERPL W/O P-5'-P-CCNC: 20 U/L (ref 10–44)
ANION GAP SERPL CALC-SCNC: 9 MMOL/L (ref 8–16)
AST SERPL-CCNC: 26 U/L (ref 10–40)
BILIRUB SERPL-MCNC: 0.8 MG/DL (ref 0.1–1)
BUN SERPL-MCNC: 12 MG/DL (ref 6–20)
CALCIUM SERPL-MCNC: 9.4 MG/DL (ref 8.7–10.5)
CHLORIDE SERPL-SCNC: 109 MMOL/L (ref 95–110)
CO2 SERPL-SCNC: 24 MMOL/L (ref 23–29)
CREAT SERPL-MCNC: 0.8 MG/DL (ref 0.5–1.4)
ERYTHROCYTE [DISTWIDTH] IN BLOOD BY AUTOMATED COUNT: 13 % (ref 11.5–14.5)
EST. GFR  (NO RACE VARIABLE): >60 ML/MIN/1.73 M^2
GLUCOSE SERPL-MCNC: 99 MG/DL (ref 70–110)
HCT VFR BLD AUTO: 43.5 % (ref 40–54)
HGB BLD-MCNC: 13.8 G/DL (ref 14–18)
INR PPP: 1 (ref 0.8–1.2)
MCH RBC QN AUTO: 26.5 PG (ref 27–31)
MCHC RBC AUTO-ENTMCNC: 31.7 G/DL (ref 32–36)
MCV RBC AUTO: 84 FL (ref 82–98)
PLATELET # BLD AUTO: 285 K/UL (ref 150–450)
PMV BLD AUTO: 9.4 FL (ref 9.2–12.9)
POTASSIUM SERPL-SCNC: 3.8 MMOL/L (ref 3.5–5.1)
PROT SERPL-MCNC: 7 G/DL (ref 6–8.4)
PROTHROMBIN TIME: 11.2 SEC (ref 9–12.5)
RBC # BLD AUTO: 5.2 M/UL (ref 4.6–6.2)
SODIUM SERPL-SCNC: 142 MMOL/L (ref 136–145)
WBC # BLD AUTO: 6.07 K/UL (ref 3.9–12.7)

## 2023-08-31 PROCEDURE — 80053 COMPREHEN METABOLIC PANEL: CPT

## 2023-08-31 PROCEDURE — 85027 COMPLETE CBC AUTOMATED: CPT | Performed by: NURSE PRACTITIONER

## 2023-08-31 PROCEDURE — 85610 PROTHROMBIN TIME: CPT | Performed by: NURSE PRACTITIONER

## 2023-08-31 PROCEDURE — 36415 COLL VENOUS BLD VENIPUNCTURE: CPT | Performed by: NURSE PRACTITIONER

## 2023-09-01 ENCOUNTER — ANESTHESIA EVENT (OUTPATIENT)
Dept: INTERVENTIONAL RADIOLOGY/VASCULAR | Facility: HOSPITAL | Age: 48
End: 2023-09-01
Payer: COMMERCIAL

## 2023-09-01 ENCOUNTER — HOSPITAL ENCOUNTER (OUTPATIENT)
Dept: INTERVENTIONAL RADIOLOGY/VASCULAR | Facility: HOSPITAL | Age: 48
Discharge: HOME | End: 2023-09-01
Attending: NURSE PRACTITIONER
Payer: COMMERCIAL

## 2023-09-01 VITALS
SYSTOLIC BLOOD PRESSURE: 123 MMHG | TEMPERATURE: 98 F | WEIGHT: 315 LBS | RESPIRATION RATE: 11 BRPM | BODY MASS INDEX: 47.74 KG/M2 | HEART RATE: 66 BPM | OXYGEN SATURATION: 94 % | DIASTOLIC BLOOD PRESSURE: 67 MMHG | HEIGHT: 68 IN

## 2023-09-01 DIAGNOSIS — K74.00 LIVER FIBROSIS: Primary | ICD-10-CM

## 2023-09-01 DIAGNOSIS — R74.8 ELEVATED LIVER ENZYMES: ICD-10-CM

## 2023-09-01 DIAGNOSIS — K76.0 FATTY LIVER: ICD-10-CM

## 2023-09-01 PROCEDURE — 37200 IR TRANSJUGULAR LIVER BIOPSY: ICD-10-PCS | Mod: ,,, | Performed by: RADIOLOGY

## 2023-09-01 PROCEDURE — 88307 TISSUE EXAM BY PATHOLOGIST: CPT | Performed by: PATHOLOGY

## 2023-09-01 PROCEDURE — 63600175 PHARM REV CODE 636 W HCPCS: Performed by: NURSE ANESTHETIST, CERTIFIED REGISTERED

## 2023-09-01 PROCEDURE — 75825 IR TRANSJUGULAR LIVER BIOPSY: ICD-10-PCS | Mod: 26,59,, | Performed by: RADIOLOGY

## 2023-09-01 PROCEDURE — 36011 IR TRANSJUGULAR LIVER BIOPSY: ICD-10-PCS | Mod: RT,,, | Performed by: RADIOLOGY

## 2023-09-01 PROCEDURE — 88307 TISSUE EXAM BY PATHOLOGIST: CPT | Mod: 26,,, | Performed by: PATHOLOGY

## 2023-09-01 PROCEDURE — D9220A PRA ANESTHESIA: Mod: CRNA,,, | Performed by: NURSE ANESTHETIST, CERTIFIED REGISTERED

## 2023-09-01 PROCEDURE — 27201074 IR TRANSJUGULAR LIVER BIOPSY

## 2023-09-01 PROCEDURE — 75889 IR TRANSJUGULAR LIVER BIOPSY: ICD-10-PCS | Mod: 26,59,, | Performed by: RADIOLOGY

## 2023-09-01 PROCEDURE — 37000009 HC ANESTHESIA EA ADD 15 MINS

## 2023-09-01 PROCEDURE — 76937 US GUIDE VASCULAR ACCESS: CPT | Mod: 26,,, | Performed by: RADIOLOGY

## 2023-09-01 PROCEDURE — D9220A PRA ANESTHESIA: Mod: ANES,,, | Performed by: ANESTHESIOLOGY

## 2023-09-01 PROCEDURE — 25000003 PHARM REV CODE 250: Performed by: NURSE ANESTHETIST, CERTIFIED REGISTERED

## 2023-09-01 PROCEDURE — D9220A PRA ANESTHESIA: ICD-10-PCS | Mod: CRNA,,, | Performed by: NURSE ANESTHETIST, CERTIFIED REGISTERED

## 2023-09-01 PROCEDURE — 75970 VASCULAR BIOPSY: CPT | Mod: TC | Performed by: RADIOLOGY

## 2023-09-01 PROCEDURE — 88307 PR  SURG PATH,LEVEL V: ICD-10-PCS | Mod: 26,,, | Performed by: PATHOLOGY

## 2023-09-01 PROCEDURE — 75970 IR TRANSJUGULAR LIVER BIOPSY: ICD-10-PCS | Mod: 26,59,, | Performed by: RADIOLOGY

## 2023-09-01 PROCEDURE — 75825 VEIN X-RAY TRUNK: CPT | Mod: 26,59,, | Performed by: RADIOLOGY

## 2023-09-01 PROCEDURE — 75889 VEIN X-RAY LIVER W/HEMODYNAM: CPT | Mod: TC | Performed by: RADIOLOGY

## 2023-09-01 PROCEDURE — 37200 TRANSCATHETER BIOPSY: CPT | Mod: ,,, | Performed by: RADIOLOGY

## 2023-09-01 PROCEDURE — 76937 IR TRANSJUGULAR LIVER BIOPSY: ICD-10-PCS | Mod: 26,,, | Performed by: RADIOLOGY

## 2023-09-01 PROCEDURE — A4550 SURGICAL TRAYS: HCPCS

## 2023-09-01 PROCEDURE — 88313 SPECIAL STAINS GROUP 2: CPT | Performed by: PATHOLOGY

## 2023-09-01 PROCEDURE — 25000003 PHARM REV CODE 250

## 2023-09-01 PROCEDURE — 25000003 PHARM REV CODE 250: Performed by: ANESTHESIOLOGY

## 2023-09-01 PROCEDURE — 88313 SPECIAL STAINS GROUP 2: CPT | Mod: 26,,, | Performed by: PATHOLOGY

## 2023-09-01 PROCEDURE — 25500020 PHARM REV CODE 255: Performed by: NURSE PRACTITIONER

## 2023-09-01 PROCEDURE — 75970 VASCULAR BIOPSY: CPT | Mod: 26,59,, | Performed by: RADIOLOGY

## 2023-09-01 PROCEDURE — 36011 PLACE CATHETER IN VEIN: CPT | Mod: RT,,, | Performed by: RADIOLOGY

## 2023-09-01 PROCEDURE — 37000008 HC ANESTHESIA 1ST 15 MINUTES

## 2023-09-01 PROCEDURE — 88313 PR  SPECIAL STAINS,GROUP II: ICD-10-PCS | Mod: 26,,, | Performed by: PATHOLOGY

## 2023-09-01 PROCEDURE — D9220A PRA ANESTHESIA: ICD-10-PCS | Mod: ANES,,, | Performed by: ANESTHESIOLOGY

## 2023-09-01 RX ORDER — MIDAZOLAM HYDROCHLORIDE 1 MG/ML
INJECTION INTRAMUSCULAR; INTRAVENOUS
Status: DISCONTINUED | OUTPATIENT
Start: 2023-09-01 | End: 2023-09-01

## 2023-09-01 RX ORDER — KETAMINE HCL IN 0.9 % NACL 50 MG/5 ML
SYRINGE (ML) INTRAVENOUS
Status: DISCONTINUED | OUTPATIENT
Start: 2023-09-01 | End: 2023-09-01

## 2023-09-01 RX ORDER — PROCHLORPERAZINE EDISYLATE 5 MG/ML
5 INJECTION INTRAMUSCULAR; INTRAVENOUS EVERY 30 MIN PRN
Status: DISCONTINUED | OUTPATIENT
Start: 2023-09-01 | End: 2023-09-02 | Stop reason: HOSPADM

## 2023-09-01 RX ORDER — OXYCODONE HYDROCHLORIDE 5 MG/1
5 TABLET ORAL
Status: DISCONTINUED | OUTPATIENT
Start: 2023-09-01 | End: 2023-09-02 | Stop reason: HOSPADM

## 2023-09-01 RX ORDER — PROPOFOL 10 MG/ML
VIAL (ML) INTRAVENOUS
Status: DISCONTINUED | OUTPATIENT
Start: 2023-09-01 | End: 2023-09-01

## 2023-09-01 RX ORDER — HYDROMORPHONE HYDROCHLORIDE 1 MG/ML
0.2 INJECTION, SOLUTION INTRAMUSCULAR; INTRAVENOUS; SUBCUTANEOUS EVERY 5 MIN PRN
Status: DISCONTINUED | OUTPATIENT
Start: 2023-09-01 | End: 2023-09-02 | Stop reason: HOSPADM

## 2023-09-01 RX ORDER — FENTANYL CITRATE 50 UG/ML
25 INJECTION, SOLUTION INTRAMUSCULAR; INTRAVENOUS EVERY 5 MIN PRN
Status: DISCONTINUED | OUTPATIENT
Start: 2023-09-01 | End: 2023-09-02 | Stop reason: HOSPADM

## 2023-09-01 RX ORDER — LIDOCAINE HYDROCHLORIDE 20 MG/ML
INJECTION INTRAVENOUS
Status: DISCONTINUED | OUTPATIENT
Start: 2023-09-01 | End: 2023-09-01

## 2023-09-01 RX ORDER — SODIUM CHLORIDE 9 MG/ML
INJECTION, SOLUTION INTRAVENOUS CONTINUOUS
Status: DISCONTINUED | OUTPATIENT
Start: 2023-09-01 | End: 2023-09-02 | Stop reason: HOSPADM

## 2023-09-01 RX ORDER — PROPOFOL 10 MG/ML
VIAL (ML) INTRAVENOUS CONTINUOUS PRN
Status: DISCONTINUED | OUTPATIENT
Start: 2023-09-01 | End: 2023-09-01

## 2023-09-01 RX ORDER — LIDOCAINE HYDROCHLORIDE 10 MG/ML
1 INJECTION, SOLUTION EPIDURAL; INFILTRATION; INTRACAUDAL; PERINEURAL ONCE
Status: DISCONTINUED | OUTPATIENT
Start: 2023-09-01 | End: 2023-09-02 | Stop reason: HOSPADM

## 2023-09-01 RX ORDER — HALOPERIDOL 5 MG/ML
0.5 INJECTION INTRAMUSCULAR EVERY 10 MIN PRN
Status: DISCONTINUED | OUTPATIENT
Start: 2023-09-01 | End: 2023-09-02 | Stop reason: HOSPADM

## 2023-09-01 RX ORDER — DEXMEDETOMIDINE HYDROCHLORIDE 100 UG/ML
INJECTION, SOLUTION INTRAVENOUS
Status: DISCONTINUED | OUTPATIENT
Start: 2023-09-01 | End: 2023-09-01

## 2023-09-01 RX ADMIN — SODIUM CHLORIDE: 0.9 INJECTION, SOLUTION INTRAVENOUS at 01:09

## 2023-09-01 RX ADMIN — OXYCODONE HYDROCHLORIDE 5 MG: 5 TABLET ORAL at 04:09

## 2023-09-01 RX ADMIN — DEXMEDETOMIDINE 8 MCG: 100 INJECTION, SOLUTION, CONCENTRATE INTRAVENOUS at 02:09

## 2023-09-01 RX ADMIN — IOHEXOL 25 ML: 300 INJECTION, SOLUTION INTRAVENOUS at 03:09

## 2023-09-01 RX ADMIN — Medication 30 MG: at 02:09

## 2023-09-01 RX ADMIN — DEXMEDETOMIDINE 8 MCG: 100 INJECTION, SOLUTION, CONCENTRATE INTRAVENOUS at 03:09

## 2023-09-01 RX ADMIN — MIDAZOLAM HYDROCHLORIDE 2 MG: 1 INJECTION INTRAMUSCULAR; INTRAVENOUS at 02:09

## 2023-09-01 RX ADMIN — PROPOFOL 200 MCG/KG/MIN: 10 INJECTION, EMULSION INTRAVENOUS at 02:09

## 2023-09-01 RX ADMIN — Medication 10 MG: at 03:09

## 2023-09-01 RX ADMIN — GLYCOPYRROLATE 0.2 MG: 0.2 INJECTION, SOLUTION INTRAMUSCULAR; INTRAVENOUS at 02:09

## 2023-09-01 RX ADMIN — Medication 10 MG: at 02:09

## 2023-09-01 RX ADMIN — PROPOFOL 30 MG: 10 INJECTION, EMULSION INTRAVENOUS at 02:09

## 2023-09-01 RX ADMIN — LIDOCAINE HYDROCHLORIDE 60 MG: 20 INJECTION INTRAVENOUS at 02:09

## 2023-09-01 NOTE — DISCHARGE INSTRUCTIONS
Please call with any questions or concerns.      Monday thru Friday 8:00 am - 4:30 pm    Interventional Radiology   [phone]    After Hours    Ask for the Radiology Intern on call  [phone]

## 2023-09-01 NOTE — PLAN OF CARE
Pt is awake, alert and oriented with no s/s of acute distress. His incision is clean dry and intact with no bleeding or hematoma. VSS. Denies pain, denies N/V.

## 2023-09-01 NOTE — TRANSFER OF CARE
"Anesthesia Transfer of Care Note    Patient: Jesús Patel    Procedure(s) Performed: * No procedures listed *    Patient location: [facility]    Anesthesia Type: general    Transport from OR: Transported from OR on 6-10 L/min O2 by face mask with adequate spontaneous ventilation    Post pain: adequate analgesia    Post assessment: no apparent anesthetic complications and tolerated procedure well    Post vital signs: stable    Level of consciousness: awake, alert and oriented    Nausea/Vomiting: no nausea/vomiting    Complications: none    Transfer of care protocol was followed      Last vitals: 09/01/23 1550  Visit Vitals  /59   Pulse 75   Temp 98.9   Resp 19   Ht 5' 8" (1.727 m)   Wt (!) 163.3 kg (360 lb)   SpO2 95%   BMI 54.74 kg/m²     "

## 2023-09-01 NOTE — ANESTHESIA PREPROCEDURE EVALUATION
09/01/2023  Jesús Patel is a 48 y.o., male.      Pre-op Assessment    I have reviewed the Patient Summary Reports.    I have reviewed the NPO Status.      Review of Systems  Anesthesia Hx:  No problems with previous Anesthesia  History of prior surgery of interest to airway management or planning: Denies Family Hx of Anesthesia complications.   Denies Personal Hx of Anesthesia complications.   Social:  Non-Smoker, Social Alcohol Use    Cardiovascular:   Hypertension    Pulmonary:   Asthma moderate Sleep Apnea    Hepatic/GI:   GERD Liver Disease,    Endocrine:   Denies Diabetes. Denies Hypothyroidism.  Morbid Obesity / BMI > 40  Psych:   Psychiatric History anxiety depression          Physical Exam  General: Cooperative, Alert and Oriented    Airway:  Mallampati: III   Mouth Opening: Normal  TM Distance: Normal  Tongue: Normal  Neck ROM: Normal ROM    Chest/Lungs:  Normal Respiratory Rate    Heart:  Rate: Normal        Anesthesia Plan  Type of Anesthesia, risks & benefits discussed:    Anesthesia Type: Gen Natural Airway, Gen Supraglottic Airway, Gen ETT  Intra-op Monitoring Plan: Standard ASA Monitors  Induction:  IV  Informed Consent: Informed consent signed with the Patient and all parties understand the risks and agree with anesthesia plan.  All questions answered.   ASA Score: 3    Ready For Surgery From Anesthesia Perspective.     .

## 2023-09-01 NOTE — PROCEDURES
VIR Post-Procedure Note    Pre Op Diagnosis: Liver dysfunction    Post Op Diagnosis: same    Procedure: Transjugular liver biposy    Procedure performed by:  Ricardo Florian MD / Marcio Macdonald MD    Written Informed Consent Obtained: Yes    Specimen Removed: YES, 3 core liver samples    Estimated Blood Loss: Minimal    Findings:     R IJ access, hepatic venography, pressure measurements & random liver biopsy. Samples sent to pathology for further evaluation    Hepatic wedge pressure 24,  Free hepatic vein pressure 16  Right atrial pressure 8   Transhepatic gradient 8.     The patient tolerated the procedure well and there were no complications.  Please see Imaging report for further details.    Ricardo Florian MD  VIR Fellow

## 2023-09-01 NOTE — PLAN OF CARE
Pt arrived to IR room 189 via stretcher w/ CRNA & RN. MELANIEO x4. Name//allergies/procedure verified.  Pt will be monitored by RN & CRNA @ bedside throughout procedure e/sedation. Sedation/airway/vs per anesthesia team.

## 2023-09-01 NOTE — PLAN OF CARE
Pt to be transferred to DOCU slot 33 for 2 hr recovery. Pt remains under the care of anesthesia team for transfer.

## 2023-09-01 NOTE — H&P
Radiology History & Physical      SUBJECTIVE:     Chief Complaint: concern for hepatic steatosis    History of Present Illness:  Jesús Patel is a 48 y.o. male under evaluation for possible liver steatosis/fibrosis. MR elastography findings suggestive of steatosis/fibrosis. Patient presents for transjugular liver biopsy.     Past Medical History:   Diagnosis Date    Anticoagulant long-term use     Asthma     GERD (gastroesophageal reflux disease)     Hyperlipidemia     Hypertension     Liver disease     Low back pain     Morbid obesity with BMI of 50.0-59.9, adult     JARAD (obstructive sleep apnea)     Pre-diabetes     Varicose veins     Vitamin D deficiency      Past Surgical History:   Procedure Laterality Date    ADENOIDECTOMY      COLONOSCOPY N/A 2/11/2022    Procedure: COLONOSCOPY;  Surgeon: Vaughn Cortes MD;  [facility]  Service: Endoscopy;  Laterality: N/A;    ESOPHAGOGASTRODUODENOSCOPY N/A 2/11/2022    Procedure: EGD (ESOPHAGOGASTRODUODENOSCOPY);  Surgeon: Vaguhn Crotes MD;  [facility]  Service: Endoscopy;  Laterality: N/A;  BMI-55  Wt:378#-fully vacc-inst portal-tb  COVID screening 2/8/22 [facility]    EYE SURGERY      GASTRECTOMY      LAPAROSCOPIC GASTRIC BANDING  2002    In [place]: uncertain of brand/ size    LAPAROSCOPIC REPAIR OF RECURRENT INCARCERATED INCISIONAL HERNIA N/A 6/21/2018    Procedure: REPAIR-HERNIA-INCISIONAL-LAPAROSCOPIC WITH MESH;  Surgeon: Vaughn Parker Jr., MD;  Location: [facility] OR [address] [place] [address];  Service: General;  Laterality: N/A;    LASIK         Home Meds:   Prior to Admission medications    Medication Sig Start Date End Date Taking? Authorizing Provider   amLODIPine (NORVASC) 10 MG tablet Take 1 tablet (10 mg total) by mouth once daily. 5/10/23 5/9/24 Yes Lester Awan MD   budesonide-formoterol 80-4.5 mcg (SYMBICORT) 80-4.5 mcg/actuation HFAA Inhale 2 puffs into the lungs once daily. Controller 5/24/23 5/23/24 Yes  Lester Awan MD   fluticasone propionate (FLONASE) 50 mcg/actuation nasal spray SPRAY 2 SPRAYS IN EACH NOSTRIL TWICE A DAY 6/12/23  Yes Lester Awan MD   hydroCHLOROthiazide (HYDRODIURIL) 12.5 MG Tab Take 1 tablet (12.5 mg total) by mouth once daily. 8/2/23  Yes Lester Awan MD   tadalafiL (CIALIS) 20 MG Tab Use one tablet every 36 hours  Patient taking differently: Take 20 mg by mouth as needed. 8/2/23  Yes Lester Awan MD   zolpidem (AMBIEN CR) 12.5 MG CR tablet Take 1 tablet (12.5 mg total) by mouth nightly as needed for Insomnia. 6/14/23 12/13/23 Yes Comfort Minaya NP   albuterol (VENTOLIN HFA) 90 mcg/actuation inhaler Inhale 2 puffs into the lungs every 6 (six) hours as needed for Wheezing or Shortness of Breath. Rescue 4/13/22 8/31/23  Natalio Amaya MD   apixaban (ELIQUIS) 5 mg Tab Take 2 tablets (10 mg total) by mouth 2 (two) times daily for the first 7 days, then reduce to 1 tablet (5 mg total) 2 (two) times daily thereafter. 12/20/22   Valentine Venegas MD   atorvastatin (LIPITOR) 80 MG tablet Take 1 tablet (80 mg total) by mouth once daily.  Patient taking differently: Take 80 mg by mouth every evening. 8/10/23 8/9/24  Davonte Lance MD PhD   cetirizine (ZYRTEC) 10 MG tablet Take 10 mg by mouth as needed.    Provider, Historical   diphenhydrAMINE (BENADRYL) 25 mg capsule Take 25 mg by mouth every 6 (six) hours as needed for Itching.    Provider, Historical   erythromycin with ethanoL (EMGEL) 2 % gel Apply topically 2 (two) times daily. Prn neck break outs 6/20/23   Eric Burns MD   meloxicam (MOBIC) 15 MG tablet Take 1 tablet (15 mg total) by mouth once daily. 8/28/23   Anuj Tai MD   omeprazole (PRILOSEC) 40 MG capsule Take 1 capsule (40 mg total) by mouth every morning. 8/28/23   Lester Awan MD   salicylic acid 17 % gel Apply topically once daily. Apply focally to skin tag/warts, not normal skin, and cover with tape or Band Aid until scab forms.   Allow for healing and watch for parts of tag/wart to fall off.  For warts okay to peel away dead skin painlessly.  Repeat as needed until tag/wart completely falls off. 6/20/23   Eric Burns MD     Anticoagulants/Antiplatelets:  eliquis (held)    Allergies: Review of patient's allergies indicates:  No Known Allergies  Sedation History:  no adverse reactions    Review of Systems:   Hematological: no known coagulopathies  Respiratory: no shortness of breath  Cardiovascular: no chest pain  Gastrointestinal: no abdominal pain  Genito-Urinary: no dysuria  Musculoskeletal: negative  Neurological: no TIA or stroke symptoms         OBJECTIVE:     Vital Signs (Most Recent)  Temp: 99 °F (37.2 °C) (09/01/23 1217)  Pulse: 79 (09/01/23 1217)  Resp: 18 (09/01/23 1217)  BP: 121/74 (09/01/23 1218)  SpO2: 95 % (09/01/23 1217)    Physical Exam:  ASA: per anes  Mallampati: per anes    General: no acute distress  Mental Status: alert and oriented to person, place and time  HEENT: normocephalic, atraumatic  Chest: unlabored breathing  Heart: regular heart rate  Abdomen: nondistended  Extremity: moves all extremities    Laboratory  Lab Results   Component Value Date    INR 1.0 08/31/2023       Lab Results   Component Value Date    WBC 6.07 08/31/2023    HGB 13.8 (L) 08/31/2023    HCT 43.5 08/31/2023    MCV 84 08/31/2023     08/31/2023      Lab Results   Component Value Date    GLU 99 08/31/2023     08/31/2023    K 3.8 08/31/2023     08/31/2023    CO2 24 08/31/2023    BUN 12 08/31/2023    CREATININE 0.8 08/31/2023    CALCIUM 9.4 08/31/2023    MG 2.4 10/02/2018    ALT 20 08/31/2023    AST 26 08/31/2023    ALBUMIN 3.6 08/31/2023    BILITOT 0.8 08/31/2023    BILIDIR 0.2 04/05/2022       ASSESSMENT/PLAN:     Sedation Plan: per anesthesia  Patient will undergo transjugular liver biopsy.    Zachery Samayoa MD PGY4  Department of Radiology  [facility]

## 2023-09-01 NOTE — PLAN OF CARE
Received pt to floor from home accompanied by brother.  AAO x 4. Denies pain or discomfort. Respirations even and unlabored. No distress noted. Pt stable.  Admit assessment complete. IV x 1 placed.  Pt oriented to room and call bell placed within reach.  Will continue to monitor.

## 2023-09-05 NOTE — ANESTHESIA POSTPROCEDURE EVALUATION
Anesthesia Post Evaluation    Patient: Jesús Patel    Procedure(s) Performed: * No procedures listed *    Final Anesthesia Type: general      Patient location during evaluation: PACU  Patient participation: Yes- Able to Participate  Level of consciousness: awake and alert  Post-procedure vital signs: reviewed and stable  Pain management: adequate  Airway patency: patent    PONV status at discharge: No PONV  Anesthetic complications: no      Cardiovascular status: blood pressure returned to baseline  Respiratory status: room air  Hydration status: euvolemic  Follow-up not needed.          Vitals Value Taken Time   /67 09/01/23 1717   Temp 36.5 °C (97.7 °F) 09/01/23 1715   Pulse 68 09/01/23 1722   Resp 10 09/01/23 1716   SpO2 90 % 09/01/23 1722   Vitals shown include unvalidated device data.      No case tracking events are documented in the log.      Pain/Maurice Score: No data recorded

## 2023-09-06 ENCOUNTER — PATIENT MESSAGE (OUTPATIENT)
Dept: BARIATRICS | Facility: CLINIC | Age: 48
End: 2023-09-06
Payer: COMMERCIAL

## 2023-09-07 ENCOUNTER — TELEPHONE (OUTPATIENT)
Dept: CARDIOLOGY | Facility: CLINIC | Age: 48
End: 2023-09-07
Payer: COMMERCIAL

## 2023-09-07 ENCOUNTER — DOCUMENTATION ONLY (OUTPATIENT)
Dept: CARDIOLOGY | Facility: CLINIC | Age: 48
End: 2023-09-07
Payer: COMMERCIAL

## 2023-09-07 NOTE — TELEPHONE ENCOUNTER
Lymphedema Pump Progress:  [x] Order, clinical notes, and face sheet faxed to [employer] for home pneumatic compression.  [x] Reached out to patient for follow up. Wish to inquire about symptoms of lymphedema and if conservative therapy has been working.  [x] Updated progress note sent to [facility].  [] Patient pneumatic compression pump approved and shipped by [employer].

## 2023-09-07 NOTE — PROGRESS NOTES
I89.0 lymphedema not elsewhere classified secondary to CVI. Patient has been compliant with compression of 20-30mmhg, elevation and exercise for at least 4 weeks yet swelling persists. Hyperplasia is present with this edema.

## 2023-09-12 ENCOUNTER — PATIENT MESSAGE (OUTPATIENT)
Dept: BARIATRICS | Facility: CLINIC | Age: 48
End: 2023-09-12
Payer: COMMERCIAL

## 2023-09-12 LAB
COMMENT: NORMAL
FINAL PATHOLOGIC DIAGNOSIS: NORMAL
GROSS: NORMAL
Lab: NORMAL

## 2023-09-13 ENCOUNTER — CLINICAL SUPPORT (OUTPATIENT)
Dept: REHABILITATION | Facility: HOSPITAL | Age: 48
End: 2023-09-13
Payer: COMMERCIAL

## 2023-09-13 DIAGNOSIS — M19.032 LOCALIZED PRIMARY OSTEOARTHRITIS OF CARPOMETACARPAL (CMC) JOINT OF LEFT WRIST: ICD-10-CM

## 2023-09-13 DIAGNOSIS — R29.898 REDUCED HAND STRENGTH: ICD-10-CM

## 2023-09-13 DIAGNOSIS — M25.532 CHRONIC PAIN OF LEFT WRIST: ICD-10-CM

## 2023-09-13 DIAGNOSIS — M79.645 PAIN OF LEFT THUMB: ICD-10-CM

## 2023-09-13 DIAGNOSIS — G89.29 CHRONIC PAIN OF LEFT WRIST: ICD-10-CM

## 2023-09-13 PROCEDURE — 97018 PARAFFIN BATH THERAPY: CPT

## 2023-09-13 PROCEDURE — 97530 THERAPEUTIC ACTIVITIES: CPT

## 2023-09-13 PROCEDURE — 97165 OT EVAL LOW COMPLEX 30 MIN: CPT

## 2023-09-13 NOTE — PROGRESS NOTES
[facility]: Occupational Therapy Note     See plan of care for full initial OT evaluation.    Alessandra Galvez, ROBERT, OTR/L

## 2023-09-13 NOTE — PATIENT INSTRUCTIONS
[facility] - OCCUPATIONAL THERAPY  HOME EXERCISE PROGRAM                              Arthritis Conservative Management  Joint Protection:  Use larger joints and muscles when possible  Stop activities before the point of discomfort  Decrease activities that cause pain that lasts more than 2 hours  Avoid activities that put strain on painful or stiff joints  Avoid a tight or prolonged grasp on objects  Balance Rest and Activity:  Rest before exhaustion  Take frequent, short breaks  Avoid activities that cannot be stopped  Avoid staying in one position for a long time  Alternate heavy and light activities  Take more breaks when inflammation is active  Allow extra time for activities--avoid rushing  Plan your day ahead of time  Eliminate unnecessary activities  Reduce the effort:  Avoid excessive loads. Dont be afraid to ask for help. Use appliances to make tasks easier.  Keep items near (i.e. keeping commonly used appliances on the counter)  Use prepared foods as needed.  Avoid low chairs  Maintain a healthy body weight  Freeze leftovers for an easy meal later  Sit while working when possible  Avoid positions of deformity:  Rheumatoid Arthritis (RA): avoid the use of the hands that push the fingers ulnarly, such as wringing a cloth or opening tight jars.   Osteoarthritis (OA): Avoid stress to the CMC joint, such as sustained pinching.      Helpful Tips: slide objects; use your palms instead of fingers, keeps heavy items close to your body, use built up handles, look for lightweight options, use wheels to roll objects, use scissors to open packages, use lever type handles, pizza cutters for cutting, electrical toothbrush, avoid small buttons or use Velcro, use pumps for soaps/shampoos, etc.    Complete the following exercises with 10 repetitions each, 4x/day.     AROM: Thumb IP Flexion / Extension  Brace thumb below tip joint. Bend joint as far as possible then straighten.    AROM: Thumb Composite  "Flexion   Bend both joints of thumb as far as possible. Try to touch base of little finger.    AROM: Radial Adduction / Abduction  Place your palm flat on the table. Move thumb out to side. Move back alongside index finger.                                  AROM: Palmar Adduction / Abduction   Rest your small finger on the table. Move thumb sideways, out and away from palm. Move back to rest along palm.                     AROM: Opposition   Touch tip of thumb to nail tip of each finger in turn, making an "O" shape.  AROM: Composite Movement Circumduction  Make clockwise circles with thumb. Reverse and make counterclockwise circles with thumb.                  "

## 2023-09-14 PROBLEM — R29.898 REDUCED HAND STRENGTH: Status: ACTIVE | Noted: 2023-09-14

## 2023-09-14 PROBLEM — R52 PAIN: Status: RESOLVED | Noted: 2022-04-12 | Resolved: 2023-09-14

## 2023-09-14 PROBLEM — M79.645 PAIN OF LEFT THUMB: Status: ACTIVE | Noted: 2023-09-14

## 2023-09-14 NOTE — PLAN OF CARE
"[facility]  Occupational Therapy Initial Evaluation    Date: 9/13/2023  Name: Jesús Patel  Clinic Number: [id number]    Therapy Diagnosis:   Encounter Diagnoses   Name Primary?    Localized primary osteoarthritis of carpometacarpal (CMC) joint of left wrist     Chronic pain of left wrist     Reduced hand strength     Pain of left thumb      Physician: Anuj Tai, *    Physician Orders: OT Eval and Treat  Medical Diagnosis: M19.032 (ICD-10-CM) - Localized primary osteoarthritis of carpometacarpal (CMC) joint of left wrist; M25.532,G89.29 (ICD-10-CM) - Chronic pain of left wrist  Surgical Procedure and Date: N/A / Date of Injury/Onset: March 2023   Evaluation Date: 9/13/2023  Insurance Authorization Period Expiration: 12/31/2023  Plan of Care Expiration: 12/8/23 (12 weeks)  Date of Return to MD: 9/27/2023   Visit # / Visits authorized: 1 / 1  FOTO: completed     Precautions:  Standard    Time In: 3:05 pm  Time Out: 3:50 pm  Total Appointment Time (timed & untimed codes): 45 minutes    SUBJECTIVE     Date of Onset: March 2023     History of Current Condition/Mechanism of Injury: Per MD note on 8/28/2023, "Patient reports onset of chronic and insidious pain beginning 2 years ago. Patient reports no known injury or trauma. Pain is located along radial and dorsal aspect of thumb/1st CMC . Pain is 5/10 at present & up to 8/10 with provacative activity including all activity including ADLs, gripping, grasping. Pain is described as sharp. 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 Associated symptoms include stiffness, decreased ROM, instability, weakness, popping, and clicking. Pain is aggravated by activities above & occur constantly. Symptoms do interfere with sleep. Patient reports pain & symptoms are getting worse. Patient reports no prior surgery to WRIST and HAND." Pt does report Hx of L " wrist fracture when he was 15 years old.    Falls: none    Involved Side: Left  Dominant Side: Right  Imaging: Reviewed   Prior Therapy: none   Occupation:    Working presently: employed  Duties: computer work, occasional lifting and opening packages     Functional Limitations/Social History:    Previous functional status includes: Independent with all ADLs and IADLs.     Current Functional Status   Home/Living environment: lives with their brother       Limitation of Functional Status as follows:   ADLs/IADLs:     - Feeding: Independent but painful    - Bathing: Independent but painful    - Dressing/Grooming: Independent but painful    - Driving: Independent but painful     Leisure: Fishing    Pain:  Functional Pain Scale Rating 0-10: Current 6/10, worst 8/10, best 3/10   Location: L thumb CMC joint   Description: Aching and Sharp  Aggravating Factors: certain positions and gripping   Easing Factors: massage, pain medication, rest, and brace wear     Patient's Goals for Therapy: get feeling better     Medical History:   Past Medical History:   Diagnosis Date    Anticoagulant long-term use     Asthma     GERD (gastroesophageal reflux disease)     Hyperlipidemia     Hypertension     Liver disease     Low back pain     Morbid obesity with BMI of 50.0-59.9, adult     JARAD (obstructive sleep apnea)     Pre-diabetes     Varicose veins     Vitamin D deficiency      Surgical History:    has a past surgical history that includes LASIK; Eye surgery; Adenoidectomy; Laparoscopic gastric banding (2002); Gastrectomy; Laparoscopic repair of recurrent incarcerated incisional hernia (N/A, 6/21/2018); Esophagogastroduodenoscopy (N/A, 2/11/2022); and Colonoscopy (N/A, 2/11/2022).    Medications:   has a current medication list which includes the following prescription(s): albuterol, amlodipine, apixaban, atorvastatin, budesonide-formoterol 80-4.5 mcg, cetirizine, diphenhydramine, erythromycin with ethanol, fluticasone  propionate, hydrochlorothiazide, meloxicam, omeprazole, salicylic acid, tadalafil, and zolpidem.    Allergies:   Review of patient's allergies indicates:  No Known Allergies     OBJECTIVE     Observation/Appearance: L ulnar head significantly less prominent compared to R.      Edema. No significant edema noted.     Elbow and Wrist ROM. WFLs - all planes of motion, but pain with L wrist RD/UD.     Hand ROM. WFL - Pt able to make full composite fists with B/L hands and demonstrates thumb opposition touch to SF DPC. L hand painful with digit ROM.      Strength (Dynamometer) and Pinch Strength (Pinch Gauge)  Measured in pounds to POP.   9/13/2023 9/13/2023    Left Right   Rung II NT NT   Key Pinch     3pt Pinch     2pt Pinch       Sensation. Intact per report. Pt denies numbness and/or tingling in LUE(s).     Manual Muscle Test   9/13/2023 9/13/2023    Left Right   Wrist Extension  NT NT   Wrist Flexion     Radial Deviation     Ulnar Deviation     Supination     Pronation       Special Tests  Thumb CMC Grind Test + L     Limitation/Restriction for FOTO Wrist Survey    Therapist reviewed FOTO scores for Jesús Fengn on 9/13/2023.   FOTO documents entered into [employer] - see Media section.    Limitation Score: 49%       Treatment   Total Treatment time (time-based codes) separate from Evaluation: 10 minutes    Jesús received the treatments listed below:     Supervised modalities after being cleared for contradictions: Paraffin bath - with moist heat pack to L hand(s) for 8 minutes to increase blood flow, circulation, pain management, and for tissue elasticity prior to therex.     Therapeutic activities to improve functional performance for 10 minutes, including:  - Pt educated on HEP consisting of thumb ROM as tolerated.   - Pt educated on conservative management of arthritis including joint protection and energy conservation principles/examples, splint or brace wear if necessary, and modality use (ice  vs heat).    Patient Education and Home Exercises      Education provided:   - Role of OT and POC  - HEP     Written Home Exercises Provided: yes.  Exercises were reviewed and Jesús was able to demonstrate them prior to the end of the session.  Jesús demonstrated good  understanding of the education provided. See EMR under Patient Instructions for exercises provided during therapy sessions.     Pt was advised to perform these exercises free of pain, and to stop performing them if pain occurs.    Patient/Family Education: role of OT, goals for OT, scheduling/cancellations - pt verbalized understanding. Discussed insurance limitations with patient.    ASSESSMENT     Jesús Patel is a 48 y.o. male referred to outpatient occupational therapy and presents with a medical diagnosis of localized primary osteoarthritis of carpometacarpal (CMC) joint of left wrist; chronic pain of left wrist. Patient presents with the following therapy deficits: Decreased ROM, Decreased  strength, Decreased pinch strength, Decreased muscle strength, Decreased functional hand use, Increased pain, and Diminished/Impaired Coordination and demonstrates limitations as described in the chart below. Following medical record review it is determined that pt will benefit from occupational therapy services in order to maximize pain free and/or functional use of left hand. The following goals were discussed with the patient and patient is in agreement with them as to be addressed in the treatment plan. The patient's rehab potential is Fair-Good.    Anticipated barriers to occupational therapy: chronic nature of Dx  Pt has no cultural, educational or language barriers to learning provided.    Profile and History Assessment of Occupational Performance Level of Clinical Decision Making Complexity Score   Occupational Profile:   Jesús Patel is a 48 y.o. male who lives with their family and is currently employed Jesús  Jadon Patel has difficulty with  ADLs and IADLs as listed previously, which  Affecting hisdaily functional abilities.      Comorbidities:    has a past medical history of Anticoagulant long-term use, Asthma, GERD (gastroesophageal reflux disease), Hyperlipidemia, Hypertension, Liver disease, Low back pain, Morbid obesity with BMI of 50.0-59.9, adult, JARAD (obstructive sleep apnea), Pre-diabetes, Varicose veins, and Vitamin D deficiency.    Medical and Therapy History Review:   Expanded               Performance Deficits    Physical:  Joint Mobility  Joint Stability  Muscle Power/Strength  Muscle Endurance   Strength  Pinch Strength  Fine Motor Coordination  Proprioception Functions  Pain    Cognitive:  No Deficits    Psychosocial:    Habits  Routines  Rituals     Clinical Decision Making:  moderate    Assessment Process:  Problem-Focused Assessments    Modification/Need for Assistance:  Minimal-Moderate Modifications/Assistance    Intervention Selection:  Limited Treatment Options       low  Based on PMHX, co morbidities , data from assessments and functional level of assistance required with task and clinical presentation directly impacting function.       Goals:   The following goals were discussed with the patient and patient is in agreement with them as to be addressed in the treatment plan.   Short Term Goals (STGs) = Long Term Goals (LTGs); to be met by discharge.  LTG #1: Pt will report a pain level of 3-4 out of 10 at worst with average functional use of his left hand.    LTG #2: Pt will demo improved FOTO score by 5-10 points.   LTG #3: Pt will verbalize and demonstrate understanding of 3 principles/examples of joint protection/energy conservation.   LTG #4: Pt will demonstrate independence with issued HEP, orthosis/brace wear, and modalities for symptom management.  LTG #5: Assess MMT and /pinch strength when appropriate and update goals as needed.    PLAN   Plan of Care Certification:  9/13/2023 to 12/8/23 (12 weeks).     Outpatient Occupational Therapy 1-2 times weekly for 12 weeks to include the following interventions: Paraffin, Fluidotherapy, Manual therapy/joint mobilizations, Modalities for pain management, US 3 mhz, Therapeutic exercises/activities., Iontophoresis with 2.0 cc Dexamethasone, Strengthening, Orthotic Fabrication/Fit/Training, Edema Control, Electrical Modalities, Joint Protection, and Energy Conservation.      Alessandra Galvez, OTR/L      I CERTIFY THE NEED FOR THESE SERVICES FURNISHED UNDER THIS PLAN OF TREATMENT AND WHILE UNDER MY CARE  Physician's comments:      Physician's Signature: ___________________________________________________

## 2023-09-14 NOTE — PROGRESS NOTES
HISTORY OF PRESENT ILLNESS   09/27/23  Jesús Patel, a 48 y.o. male, presents today for follow up evaluation of his left WRIST. At last appointment, 8/28/2023, patient was fitted with thumb spica brace, completed 2 OT visits, compliant with HEP & pain/symptoms did improve. Pain is 5/10 at present & up to 7/10 with provacative activity including  , grasping activities . Pain remains throughout 1st CMC region.    08/28/23  Jesús Patel, a 48 y.o. male, presents today for evaluation of his left WRIST and HAND. Patient is right hand dominant.    Patient reports onset of chronic and insidious  pain beginning 2 years ago.. Patient reports no known injury or trauma. Pain is located along radial and dorsal aspect of  thumb/1st CMC . Pain is 5/10 at present & up to 8/10 with provacative activity including all activity including ADLs, gripping, grasping. Pain is described as sharp. 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    Associated symptoms include stiffness, decreased ROM, instability, weakness, popping, and clicking. Pain is aggravated by activities above & occur constantly. Symptoms do interfere with sleep. Patient reports pain & symptoms are getting worse. Patient reports no prior surgery to WRIST and HAND.     Prior treatment Jesús Patel has tried   OTC Acetaminophen - No  OTC NSAID - Yes - ibuprofen   Rx NSAID - No   Rx Narcotic/Other - No   Brace - No   Injection - Cortisone - No   Injection - Biologics - No   Activity Modification - Yes  Physical Therapy - No   Home Exercise Program - No  Assistive Device - No  Other - ice and heat    Review of systems (ROS):  A 10+ review of systems was performed with pertinent positives and negatives noted above in the history of present illness. Other systems were negative unless otherwise specified.      PHYSICAL EXAMINATION  General:  The patient  is alert and oriented x 3. Mood is pleasant. Observation of ears, eyes and nose reveal no gross abnormalities. HEENT: NCAT, sclera anicteric. Lungs: Respirations are equal and unlabored.    left Wrist/Hand Exam    Observation:     Swelling  none  Deformity  none   Discoloration  none   Atrophy  none   Scars   none             Erythema                    none    Tenderness:  Radial:  DRUJ    Neg  Radial Styloid   Neg  Radial Shaft                            Neg  1st dorsal Compartment Neg  Stef's Tubercle  Neg  Basal Joint Thumb  Neg  ECRL Tendon   Neg  FCR Tendon   Neg  Snuff Box   Neg  Lunate    Neg     Ulnar:  ECU Sheath   Neg  FCU Tendon   Neg  Pisiform   Neg  TFCC    Neg  Ulnar Styloid   Neg  Ulnar Shaft   Neg    Hand:  Palmar Fascia   Neg  Metacarpal   Neg  MCP    POS 1st,   Phalanx   Neg  PIP    Neg  DIP    Neg  A1- Pulley   Neg  Flexor Tendons  Neg  Finger Tip   Neg         ROM: (AROM)  Right    Left        Wrist Flexion   80°       80°     Wrist Extension   75°      75°  Radial Deviation  30°     30°   Ulnar Deviation  30°      30°     Pronation   90     90  Supination   90    90    Strength:   RIGHT    LEFT   Wrist Flexion   5/5    5/5   Wrist Extension  5/5    5/5  Hand    5/5    5/5*  Opposition   5/5    5/5*    Special tests:  Phalens     Neg  Durkan Carpal Compression   Neg  Tinel (wrist)     Neg  Finkelstein     Neg  Piano Wahl (ulnar translation)   Neg  Doss Scaphoid Shift   Neg  Wood Test     Normal  Froment Sign     Neg  CMC Grind     Neg  York (Intrinsic Tightness)   Neg     Extremity Neuro-vascular Testing: Sensation grossly intact to light touch all dermatomal regions. DTR 2+ Biceps, Triceps, BR and Negative China's sign. Grossly intact motor function at Elbow, Wrist and Hand. Distal pulses radial and ulnar 2+, brisk cap refill, symmetric.    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, left,  nondominant     No evidence of osseous pathology  No evidence of neurologic pathology  No evidence of vascular pathology    Imaging studies reviewed:   X-ray  hand , left 23.08    Plan:    We discussed the importance of appropriate diet, weight, and regular exercise    We discussed options including    Watchful waiting / relative rest    Physical therapy X   Injection therapy 1st CMC CSI , left   Consultation    The patient chooses As above   x = prescribed  CSI = corticosteroid injection  VSI = viscosupplement injection  PRPI = platelet rich plasma injection  ia = intra articular  R = right  L = left  B = bilateral   nfSx = surgical consultation was recommended, but patient is not interested in consultation at this time    Physical Therapy        Formal (fPT), @ [facility] facility T   Formal (fPT), @ [facility] facility        Homegoing (hgPT), per concurrent fPT recommendations T   Homegoing (hgPT), per prior fPT recommendations    Homegoing (hgPT), handout provided        w/  (atPT)    [blank] = not prescribed  x = prescribed  b = prescribed, and begin as indicated  t = continue as indicated  r = prescribed, and restart as indicated  p = completed prior as indicated  hs = prescribed, and with high school   col = prescribed, and with college or university   nfPT = physical therapy was recommended, but patient is not interested in PT at this time    Activity (e.g. sports, work) restrictions    [blank] = as tolerated  pt = per physical therapist  at = per     Bracing P - Thumb spica brace   [blank] = not prescribed  r = recommended, but not fit with at todays visit  f = prescribed and fit with at todays visit  t = continue as indicated  p = prn use on rare, as-needed basis; advised against chronic use    Pain management MP   [blank] = No prescription necessary. A handout detailing dosing of appropriate   over-the-counter musculoskeletal analgesics was made  available to the patient.   m = meloxicam x 14 days  mp = 14 day course of meloxicam prescribed prior    Follow up    [blank] = as needed  [number] = in [number] weeks  CSI = for corticosteroid injection  VSI = for viscosupplement injection or injection series  PRP = for platelet rich plasma injection or injection series  MRI = after MRI imaging  ns = should surgical options be deferred (no surgery)  o = appointment offered, deferred by patient    Should symptoms worsen or fail to resolve, consider    Revisiting the above options and / or      Vocation:   Desk job  crossfit

## 2023-09-15 ENCOUNTER — PATIENT MESSAGE (OUTPATIENT)
Dept: HEPATOLOGY | Facility: CLINIC | Age: 48
End: 2023-09-15

## 2023-09-15 ENCOUNTER — TELEPHONE (OUTPATIENT)
Dept: BARIATRICS | Facility: CLINIC | Age: 48
End: 2023-09-15
Payer: COMMERCIAL

## 2023-09-15 ENCOUNTER — OFFICE VISIT (OUTPATIENT)
Dept: HEPATOLOGY | Facility: CLINIC | Age: 48
End: 2023-09-15
Payer: COMMERCIAL

## 2023-09-15 VITALS — BODY MASS INDEX: 47.74 KG/M2 | HEIGHT: 68 IN | WEIGHT: 315 LBS

## 2023-09-15 DIAGNOSIS — K74.00 LIVER FIBROSIS: Primary | ICD-10-CM

## 2023-09-15 DIAGNOSIS — K76.6 PORTAL HYPERTENSION: ICD-10-CM

## 2023-09-15 DIAGNOSIS — E66.01 MORBID OBESITY WITH BMI OF 50.0-59.9, ADULT: ICD-10-CM

## 2023-09-15 DIAGNOSIS — I10 ESSENTIAL HYPERTENSION: ICD-10-CM

## 2023-09-15 DIAGNOSIS — R73.03 PREDIABETES: ICD-10-CM

## 2023-09-15 DIAGNOSIS — K76.0 FATTY LIVER: ICD-10-CM

## 2023-09-15 DIAGNOSIS — E78.49 OTHER HYPERLIPIDEMIA: ICD-10-CM

## 2023-09-15 PROCEDURE — 99215 PR OFFICE/OUTPT VISIT, EST, LEVL V, 40-54 MIN: ICD-10-PCS | Mod: 95,,, | Performed by: NURSE PRACTITIONER

## 2023-09-15 PROCEDURE — 1160F RVW MEDS BY RX/DR IN RCRD: CPT | Mod: CPTII,95,, | Performed by: NURSE PRACTITIONER

## 2023-09-15 PROCEDURE — 3044F PR MOST RECENT HEMOGLOBIN A1C LEVEL <7.0%: ICD-10-PCS | Mod: CPTII,95,, | Performed by: NURSE PRACTITIONER

## 2023-09-15 PROCEDURE — 3008F PR BODY MASS INDEX (BMI) DOCUMENTED: ICD-10-PCS | Mod: CPTII,95,, | Performed by: NURSE PRACTITIONER

## 2023-09-15 PROCEDURE — 1159F PR MEDICATION LIST DOCUMENTED IN MEDICAL RECORD: ICD-10-PCS | Mod: CPTII,95,, | Performed by: NURSE PRACTITIONER

## 2023-09-15 PROCEDURE — 3008F BODY MASS INDEX DOCD: CPT | Mod: CPTII,95,, | Performed by: NURSE PRACTITIONER

## 2023-09-15 PROCEDURE — 3044F HG A1C LEVEL LT 7.0%: CPT | Mod: CPTII,95,, | Performed by: NURSE PRACTITIONER

## 2023-09-15 PROCEDURE — 99215 OFFICE O/P EST HI 40 MIN: CPT | Mod: 95,,, | Performed by: NURSE PRACTITIONER

## 2023-09-15 PROCEDURE — 1159F MED LIST DOCD IN RCRD: CPT | Mod: CPTII,95,, | Performed by: NURSE PRACTITIONER

## 2023-09-15 PROCEDURE — 1160F PR REVIEW ALL MEDS BY PRESCRIBER/CLIN PHARMACIST DOCUMENTED: ICD-10-PCS | Mod: CPTII,95,, | Performed by: NURSE PRACTITIONER

## 2023-09-15 NOTE — PATIENT INSTRUCTIONS
Call the bariatric medicine clinic for possible weight loss meds [phone]       FATTY LIVER  There is no FDA approved therapy for fatty liver disease. Therefore, these things are important:  Limit alcohol consumption, none given scar tisuse/possible cirrhosis   2 Weight loss goal of 100 lbs, referral for [facility] if interested. Also, if interested in a dietician visit to create a weight loss plan, contact the dietician team at [facility] at [email or web address] to schedule a visit to you can call [facility] in [place]: [phone] and the  will transfer the call to one of the dieticians to schedule an appointment. Or you can also call [phone] to schedule. They do offer video visits   3. Low carb/sugar and high protein diet (~1 g/kg/day of protein).Try to limit your carb intake to LESS than 30-45 grams of carbs with a meal or LESS than 5-10 grams with any snack (total of any snack foods eaten during that time). Use Traveler | VIP Pal or Lose It george to add up your carbs through the day. Do NOT drink any beverages with calories or carbs (this can lead to high blood sugar and weight gain). Also, some of our patients have been very successful with weight loss using the pre made/planned meal planning services that limit calories and portion size ( The main thing to look for is low calorie, high protein, low carb)  4. Exercise, 5 days per week, 30 minutes per day, as tolerated  5. Recommend good cholesterol, blood pressure, blood sugar levels       In some people, fatty liver can progress to steatohepatitis (inflamatory fatty liver) and possibly to cirrhosis, increasing the risk for liver cancer, liver failure, and death. Therefore, the lifestyle changes are very important to decrease this risk.     Website with information about fatty liver and inflammation related to fatty liver ([place]) = www.nashtruth.com  AND www.NASHactually.com        CIRRHOSIS  This is a web site  that you may find helpful about cirrhosis : https://cirrhosiscare.ca/    Because you have cirrhosis, it is important to attend clinic visits every 6 months with an Ultrasound and blood tests every 6 months to screen for liver cancer (you are at risk of developing liver cancer due to scar tissue in the liver)    Signs and symptoms of worsening liver disease include jaundice, fluid in the belly (ascites), and confusion/disorientation/slowed thought processes due to hepatic encephalopathy (toxins building up because of liver problems).   You should seek medical attention if any of these things occur.    Also, possible bleeding from esophageal varices (blood vessels in the stomach and foodpipe can burst and cause fatal bleeding).  Therefore, if you have symptoms of vomiting blood, blood in your stool, dark or black stools or vomiting coffee ground vomit, YOU SHOULD GO TO THE EMERGENCY ROOM IMMEDIATELY.     Cirrhosis can increase the risk of liver cancer, liver failure, and death. However, we will watch your liver function score (MELD score) closely with each clinic visit.  We will check this with every clinic visit. A MELD 15 or higher is when we start to consider transplant because MELD 15 or higher indicates that the liver is not functioning as well     Cirrhosis Counseling  - NO alcohol use (includes beer, wine, and/or liquor)  - avoid non-steroidal anti-inflammatory drugs (NSAIDs) such as ibuprofen, Motrin, naprosyn, Alleve due to the risk of kidney damage  - can take acetaminophen (Tylenol), no more than 2000 mg per day  - low sodium (salt) 2 gram per day diet  - high protein diet: 150 grams per day to prevent muscle mass loss. Drink at least 1 protein shake daily (Premier Protein is best option because it is very high protein and low sugar). Ok to use this as nighttime snack to fit it in   - resistance exercises for muscle strength  - avoid raw seafoods due to the risk of fatal Vibrio vulnificus infection  -  ultrasound of the liver every 6 months for liver cancer screening (you are at risk of developing liver cancer due to scar tissue in the liver)  - Upper endoscopy every 1-2 years to screen for varices in the stomach and foodpipe which can burst and cause fatal bleeding

## 2023-09-15 NOTE — PROGRESS NOTES
[facility] VISIT FOLLOW UP NOTE    PCP: Lester Awan MD     CHIEF COMPLAINT:  fatty liver, liver fibrosis/cirrhosis ?, portal HTN on TJ biopsy     HPI: This is a 48 y.o. White male with PMH noted below, presenting for follow up of above    Serological workup was negative for Maximo's, alpha-1 antitrypsin deficiency, hemochromatosis, autoimmune etiology, and viral hepatitis A and B.   + Hep C ab in 2020, previous RNA in 2019 and 2022 negative. No hep C treatment   PETH 190s  4/2022. Negative 8/2023 on repeat     Prior serologic workup:   Lab Results   Component Value Date    SMOOTHMUSCAB Negative 1:40 04/05/2022    AMAIFA Negative 1:40 04/05/2022    IGGSERUM 1166 04/05/2022    ANASCREEN Negative <1:80 04/05/2022    FERRITIN 202 04/05/2022    FESATURATED 30 04/05/2022    CERULOPLSM 32.0 04/05/2022    HEPBSAG Non-reactive 08/04/2023    HEPCAB Equivocal (A) 08/04/2023    HEPAIGM Non-reactive 08/04/2023       Liver fibrosis staging:  -- MRI elasto 4/2022 noted mild steatosis, no fibrosis staging possible given body habitus  -- MRI elasto 4/2023 noted F3-F4 fibrosis    Risk factors for NAFLD include morbid obesity, HTN, HLD, preDM    Interval HPI: Presents today alone. Portal pressures on TJ liver biopsy suggest mild portal HTN (HVPG =8). Liver biopsy notes minimal steatosis, F2 fibrosis  Given portal HTN on TJ biopsy, will follow as if has advanced fibrosis although unclear   Previous Heavy alcohol use, stopped 4/29/23, doing great without alcohol, peth negative  Current wt 360 lbs   Avoiding carbs, trying to eat smaller portions  No fried/fast foods   Liver enzymes normalized without alcohol   Recently started Lipitor with cardiology 8/2023  No s/s of hepatic decompensation: no ascites, HE or h/o variceal bleeding     Labs done 8/2023 show normalized transaminase levels (previously significantly elevated since 11/[id number])  Platelets WNL, alk phos WNL  Synthetic liver functioning WNL    Lab Results    Component Value Date    ALT 20 08/31/2023    AST 26 08/31/2023    ALKPHOS 116 08/31/2023    BILITOT 0.8 08/31/2023    ALBUMIN 3.6 08/31/2023    INR 1.0 08/31/2023     08/31/2023     MELD 3.0: 6 at 8/31/2023  7:50 AM  MELD-Na: 6 at 8/31/2023  7:50 AM  Calculated from:  Serum Creatinine: 0.8 mg/dL (Using min of 1 mg/dL) at 8/31/2023  7:50 AM  Serum Sodium: 142 mmol/L (Using max of 137 mmol/L) at 8/31/2023  7:50 AM  Total Bilirubin: 0.8 mg/dL (Using min of 1 mg/dL) at 8/31/2023  7:50 AM  Serum Albumin: 3.6 g/dL (Using max of 3.5 g/dL) at 8/31/2023  7:50 AM  INR(ratio): 1.0 at 8/31/2023  7:50 AM  Age at listing (hypothetical): 48 years  Sex: Male at 8/31/2023  7:50 AM    MELD 6    Cirrhosis Health Maintenance:   -- Last EGD 2/2022 noted no  Varices, Due for next EGD 2/2024  -- Due for next colonoscopy : 2032  -- HCC screening   MRI no lesions, next due 3/2023   AFP  WNL, next due 3/2023  Lab Results   Component Value Date    AFP <2.0 08/04/2023         Denies family history of liver disease. + previous alcohol use  Social History     Substance and Sexual Activity   Alcohol Use Not Currently    Comment: stopped 4/2023         Immunity to Hep A and B - completed 2 twinrix vaccines >1 year ago, need to repeat titers with next labs        Allergy and medication list reviewed and updated     PMHX:  has a past medical history of Anticoagulant long-term use, Asthma, GERD (gastroesophageal reflux disease), Hyperlipidemia, Hypertension, Liver disease, Low back pain, Morbid obesity with BMI of 50.0-59.9, adult, JARAD (obstructive sleep apnea), Pre-diabetes, Varicose veins, and Vitamin D deficiency.    PSHX:  has a past surgical history that includes LASIK; Eye surgery; Adenoidectomy; Laparoscopic gastric banding (2002); Gastrectomy; Laparoscopic repair of recurrent incarcerated incisional hernia (N/A, 6/21/2018); Esophagogastroduodenoscopy (N/A, 2/11/2022); and Colonoscopy (N/A, 2/11/2022).    FAMILY HISTORY: Updated and  "reviewed in EPIC    SOCIAL HISTORY:   Social History     Substance and Sexual Activity   Alcohol Use Not Currently    Comment: stopped 4/2023       Social History     Substance and Sexual Activity   Drug Use No       ROS:   GENERAL: Denies fatigue  CARDIOVASCULAR: Denies edema  GI: Denies abdominal pain  SKIN: Denies rash, itching   NEURO: Denies confusion, memory loss, or mood changes    PHYSICAL EXAM:   Friendly White male, in no acute distress; alert and oriented to person, place and time  VITALS: Ht 5' 8" (1.727 m)   Wt (!) 163.3 kg (360 lb)   BMI 54.74 kg/m²   EYES: Sclerae anicteric  GI: Soft, non-tender, non-distended. No ascites.  EXTREMITIES:  No edema.  SKIN: Warm and dry. No jaundice. No telangectasias noted. No palmar erythema.  NEURO:  No asterixis.  PSYCH:  Thought and speech pattern appropriate. Behavior normal      EDUCATION:  See instructions discussed with patient in Instructions section of the After Visit Summary     ASSESSMENT & PLAN:  48 y.o. White male with:  1.  Liver fibrosis/ cirrhosis?  -- TJ liver biopsy portal pressures noted mild portal HTN, which would be likely with cirrhosis, so it is possible that the biopsy of F2 understaged fibrosis, will monitor q6 months as if has cirrhosis to be cautious    2. Fatty liver   -- likely given risk factors (previous alcohol, multiple metabolic risk factors) although minimal percentage on biopsy, possible burnout versus improvement after alcohol cessation   - see HPI  -- Immunity to Hep A and B : see HPI  -- Recommendations discussed with patient:  Limit alcohol consumption, continue complete abstinence   2 Weight loss goal of  lbs  3. Low carb/sugar, high fiber and protein diet, limit your carb intake to LESS than 30-45 grams of carbs with a meal or LESS than 5-10 grams with any snack   4. Exercise, 5 days per week, 30 minutes per day, as tolerated  5. Recommend good cholesterol, blood pressure, blood sugar levels     2. Elevated liver " enzymes  -- likely due to alcohol/fatty liver since they normalized with abstinence    3. Portal HTN (?)  -- HVPG = 8 on TJ liver biopsy   -- no ascites  -- no splenomegaly or thrombocytopenia  -- EGD next 2/2024, will order at next visit     4. Morbid obesity, HTN, HLD, preDM  -- referral to bariatric medicine for consideration of GLP-1  Body mass index is 54.74 kg/m².  -- can increase risk of fatty liver        Follow up in about 6 months (around 3/15/2024). Virtual visit with US and labs before   Orders Placed This Encounter   Procedures    US Abdomen Limited    AFP Tumor Marker    CBC Without Differential    Comprehensive Metabolic Panel    Protime-INR    Hepatic Function Panel    Hepatitis A antibody, IgG    Hepatitis B Core Antibody, Total    Hepatitis B Surface Ab, Qualitative    Ambulatory referral/consult to Bariatric Medicine    Ambulatory Referral/Consult to Nutrition Services - [facility]        Thank you for allowing me to participate in the care of Jesús Galindo PatelCHACE McneillC    I spent a total of 40 minutes on the day of the visit.This includes face to face time and non-face to face time preparing to see the patient (eg, review of tests), obtaining and/or reviewing separately obtained history, documenting clinical information in the electronic or other health record, independently interpreting results and communicating results to the patient/family/caregiver, and coordinating care.         CC'ed note to:   Lester Awan MD

## 2023-09-18 ENCOUNTER — TELEPHONE (OUTPATIENT)
Dept: SURGERY | Facility: CLINIC | Age: 48
End: 2023-09-18
Payer: COMMERCIAL

## 2023-09-20 ENCOUNTER — CLINICAL SUPPORT (OUTPATIENT)
Dept: REHABILITATION | Facility: HOSPITAL | Age: 48
End: 2023-09-20
Payer: COMMERCIAL

## 2023-09-20 ENCOUNTER — TELEPHONE (OUTPATIENT)
Dept: BARIATRICS | Facility: CLINIC | Age: 48
End: 2023-09-20
Payer: COMMERCIAL

## 2023-09-20 DIAGNOSIS — M79.645 PAIN OF LEFT THUMB: ICD-10-CM

## 2023-09-20 DIAGNOSIS — R29.898 REDUCED HAND STRENGTH: Primary | ICD-10-CM

## 2023-09-20 PROCEDURE — 97140 MANUAL THERAPY 1/> REGIONS: CPT

## 2023-09-20 PROCEDURE — 97110 THERAPEUTIC EXERCISES: CPT

## 2023-09-20 PROCEDURE — 97530 THERAPEUTIC ACTIVITIES: CPT

## 2023-09-20 PROCEDURE — 97018 PARAFFIN BATH THERAPY: CPT

## 2023-09-20 NOTE — PROGRESS NOTES
[facility]  Occupational Therapy Treatment Note    Date: 9/20/2023  Name: Jesús Patel  Clinic Number: [id number]    Therapy Diagnosis:   Encounter Diagnoses   Name Primary?    Reduced hand strength Yes    Pain of left thumb      Physician: Anuj Tai, *    Physician Orders: OT Eval and Treat  Medical Diagnosis: M19.032 (ICD-10-CM) - Localized primary osteoarthritis of carpometacarpal (CMC) joint of left wrist; M25.532,G89.29 (ICD-10-CM) - Chronic pain of left wrist  Surgical Procedure and Date: N/A / Date of Injury/Onset: March 2023   Evaluation Date: 9/13/2023  Insurance Authorization Period Expiration: 12/31/2023  Plan of Care Expiration: 12/8/23 (12 weeks)  Date of Return to MD: 9/27/2023   Visit # / Visits authorized: 2 / 21  FOTO: 1/3    Precautions:  Standard    Time In: 3:50 pm  Time Out: 4:27 pm  Total Billable Time: 27 minutes    SUBJECTIVE     Pt reports: no change.   He was compliant with home exercise program given last session.   Response to previous treatment: First treatment  Functional change: none noted yet    Pain: 5/10  Location: left thumb/wrist     OBJECTIVE   Objective Measures updated at progress report unless specified.    Observation/Appearance: L ulnar head significantly less prominent compared to R.       Edema. No significant edema noted.      Elbow and Wrist ROM. WFLs - all planes of motion, but pain with L wrist RD/UD.      Hand ROM. WFL - Pt able to make full composite fists with B/L hands and demonstrates thumb opposition touch to SF DPC. L hand painful with digit ROM.       Strength (Dynamometer) and Pinch Strength (Pinch Gauge)  Measured in pounds to POP.    9/13/2023 9/13/2023     Left Right   Rung II NT NT   Key Pinch       3pt Pinch       2pt Pinch          Sensation. Intact per report. Pt denies numbness and/or tingling in LUE(s).      Manual Muscle Test    9/13/2023 9/13/2023     Left Right   Wrist Extension  NT NT    Wrist Flexion       Radial Deviation       Ulnar Deviation       Supination       Pronation          Special Tests  Thumb CMC Grind Test + L      Treatment     Jesús received the treatments listed below:     Supervised modalities after being cleared for contradictions: Paraffin bath - with moist heat pack to L hand(s) for 10 minutes to increase blood flow, circulation, pain management, and for tissue elasticity prior to therex.     Manual therapy techniques: Manual traction, Soft tissue Mobilization, and Friction Massage were applied to the: L hand/wrist for 10 minutes, including:  - Performed soft tissue mobilization/friction massage to L hand/wrist to relieve associated soft tissue tightness, improve joint mobility, decrease pain, and improve lymphatic drainage to increase ROM.   - Manual traction to L thumb    Therapeutic exercises to develop strength, endurance, ROM, and flexibility for 8 minutes, including:  - Webspace warming x 2 min  - Self thenar stretch 3 x 30 sec holds  - Isometric C 10 reps x 5-10 sec holds     Therapeutic activities to improve functional performance for 9 minutes, including:  - 9 hole peg nesting 3 at a time x 2 sets   - Wrist dextericiser x 2 min   - Isospheres x 2 min   - Kinesiology tape application: 25% I-strip from L thumb to volar forearm and 80% space correction to L wrist.     Patient Education and Home Exercises      Education provided:   - HEP in place, added: self-thenar stretch and isometric C as tolerated.  - Progress towards goals     Written Home Exercises Provided: yes.  Exercises were reviewed and Jesús was able to demonstrate them prior to the end of the session.  Jesús demonstrated good  understanding of the HEP provided. See EMR under Patient Instructions for exercises provided during therapy sessions.      ASSESSMENT     Pt returns for his first follow-up visit this date. He participated well and endorses good adherence to HEP. Reports no subjective improvements  in function at this time. Remains mostly limited by pain in his thumb and wrist. Will progress as tolerated.    Jesús is progressing well towards his goals and there are no updates to goals at this time. Pt prognosis is Fair-Good.     Pt will continue to benefit from skilled outpatient occupational therapy to address the deficits listed in the problem list on initial evaluation, provide pt/family education and to maximize pt's level of independence in the home and community environment.     Pt's spiritual, cultural and educational needs considered and pt agreeable to plan of care and goals.    Anticipated barriers to occupational therapy: chronic nature of Dx    Goals:   The following goals were discussed with the patient and patient is in agreement with them as to be addressed in the treatment plan.   Short Term Goals (STGs) = Long Term Goals (LTGs); to be met by discharge.  LTG #1: Pt will report a pain level of 3-4 out of 10 at worst with average functional use of his left hand. progressing not met 9/20/2023  LTG #2: Pt will demo improved FOTO score by 5-10 points. progressing not met 9/20/2023  LTG #3: Pt will verbalize and demonstrate understanding of 3 principles/examples of joint protection/energy conservation. progressing not met 9/20/2023  LTG #4: Pt will demonstrate independence with issued HEP, orthosis/brace wear, and modalities for symptom management. progressing not met 9/20/2023  LTG #5: Assess MMT and /pinch strength when appropriate and update goals as needed. progressing not met 9/20/2023    PLAN     Continue skilled occupational therapy with individualized plan of care focusing on maximizing functional use of patient's left hand.    Updates/Grading for next session: progress as tolerated.    Alessandra Galvez, OTR/L

## 2023-09-20 NOTE — PATIENT INSTRUCTIONS
"[facility]  OCCUPATIONAL THERAPY  HOME EXERCISE PROGRAM        Reach around the back of your thumb using your opposite hand & wrap your fingers around your thumb. Pull your palm open using your opposite hand. To deepen the stretch, bring your palm to your chest.   Hold for 30 seconds. Do 3 repetitions, 2x/day.     Make the shape of the letter "c" using your thumb & index finger.   Hold for 10 seconds. Do 10 repetitions.           "

## 2023-09-21 ENCOUNTER — PATIENT MESSAGE (OUTPATIENT)
Dept: HEPATOLOGY | Facility: CLINIC | Age: 48
End: 2023-09-21
Payer: COMMERCIAL

## 2023-09-27 ENCOUNTER — PATIENT MESSAGE (OUTPATIENT)
Dept: SPORTS MEDICINE | Facility: CLINIC | Age: 48
End: 2023-09-27

## 2023-09-27 ENCOUNTER — OFFICE VISIT (OUTPATIENT)
Dept: SPORTS MEDICINE | Facility: CLINIC | Age: 48
End: 2023-09-27
Payer: COMMERCIAL

## 2023-09-27 VITALS — HEIGHT: 68 IN | TEMPERATURE: 98 F | WEIGHT: 315 LBS | BODY MASS INDEX: 47.74 KG/M2

## 2023-09-27 DIAGNOSIS — G89.29 CHRONIC PAIN OF LEFT WRIST: ICD-10-CM

## 2023-09-27 DIAGNOSIS — M19.032 LOCALIZED PRIMARY OSTEOARTHRITIS OF CARPOMETACARPAL (CMC) JOINT OF LEFT WRIST: Primary | ICD-10-CM

## 2023-09-27 DIAGNOSIS — M25.532 CHRONIC PAIN OF LEFT WRIST: ICD-10-CM

## 2023-09-27 PROCEDURE — 3044F PR MOST RECENT HEMOGLOBIN A1C LEVEL <7.0%: ICD-10-PCS | Mod: CPTII,S$GLB,, | Performed by: FAMILY MEDICINE

## 2023-09-27 PROCEDURE — 20606 INTERMEDIATE JOINT ASPIRATION/INJECTION: ICD-10-PCS | Mod: LT,S$GLB,, | Performed by: FAMILY MEDICINE

## 2023-09-27 PROCEDURE — 3008F BODY MASS INDEX DOCD: CPT | Mod: CPTII,S$GLB,, | Performed by: FAMILY MEDICINE

## 2023-09-27 PROCEDURE — 1159F MED LIST DOCD IN RCRD: CPT | Mod: CPTII,S$GLB,, | Performed by: FAMILY MEDICINE

## 2023-09-27 PROCEDURE — 99214 PR OFFICE/OUTPT VISIT, EST, LEVL IV, 30-39 MIN: ICD-10-PCS | Mod: 25,S$GLB,, | Performed by: FAMILY MEDICINE

## 2023-09-27 PROCEDURE — 99999 PR PBB SHADOW E&M-EST. PATIENT-LVL III: CPT | Mod: PBBFAC,,, | Performed by: FAMILY MEDICINE

## 2023-09-27 PROCEDURE — 99214 OFFICE O/P EST MOD 30 MIN: CPT | Mod: 25,S$GLB,, | Performed by: FAMILY MEDICINE

## 2023-09-27 PROCEDURE — 3044F HG A1C LEVEL LT 7.0%: CPT | Mod: CPTII,S$GLB,, | Performed by: FAMILY MEDICINE

## 2023-09-27 PROCEDURE — 3008F PR BODY MASS INDEX (BMI) DOCUMENTED: ICD-10-PCS | Mod: CPTII,S$GLB,, | Performed by: FAMILY MEDICINE

## 2023-09-27 PROCEDURE — 1159F PR MEDICATION LIST DOCUMENTED IN MEDICAL RECORD: ICD-10-PCS | Mod: CPTII,S$GLB,, | Performed by: FAMILY MEDICINE

## 2023-09-27 PROCEDURE — 99999 PR PBB SHADOW E&M-EST. PATIENT-LVL III: ICD-10-PCS | Mod: PBBFAC,,, | Performed by: FAMILY MEDICINE

## 2023-09-27 PROCEDURE — 20606 DRAIN/INJ JOINT/BURSA W/US: CPT | Mod: LT,S$GLB,, | Performed by: FAMILY MEDICINE

## 2023-09-27 RX ORDER — TRIAMCINOLONE ACETONIDE 40 MG/ML
40 INJECTION, SUSPENSION INTRA-ARTICULAR; INTRAMUSCULAR
Status: DISCONTINUED | OUTPATIENT
Start: 2023-09-27 | End: 2023-09-27 | Stop reason: HOSPADM

## 2023-09-27 RX ADMIN — TRIAMCINOLONE ACETONIDE 40 MG: 40 INJECTION, SUSPENSION INTRA-ARTICULAR; INTRAMUSCULAR at 02:09

## 2023-09-27 NOTE — PROCEDURES
"Intermediate Joint Aspiration/Injection    Date/Time: 9/27/2023 2:45 PM    Performed by: Anuj Tai MD  Authorized by: Anuj Tai MD    Consent Done?:  Yes (Verbal)  Indications:  Pain  Site marked: The procedure site was marked    Timeout: Prior to procedure the correct patient, procedure, and site was verified      Location:  Wrist  Prep: Patient was prepped and draped in usual sterile fashion    Ultrasonic Guidance for needle placement: Yes  Images are saved and documented.    Needle size:  25 G  Approach:  Posterolateral  Medications:  40 mg triamcinolone acetonide 40 mg/mL  Patient tolerance:  Patient tolerated the procedure well with no immediate complications       Additional Comments: 1st carpo metacarpal joint, dorso radial approach    Description of ultrasound utilization for needle guidance:   Ultrasound guidance used for needle localization. Images saved and stored for documentation. The 1st CMC joint was visualized. Dynamic visualization of the 25g x 1.5" needle was continuous throughout the procedure.    "

## 2023-10-04 ENCOUNTER — PATIENT MESSAGE (OUTPATIENT)
Dept: BARIATRICS | Facility: CLINIC | Age: 48
End: 2023-10-04
Payer: COMMERCIAL

## 2023-10-10 ENCOUNTER — PATIENT MESSAGE (OUTPATIENT)
Dept: BARIATRICS | Facility: CLINIC | Age: 48
End: 2023-10-10
Payer: COMMERCIAL

## 2023-11-01 ENCOUNTER — PATIENT MESSAGE (OUTPATIENT)
Dept: HEPATOLOGY | Facility: CLINIC | Age: 48
End: 2023-11-01
Payer: COMMERCIAL

## 2023-11-15 ENCOUNTER — PATIENT MESSAGE (OUTPATIENT)
Dept: BEHAVIORAL HEALTH | Facility: CLINIC | Age: 48
End: 2023-11-15
Payer: COMMERCIAL

## 2023-11-16 ENCOUNTER — PATIENT MESSAGE (OUTPATIENT)
Dept: DERMATOLOGY | Facility: CLINIC | Age: 48
End: 2023-11-16
Payer: COMMERCIAL

## 2023-11-21 ENCOUNTER — OFFICE VISIT (OUTPATIENT)
Dept: DERMATOLOGY | Facility: CLINIC | Age: 48
End: 2023-11-21
Payer: COMMERCIAL

## 2023-11-21 DIAGNOSIS — L80 VITILIGO: Primary | ICD-10-CM

## 2023-11-21 DIAGNOSIS — Q82.8 KERATODERMA: ICD-10-CM

## 2023-11-21 DIAGNOSIS — Z76.89 ENCOUNTER FOR SKIN CARE: ICD-10-CM

## 2023-11-21 PROCEDURE — 99214 PR OFFICE/OUTPT VISIT, EST, LEVL IV, 30-39 MIN: ICD-10-PCS | Mod: S$GLB,,, | Performed by: DERMATOLOGY

## 2023-11-21 PROCEDURE — 3044F HG A1C LEVEL LT 7.0%: CPT | Mod: CPTII,S$GLB,, | Performed by: DERMATOLOGY

## 2023-11-21 PROCEDURE — 99999 PR PBB SHADOW E&M-EST. PATIENT-LVL III: CPT | Mod: PBBFAC,,, | Performed by: DERMATOLOGY

## 2023-11-21 PROCEDURE — 99214 OFFICE O/P EST MOD 30 MIN: CPT | Mod: S$GLB,,, | Performed by: DERMATOLOGY

## 2023-11-21 PROCEDURE — 99999 PR PBB SHADOW E&M-EST. PATIENT-LVL III: ICD-10-PCS | Mod: PBBFAC,,, | Performed by: DERMATOLOGY

## 2023-11-21 PROCEDURE — 1159F MED LIST DOCD IN RCRD: CPT | Mod: CPTII,S$GLB,, | Performed by: DERMATOLOGY

## 2023-11-21 PROCEDURE — 1160F PR REVIEW ALL MEDS BY PRESCRIBER/CLIN PHARMACIST DOCUMENTED: ICD-10-PCS | Mod: CPTII,S$GLB,, | Performed by: DERMATOLOGY

## 2023-11-21 PROCEDURE — 3044F PR MOST RECENT HEMOGLOBIN A1C LEVEL <7.0%: ICD-10-PCS | Mod: CPTII,S$GLB,, | Performed by: DERMATOLOGY

## 2023-11-21 PROCEDURE — 1159F PR MEDICATION LIST DOCUMENTED IN MEDICAL RECORD: ICD-10-PCS | Mod: CPTII,S$GLB,, | Performed by: DERMATOLOGY

## 2023-11-21 PROCEDURE — 1160F RVW MEDS BY RX/DR IN RCRD: CPT | Mod: CPTII,S$GLB,, | Performed by: DERMATOLOGY

## 2023-11-21 RX ORDER — TACROLIMUS 1 MG/G
OINTMENT TOPICAL 2 TIMES DAILY
Qty: 60 G | Refills: 0 | Status: SHIPPED | OUTPATIENT
Start: 2023-11-21 | End: 2023-12-19

## 2023-11-21 RX ORDER — AMMONIUM LACTATE 12 G/100G
CREAM TOPICAL
Qty: 385 G | Refills: 3 | Status: SHIPPED | OUTPATIENT
Start: 2023-11-21

## 2023-11-21 NOTE — PROGRESS NOTES
Subjective:      Patient ID:  Jesús Patel is a 48 y.o. male who presents for   Chief Complaint   Patient presents with    Skin Discoloration     L hand    Dry Skin     R foot     Skin Discoloration - Initial  Affected locations: left hand  Duration: 1 week  Signs and Symptoms: White discoloration.  Severity: mild to moderate  Timing: constant    Dry Skin - Initial  Affected locations: right foot and right toes  Duration: Pt.stated for a while.  Signs / symptoms: dryness, itching and burning  Severity: mild to moderate  Timing: constant  Relieving factors/Treatments tried: OTC moisturizer  Improvement on treatment: no relief      Review of Systems   Constitutional:  Negative for fever and chills.   HENT:  Negative for sore throat.    Respiratory:  Negative for cough.    Skin:  Positive for dry skin.       Objective:   Physical Exam   Constitutional: He appears well-developed and well-nourished.   Neurological: He is alert and oriented to person, place, and time.   Psychiatric: He has a normal mood and affect.        Diagram Legend     Erythematous scaling macule/papule c/w actinic keratosis       Vascular papule c/w angioma      Pigmented verrucoid papule/plaque c/w seborrheic keratosis      Yellow umbilicated papule c/w sebaceous hyperplasia      Irregularly shaped tan macule c/w lentigo     1-2 mm smooth white papules consistent with Milia      Movable subcutaneous cyst with punctum c/w epidermal inclusion cyst      Subcutaneous movable cyst c/w pilar cyst      Firm pink to brown papule c/w dermatofibroma      Pedunculated fleshy papule(s) c/w skin tag(s)      Evenly pigmented macule c/w junctional nevus     Mildly variegated pigmented, slightly irregular-bordered macule c/w mildly atypical nevus      Flesh colored to evenly pigmented papule c/w intradermal nevus       Pink pearly papule/plaque c/w basal cell carcinoma      Erythematous hyperkeratotic cursted plaque c/w SCC      Surgical scar  with no sign of skin cancer recurrence      Open and closed comedones      Inflammatory papules and pustules      Verrucoid papule consistent consistent with wart     Erythematous eczematous patches and plaques     Dystrophic onycholytic nail with subungual debris c/w onychomycosis     Umbilicated papule    Erythematous-base heme-crusted tan verrucoid plaque consistent with inflamed seborrheic keratosis     Erythematous Silvery Scaling Plaque c/w Psoriasis     See annotation              R sole thick and yellow.    Assessment / Plan:        Vitiligo  -     tacrolimus (PROTOPIC) 0.1 % ointment; Apply topically 2 (two) times daily. To white patch of the hand  Dispense: 60 g; Refill: 0  -     NB-UVB Light Therapy; Standing  Discussed with patient the etiology and pathogenesis of the disease or skin lesion(s) and possible treatments and aggravators.    Chronic nature of this condition discussed with patient.  Reviewed with patient different treatment options and associated risks.  Proper application of medications and or care for affected area(s) and condition(s) reviewed.  UVB therapy as optional treatment reviewed.  To be done at [facility].  Theoretical risks about future skin cancer reviewed.  Discussed with patient risks of protopic and elidel, including but not limited to, infections, lymphoma, black box warnings, unknown long term effects.  Okay to refrigerate protopic for a cooler feeling on the skin.  Can also apply base coat of petroleum jelly as a buffer.  Brochure given for patient education.    Keratoderma  -     ammonium lactate 12 % Crea; Bid to thick right sole  Dispense: 385 g; Refill: 3  Discussed with patient the etiology and pathogenesis of the disease or skin lesion(s) and possible treatments and aggravators.    Chronic nature of this condition discussed with patient.  Reviewed with patient different treatment options and associated risks.  Proper application of medications and or care for  affected area(s) and condition(s) reviewed.  Patient instructed to start Amlactin cream or lotion nightly to AK prone areas or other specified affected areas.  Warned of skin irritation and to decrease frequency of usage if this occurs.  Pt has old injury r ankle and is flat footed.    Encounter for skin care  Reviewed with patient to air out feet as much as possible, change socks at work after wiping feet down and applying corn starch during breaks.            Follow up in about 2 months (around 1/21/2024).

## 2023-11-27 ENCOUNTER — LAB VISIT (OUTPATIENT)
Dept: LAB | Facility: HOSPITAL | Age: 48
End: 2023-11-27
Attending: INTERNAL MEDICINE
Payer: COMMERCIAL

## 2023-11-27 DIAGNOSIS — K76.0 FATTY LIVER: ICD-10-CM

## 2023-11-27 DIAGNOSIS — K74.00 LIVER FIBROSIS: ICD-10-CM

## 2023-11-27 DIAGNOSIS — E78.49 OTHER HYPERLIPIDEMIA: ICD-10-CM

## 2023-11-27 LAB
ALBUMIN SERPL BCP-MCNC: 3.4 G/DL (ref 3.5–5.2)
ALP SERPL-CCNC: 113 U/L (ref 55–135)
ALT SERPL W/O P-5'-P-CCNC: 19 U/L (ref 10–44)
AST SERPL-CCNC: 26 U/L (ref 10–40)
BILIRUB DIRECT SERPL-MCNC: 0.3 MG/DL (ref 0.1–0.3)
BILIRUB SERPL-MCNC: 0.8 MG/DL (ref 0.1–1)
CHOLEST SERPL-MCNC: 103 MG/DL (ref 120–199)
CHOLEST/HDLC SERPL: 3 {RATIO} (ref 2–5)
HDLC SERPL-MCNC: 34 MG/DL (ref 40–75)
HDLC SERPL: 33 % (ref 20–50)
LDLC SERPL CALC-MCNC: 58.8 MG/DL (ref 63–159)
NONHDLC SERPL-MCNC: 69 MG/DL
PROT SERPL-MCNC: 6.6 G/DL (ref 6–8.4)
TRIGL SERPL-MCNC: 51 MG/DL (ref 30–150)

## 2023-11-27 PROCEDURE — 80076 HEPATIC FUNCTION PANEL: CPT | Performed by: NURSE PRACTITIONER

## 2023-11-27 PROCEDURE — 36415 COLL VENOUS BLD VENIPUNCTURE: CPT | Performed by: INTERNAL MEDICINE

## 2023-11-27 PROCEDURE — 80061 LIPID PANEL: CPT | Performed by: INTERNAL MEDICINE

## 2023-12-04 ENCOUNTER — OFFICE VISIT (OUTPATIENT)
Dept: CARDIOLOGY | Facility: CLINIC | Age: 48
End: 2023-12-04
Payer: COMMERCIAL

## 2023-12-04 VITALS
HEART RATE: 83 BPM | WEIGHT: 315 LBS | DIASTOLIC BLOOD PRESSURE: 76 MMHG | BODY MASS INDEX: 47.74 KG/M2 | HEIGHT: 68 IN | SYSTOLIC BLOOD PRESSURE: 118 MMHG

## 2023-12-04 DIAGNOSIS — I10 ESSENTIAL HYPERTENSION: ICD-10-CM

## 2023-12-04 DIAGNOSIS — E66.01 MORBID OBESITY WITH BMI OF 50.0-59.9, ADULT: ICD-10-CM

## 2023-12-04 DIAGNOSIS — L03.116 CELLULITIS OF LEFT LOWER EXTREMITY: ICD-10-CM

## 2023-12-04 DIAGNOSIS — G47.33 OBSTRUCTIVE SLEEP APNEA SYNDROME: ICD-10-CM

## 2023-12-04 DIAGNOSIS — I89.0 LYMPHEDEMA OF BOTH LOWER EXTREMITIES: Primary | ICD-10-CM

## 2023-12-04 DIAGNOSIS — I82.812 ACUTE SUPERFICIAL VENOUS THROMBOSIS OF LEFT LOWER EXTREMITY: ICD-10-CM

## 2023-12-04 DIAGNOSIS — E78.49 OTHER HYPERLIPIDEMIA: ICD-10-CM

## 2023-12-04 DIAGNOSIS — R60.0 EDEMA OF BOTH LOWER EXTREMITIES: ICD-10-CM

## 2023-12-04 PROCEDURE — 99999 PR PBB SHADOW E&M-EST. PATIENT-LVL V: CPT | Mod: PBBFAC,,, | Performed by: INTERNAL MEDICINE

## 2023-12-04 PROCEDURE — 1159F MED LIST DOCD IN RCRD: CPT | Mod: CPTII,S$GLB,, | Performed by: INTERNAL MEDICINE

## 2023-12-04 PROCEDURE — 3008F BODY MASS INDEX DOCD: CPT | Mod: CPTII,S$GLB,, | Performed by: INTERNAL MEDICINE

## 2023-12-04 PROCEDURE — 1159F PR MEDICATION LIST DOCUMENTED IN MEDICAL RECORD: ICD-10-PCS | Mod: CPTII,S$GLB,, | Performed by: INTERNAL MEDICINE

## 2023-12-04 PROCEDURE — 3074F PR MOST RECENT SYSTOLIC BLOOD PRESSURE < 130 MM HG: ICD-10-PCS | Mod: CPTII,S$GLB,, | Performed by: INTERNAL MEDICINE

## 2023-12-04 PROCEDURE — 99215 OFFICE O/P EST HI 40 MIN: CPT | Mod: S$GLB,,, | Performed by: INTERNAL MEDICINE

## 2023-12-04 PROCEDURE — 3078F PR MOST RECENT DIASTOLIC BLOOD PRESSURE < 80 MM HG: ICD-10-PCS | Mod: CPTII,S$GLB,, | Performed by: INTERNAL MEDICINE

## 2023-12-04 PROCEDURE — 99215 PR OFFICE/OUTPT VISIT, EST, LEVL V, 40-54 MIN: ICD-10-PCS | Mod: S$GLB,,, | Performed by: INTERNAL MEDICINE

## 2023-12-04 PROCEDURE — 3074F SYST BP LT 130 MM HG: CPT | Mod: CPTII,S$GLB,, | Performed by: INTERNAL MEDICINE

## 2023-12-04 PROCEDURE — 3044F PR MOST RECENT HEMOGLOBIN A1C LEVEL <7.0%: ICD-10-PCS | Mod: CPTII,S$GLB,, | Performed by: INTERNAL MEDICINE

## 2023-12-04 PROCEDURE — 99999 PR PBB SHADOW E&M-EST. PATIENT-LVL V: ICD-10-PCS | Mod: PBBFAC,,, | Performed by: INTERNAL MEDICINE

## 2023-12-04 PROCEDURE — 3078F DIAST BP <80 MM HG: CPT | Mod: CPTII,S$GLB,, | Performed by: INTERNAL MEDICINE

## 2023-12-04 PROCEDURE — 3008F PR BODY MASS INDEX (BMI) DOCUMENTED: ICD-10-PCS | Mod: CPTII,S$GLB,, | Performed by: INTERNAL MEDICINE

## 2023-12-04 PROCEDURE — 3044F HG A1C LEVEL LT 7.0%: CPT | Mod: CPTII,S$GLB,, | Performed by: INTERNAL MEDICINE

## 2023-12-04 RX ORDER — ASPIRIN 81 MG/1
81 TABLET ORAL DAILY
Qty: 90 TABLET | Refills: 3 | Status: SHIPPED | OUTPATIENT
Start: 2023-12-04 | End: 2024-12-03

## 2023-12-04 NOTE — PATIENT INSTRUCTIONS
Assessment/Plan:  Jesús Patel is a 48 y.o. male with HTN, HLD, JARAD (on CPAP), morbid obesity s/p gastric bypass, who presents for a follow up appointment.    1. Left Leg Pain- Due to superficial venous thrombosis with cellulitis. Symptoms now completely resolved.  BLE Venous Ultrasound on 8/29/2023 revealed no no evidence of a right or left lower extremity DVT or SVT.  OK to stop Eliquis.  Start ASA 81 mg daily.    2. BLE Lymphedema- Refer to lymphedema clinic (pt is on waiting list for lymphedema clinic therapy).  Recommend wearing graduated compression hose.  Limit sodium intake to 2000 mg daily.  Limit volume intake to 1.5 L daily.  Elevate legs when resting.    3. Morbid Obesity s/p Gastric Bypass- Pt has lost 50 pounds since clinic on 8/10/2023.  Encourage diet, exercise and weigh loss.    4. HTN- Continue current medications.    5. HLD- Continue atorvastatin 80 mg daily.      Follow up in 5 months with cmp prior

## 2023-12-04 NOTE — PROGRESS NOTES
[facility]       Chief Complaint   Patient presents with    venous thrombosis       Patient ID: Jesús Patel is a 48 y.o. male with HTN, HLD, JARAD (on CPAP), morbid obesity s/p gastric bypass, who presents for a follow up appointment.  Pertinent history/events are as follows:     -Pt kindly referred by Niall Mauricio PA-C for evaluation of thrombophlebitis of superficial veins of left lower extremity.    -At our initial clinic visit on 4/4/2022, Mr. Josie Patel reports left leg pain which started approximately 2 weeks ago.  On 3/27/2022 he presented to the ED for evaluation.  LLE Venous Ultrasound on 3/27/2022 revealed Left calf nonocclusive superficial thrombophlebitis.  There is no evidence of deep venous thrombosis in the left lower extremity.  He states the left leg pain has continued since that time.  Exam significant for LLE with erythema and multiple palpable cords with TTP.  Both  LE's with changes consistent with lymphedema.    Plan:   Left Leg Pain- Likely due to superficial venous thrombosis with cellulitis.  Check LLE venous reflux study today to rule out DVT and SVT in close proximity to the deep venous system.  If no evidence of DVT, treat with Xarelto 10 mg daily for 45 days.  Treat with doxycycline 100 mg bid for 21 days for cellulitis.  Pt to elevate legs when resting.    BLE Lymphedema- Plan to refer to lymphedema clinic after cellulitis has fully resolved.  Morbid Obesity s/p Gastric Bypass- Refer back to Bariatric Surgery for evaluation.  Encourage diet, exercise and weigh loss.  HTN- Continue current medications.  HLD- Check updated lipid panel    -Results Addendum 4/4/2022:  LLE venous ultrasound done today shows extensive superficial venous thrombosis extending to close proximity of the saphenofemoral junction.  This is very close to the deep venous system, therefore, will treat as a DVT.  Start Eliquis 10 mg twice daily for the 1st 7 days, then reduce  to 5 mg twice daily thereafter.  Repeat LLE venous ultrasound in 45 days.  Plan discussed in detail with Mr. Josie Patel, who voiced understanding.    -At follow up clinic visit on 5/18/2022, Mr. Josie Patel reports significant improvement in LLE edema and pain since starting Eliquis 5 mg bid.  He has no claudication or tissue loss.  He is enrolled in the digital HTN program.   Plan:   Left Leg Pain- Due to superficial venous thrombosis with cellulitis. Symptoms now significantly improved.  LLE venous ultrasound done today shows extensive superficial venous thrombosis extending to close proximity of the saphenofemoral junction.  This is very close to the deep venous system, therefore, will treat as a DVT.  Continue Eliquis 5 mg twice daily.  Repeat LLE venous ultrasound in 3 months.  BLE Lymphedema- Plan to refer to lymphedema clinic after cellulitis and superficial venous thrombosis has fully resolved.  Morbid Obesity s/p Gastric Bypass- Refer back to Bariatric Surgery for evaluation.  Encourage diet, exercise and weigh loss.  HTN- Continue current medications.  HLD- Check updated lipid panel.    -8/10/2023 clinic visit: Mr. Josie Patel reports no leg pain or edema.  He would like to stop taking Eliquis.   Plan:    Left Leg Pain- Due to superficial venous thrombosis with cellulitis. Symptoms now significantly improved.  Cellulitis has resolved.  LLE venous ultrasound done today shows extensive superficial venous thrombosis extending to close proximity of the saphenofemoral junction.  This is very close to the deep venous system, therefore, will treat as a DVT.  Check BLE venous ultrasound.  If not SVT or DVT, will stop Eliquis 5 mg twice daily.    BLE Lymphedema- Refer to lymphedema clinic.  Refer to lymphedema clinic. Recommend wearing graduated compression hose.  Limit sodium intake to 2000 mg daily.  Limit volume intake to 1.5 L daily.  Elevate legs when resting.  Morbid Obesity s/p Gastric Bypass-  Refer back to Bariatric Surgery for evaluation.  Encourage diet, exercise and weigh loss.  HTN- Continue current medications.  HLD- Start atorvastatin 80 mg daily.      HPI:  Mr. Josie Patel reports no leg pain or edema.  BLE Venous Ultrasound on 2023 revealed no no evidence of a right or left lower extremity DVT or SVT.   He is on waiting list for lymphedema clinic therapy.  LDL 59 on 2023.      Past Medical History:   Diagnosis Date    Anticoagulant long-term use     Asthma     GERD (gastroesophageal reflux disease)     Hyperlipidemia     Hypertension     Liver disease     Low back pain     Morbid obesity with BMI of 50.0-59.9, adult     JARAD (obstructive sleep apnea)     Pre-diabetes     Varicose veins     Vitamin D deficiency      Past Surgical History:   Procedure Laterality Date    ADENOIDECTOMY      COLONOSCOPY N/A 2022    Procedure: COLONOSCOPY;  Surgeon: Vaughn Cortes MD;  Location: [facility] ([address] [place] [address]);  Service: Endoscopy;  Laterality: N/A;    ESOPHAGOGASTRODUODENOSCOPY N/A 2022    Procedure: EGD (ESOPHAGOGASTRODUODENOSCOPY);  Surgeon: Vaughn Cortes MD;  Location: [facility] ([address] [place] [address]);  Service: Endoscopy;  Laterality: N/A;  BMI-55  Wt:378#-fully vacc-inst portal-tb  COVID screening 22 [facility]    EYE SURGERY      GASTRECTOMY      LAPAROSCOPIC GASTRIC BANDING      In [place]: uncertain of brand/ size    LAPAROSCOPIC REPAIR OF RECURRENT INCARCERATED INCISIONAL HERNIA N/A 2018    Procedure: REPAIR-HERNIA-INCISIONAL-LAPAROSCOPIC WITH MESH;  Surgeon: Vaughn Parker Jr., MD;  Location: [facility] OR [address] [place] [address];  Service: General;  Laterality: N/A;    LASIK       Social History     Socioeconomic History    Marital status: Single   Tobacco Use    Smoking status: Former     Current packs/day: 0.00     Average packs/day: 0.3 packs/day for 5.0 years (1.3 ttl pk-yrs)     Types: Cigarettes     Start date: 2004     Quit date: 2009     Years since quittin.9     Smokeless tobacco: Never   Substance and Sexual Activity    Alcohol use: Not Currently     Comment: stopped 4/2023    Drug use: No    Sexual activity: Not Currently   Social History Narrative    Works [employer].  Previously worked as a .  1 daughter, Yenni (18 years old). He is . Lives with brother. Not much exercise     Social Determinants of Health     Financial Resource Strain: Low Risk  (11/16/2023)    Overall Financial Resource Strain (CARDIA)     Difficulty of Paying Living Expenses: Not very hard   Food Insecurity: Food Insecurity Present (11/16/2023)    Hunger Vital Sign     Worried About Running Out of Food in the Last Year: Sometimes true     Ran Out of Food in the Last Year: Sometimes true   Transportation Needs: No Transportation Needs (11/16/2023)    PRAPARE - Transportation     Lack of Transportation (Medical): No     Lack of Transportation (Non-Medical): No   Physical Activity: Insufficiently Active (11/16/2023)    Exercise Vital Sign     Days of Exercise per Week: 1 day     Minutes of Exercise per Session: 30 min   Stress: Stress Concern Present (11/16/2023)    [employer] [place] [employer] - Occupational Stress Questionnaire     Feeling of Stress : To some extent   Social Connections: Unknown (11/16/2023)    Social Connection and Isolation Panel [NHANES]     Frequency of Communication with Friends and Family: More than three times a week     Frequency of Social Gatherings with Friends and Family: Twice a week     Active Member of Clubs or Organizations: Yes     Attends Club or Organization Meetings: More than 4 times per year     Marital Status:    Housing Stability: Low Risk  (11/16/2023)    Housing Stability Vital Sign     Unable to Pay for Housing in the Last Year: No     Number of Places Lived in the Last Year: 0     Unstable Housing in the Last Year: No     Family History   Problem Relation Age of Onset    Hypertension Mother      Obesity Mother         s/p sleeve 2013: good results    Cancer Maternal Grandfather         colon cancer    Diabetes Paternal Grandmother     No Known Problems Father     No Known Problems Brother     Colon cancer Maternal Grandmother     Obesity Brother     No Known Problems Sister        Review of patient's allergies indicates:  No Known Allergies    Medication List with Changes/Refills   Current Medications    ALBUTEROL (VENTOLIN HFA) 90 MCG/ACTUATION INHALER    Inhale 2 puffs into the lungs every 6 (six) hours as needed for Wheezing or Shortness of Breath. Rescue    AMLODIPINE (NORVASC) 10 MG TABLET    Take 1 tablet (10 mg total) by mouth once daily.    AMMONIUM LACTATE 12 % CREA    Bid to thick right sole    APIXABAN (ELIQUIS) 5 MG TAB    Take 2 tablets (10 mg total) by mouth 2 (two) times daily for the first 7 days, then reduce to 1 tablet (5 mg total) 2 (two) times daily thereafter.    ATORVASTATIN (LIPITOR) 80 MG TABLET    Take 1 tablet (80 mg total) by mouth once daily.    BUDESONIDE-FORMOTEROL 80-4.5 MCG (SYMBICORT) 80-4.5 MCG/ACTUATION HFAA    Inhale 2 puffs into the lungs once daily. Controller    CETIRIZINE (ZYRTEC) 10 MG TABLET    Take 10 mg by mouth as needed.    DIPHENHYDRAMINE (BENADRYL) 25 MG CAPSULE    Take 25 mg by mouth every 6 (six) hours as needed for Itching.    ERYTHROMYCIN WITH ETHANOL (EMGEL) 2 % GEL    Apply topically 2 (two) times daily. Prn neck break outs    FLUTICASONE PROPIONATE (FLONASE) 50 MCG/ACTUATION NASAL SPRAY    SPRAY 2 SPRAYS IN EACH NOSTRIL TWICE A DAY    HYDROCHLOROTHIAZIDE (HYDRODIURIL) 12.5 MG TAB    Take 1 tablet (12.5 mg total) by mouth once daily.    MELOXICAM (MOBIC) 15 MG TABLET    Take 1 tablet (15 mg total) by mouth once daily.    OMEPRAZOLE (PRILOSEC) 40 MG CAPSULE    Take 1 capsule (40 mg total) by mouth every morning.    SALICYLIC ACID 17 % GEL    Apply topically once daily. Apply focally to skin tag/warts, not normal skin, and cover with tape or Band Aid until  "scab forms.  Allow for healing and watch for parts of tag/wart to fall off.  For warts okay to peel away dead skin painlessly.  Repeat as needed until tag/wart completely falls off.    TACROLIMUS (PROTOPIC) 0.1 % OINTMENT    Apply topically 2 (two) times daily. To white patch of the hand    TADALAFIL (CIALIS) 20 MG TAB    Use one tablet every 36 hours    ZOLPIDEM (AMBIEN CR) 12.5 MG CR TABLET    Take 1 tablet (12.5 mg total) by mouth nightly as needed for Insomnia.       Review of Systems  Constitution: Denies chills, fever, and sweats.  HENT: Denies headaches or blurry vision.  Cardiovascular: Denies chest pain or irregular heart beat.  Respiratory: Denies cough or shortness of breath.  Gastrointestinal: Denies abdominal pain, nausea, or vomiting.  Musculoskeletal: Negative for left leg pain and redness.  Neurological: Denies dizziness or focal weakness.  Psychiatric/Behavioral: Normal mental status.  Hematologic/Lymphatic: Denies bleeding problem or easy bruising/bleeding.  Skin: Denies rash or suspicious lesions    Physical Examination  /76   Pulse 83   Ht 5' 8" (1.727 m)   Wt (!) 158.9 kg (350 lb 5 oz)   BMI 53.26 kg/m²     Constitutional: No acute distress, conversant  HEENT: Sclera anicteric, Pupils equal, round and reactive to light, extraocular motions intact, Oropharynx clear  Neck: No JVD, no carotid bruits  Cardiovascular: regular rate and rhythm, no murmur, rubs or gallops, normal S1/S2  Pulmonary: Clear to auscultation bilaterally  Abdominal: Abdomen soft, nontender, nondistended, positive bowel sounds  Extremities: Both  LE's with changes consistent with lymphedema and prominent varicose veins.    Pulses:  Carotid pulses are 2+ on the right side, and 2+ on the left side.  Radial pulses are 2+ on the right side, and 2+ on the left side.   Femoral pulses are 2+ on the right side, and 2+ on the left side.  Popliteal pulses are 2+ on the right side, and 2+ on the left side.   Dorsalis pedis " "pulses are 2+ on the right side, and 2+ on the left side.   Posterior tibial pulses are 2+ on the right side, and 2+ on the left side.    Skin: No ecchymosis, erythema, or ulcers  Psych: Alert and oriented x 3, appropriate affect  Neuro: CNII-XII intact, no focal deficits    Labs:  Most Recent Data  CBC:   Lab Results   Component Value Date    WBC 6.07 08/31/2023    HGB 13.8 (L) 08/31/2023    HCT 43.5 08/31/2023     08/31/2023    MCV 84 08/31/2023    RDW 13.0 08/31/2023     BMP:   Lab Results   Component Value Date     08/31/2023    K 3.8 08/31/2023     08/31/2023    CO2 24 08/31/2023    BUN 12 08/31/2023    CREATININE 0.8 08/31/2023    GLU 99 08/31/2023    CALCIUM 9.4 08/31/2023    MG 2.4 10/02/2018    PHOS 2.8 10/02/2018     LFTS;   Lab Results   Component Value Date    PROT 6.6 11/27/2023    ALBUMIN 3.4 (L) 11/27/2023    BILITOT 0.8 11/27/2023    AST 26 11/27/2023    ALKPHOS 113 11/27/2023    ALT 19 11/27/2023     COAGS:   Lab Results   Component Value Date    INR 1.0 08/31/2023     FLP:   Lab Results   Component Value Date    CHOL 103 (L) 11/27/2023    HDL 34 (L) 11/27/2023    LDLCALC 58.8 (L) 11/27/2023    TRIG 51 11/27/2023    CHOLHDL 33.0 11/27/2023     CARDIAC: No results found for: "TROPONINI", "CKTOTAL", "CKMB", "BNP"    Imaging:    BLE Venous Ultrasound 8/29/2023:    There is no evidence of a right lower extremity DVT.    There is no evidence of a left lower extremity DVT.    The left superficial femoral middle vein is normal.    LLE Venous Ultrasound 3/27/2022:  No evidence of deep venous thrombosis in the left lower extremity.   Left calf nonocclusive superficial thrombophlebitis.    Assessment/Plan:  Jesús Patel is a 48 y.o. male with HTN, HLD, JARAD (on CPAP), morbid obesity s/p gastric bypass, who presents for a follow up appointment.    1. Left Leg Pain- Due to superficial venous thrombosis with cellulitis. Symptoms now completely resolved.  BLE Venous Ultrasound " on 8/29/2023 revealed no no evidence of a right or left lower extremity DVT or SVT.  OK to stop Eliquis.  Start ASA 81 mg daily.    2. BLE Lymphedema- Refer to lymphedema clinic (pt is on waiting list for lymphedema clinic therapy).  Recommend wearing graduated compression hose.  Limit sodium intake to 2000 mg daily.  Limit volume intake to 1.5 L daily.  Elevate legs when resting.    3. Morbid Obesity s/p Gastric Bypass- Pt has lost 50 pounds since clinic on 8/10/2023.  Encourage diet, exercise and weigh loss.    4. HTN- Continue current medications.    5. HLD- Continue atorvastatin 80 mg daily.      Follow up in 5 months with cmp prior    Total duration of face to face visit time 30 minutes.  Total time spent counseling greater than fifty percent of total visit time.  Counseling included discussion regarding imaging findings, diagnosis, possibilities, treatment options, risks and benefits.  The patient had many questions regarding the options and long-term effects.    Davonte Lance MD, PhD  Interventional Cardiology

## 2023-12-06 ENCOUNTER — TELEPHONE (OUTPATIENT)
Dept: CARDIOLOGY | Facility: CLINIC | Age: 48
End: 2023-12-06
Payer: COMMERCIAL

## 2023-12-11 DIAGNOSIS — G47.00 INSOMNIA, UNSPECIFIED TYPE: ICD-10-CM

## 2023-12-11 RX ORDER — ZOLPIDEM TARTRATE 12.5 MG/1
12.5 TABLET, FILM COATED, EXTENDED RELEASE ORAL NIGHTLY PRN
Qty: 30 TABLET | Refills: 5 | Status: SHIPPED | OUTPATIENT
Start: 2023-12-11 | End: 2024-06-10

## 2023-12-11 NOTE — TELEPHONE ENCOUNTER
Requested Prescriptions     Pending Prescriptions Disp Refills    zolpidem (AMBIEN CR) 12.5 MG CR tablet 30 tablet 5     Sig: Take 1 tablet (12.5 mg total) by mouth nightly as needed for Insomnia.     LOV 4/4/2023

## 2023-12-19 ENCOUNTER — E-VISIT (OUTPATIENT)
Dept: INTERNAL MEDICINE | Facility: CLINIC | Age: 48
End: 2023-12-19
Payer: COMMERCIAL

## 2023-12-19 ENCOUNTER — PATIENT MESSAGE (OUTPATIENT)
Dept: INTERNAL MEDICINE | Facility: CLINIC | Age: 48
End: 2023-12-19

## 2023-12-19 ENCOUNTER — PATIENT MESSAGE (OUTPATIENT)
Dept: INTERNAL MEDICINE | Facility: CLINIC | Age: 48
End: 2023-12-19
Payer: COMMERCIAL

## 2023-12-19 DIAGNOSIS — J06.9 URTI (ACUTE UPPER RESPIRATORY INFECTION): Primary | ICD-10-CM

## 2023-12-19 DIAGNOSIS — L80 VITILIGO: ICD-10-CM

## 2023-12-19 PROCEDURE — 99422 OL DIG E/M SVC 11-20 MIN: CPT | Mod: ,,, | Performed by: INTERNAL MEDICINE

## 2023-12-19 PROCEDURE — 99422 PR E&M, ONLINE DIGIT, EST, < 7 DAYS,  11-20 MINS: ICD-10-PCS | Mod: ,,, | Performed by: INTERNAL MEDICINE

## 2023-12-19 RX ORDER — TACROLIMUS 1 MG/G
OINTMENT TOPICAL 2 TIMES DAILY
Qty: 60 G | Refills: 0 | Status: SHIPPED | OUTPATIENT
Start: 2023-12-19 | End: 2024-01-16

## 2023-12-19 NOTE — PROGRESS NOTES
Patient ID: Jesús Patel is a 48 y.o. male.    Chief Complaint: URI (Entered automatically based on patient selection in Patient Portal.)    The patient initiated a request through [employer] on 12/19/2023 for evaluation and management with a chief complaint of URI (Entered automatically based on patient selection in Patient Portal.)     I evaluated the questionnaire submission on 12/19/2023  .    [facility] Evisit Upper Respitatory/Cough Questionnaire    12/19/2023  1:48 PM CST - Filed by Patient   Do you agree to participate in an E-Visit? Yes   If you have any of the following symptoms, please present to your local ER or call 911:  I acknowledge   What is the main issue that you would like for your doctor to address today? Not feeling good   Are you able to take your vital signs? No   What symptoms do you currently have?  Cough;  Headache;  Nasal Congestion;  Muscle or body aches;  Runny nose;  Sore throat   Describe your cough: Dry   Have you had any of the following? None of the above   Have you ever smoked? I have never smoked   Have you had a fever? No   When did your symptoms first appear? 12/17/2023   In the last two weeks, have you been in close contact with someone who has COVID-19 or the Flu? No   In the last two weeks, have you worked or volunteered in a healthcare facility or as a ? Healthcare facilities include a hospital, medical or dental clinic, long-term care facility, or nursing home No   Do you live in a long-term care facility, nursing home, group home, or homeless shelter? No   List what you have done or taken to help your symptoms. Just started taking tylenol   How severe are your symptoms? Moderate   Have your symptoms improved since they first appeared? No change   Have you taken an at home Covid test? No   Have you taken a Flu test? No   Have you been fully vaccinated for COVID? (2 Pfizer, 2 Moderna or 1 Jean & Jean vaccine injections) Yes   Have you  received a booster? Yes   Have you recieved a Flu shot? Yes   When did you recieve your Flu shot? 10/16/2023   Do you have transportation to get tested for COVID if it is indicated and ordered for you at an [facility] location? Yes   Provide any information you feel is important to your history not asked above    Please attach any relevant images or files          Recent Labs Obtained:  No visits with results within 7 Day(s) from this visit.   Latest known visit with results is:   Lab Visit on 11/27/2023   Component Date Value Ref Range Status    Cholesterol 11/27/2023 103 (L)  120 - 199 mg/dL Final    Comment: The National Cholesterol Education Program (NCEP) has set the  following guidelines (reference ranges) for Cholesterol:  Optimal.....................<200 mg/dL  Borderline High.............200-239 mg/dL  High........................> or = 240 mg/dL      Triglycerides 11/27/2023 51  30 - 150 mg/dL Final    Comment: The National Cholesterol Education Program (NCEP) has set the  following guidelines (reference values) for triglycerides:  Normal......................<150 mg/dL  Borderline High.............150-199 mg/dL  High........................200-499 mg/dL      HDL 11/27/2023 34 (L)  40 - 75 mg/dL Final    Comment: The National Cholesterol Education Program (NCEP) has set the  following guidelines (reference values) for HDL Cholesterol:  Low...............<40 mg/dL  Optimal...........>60 mg/dL      LDL Cholesterol 11/27/2023 58.8 (L)  63.0 - 159.0 mg/dL Final    Comment: The National Cholesterol Education Program (NCEP) has set the  following guidelines (reference values) for LDL Cholesterol:  Optimal.......................<130 mg/dL  Borderline High...............130-159 mg/dL  High..........................160-189 mg/dL  Very High.....................>190 mg/dL      HDL/Cholesterol Ratio 11/27/2023 33.0  20.0 - 50.0 % Final    Total Cholesterol/HDL Ratio 11/27/2023 3.0  2.0 - 5.0 Final    Non-HDL Cholesterol  11/27/2023 69  mg/dL Final    Comment: Risk category and Non-HDL cholesterol goals:  Coronary heart disease (CHD)or equivalent (10-year risk of CHD >20%):  Non-HDL cholesterol goal     <130 mg/dL  Two or more CHD risk factors and 10-year risk of CHD <= 20%:  Non-HDL cholesterol goal     <160 mg/dL  0 to 1 CHD risk factor:  Non-HDL cholesterol goal     <190 mg/dL      Total Protein 11/27/2023 6.6  6.0 - 8.4 g/dL Final    Albumin 11/27/2023 3.4 (L)  3.5 - 5.2 g/dL Final    Total Bilirubin 11/27/2023 0.8  0.1 - 1.0 mg/dL Final    Comment: For infants and newborns, interpretation of results should be based  on gestational age, weight and in agreement with clinical  observations.    Premature Infant recommended reference ranges:  Up to 24 hours.............<8.0 mg/dL  Up to 48 hours............<12.0 mg/dL  3-5 days..................<15.0 mg/dL  6-29 days.................<15.0 mg/dL      Bilirubin, Direct 11/27/2023 0.3  0.1 - 0.3 mg/dL Final    AST 11/27/2023 26  10 - 40 U/L Final    ALT 11/27/2023 19  10 - 44 U/L Final    Alkaline Phosphatase 11/27/2023 113  55 - 135 U/L Final       Encounter Diagnosis   Name Primary?    URTI (acute upper respiratory infection) Yes    Covid and flu + will rx 2 anti-virals    No orders of the defined types were placed in this encounter.           No follow-ups on file.      E-Visit Time Tracking:

## 2023-12-20 ENCOUNTER — CLINICAL SUPPORT (OUTPATIENT)
Dept: INTERNAL MEDICINE | Facility: CLINIC | Age: 48
End: 2023-12-20
Payer: COMMERCIAL

## 2023-12-20 DIAGNOSIS — J10.1 INFLUENZA A: ICD-10-CM

## 2023-12-20 DIAGNOSIS — U07.1 COVID-19 VIRUS INFECTION: ICD-10-CM

## 2023-12-20 DIAGNOSIS — J06.9 URTI (ACUTE UPPER RESPIRATORY INFECTION): Primary | ICD-10-CM

## 2023-12-20 PROCEDURE — 87635 SARS-COV-2 COVID-19 AMP PRB: CPT | Mod: QW,S$GLB,, | Performed by: INTERNAL MEDICINE

## 2023-12-20 PROCEDURE — 87635: ICD-10-PCS | Mod: QW,S$GLB,, | Performed by: INTERNAL MEDICINE

## 2023-12-20 PROCEDURE — 99999 PR PBB SHADOW E&M-EST. PATIENT-LVL I: CPT | Mod: PBBFAC,,,

## 2023-12-20 PROCEDURE — 99999 PR PBB SHADOW E&M-EST. PATIENT-LVL I: ICD-10-PCS | Mod: PBBFAC,,,

## 2023-12-20 PROCEDURE — 87804 POCT INFLUENZA A/B: ICD-10-PCS | Mod: 59,QW,S$GLB, | Performed by: INTERNAL MEDICINE

## 2023-12-20 PROCEDURE — 87804 INFLUENZA ASSAY W/OPTIC: CPT | Mod: 59,QW,S$GLB, | Performed by: INTERNAL MEDICINE

## 2023-12-20 RX ORDER — NIRMATRELVIR AND RITONAVIR 300-100 MG
KIT ORAL
Qty: 30 TABLET | Refills: 0 | Status: SHIPPED | OUTPATIENT
Start: 2023-12-20 | End: 2023-12-25

## 2023-12-20 RX ORDER — OSELTAMIVIR PHOSPHATE 75 MG/1
75 CAPSULE ORAL 2 TIMES DAILY
Qty: 10 CAPSULE | Refills: 0 | Status: SHIPPED | OUTPATIENT
Start: 2023-12-20 | End: 2023-12-25

## 2023-12-20 NOTE — TELEPHONE ENCOUNTER
Called and spoke with pt. Informed him of recent results and instructions from Dr. Awan. Pt expressed understanding with no further questions

## 2023-12-20 NOTE — TELEPHONE ENCOUNTER
Hi, please call him -- I sent in to his pharmacy:    Paxlovid and tamiflu    He needs to stop lipitor/atorvastatin while he is taking these medicines. He can restart them in 5 days    Let me know if patient has any questions.  Thank you, Lester Awan

## 2023-12-20 NOTE — TELEPHONE ENCOUNTER
Hi, please schedule him for a nurse visit for today for flu and covid swabs  Thank you, Lester Awan

## 2023-12-21 LAB
CTP QC/QA: YES
CTP QC/QA: YES
FLUAV AG NPH QL: POSITIVE
FLUBV AG NPH QL: NEGATIVE
SARS-COV-2 RDRP RESP QL NAA+PROBE: POSITIVE

## 2023-12-21 NOTE — PROGRESS NOTES
Pt here for covid and flu swab. 2 pt identifiers used. Both test are positive, results entered and MD Awan notified.

## 2023-12-21 NOTE — TELEPHONE ENCOUNTER
Hi, please input the test results from yesterday for his covid and flu tests, please email him back when they have been posted.  Thank you, Lester Awan

## 2024-01-16 DIAGNOSIS — L80 VITILIGO: ICD-10-CM

## 2024-01-16 RX ORDER — TACROLIMUS 1 MG/G
OINTMENT TOPICAL 2 TIMES DAILY
Qty: 60 G | Refills: 0 | Status: SHIPPED | OUTPATIENT
Start: 2024-01-16

## 2024-01-23 ENCOUNTER — PATIENT MESSAGE (OUTPATIENT)
Dept: OPTOMETRY | Facility: CLINIC | Age: 49
End: 2024-01-23
Payer: COMMERCIAL

## 2024-01-29 ENCOUNTER — CLINICAL SUPPORT (OUTPATIENT)
Dept: REHABILITATION | Facility: HOSPITAL | Age: 49
End: 2024-01-29
Payer: COMMERCIAL

## 2024-01-29 DIAGNOSIS — I89.0 LYMPHEDEMA OF BOTH LOWER EXTREMITIES: ICD-10-CM

## 2024-01-29 DIAGNOSIS — R60.0 EDEMA OF BOTH LOWER EXTREMITIES: Primary | ICD-10-CM

## 2024-01-29 PROCEDURE — 97162 PT EVAL MOD COMPLEX 30 MIN: CPT

## 2024-01-29 PROCEDURE — 97535 SELF CARE MNGMENT TRAINING: CPT

## 2024-01-29 NOTE — PROGRESS NOTES
See evaluation in POC for goals and assessment     Eval Date: 01/31/2024    Tonya Nobles, PT, DPT, CLT

## 2024-02-01 NOTE — PLAN OF CARE
[facility]  Physical Therapy Initial Evaluation    Name: Jesús Patel  Clinic Number: [id number]    Therapy Diagnosis:   Encounter Diagnoses   Name Primary?    Lymphedema of both lower extremities     Edema of both lower extremities Yes     Physician: Davonte Lance MD*    Physician Orders: PT Eval and Treat - lymphedema  Medical Diagnosis from Referral: I89.0 (ICD-10-CM) - Lymphedema, not elsewhere classified  Evaluation Date: 1/29/2024  Authorization Period Expiration: 12/3/2024  Plan of Care Expiration: 3/5/2024  Visit # / Visits authorized: 1/ 1    Time In: 3:30pm   Time Out: 4:30pm   Total Billable Time: 60 minutes    Precautions: Standard    Subjective   Date of onset: 2 years ago   History of current condition - Jesús reports: He had varicose veins, he was hospitalized and put on blood thinners but he was taken off of them in November. He was referred to the Lymphedema clinic and last year but the clinic was busy. He was contacted about a pump but was denied.     Pt denies CHF, KF, DM and CA.   Fluid pill: No  Blood thinner: No     Medical History:   Past Medical History:   Diagnosis Date    Anticoagulant long-term use     Asthma     GERD (gastroesophageal reflux disease)     Hyperlipidemia     Hypertension     Liver disease     Low back pain     Morbid obesity with BMI of 50.0-59.9, adult     JARAD (obstructive sleep apnea)     Pre-diabetes     Varicose veins     Vitamin D deficiency        Surgical History:   Jesús Patel  has a past surgical history that includes LASIK; Eye surgery; Adenoidectomy; Laparoscopic gastric banding (2002); Gastrectomy; Laparoscopic repair of recurrent incarcerated incisional hernia (N/A, 6/21/2018); Esophagogastroduodenoscopy (N/A, 2/11/2022); and Colonoscopy (N/A, 2/11/2022).    Medications:   Jesús has a current medication list which includes the following prescription(s): albuterol, amlodipine, ammonium  How Severe Are Your Spot(S)?: mild Have Your Spot(S) Been Treated In The Past?: has not been treated lactate, aspirin, atorvastatin, budesonide-formoterol 80-4.5 mcg, cetirizine, diphenhydramine, erythromycin with ethanol, fluticasone propionate, hydrochlorothiazide, meloxicam, omeprazole, salicylic acid, tacrolimus, tadalafil, and zolpidem.    Allergies:   Review of patient's allergies indicates:  No Known Allergies     Imaging,         There is no evidence of a right lower extremity DVT.    There is no evidence of a left lower extremity DVT.    The left superficial femoral middle vein is normal.    Surgery: none  Radiation:  none  Chemotherapy: none   Previous Lymphedema Treatment: none  Prior Therapy: yes  Social History: Lives with brother    Environmental barriers: Steps and stairs in home      Abuse/Neglect: Pt shows no visible signs of abuse/neglect   Nutritional status: Pt reports no nutritional needs    Educational needs: Pt reports no educational needs    Spiritual/Cultural: Pt reports no spiritual/ cultural needs     Fall risk: none recent    Occupation:    Prior Level of Function: Independent   Current Level of Function: independent   Gait: none AD    Transfers: independent    Bed Mobility: independent      Pain and Swelling:   Current 0/10, worst 5/10, best 0/10   Location: L leg, medial thigh  Description: Aching  Aggravating Factors: sitting too much   Easing Factors: movement, hot compress, elevation     Pts goals: unsure     Objective       Amount of Swelling/Location of Swelling: moderate swelling LLE    Skin Integrity: varicosites B lower legs    Palpation/Texture: non pitting edema    - B Stemmer Sign  - B Lili's Sign  Circulation: intact      Posture: FHP      Strength: functional screen of AROM against gravity and sit to stand transfers       Sensation:  intact to lt touch B LE    Girth Measurements (in centimeters)    CMS Impairment/Limitation/Restriction for FOTO LE edema Survey  Limitation: 83%    Therapist reviewed FOTO scores for Jesús Patel on  1/29/2024.   FOTO documents entered into [employer] - see Media section.       TREATMENT     Total Treatment time separate from Evaluation: 30 minutes    Self Care/Home Management training for 30 minutes including:    Pt was educated in potential compression needs.  Demo of products including socks, garments, and Inelastic Velcro wraps.   Discussed cost/coverage and authorization per insurance with Durable Medical Equipment(DME) provider.  Compression require orders from referring provider and coverage or purchase of products from DME or self order.      Discussed wear schedule, don/doff, wash and management of products.  Size and compression class and AM/PM needs.    Consideration for tubigrip as form of temporary compression use until securing compression needs.   Consideration for shorts/tights or capris style compression to compliment lower leg compression to support size/shape of LE.       Informed insurance coverage of compression is per DME provider and typically Medicare and Medicare group plans may not cover cost beyond pair of standard sized knee high garments.   Commitment to attendance as well as commitment to securing compression needs is critical to edema management.      Home Exercises and Patient Education Provided  Education provided:   - lymph booklet   - Pt was educated in lymphedema etiology and management plans.  Pt was provided with written risk reductions and precautions for managing lymphedema.      This patient is in agreement to participate in Lymphedema treatment.    Written Home Exercises Provided: yes.  Exercises were reviewed and Jesús was able to demonstrate them prior to the end of the session.  Jesús demonstrated good  understanding of the education provided.     See EMR under Patient Instructions for exercises provided 1/29/2024.    Assessment   Jesús is a 48 y.o. male referred to outpatient Physical Therapy with a medical diagnosis of I89.0 (ICD-10-CM) - Lymphedema, not elsewhere  Hpi Title: Evaluation of Skin Lesions Year Removed: 1900 classified. This patient presents s/p thrombophlebitis    resulting in: lymphedema of the LLE, increased pain , compression needs, and placing the pt at higher risk of infection.     Patient presents with LLE swelling with B varicosites and intact skin. Patient is in agreement to focus on home management. Pt was educated on lymphedema causes and physiology, infection signs and symptoms, complete decongestive therapy and its components, and expectations for therapy. Pt was also educated on the need for some type of compression.       Pt prognosis is Good.   Pt will benefit from skilled outpatient Physical Therapy to address the deficits stated above and in the chart below, provide pt/family education, and to maximize pt's level of independence.     Plan of care discussed with patient: Yes  Pt's spiritual, cultural and educational needs considered and patient is agreeable to the plan of care and goals as stated below:     Medical Necessity is demonstrated by the following  History  Co-morbidities and personal factors that may impact the plan of care [] LOW: no personal factors / co-morbidities  [x] MODERATE: 1-2 personal factors / co-morbidities  [] HIGH: 3+ personal factors / co-morbidities    Moderate / High Support Documentation:     thrombophlebitis      Examination  Body Structures and Functions, activity limitations and participation restrictions that may impact the plan of care [] LOW: addressing 1-2 elements  [x] MODERATE: 3+ elements  [] HIGH: 4+ elements (please support below)    Moderate / High Support Documentation: leg swelling, varicosites      Clinical Presentation [] LOW: stable  [x] MODERATE: Evolving  [] HIGH: Unstable     Decision Making/ Complexity Score: moderate       Anticipated Barriers for therapy: none     The following goals were discussed with the patient and patient is in agreement with them as to be addressed in the treatment plan.     Short Term Goals: (6 weeks)  1. Patient will show  decreased girth in L  LE by up to 1 cm to allow for LE symmetry, shoe and clothing choice, and ability to apply needed compression.  (progressing, not met)   2. Patient will demonstrate 100% knowledge of lymphedema precautions and signs of infection to allow for reduced lymphedema risk, infection risk, and/or exacerbation of condition.  (progressing, not met)  3. Patient or caregiver will perform self-bandaging techniques and/or wearing of compression garments to allow for lymphatic drainage support, skin elasticity, and reduction in shape and size of limb. (progressing, not met)  4. Patient will perform self lymph drainage techniques to areas within reach to enhance lymphatic drainage and skin condition.  (progressing, not met)  5. Patient will tolerate daily activities with multilayered bandaging to allow for lymphatic and venous support.  (progressing, not met)    Long Term Goals: (12  weeks)  1. Patient will show decreased girth in L LE by up to 2 cm  to allow for LE symmetry, shoe and clothing choice, and ability to apply needed compression daily.  (progressing, not met)  2. Patient will show reduction in density to mild or less with improved contour of limb to allow for cosmesis, LE symmetry, infection risk reduction, and clothing and compression choice.   (progressing, not met)  3. Patient to sudhir/doff compression garment with daily compliance to assist in lymphedema management, skin elasticity, and tissue density.  (progressing, not met)  4. Pt to show improved postural awareness and alignment.  (progressing, not met)  5. Pt to be I and compliant with HEP to allow for increased function in affected limb.   (progressing, not met)  Plan   Plan of care Certification: 1/29/2024 to 3/5/2024.    Outpatient Physical Therapy 2 times weekly for 6 weeks to include the following interventions: Gait Training, Manual Therapy, Neuromuscular Re-ed, Patient Education, Self Care, Therapeutic Activities, and Therapeutic  Exercise. Complete Decongestive Therapy- compression and home equipment needs to be addressed and assisted.    Pt may be seen by a PTA as part of the Rehab treatment team.  Plan of Care was discussed with YUE Clement, LAURA

## 2024-03-08 ENCOUNTER — HOSPITAL ENCOUNTER (OUTPATIENT)
Dept: RADIOLOGY | Facility: HOSPITAL | Age: 49
Discharge: HOME | End: 2024-03-08
Attending: NURSE PRACTITIONER
Payer: COMMERCIAL

## 2024-03-08 ENCOUNTER — PATIENT MESSAGE (OUTPATIENT)
Dept: CARDIOLOGY | Facility: CLINIC | Age: 49
End: 2024-03-08
Payer: COMMERCIAL

## 2024-03-08 DIAGNOSIS — K76.0 FATTY LIVER: ICD-10-CM

## 2024-03-08 DIAGNOSIS — K74.00 LIVER FIBROSIS: ICD-10-CM

## 2024-03-08 PROCEDURE — 76705 ECHO EXAM OF ABDOMEN: CPT | Mod: TC

## 2024-03-08 PROCEDURE — 76705 ECHO EXAM OF ABDOMEN: CPT | Mod: 26,,, | Performed by: STUDENT IN AN ORGANIZED HEALTH CARE EDUCATION/TRAINING PROGRAM

## 2024-03-15 ENCOUNTER — OFFICE VISIT (OUTPATIENT)
Dept: HEPATOLOGY | Facility: CLINIC | Age: 49
End: 2024-03-15
Payer: COMMERCIAL

## 2024-03-15 ENCOUNTER — PATIENT MESSAGE (OUTPATIENT)
Dept: HEPATOLOGY | Facility: CLINIC | Age: 49
End: 2024-03-15

## 2024-03-15 VITALS — WEIGHT: 315 LBS | HEIGHT: 68 IN | BODY MASS INDEX: 47.74 KG/M2

## 2024-03-15 DIAGNOSIS — I10 ESSENTIAL HYPERTENSION: ICD-10-CM

## 2024-03-15 DIAGNOSIS — K74.00 LIVER FIBROSIS: Primary | ICD-10-CM

## 2024-03-15 DIAGNOSIS — Z23 NEED FOR HEPATITIS A AND B VACCINATION: ICD-10-CM

## 2024-03-15 DIAGNOSIS — K76.6 PORTAL HYPERTENSION: ICD-10-CM

## 2024-03-15 DIAGNOSIS — R73.03 PREDIABETES: ICD-10-CM

## 2024-03-15 DIAGNOSIS — E66.01 MORBID OBESITY WITH BMI OF 50.0-59.9, ADULT: ICD-10-CM

## 2024-03-15 DIAGNOSIS — K76.0 FATTY LIVER: ICD-10-CM

## 2024-03-15 PROCEDURE — 1160F RVW MEDS BY RX/DR IN RCRD: CPT | Mod: CPTII,95,, | Performed by: NURSE PRACTITIONER

## 2024-03-15 PROCEDURE — 3008F BODY MASS INDEX DOCD: CPT | Mod: CPTII,95,, | Performed by: NURSE PRACTITIONER

## 2024-03-15 PROCEDURE — 1159F MED LIST DOCD IN RCRD: CPT | Mod: CPTII,95,, | Performed by: NURSE PRACTITIONER

## 2024-03-15 PROCEDURE — 99214 OFFICE O/P EST MOD 30 MIN: CPT | Mod: 95,,, | Performed by: NURSE PRACTITIONER

## 2024-03-15 NOTE — PROGRESS NOTES
[facility] VISIT FOLLOW UP NOTE    PCP: Lester Awan MD     CHIEF COMPLAINT:  fatty liver, liver fibrosis/cirrhosis ?, portal HTN on TJ biopsy     HPI: This is a 48 y.o. White male with PMH noted below, presenting for follow up of above    Serological workup was negative for Maximo's, alpha-1 antitrypsin deficiency, hemochromatosis, autoimmune etiology, and viral hepatitis A and B.   + Hep C ab in 2020, previous RNA in 2019 and 2022 negative. No hep C treatment   PETH 190s  4/2022. Negative 8/2023 on repeat     Prior serologic workup:   Lab Results   Component Value Date    SMOOTHMUSCAB Negative 1:40 04/05/2022    AMAIFA Negative 1:40 04/05/2022    IGGSERUM 1166 04/05/2022    ANASCREEN Negative <1:80 04/05/2022    FERRITIN 202 04/05/2022    FESATURATED 30 04/05/2022    CERULOPLSM 32.0 04/05/2022    HEPBSAG Non-reactive 08/04/2023    HEPCAB Equivocal (A) 08/04/2023    HEPAIGM Non-reactive 08/04/2023       Liver fibrosis staging:  -- MRI elasto 4/2022 noted mild steatosis, no fibrosis staging possible given body habitus  -- MRI elasto 4/2023 noted F3-F4 fibrosis  -- liver biopsy 9/2023 noted mild PH, fatty liver, F2 fibrosis   Given portal HTN on TJ biopsy, will follow as if has advanced fibrosis although unclear     Risk factors for NAFLD include morbid obesity, HTN, HLD, preDM    Interval HPI: Presents today alone. Doing great with lifestyle changes! Continues to lose weight, current wt 340 lbs. Trying to see if GLP-1 is covered by insurance  No alcohol since 4/2023, great accomplishment!  Portal pressures on TJ liver biopsy suggest mild portal HTN (HVPG =8). Liver biopsy notes minimal steatosis, F2 fibrosis  Avoiding carbs, trying to eat smaller portions  No fried/fast foods   Liver enzymes normalized without alcohol and with weight loss   Started Lipitor with cardiology 8/2023, liver enzymes normal on repeat after   No s/s of hepatic decompensation: no ascites, HE or h/o variceal bleeding      Labs done 3/2024 show normalized transaminase levels (previously significantly elevated since 11/[id number])  Platelets WNL, alk phos WNL  Synthetic liver functioning WNL    Lab Results   Component Value Date    ALT 17 03/08/2024    AST 21 03/08/2024    ALKPHOS 115 03/08/2024    BILITOT 0.5 03/08/2024    ALBUMIN 3.6 03/08/2024    INR 1.0 03/08/2024     03/08/2024     MELD 3.0: 6 at 3/8/2024  7:59 AM  MELD-Na: 6 at 3/8/2024  7:59 AM  Calculated from:  Serum Creatinine: 0.7 mg/dL (Using min of 1 mg/dL) at 3/8/2024  7:59 AM  Serum Sodium: 139 mmol/L (Using max of 137 mmol/L) at 3/8/2024  7:59 AM  Total Bilirubin: 0.5 mg/dL (Using min of 1 mg/dL) at 3/8/2024  7:59 AM  Serum Albumin: 3.6 g/dL (Using max of 3.5 g/dL) at 3/8/2024  7:59 AM  INR(ratio): 1.0 at 3/8/2024  7:59 AM  Age at listing (hypothetical): 48 years  Sex: Male at 3/8/2024  7:59 AM  MELD 6    Cirrhosis Health Maintenance:   -- Last EGD 2/2022 noted no  Varices, Due for next EGD 2/2024, sarah eddy phone call scheduled   -- Due for next colonoscopy : 2032  -- HCC screening   MRI no lesions, next due 9/2024   AFP  WNL, next due 9/2024  Lab Results   Component Value Date    AFP <2.0 03/08/2024       Denies family history of liver disease. + previous alcohol use  Social History     Substance and Sexual Activity   Alcohol Use Not Currently    Comment: stopped 4/2023         Immunity to Hep A and B - needs to repeat twinrix, sent to [facility] and [facility]       Allergy and medication list reviewed and updated     PMHX:  has a past medical history of Anticoagulant long-term use, Asthma, GERD (gastroesophageal reflux disease), Hyperlipidemia, Hypertension, Liver disease, Low back pain, Morbid obesity with BMI of 50.0-59.9, adult, JARAD (obstructive sleep apnea), Pre-diabetes, Varicose veins, and Vitamin D deficiency.    PSHX:  has a past surgical history that includes LASIK; Eye surgery; Adenoidectomy; Laparoscopic gastric banding (2002); Gastrectomy; Laparoscopic  repair of recurrent incarcerated incisional hernia (N/A, 6/21/2018); Esophagogastroduodenoscopy (N/A, 2/11/2022); and Colonoscopy (N/A, 2/11/2022).    FAMILY HISTORY: Updated and reviewed in [facility]    SOCIAL HISTORY:   Social History     Substance and Sexual Activity   Alcohol Use Not Currently    Comment: stopped 4/2023       Social History     Substance and Sexual Activity   Drug Use No       ROS:   GENERAL: Denies fatigue  CARDIOVASCULAR: Denies edema  GI: Denies abdominal pain  SKIN: Denies rash, itching   NEURO: Denies confusion, memory loss, or mood changes    PHYSICAL EXAM:   Friendly White male, in no acute distress; alert and oriented to person, place and time  VITALS: There were no vitals taken for this visit.  EYES: Sclerae anicteric  GI: Soft, non-tender, non-distended. No ascites.  EXTREMITIES:  No edema.  SKIN: Warm and dry. No jaundice. No telangectasias noted. No palmar erythema.  NEURO:  No asterixis.  PSYCH:  Thought and speech pattern appropriate. Behavior normal      EDUCATION:  See instructions discussed with patient in Instructions section of the After Visit Summary     ASSESSMENT & PLAN:  48 y.o. White male with:  1.  Liver fibrosis/ cirrhosis?  -- TJ liver biopsy portal pressures noted mild portal HTN, which would be likely with cirrhosis, so it is possible that the biopsy of F2 understaged fibrosis, will repeat MRI elasto with next visit to reassess given significant lifestyle changes (no alcohol, weight loss)    2. Fatty liver   -- likely given risk factors (previous alcohol, multiple metabolic risk factors) although minimal percentage on biopsy, possible burnout versus improvement after alcohol cessation   - see HPI  -- Immunity to Hep A and B : see HPI  -- Recommendations discussed with patient:  Limit alcohol consumption, continue complete abstinence   2 Weight loss goal of  lbs  3. Low carb/sugar, high fiber and protein diet, limit your carb intake to LESS than 30-45 grams of carbs  with a meal or LESS than 5-10 grams with any snack   4. Exercise, 5 days per week, 30 minutes per day, as tolerated  5. Recommend good cholesterol, blood pressure, blood sugar levels   Discussed new medication for treatment of F2-F3 liver scarring due to fatty liver. Limited information currently but can consider it in the future if meets indications. Will continue to assess     3. Previously Elevated liver enzymes  -- likely due to alcohol/fatty liver since they normalized with abstinence    4. Portal HTN (?)  -- HVPG = 8 on TJ liver biopsy   -- no ascites  -- no splenomegaly or thrombocytopenia  -- EGD next 2/2024, ordered and triage phone call scheduled     4. Morbid obesity, HTN, HLD, preDM  -- pt to look into insurance coverage of GLP-1  Body mass index is 51.7 kg/m².  -- can increase risk of fatty liver        Follow up in about 6 months (around 9/15/2024). Virtual visit with US and labs before   Orders Placed This Encounter   Procedures    MR Elastography    Hepatitis A / Hepatitis B Combined Vaccine (IM)    AFP Tumor Marker    CBC Without Differential    Comprehensive Metabolic Panel    Phosphatidylethanol (PETH)    Protime-INR    Ambulatory referral/consult to Endo Procedure         Thank you for allowing me to participate in the care of Abilio GuyCM Rodriguez    I spent a total of 30 minutes on the day of the visit.This includes face to face time and non-face to face time preparing to see the patient (eg, review of tests), obtaining and/or reviewing separately obtained history, documenting clinical information in the electronic or other health record, independently interpreting results and communicating results to the patient/family/caregiver, and coordinating care.         CC'ed note to:

## 2024-03-15 NOTE — PATIENT INSTRUCTIONS
WEIGHT LOSS  I recommend looking into medication to help you lose weight    Call your insurance and ask if they cover weight loss or cover the 2 more effective  medications for weight loss: Wegovy or Zepbound. To better figure out if the weight loss medications are covered, Call your insurance company and ask them to run a test claim for the 2 more effective medications FDA approved for weight loss to see if either one is covered. It is Wegovy 0.25 mg weekly or Zepbound 2.5 mg weekly (they just need an example dose to run a test claim).   If your insurance covers these meds for weight loss, you can ask your PCP if they will prescribe it or you can establish care with the [facility] (let me know if you want a referral)  I recommend using an [facility] pharmacy if you use your PCP or the [facility] weight loss clinic (through your insurance) because they will do any prior authorization or appeal paperwork that your insurance may require   If your insurance company does not cover weight loss, you can call your work HR department (if it is a work policy) and ask for a weight loss rider to be added to the company's insurance plan (I have had people successful with this)  5. If your insurance does not cover the above/weight loss meds, then you can look into local weight loss clinics who prescribe those 2 mesd from compound pharmacies without using your insurance (cash pay). The price ranges from $175-400 per month depending on the med and dose (some places our patients have gone is [facility] in [place], [facility] in [place], but there are many others if you search online)     Let me know if you want me to put in the referral for bariatric medicine, their phone number is   [phone] to schedule an appt       FATTY LIVER    These things are important to improve fatty liver:  Limit alcohol consumption, continue to abstain   2 Weight loss goal of 50 lbs. [facility] offers benefits to  patients so let me know if you want a referral to the [facility] gym. Also, [facility] has dieticians that can create a weight loss plan. If you are interested let me know and I can place a referral. If so, contact the dietician team at [facility] at email [email or web address] or call [phone] to get scheduled. They do offer video visits   3. Low carb/sugar and high protein diet (~1 g/kg/day of protein).Try to limit your carb intake to LESS than 30-45 grams of carbs with a meal or LESS than 5-10 grams with any snack (total of any snack foods eaten during that time). Use MyFitness Pal or Lose It george to add up your carbs through the day. Do NOT drink any beverages with calories or carbs (this can lead to high blood sugar and weight gain). The main thing to focus on is low calorie, high protein, low carb)  4. Exercise, 5 days per week, 30 minutes per day, as tolerated  5. Recommend good cholesterol, blood pressure, blood sugar levels   6. There is a new medication called Rezdiffra for the treatment of F2-F3 liver scarring due to fatty liver. We have limited information currently but we can consider it in the future if you have stage 2-3 scar tissue and meet indications for treatment        In some people, fatty liver can progress to steatohepatitis (inflamatory fatty liver) and possibly to cirrhosis, increasing the risk for liver cancer, liver failure, and death. Therefore, the lifestyle changes are very important to decrease this risk.       CIRRHOSIS  This is a web site that you may find helpful about cirrhosis : https://cirrhosiscare.ca/    Because you have cirrhosis, it is important to attend clinic visits every 6 months with an Ultrasound and blood tests every 6 months to screen for liver cancer (you are at risk of developing liver cancer due to scar tissue in the liver)    Signs and symptoms of worsening liver disease include jaundice, fluid in the belly (ascites), and  confusion/disorientation/slowed thought processes due to hepatic encephalopathy (toxins building up because of liver problems).   You should seek medical attention if any of these things occur.    Also, possible bleeding from esophageal varices (blood vessels in the stomach and foodpipe can burst and cause fatal bleeding).  Therefore, if you have symptoms of vomiting blood, blood in your stool, dark or black stools or vomiting coffee ground vomit, YOU SHOULD GO TO THE EMERGENCY ROOM IMMEDIATELY.     Cirrhosis can increase the risk of liver cancer, liver failure, and death. However, we will watch your liver function score (MELD score) closely with each clinic visit. A normal MELD score is 6, highest is 40. Your last one was an 6. We will check this with every clinic visit. A MELD 15 or higher is when we start to consider transplant because MELD 15 or higher indicates that the liver is not functioning as well     Cirrhosis Counseling  - NO alcohol use (includes beer, wine, and/or liquor)  - avoid non-steroidal anti-inflammatory drugs (NSAIDs) such as ibuprofen, Motrin, naprosyn, Alleve due to the risk of kidney damage  - can take acetaminophen (Tylenol), no more than 2000 mg per day  - low sodium (salt) 2 gram per day diet  - high protein diet: 150 grams per day to prevent muscle mass loss. Drink at least 1 protein shake daily (Premier Protein is best option because it is very high protein and low sugar). Ok to use this as nighttime snack to fit it in   - resistance exercises for muscle strength  - avoid raw seafoods due to the risk of fatal Vibrio vulnificus infection  - ultrasound of the liver every 6 months for liver cancer screening (you are at risk of developing liver cancer due to scar tissue in the liver)  - Upper endoscopy every 1-2 years to screen for varices in the stomach and foodpipe which can burst and cause fatal bleeding

## 2024-04-03 ENCOUNTER — E-VISIT (OUTPATIENT)
Dept: INTERNAL MEDICINE | Facility: CLINIC | Age: 49
End: 2024-04-03
Payer: COMMERCIAL

## 2024-04-03 ENCOUNTER — PATIENT MESSAGE (OUTPATIENT)
Dept: INTERNAL MEDICINE | Facility: CLINIC | Age: 49
End: 2024-04-03

## 2024-04-03 DIAGNOSIS — J32.9 SINUSITIS, UNSPECIFIED CHRONICITY, UNSPECIFIED LOCATION: Primary | ICD-10-CM

## 2024-04-03 PROCEDURE — 99422 OL DIG E/M SVC 11-20 MIN: CPT | Mod: ,,, | Performed by: INTERNAL MEDICINE

## 2024-04-03 RX ORDER — AMOXICILLIN AND CLAVULANATE POTASSIUM 875; 125 MG/1; MG/1
1 TABLET, FILM COATED ORAL EVERY 12 HOURS
Qty: 14 TABLET | Refills: 0 | Status: SHIPPED | OUTPATIENT
Start: 2024-04-03 | End: 2024-04-10

## 2024-04-03 NOTE — PROGRESS NOTES
Patient ID: Jessú Patel is a 48 y.o. male.    Chief Complaint: URI (Entered automatically based on patient selection in Patient Portal.)    The patient initiated a request through [facility] on 4/3/2024 for evaluation and management with a chief complaint of URI (Entered automatically based on patient selection in Patient Portal.)     I evaluated the questionnaire submission on 04/04/2024  .    [facility] Peq Evisit Upper Respitatory/Cough Questionnaire    4/3/2024  7:46 AM CDT - Filed by Patient   Do you agree to participate in an E-Visit? Yes   If you have any of the following symptoms, please present to your local ER or call 911:  I acknowledge   What is the main issue you would like addressed today? Sinus infection   Are you able to take your vital signs? No   What symptoms do you currently have?  Headache;  Nasal Congestion;  New loss of taste or smell;  Muscle or body aches;  Sore throat;  Pain around the nose and face   Have you had any of the following? Difficulty swallowing   Please enter a few details about your swallowing, breathing, or visual problems. Like a sore throat in the morning   Have you ever smoked? I have never smoked   Have you had a fever? No   When did your symptoms first appear? 3/27/2024   In the last two weeks, have you been in close contact with someone who has COVID-19 or the Flu? No   In the last two weeks, have you worked or volunteered in a healthcare facility or as a ? Healthcare facilities include a hospital, medical or dental clinic, long-term care facility, or nursing home No   Do you live in a long-term care facility, nursing home, group home, or homeless shelter? No   List what you have done or taken to help your symptoms. Nothing   How severe are your symptoms? Severe   Have your symptoms improved since they first appeared? Worse   Have you taken an at home Covid test? Yes   What were the results? Negative   Have you taken a Flu test? No   Have you been  fully vaccinated for COVID? (2 Pfizer, 2 Moderna or 1 Jean & Jean vaccine injections) Yes   Have you received a booster? Yes   Have you recieved a Flu shot? Yes   When did you recieve your Flu shot? 12/28/2023   Do you have transportation to get tested for COVID if it is indicated and ordered for you at an [facility] location? Yes   Provide any additional information you feel is important.    Please attach any relevant images or files          Encounter Diagnosis   Name Primary?    Sinusitis, unspecified chronicity, unspecified location Yes    Augmentin sent in  Explained that if not better in 1-2 weeks, pt should rtc/call PCP      No orders of the defined types were placed in this encounter.           No follow-ups on file.      E-Visit Time Tracking:    Day 1 Time (in minutes): 12    Total Time (in minutes): 12

## 2024-04-22 ENCOUNTER — TELEPHONE (OUTPATIENT)
Dept: ENDOSCOPY | Facility: HOSPITAL | Age: 49
End: 2024-04-22

## 2024-04-22 ENCOUNTER — CLINICAL SUPPORT (OUTPATIENT)
Dept: ENDOSCOPY | Facility: HOSPITAL | Age: 49
End: 2024-04-22
Payer: COMMERCIAL

## 2024-04-22 ENCOUNTER — OFFICE VISIT (OUTPATIENT)
Dept: CARDIOLOGY | Facility: CLINIC | Age: 49
End: 2024-04-22
Payer: COMMERCIAL

## 2024-04-22 VITALS
DIASTOLIC BLOOD PRESSURE: 71 MMHG | BODY MASS INDEX: 47.74 KG/M2 | SYSTOLIC BLOOD PRESSURE: 122 MMHG | HEIGHT: 68 IN | WEIGHT: 315 LBS | HEART RATE: 72 BPM

## 2024-04-22 VITALS — HEIGHT: 68 IN | WEIGHT: 315 LBS | BODY MASS INDEX: 47.74 KG/M2

## 2024-04-22 DIAGNOSIS — I10 ESSENTIAL HYPERTENSION: ICD-10-CM

## 2024-04-22 DIAGNOSIS — G47.33 OBSTRUCTIVE SLEEP APNEA SYNDROME: ICD-10-CM

## 2024-04-22 DIAGNOSIS — I89.0 LYMPHEDEMA OF BOTH LOWER EXTREMITIES: ICD-10-CM

## 2024-04-22 DIAGNOSIS — K76.6 PORTAL HYPERTENSION: ICD-10-CM

## 2024-04-22 DIAGNOSIS — R60.0 EDEMA OF BOTH LOWER EXTREMITIES: Primary | ICD-10-CM

## 2024-04-22 DIAGNOSIS — L03.116 CELLULITIS OF LEFT LOWER EXTREMITY: ICD-10-CM

## 2024-04-22 DIAGNOSIS — E66.01 MORBID OBESITY WITH BMI OF 50.0-59.9, ADULT: ICD-10-CM

## 2024-04-22 DIAGNOSIS — E78.49 OTHER HYPERLIPIDEMIA: ICD-10-CM

## 2024-04-22 DIAGNOSIS — I82.812 ACUTE SUPERFICIAL VENOUS THROMBOSIS OF LEFT LOWER EXTREMITY: ICD-10-CM

## 2024-04-22 DIAGNOSIS — M79.605 LEFT LEG PAIN: ICD-10-CM

## 2024-04-22 PROCEDURE — 99215 OFFICE O/P EST HI 40 MIN: CPT | Mod: S$GLB,,, | Performed by: INTERNAL MEDICINE

## 2024-04-22 PROCEDURE — 3074F SYST BP LT 130 MM HG: CPT | Mod: CPTII,S$GLB,, | Performed by: INTERNAL MEDICINE

## 2024-04-22 PROCEDURE — 99999 PR PBB SHADOW E&M-EST. PATIENT-LVL IV: CPT | Mod: PBBFAC,,, | Performed by: INTERNAL MEDICINE

## 2024-04-22 PROCEDURE — 3008F BODY MASS INDEX DOCD: CPT | Mod: CPTII,S$GLB,, | Performed by: INTERNAL MEDICINE

## 2024-04-22 PROCEDURE — 1159F MED LIST DOCD IN RCRD: CPT | Mod: CPTII,S$GLB,, | Performed by: INTERNAL MEDICINE

## 2024-04-22 PROCEDURE — 3078F DIAST BP <80 MM HG: CPT | Mod: CPTII,S$GLB,, | Performed by: INTERNAL MEDICINE

## 2024-04-22 RX ORDER — SILDENAFIL 25 MG/1
25 TABLET, FILM COATED ORAL DAILY PRN
Qty: 10 TABLET | Refills: 11 | Status: SHIPPED | OUTPATIENT
Start: 2024-04-22 | End: 2025-04-22

## 2024-04-22 NOTE — TELEPHONE ENCOUNTER
Spoke to pt to schedule procedure(s) Upper Endoscopy (EGD)       Physician to perform procedure(s) Dr. CHICO Heard  Date of Procedure (s) 6/14/24  Arrival Time 8:00 AM  Time of Procedure(s) 9:00 AM   Location of Procedure(s) [place] 2nd Floor  Type of Rx Prep sent to patient: N/A  Instructions provided to patient via [facility]    Patient was informed on the following information and verbalized understanding. Screening questionnaire reviewed with patient and complete. If procedure requires anesthesia, a responsible adult needs to be present to accompany the patient home, patient cannot drive after receiving anesthesia. Appointment details are tentative, especially check-in time. Patient will receive a prep-op call 7 days prior to confirm check-in time for procedure. If applicable the patient should contact their pharmacy to verify Rx for procedure prep is ready for pick-up. Patient was advised to call the scheduling department at [phone] if pharmacy states no Rx is available. Patient was advised to call the endoscopy scheduling department if any questions or concerns arise.      [facility] Scheduling Department

## 2024-04-22 NOTE — PROGRESS NOTES
[facility]       Chief Complaint   Patient presents with    Lymphedema of both lower extremities       Patient ID: Jesús Patel is a 48 y.o. male with BLE lymphedema, HTN, HLD, JARAD (on CPAP), morbid obesity s/p gastric bypass, who presents for a follow up appointment. Pertinent history/events are as follows:     -Pt kindly referred by Niall Mauricio PA-C for evaluation of thrombophlebitis of superficial veins of left lower extremity.    -At our initial clinic visit on 4/4/2022, Mr. Josie Patel reports left leg pain which started approximately 2 weeks ago.  On 3/27/2022 he presented to the ED for evaluation.  LLE Venous Ultrasound on 3/27/2022 revealed Left calf nonocclusive superficial thrombophlebitis.  There is no evidence of deep venous thrombosis in the left lower extremity.  He states the left leg pain has continued since that time.  Exam significant for LLE with erythema and multiple palpable cords with TTP.  Both  LE's with changes consistent with lymphedema.    Plan:   Left Leg Pain- Likely due to superficial venous thrombosis with cellulitis.  Check LLE venous reflux study today to rule out DVT and SVT in close proximity to the deep venous system.  If no evidence of DVT, treat with Xarelto 10 mg daily for 45 days.  Treat with doxycycline 100 mg bid for 21 days for cellulitis.  Pt to elevate legs when resting.    BLE Lymphedema- Plan to refer to lymphedema clinic after cellulitis has fully resolved.  Morbid Obesity s/p Gastric Bypass- Refer back to Bariatric Surgery for evaluation.  Encourage diet, exercise and weigh loss.  HTN- Continue current medications.  HLD- Check updated lipid panel    -Results Addendum 4/4/2022:  LLE venous ultrasound done today shows extensive superficial venous thrombosis extending to close proximity of the saphenofemoral junction.  This is very close to the deep venous system, therefore, will treat as a DVT.  Start Eliquis 10 mg twice  daily for the 1st 7 days, then reduce to 5 mg twice daily thereafter.  Repeat LLE venous ultrasound in 45 days.  Plan discussed in detail with Mr. Josie Patel, who voiced understanding.    -At follow up clinic visit on 5/18/2022, Mr. Josie Patel reports significant improvement in LLE edema and pain since starting Eliquis 5 mg bid.  He has no claudication or tissue loss.  He is enrolled in the digital HTN program.   Plan:   Left Leg Pain- Due to superficial venous thrombosis with cellulitis. Symptoms now significantly improved.  LLE venous ultrasound done today shows extensive superficial venous thrombosis extending to close proximity of the saphenofemoral junction.  This is very close to the deep venous system, therefore, will treat as a DVT.  Continue Eliquis 5 mg twice daily.  Repeat LLE venous ultrasound in 3 months.  BLE Lymphedema- Plan to refer to lymphedema clinic after cellulitis and superficial venous thrombosis has fully resolved.  Morbid Obesity s/p Gastric Bypass- Refer back to Bariatric Surgery for evaluation.  Encourage diet, exercise and weigh loss.  HTN- Continue current medications.  HLD- Check updated lipid panel.    -8/10/2023 clinic visit: Mr. Josie Patel reports no leg pain or edema.  He would like to stop taking Eliquis.   Plan:    Left Leg Pain- Due to superficial venous thrombosis with cellulitis. Symptoms now significantly improved.  Cellulitis has resolved.  LLE venous ultrasound done today shows extensive superficial venous thrombosis extending to close proximity of the saphenofemoral junction.  This is very close to the deep venous system, therefore, will treat as a DVT.  Check BLE venous ultrasound.  If not SVT or DVT, will stop Eliquis 5 mg twice daily.    BLE Lymphedema- Refer to lymphedema clinic.  Refer to lymphedema clinic. Recommend wearing graduated compression hose.  Limit sodium intake to 2000 mg daily.  Limit volume intake to 1.5 L daily.  Elevate legs when  resting.  Morbid Obesity s/p Gastric Bypass- Refer back to Bariatric Surgery for evaluation.  Encourage diet, exercise and weigh loss.  HTN- Continue current medications.  HLD- Start atorvastatin 80 mg daily.      -12/4/2023 clinic visit: Mr. Josie Patel reports no leg pain or edema.  BLE Venous Ultrasound on 8/29/2023 revealed no no evidence of a right or left lower extremity DVT or SVT.   He is on waiting list for lymphedema clinic therapy.  LDL 59 on 11/27/2023.  Plan:   Left Leg Pain- Due to superficial venous thrombosis with cellulitis. Symptoms now completely resolved.  BLE Venous Ultrasound on 8/29/2023 revealed no no evidence of a right or left lower extremity DVT or SVT.  OK to stop Eliquis.  Start ASA 81 mg daily.  BLE Lymphedema- Refer to lymphedema clinic (pt is on waiting list for lymphedema clinic therapy).  Recommend wearing graduated compression hose.  Limit sodium intake to 2000 mg daily.  Limit volume intake to 1.5 L daily.  Elevate legs when resting.  Morbid Obesity s/p Gastric Bypass- Pt has lost 50 pounds since clinic on 8/10/2023.  Encourage diet, exercise and weigh loss.  HTN- Continue current medications.  HLD- Continue atorvastatin 80 mg daily.      HPI:  Mr. Josie Patel reports doing well with no chest pain or SOB.  BLE edema is well controlled.  Pt is participating with lymphedema clinic therapy.      Past Medical History:   Diagnosis Date    Anticoagulant long-term use     Asthma     GERD (gastroesophageal reflux disease)     Hyperlipidemia     Hypertension     Liver disease     Low back pain     Morbid obesity with BMI of 50.0-59.9, adult     JARAD (obstructive sleep apnea)     Pre-diabetes     Varicose veins     Vitamin D deficiency      Past Surgical History:   Procedure Laterality Date    ADENOIDECTOMY      COLONOSCOPY N/A 2/11/2022    Procedure: COLONOSCOPY;  Surgeon: Vaughn Cortes MD;  Location: [facility] ([address] [place] [address]);  Service: Endoscopy;  Laterality: N/A;     ESOPHAGOGASTRODUODENOSCOPY N/A 2/11/2022    Procedure: EGD (ESOPHAGOGASTRODUODENOSCOPY);  Surgeon: Vaughn Cortes MD;  Location: [facility] (2ND FLR);  Service: Endoscopy;  Laterality: N/A;  BMI-55  Wt:378#-fully vacc-inst portal-tb  COVID screening 2/8/22 [facility]    EYE SURGERY      GASTRECTOMY      LAPAROSCOPIC GASTRIC BANDING  2002    In [place]: uncertain of brand/ size    LAPAROSCOPIC REPAIR OF RECURRENT INCARCERATED INCISIONAL HERNIA N/A 6/21/2018    Procedure: REPAIR-HERNIA-INCISIONAL-LAPAROSCOPIC WITH MESH;  Surgeon: Vaughn Parker Jr., MD;  Location: [facility] OR [address] [place] [address];  Service: General;  Laterality: N/A;    LASIK       Social History     Socioeconomic History    Marital status: Single   Tobacco Use    Smoking status: Former     Current packs/day: 0.00     Average packs/day: 0.3 packs/day for 5.0 years (1.3 ttl pk-yrs)     Types: Cigarettes     Start date: 1/1/2004     Quit date: 1/1/2009     Years since quitting: 15.3    Smokeless tobacco: Never   Substance and Sexual Activity    Alcohol use: Not Currently     Comment: stopped 4/2023    Drug use: No    Sexual activity: Not Currently   Social History Narrative    Works [employer].  Previously worked as a .  1 daughter, Yenni (18 years old). He is . Lives with brother. Not much exercise     Social Determinants of Health     Financial Resource Strain: Low Risk  (3/8/2024)    Overall Financial Resource Strain (CARDIA)     Difficulty of Paying Living Expenses: Not very hard   Food Insecurity: Food Insecurity Present (3/8/2024)    Hunger Vital Sign     Worried About Running Out of Food in the Last Year: Never true     Ran Out of Food in the Last Year: Sometimes true   Transportation Needs: No Transportation Needs (3/8/2024)    PRAPARE - Transportation     Lack of Transportation (Medical): No     Lack of Transportation (Non-Medical): No   Physical Activity: Insufficiently Active (3/8/2024)    Exercise Vital Sign      Days of Exercise per Week: 2 days     Minutes of Exercise per Session: 30 min   Stress: Stress Concern Present (3/8/2024)    [employer] [place] [employer] - Occupational Stress Questionnaire     Feeling of Stress : To some extent   Social Connections: Unknown (3/8/2024)    Social Connection and Isolation Panel [NHANES]     Frequency of Communication with Friends and Family: More than three times a week     Frequency of Social Gatherings with Friends and Family: Twice a week     Active Member of Clubs or Organizations: Yes     Attends Club or Organization Meetings: More than 4 times per year     Marital Status:    Housing Stability: Low Risk  (3/8/2024)    Housing Stability Vital Sign     Unable to Pay for Housing in the Last Year: No     Number of Places Lived in the Last Year: 0     Unstable Housing in the Last Year: No     Family History   Problem Relation Name Age of Onset    Hypertension Mother      Obesity Mother          s/p sleeve 2013: good results    Cancer Maternal Grandfather          colon cancer    Diabetes Paternal Grandmother      No Known Problems Father      No Known Problems Brother Baldo     Colon cancer Maternal Grandmother      Obesity Brother Ravin     No Known Problems Sister         Review of patient's allergies indicates:  No Known Allergies    Medication List with Changes/Refills   Current Medications    ALBUTEROL (VENTOLIN HFA) 90 MCG/ACTUATION INHALER    Inhale 2 puffs into the lungs every 6 (six) hours as needed for Wheezing or Shortness of Breath. Rescue    AMLODIPINE (NORVASC) 10 MG TABLET    Take 1 tablet (10 mg total) by mouth once daily.    AMMONIUM LACTATE 12 % CREA    Bid to thick right sole    ASPIRIN (ECOTRIN) 81 MG EC TABLET    Take 1 tablet (81 mg total) by mouth once daily.    ATORVASTATIN (LIPITOR) 80 MG TABLET    Take 1 tablet (80 mg total) by mouth once daily.    BUDESONIDE-FORMOTEROL 80-4.5 MCG (SYMBICORT) 80-4.5 MCG/ACTUATION HFAA    Inhale 2  puffs into the lungs once daily. Controller    CETIRIZINE (ZYRTEC) 10 MG TABLET    Take 10 mg by mouth as needed.    DIPHENHYDRAMINE (BENADRYL) 25 MG CAPSULE    Take 25 mg by mouth every 6 (six) hours as needed for Itching.    ERYTHROMYCIN WITH ETHANOL (EMGEL) 2 % GEL    Apply topically 2 (two) times daily. Prn neck break outs    FLUTICASONE PROPIONATE (FLONASE) 50 MCG/ACTUATION NASAL SPRAY    SPRAY 2 SPRAYS IN EACH NOSTRIL TWICE A DAY    HYDROCHLOROTHIAZIDE (HYDRODIURIL) 12.5 MG TAB    Take 1 tablet (12.5 mg total) by mouth once daily.    OMEPRAZOLE (PRILOSEC) 40 MG CAPSULE    Take 1 capsule (40 mg total) by mouth every morning.    SALICYLIC ACID 17 % GEL    Apply topically once daily. Apply focally to skin tag/warts, not normal skin, and cover with tape or Band Aid until scab forms.  Allow for healing and watch for parts of tag/wart to fall off.  For warts okay to peel away dead skin painlessly.  Repeat as needed until tag/wart completely falls off.    TACROLIMUS (PROTOPIC) 0.1 % OINTMENT    APPLY TOPICALLY 2 (TWO) TIMES DAILY. TO WHITE PATCH OF THE HAND    TADALAFIL (CIALIS) 20 MG TAB    Use one tablet every 36 hours    ZOLPIDEM (AMBIEN CR) 12.5 MG CR TABLET    Take 1 tablet (12.5 mg total) by mouth nightly as needed for Insomnia.   Discontinued Medications    MELOXICAM (MOBIC) 15 MG TABLET    Take 1 tablet (15 mg total) by mouth once daily.       Review of Systems  Constitution: Denies chills, fever, and sweats.  HENT: Denies headaches or blurry vision.  Cardiovascular: Denies chest pain or irregular heart beat.  Respiratory: Denies cough or shortness of breath.  Gastrointestinal: Denies abdominal pain, nausea, or vomiting.  Musculoskeletal: Negative for left leg pain and redness.  Neurological: Denies dizziness or focal weakness.  Psychiatric/Behavioral: Normal mental status.  Hematologic/Lymphatic: Denies bleeding problem or easy bruising/bleeding.  Skin: Denies rash or suspicious lesions    Physical  "Examination  /71   Pulse 72   Ht 5' 8" (1.727 m)   Wt (!) 158.1 kg (348 lb 8.8 oz)   BMI 53.00 kg/m²     Constitutional: No acute distress, conversant  HEENT: Sclera anicteric, Pupils equal, round and reactive to light, extraocular motions intact, Oropharynx clear  Neck: No JVD, no carotid bruits  Cardiovascular: regular rate and rhythm, no murmur, rubs or gallops, normal S1/S2  Pulmonary: Clear to auscultation bilaterally  Abdominal: Abdomen soft, nontender, nondistended, positive bowel sounds  Extremities: Both  LE's with changes consistent with lymphedema and prominent varicose veins.    Pulses:  Carotid pulses are 2+ on the right side, and 2+ on the left side.  Radial pulses are 2+ on the right side, and 2+ on the left side.   Femoral pulses are 2+ on the right side, and 2+ on the left side.  Popliteal pulses are 2+ on the right side, and 2+ on the left side.   Dorsalis pedis pulses are 2+ on the right side, and 2+ on the left side.   Posterior tibial pulses are 2+ on the right side, and 2+ on the left side.    Skin: No ecchymosis, erythema, or ulcers  Psych: Alert and oriented x 3, appropriate affect  Neuro: CNII-XII intact, no focal deficits    Labs:  Most Recent Data  CBC:   Lab Results   Component Value Date    WBC 5.81 03/08/2024    HGB 14.1 03/08/2024    HCT 44.2 03/08/2024     03/08/2024    MCV 82 03/08/2024    RDW 14.1 03/08/2024     BMP:   Lab Results   Component Value Date     03/08/2024    K 3.6 03/08/2024     03/08/2024    CO2 24 03/08/2024    BUN 14 03/08/2024    CREATININE 0.7 03/08/2024     03/08/2024    CALCIUM 9.8 03/08/2024    MG 2.4 10/02/2018    PHOS 2.8 10/02/2018     LFTS;   Lab Results   Component Value Date    PROT 6.8 03/08/2024    ALBUMIN 3.6 03/08/2024    BILITOT 0.5 03/08/2024    AST 21 03/08/2024    ALKPHOS 115 03/08/2024    ALT 17 03/08/2024     COAGS:   Lab Results   Component Value Date    INR 1.0 03/08/2024     FLP:   Lab Results   Component " "Value Date    CHOL 103 (L) 11/27/2023    HDL 34 (L) 11/27/2023    LDLCALC 58.8 (L) 11/27/2023    TRIG 51 11/27/2023    CHOLHDL 33.0 11/27/2023     CARDIAC: No results found for: "TROPONINI", "CKTOTAL", "CKMB", "BNP"    Imaging:    BLE Venous Ultrasound 8/29/2023:    There is no evidence of a right lower extremity DVT.    There is no evidence of a left lower extremity DVT.    The left superficial femoral middle vein is normal.    LLE Venous Ultrasound 3/27/2022:  No evidence of deep venous thrombosis in the left lower extremity.   Left calf nonocclusive superficial thrombophlebitis.    Assessment/Plan:  Jesús Patel is a 48 y.o. male with BLE lymphedema, HTN, HLD, JARAD (on CPAP), morbid obesity s/p gastric bypass, who presents for a follow up appointment.    1. BLE Lymphedema- Improving.  Continue lymphedema clinic therapy.  Continue graduated compression hose.  Limit sodium intake to 2000 mg daily.  Limit volume intake to 1.5 L daily.  Elevate legs when resting.    2. Morbid Obesity s/p Gastric Bypass- Pt has lost approximately 70 pounds since clinic on 8/10/2023.  Encourage diet, exercise and weigh loss.    4. HTN- Continue current medications.    5. HLD- Continue atorvastatin 80 mg daily.      Follow up in 5 months with lipids prior    Total duration of face to face visit time 30 minutes.  Total time spent counseling greater than fifty percent of total visit time.  Counseling included discussion regarding imaging findings, diagnosis, possibilities, treatment options, risks and benefits.  The patient had many questions regarding the options and long-term effects.    Davonte Lance MD, PhD  Interventional Cardiology      "

## 2024-04-22 NOTE — PATIENT INSTRUCTIONS
Assessment/Plan:  Jesús Patel is a 48 y.o. male with BLE lymphedema, HTN, HLD, JARAD (on CPAP), morbid obesity s/p gastric bypass, who presents for a follow up appointment.    1. BLE Lymphedema- Improving.  Continue lymphedema clinic therapy.  Continue graduated compression hose.  Limit sodium intake to 2000 mg daily.  Limit volume intake to 1.5 L daily.  Elevate legs when resting.    2. Morbid Obesity s/p Gastric Bypass- Pt has lost approximately 70 pounds since clinic on 8/10/2023.  Encourage diet, exercise and weigh loss.    4. HTN- Continue current medications.    5. HLD- Continue atorvastatin 80 mg daily.      Follow up in 5 months with lipids prior

## 2024-04-24 ENCOUNTER — PATIENT MESSAGE (OUTPATIENT)
Dept: INTERNAL MEDICINE | Facility: CLINIC | Age: 49
End: 2024-04-24
Payer: COMMERCIAL

## 2024-04-24 DIAGNOSIS — E66.01 MORBID OBESITY WITH BMI OF 50.0-59.9, ADULT: ICD-10-CM

## 2024-04-25 RX ORDER — SEMAGLUTIDE 0.68 MG/ML
0.5 INJECTION, SOLUTION SUBCUTANEOUS
Qty: 3 ML | Refills: 3 | Status: SHIPPED | OUTPATIENT
Start: 2024-04-25

## 2024-06-05 ENCOUNTER — PATIENT MESSAGE (OUTPATIENT)
Dept: BEHAVIORAL HEALTH | Facility: CLINIC | Age: 49
End: 2024-06-05
Payer: COMMERCIAL

## 2024-06-07 ENCOUNTER — TELEPHONE (OUTPATIENT)
Dept: ENDOSCOPY | Facility: HOSPITAL | Age: 49
End: 2024-06-07
Payer: COMMERCIAL

## 2024-06-07 NOTE — TELEPHONE ENCOUNTER
Contacted Pt for endoscopy pre-call for upcoming procedure.  Pre-call complete, Pt does not have any further questions. Arrival time: 7:45AM

## 2024-06-10 ENCOUNTER — PATIENT MESSAGE (OUTPATIENT)
Dept: INTERNAL MEDICINE | Facility: CLINIC | Age: 49
End: 2024-06-10
Payer: COMMERCIAL

## 2024-06-11 ENCOUNTER — PATIENT MESSAGE (OUTPATIENT)
Dept: SLEEP MEDICINE | Facility: CLINIC | Age: 49
End: 2024-06-11
Payer: COMMERCIAL

## 2024-06-11 DIAGNOSIS — G47.00 INSOMNIA, UNSPECIFIED TYPE: ICD-10-CM

## 2024-06-12 ENCOUNTER — TELEPHONE (OUTPATIENT)
Dept: ENDOSCOPY | Facility: HOSPITAL | Age: 49
End: 2024-06-12
Payer: COMMERCIAL

## 2024-06-12 RX ORDER — ZOLPIDEM TARTRATE 12.5 MG/1
12.5 TABLET, FILM COATED, EXTENDED RELEASE ORAL NIGHTLY PRN
Qty: 30 TABLET | Refills: 1 | Status: SHIPPED | OUTPATIENT
Start: 2024-06-12 | End: 2024-12-11

## 2024-06-12 NOTE — TELEPHONE ENCOUNTER
Spoke to pt to confirm arrival time of 7:45AM and NPO instructions on 6/14/24.  Pt verbalized understanding.

## 2024-06-14 ENCOUNTER — HOSPITAL ENCOUNTER (OUTPATIENT)
Facility: HOSPITAL | Age: 49
Discharge: HOME | End: 2024-06-14
Attending: INTERNAL MEDICINE | Admitting: INTERNAL MEDICINE
Payer: COMMERCIAL

## 2024-06-14 ENCOUNTER — ANESTHESIA (OUTPATIENT)
Dept: ENDOSCOPY | Facility: HOSPITAL | Age: 49
End: 2024-06-14
Payer: COMMERCIAL

## 2024-06-14 ENCOUNTER — ANESTHESIA EVENT (OUTPATIENT)
Dept: ENDOSCOPY | Facility: HOSPITAL | Age: 49
End: 2024-06-14
Payer: COMMERCIAL

## 2024-06-14 VITALS
DIASTOLIC BLOOD PRESSURE: 57 MMHG | WEIGHT: 315 LBS | BODY MASS INDEX: 47.74 KG/M2 | RESPIRATION RATE: 18 BRPM | TEMPERATURE: 98 F | OXYGEN SATURATION: 96 % | HEIGHT: 68 IN | HEART RATE: 65 BPM | SYSTOLIC BLOOD PRESSURE: 103 MMHG

## 2024-06-14 DIAGNOSIS — K76.6 PORTAL HYPERTENSION: ICD-10-CM

## 2024-06-14 LAB
GLUCOSE SERPL-MCNC: 88 MG/DL (ref 70–110)
POCT GLUCOSE: 88 MG/DL (ref 70–110)

## 2024-06-14 PROCEDURE — D9220A PRA ANESTHESIA: Mod: ANES,,, | Performed by: STUDENT IN AN ORGANIZED HEALTH CARE EDUCATION/TRAINING PROGRAM

## 2024-06-14 PROCEDURE — D9220A PRA ANESTHESIA: Mod: CRNA,,, | Performed by: NURSE ANESTHETIST, CERTIFIED REGISTERED

## 2024-06-14 PROCEDURE — 37000008 HC ANESTHESIA 1ST 15 MINUTES: Performed by: INTERNAL MEDICINE

## 2024-06-14 PROCEDURE — 37000009 HC ANESTHESIA EA ADD 15 MINS: Performed by: INTERNAL MEDICINE

## 2024-06-14 PROCEDURE — 43235 EGD DIAGNOSTIC BRUSH WASH: CPT | Mod: ,,, | Performed by: INTERNAL MEDICINE

## 2024-06-14 PROCEDURE — 25000003 PHARM REV CODE 250: Performed by: INTERNAL MEDICINE

## 2024-06-14 PROCEDURE — 63600175 PHARM REV CODE 636 W HCPCS: Performed by: NURSE ANESTHETIST, CERTIFIED REGISTERED

## 2024-06-14 PROCEDURE — 25000003 PHARM REV CODE 250: Performed by: NURSE ANESTHETIST, CERTIFIED REGISTERED

## 2024-06-14 PROCEDURE — 43235 EGD DIAGNOSTIC BRUSH WASH: CPT | Performed by: INTERNAL MEDICINE

## 2024-06-14 RX ORDER — DEXMEDETOMIDINE HYDROCHLORIDE 100 UG/ML
INJECTION, SOLUTION INTRAVENOUS
Status: DISCONTINUED | OUTPATIENT
Start: 2024-06-14 | End: 2024-06-14

## 2024-06-14 RX ORDER — SODIUM CHLORIDE 0.9 % (FLUSH) 0.9 %
10 SYRINGE (ML) INJECTION
Status: DISCONTINUED | OUTPATIENT
Start: 2024-06-14 | End: 2024-06-14 | Stop reason: HOSPADM

## 2024-06-14 RX ORDER — SODIUM CHLORIDE 9 MG/ML
INJECTION, SOLUTION INTRAVENOUS CONTINUOUS
Status: DISCONTINUED | OUTPATIENT
Start: 2024-06-14 | End: 2024-06-14 | Stop reason: HOSPADM

## 2024-06-14 RX ORDER — LIDOCAINE HYDROCHLORIDE 20 MG/ML
INJECTION INTRAVENOUS
Status: DISCONTINUED | OUTPATIENT
Start: 2024-06-14 | End: 2024-06-14

## 2024-06-14 RX ORDER — PROPOFOL 10 MG/ML
VIAL (ML) INTRAVENOUS
Status: DISCONTINUED | OUTPATIENT
Start: 2024-06-14 | End: 2024-06-14

## 2024-06-14 RX ADMIN — LIDOCAINE HYDROCHLORIDE 200 MG: 20 INJECTION, SOLUTION INTRAVENOUS at 08:06

## 2024-06-14 RX ADMIN — DEXMEDETOMIDINE HYDROCHLORIDE 10 MCG: 100 INJECTION, SOLUTION, CONCENTRATE INTRAVENOUS at 08:06

## 2024-06-14 RX ADMIN — GLYCOPYRROLATE 0.2 MG: 0.2 INJECTION, SOLUTION INTRAMUSCULAR; INTRAVITREAL at 08:06

## 2024-06-14 RX ADMIN — SODIUM CHLORIDE: 9 INJECTION, SOLUTION INTRAVENOUS at 08:06

## 2024-06-14 RX ADMIN — PROPOFOL 50 MG: 10 INJECTION, EMULSION INTRAVENOUS at 08:06

## 2024-06-14 RX ADMIN — SODIUM CHLORIDE: 0.9 INJECTION, SOLUTION INTRAVENOUS at 08:06

## 2024-06-14 RX ADMIN — PROPOFOL 150 MG: 10 INJECTION, EMULSION INTRAVENOUS at 08:06

## 2024-06-14 RX ADMIN — TOPICAL ANESTHETIC 1 EACH: 200 SPRAY DENTAL; PERIODONTAL at 08:06

## 2024-06-14 NOTE — ANESTHESIA PREPROCEDURE EVALUATION
06/14/2024  [facility]  Anesthesia Pre-Operative Evaluation         Patient Name: Jesús Patel  YOB: 1975  MRN: [id number]    SUBJECTIVE:     Pre-operative evaluation for Procedure(s) (LRB):  ESOPHAGOGASTRODUODENOSCOPY (EGD) (N/A)     06/14/2024    Jesús Patel is a 48 y.o. male w/ a significant PMHx of morbid obesity, cirrhosis, JARAD, hx of ETOH abuse, asthma, BLE lymphedema, HTN, HLD,   Patient now presents for the above procedure(s).    TTE:   none documented.     Patient Active Problem List   Diagnosis    Other hyperlipidemia    Low back pain    Vitamin D insufficiency    Gastroesophageal reflux disease    Obstructive sleep apnea syndrome    Chest wall pain    Incisional hernia, without obstruction or gangrene    Insomnia    Anxiety and depression    Morbid obesity with BMI of 50.0-59.9, adult    Essential hypertension    Prediabetes    Mild persistent asthma without complication    Cough    Allergic conjunctivitis, bilateral    Allergic rhinitis due to dust mite    Left leg pain    Cellulitis of left lower extremity    Acute superficial venous thrombosis of left lower extremity    Fatty liver    Depression, recurrent    History of alcohol use    Lymphedema of both lower extremities    Edema of both lower extremities    Reduced hand strength    Pain of left thumb    Liver fibrosis    Portal hypertension       Review of patient's allergies indicates:  No Known Allergies    Current Inpatient Medications:      No current facility-administered medications on file prior to encounter.     Current Outpatient Medications on File Prior to Encounter   Medication Sig Dispense Refill    albuterol (VENTOLIN HFA) 90 mcg/actuation inhaler Inhale 2 puffs into the lungs every 6 (six) hours as needed for Wheezing or Shortness of Breath. Rescue 18 g 11     ammonium lactate 12 % Crea Bid to thick right sole 385 g 3    aspirin (ECOTRIN) 81 MG EC tablet Take 1 tablet (81 mg total) by mouth once daily. 90 tablet 3    atorvastatin (LIPITOR) 80 MG tablet Take 1 tablet (80 mg total) by mouth once daily. (Patient taking differently: Take 80 mg by mouth every evening.) 90 tablet 3    budesonide-formoterol 80-4.5 mcg (SYMBICORT) 80-4.5 mcg/actuation HFAA Inhale 2 puffs into the lungs once daily. Controller 10.2 g 11    cetirizine (ZYRTEC) 10 MG tablet Take 10 mg by mouth as needed.      diphenhydrAMINE (BENADRYL) 25 mg capsule Take 25 mg by mouth every 6 (six) hours as needed for Itching.      erythromycin with ethanoL (EMGEL) 2 % gel Apply topically 2 (two) times daily. Prn neck break outs 60 g 3    fluticasone propionate (FLONASE) 50 mcg/actuation nasal spray SPRAY 2 SPRAYS IN EACH NOSTRIL TWICE A DAY 48 mL 11    hydroCHLOROthiazide (HYDRODIURIL) 12.5 MG Tab Take 1 tablet (12.5 mg total) by mouth once daily. 90 tablet 11    omeprazole (PRILOSEC) 40 MG capsule Take 1 capsule (40 mg total) by mouth every morning. (Patient taking differently: Take 40 mg by mouth as needed.) 90 capsule 11    salicylic acid 17 % gel Apply topically once daily. Apply focally to skin tag/warts, not normal skin, and cover with tape or Band Aid until scab forms.  Allow for healing and watch for parts of tag/wart to fall off.  For warts okay to peel away dead skin painlessly.  Repeat as needed until tag/wart completely falls off. 7 g 2    sildenafiL (VIAGRA) 25 MG tablet Take 1 tablet (25 mg total) by mouth daily as needed for Erectile Dysfunction. 10 tablet 11    tacrolimus (PROTOPIC) 0.1 % ointment APPLY TOPICALLY 2 (TWO) TIMES DAILY. TO WHITE PATCH OF THE HAND 60 g 0       Past Surgical History:   Procedure Laterality Date    ADENOIDECTOMY      COLONOSCOPY N/A 2/11/2022    Procedure: COLONOSCOPY;  Surgeon: Vaughn Cortes MD;  Location: [address]);  Service: Endoscopy;  Laterality: N/A;     ESOPHAGOGASTRODUODENOSCOPY N/A 2/11/2022    Procedure: EGD (ESOPHAGOGASTRODUODENOSCOPY);  Surgeon: Vaughn Cortes MD;  Location: [facility] (2ND FLR);  Service: Endoscopy;  Laterality: N/A;  BMI-55  Wt:378#-fully vacc-inst portal-tb  COVID screening 2/8/22 [facility]    EYE SURGERY      GASTRECTOMY      LAPAROSCOPIC GASTRIC BANDING  2002    In [place]: uncertain of brand/ size    LAPAROSCOPIC REPAIR OF RECURRENT INCARCERATED INCISIONAL HERNIA N/A 6/21/2018    Procedure: REPAIR-HERNIA-INCISIONAL-LAPAROSCOPIC WITH MESH;  Surgeon: Vaughn Parker Jr., MD;  Location: [facility] OR [address] [place] [address];  Service: General;  Laterality: N/A;    LASIK         OBJECTIVE:     Vital Signs Range (Last 24H):         Significant Labs:  Lab Results   Component Value Date    WBC 5.81 03/08/2024    HGB 14.1 03/08/2024    HCT 44.2 03/08/2024     03/08/2024    CHOL 103 (L) 11/27/2023    TRIG 51 11/27/2023    HDL 34 (L) 11/27/2023    ALT 17 03/08/2024    AST 21 03/08/2024     03/08/2024    K 3.6 03/08/2024     03/08/2024    CREATININE 0.7 03/08/2024    BUN 14 03/08/2024    CO2 24 03/08/2024    TSH 2.949 08/27/2019    INR 1.0 03/08/2024    HGBA1C 5.8 (H) 04/24/2023       Diagnostic Studies: No relevant studies.    EKG:   Results for orders placed or performed during the hospital encounter of 06/12/18   SCHEDULED EKG 12-LEAD (to Muse)    Collection Time: 06/12/18  3:39 PM    Narrative    Test Reason : Z01.818,  Blood Pressure : / mmHG  Vent. Rate : 102 BPM     Atrial Rate : 102 BPM     P-R Int : 156 ms          QRS Dur : 096 ms      QT Int : 342 ms       P-R-T Axes : 036 -53 036 degrees     QTc Int : 445 ms    Sinus tachycardia  Abnormal R wave progression  Left anterior fascicular block  Abnormal ECG  When compared with ECG of 06-SEP-2016 07:55,  The axis Shifted left  Loss of anterior forces New since previous tracing  Confirmed by Effron MD, Omar ([id number]) on 6/14/2018 1:46:18 AM    Referred By:  JENNY Paul  By:Omar Quintanilla MD       ASSESSMENT/PLAN:           Pre-op Assessment    I have reviewed the Patient Summary Reports.    I have reviewed the NPO Status.   I have reviewed the Medications.     Review of Systems  Anesthesia Hx:  No problems with previous Anesthesia   History of prior surgery of interest to airway management or planning:          Denies Family Hx of Anesthesia complications.    Denies Personal Hx of Anesthesia complications.                    Social:  Non-Smoker, Social Alcohol Use       Hematology/Oncology:                   Hematology Comments: BLE lymphedema                    Cardiovascular:  Exercise tolerance: poor   Hypertension                                        Pulmonary:    Asthma moderate   Sleep Apnea                Hepatic/GI:     GERD Liver Disease,            Neurological:  Neurology Normal                                      Endocrine:  Denies Diabetes. Denies Hypothyroidism.        Morbid Obesity / BMI > 40  Psych:  Psychiatric History anxiety depression                Physical Exam  General: Cooperative, Alert and Oriented    Airway:  Mallampati: III   Mouth Opening: Normal  TM Distance: Normal  Tongue: Normal  Neck ROM: Normal ROM        Anesthesia Plan  Type of Anesthesia, risks & benefits discussed:    Anesthesia Type: Gen Natural Airway  Intra-op Monitoring Plan: Standard ASA Monitors  Post Op Pain Control Plan: multimodal analgesia  Induction:  IV  Informed Consent: Informed consent signed with the Patient and all parties understand the risks and agree with anesthesia plan.  All questions answered.   ASA Score: 3  Day of Surgery Review of History & Physical: H&P Update referred to the surgeon/provider.    Ready For Surgery From Anesthesia Perspective.     .

## 2024-06-14 NOTE — H&P
Short Stay Endoscopy History and Physical    PCP - Lester Awan MD     Procedure - EGD  ASA - per anesthesia  Mallampati - per anesthesia  History of Anesthesia problems - no  Family history Anesthesia problems -  no   Plan of anesthesia - General    HPI:  This is a 48 y.o. male here for evaluation of :     varices  Screening, hx of portal htn      ROS:  Constitutional: No fevers, chills, No weight loss  CV: No chest pain  Pulm: No cough, No shortness of breath  Ophtho: No vision changes  GI: see HPI  Derm: No rash    Medical History:  has a past medical history of Anticoagulant long-term use, Asthma, GERD (gastroesophageal reflux disease), Hyperlipidemia, Hypertension, Liver disease, Low back pain, Morbid obesity with BMI of 50.0-59.9, adult, JARAD (obstructive sleep apnea), Pre-diabetes, Varicose veins, and Vitamin D deficiency.    Surgical History:  has a past surgical history that includes LASIK; Eye surgery; Adenoidectomy; Laparoscopic gastric banding (2002); Gastrectomy; Laparoscopic repair of recurrent incarcerated incisional hernia (N/A, 6/21/2018); Esophagogastroduodenoscopy (N/A, 2/11/2022); and Colonoscopy (N/A, 2/11/2022).    Family History: family history includes Cancer in his maternal grandfather; Colon cancer in his maternal grandmother; Diabetes in his paternal grandmother; Hypertension in his mother; No Known Problems in his brother, father, and sister; Obesity in his brother and mother.. Otherwise no colon cancer, inflammatory bowel disease, or GI malignancies.    Social History:  reports that he quit smoking about 15 years ago. His smoking use included cigarettes. He started smoking about 20 years ago. He has a 1.3 pack-year smoking history. He has never used smokeless tobacco. He reports that he does not currently use alcohol. He reports that he does not use drugs.    Review of patient's allergies indicates:  No Known Allergies    Medications:   Medications Prior to Admission   Medication  Sig Dispense Refill Last Dose    albuterol (VENTOLIN HFA) 90 mcg/actuation inhaler Inhale 2 puffs into the lungs every 6 (six) hours as needed for Wheezing or Shortness of Breath. Rescue 18 g 11     amLODIPine (NORVASC) 10 MG tablet Take 1 tablet (10 mg total) by mouth once daily. 90 tablet 3     ammonium lactate 12 % Crea Bid to thick right sole 385 g 3     aspirin (ECOTRIN) 81 MG EC tablet Take 1 tablet (81 mg total) by mouth once daily. 90 tablet 3     atorvastatin (LIPITOR) 80 MG tablet Take 1 tablet (80 mg total) by mouth once daily. (Patient taking differently: Take 80 mg by mouth every evening.) 90 tablet 3     budesonide-formoterol 80-4.5 mcg (SYMBICORT) 80-4.5 mcg/actuation HFAA Inhale 2 puffs into the lungs once daily. Controller 10.2 g 11     cetirizine (ZYRTEC) 10 MG tablet Take 10 mg by mouth as needed.       diphenhydrAMINE (BENADRYL) 25 mg capsule Take 25 mg by mouth every 6 (six) hours as needed for Itching.       erythromycin with ethanoL (EMGEL) 2 % gel Apply topically 2 (two) times daily. Prn neck break outs 60 g 3     fluticasone propionate (FLONASE) 50 mcg/actuation nasal spray SPRAY 2 SPRAYS IN EACH NOSTRIL TWICE A DAY 48 mL 11     hydroCHLOROthiazide (HYDRODIURIL) 12.5 MG Tab Take 1 tablet (12.5 mg total) by mouth once daily. 90 tablet 11     omeprazole (PRILOSEC) 40 MG capsule Take 1 capsule (40 mg total) by mouth every morning. (Patient taking differently: Take 40 mg by mouth as needed.) 90 capsule 11     salicylic acid 17 % gel Apply topically once daily. Apply focally to skin tag/warts, not normal skin, and cover with tape or Band Aid until scab forms.  Allow for healing and watch for parts of tag/wart to fall off.  For warts okay to peel away dead skin painlessly.  Repeat as needed until tag/wart completely falls off. 7 g 2     semaglutide (OZEMPIC) 0.25 mg or 0.5 mg (2 mg/3 mL) pen injector Inject 0.5 mg into the skin every 7 days. 3 mL 3     sildenafiL (VIAGRA) 25 MG tablet Take 1  tablet (25 mg total) by mouth daily as needed for Erectile Dysfunction. 10 tablet 11     tacrolimus (PROTOPIC) 0.1 % ointment APPLY TOPICALLY 2 (TWO) TIMES DAILY. TO WHITE PATCH OF THE HAND 60 g 0     zolpidem (AMBIEN CR) 12.5 MG CR tablet Take 1 tablet (12.5 mg total) by mouth nightly as needed for Insomnia. 30 tablet 1        Physical Exam:    Vital Signs: There were no vitals filed for this visit.    General Appearance: Well appearing in no acute distress  Eyes:    No scleral icterus  ENT: Neck supple, Lips, mucosa, and tongue normal; teeth and gums normal  Abdomen: Soft, non tender, non distended with normal bowel sounds. No hepatosplenomegaly, ascites, or mass.  Extremities: No edema  Skin: No rash    Labs:  Lab Results   Component Value Date    WBC 5.81 03/08/2024    HGB 14.1 03/08/2024    HCT 44.2 03/08/2024     03/08/2024    CHOL 103 (L) 11/27/2023    TRIG 51 11/27/2023    HDL 34 (L) 11/27/2023    ALT 17 03/08/2024    AST 21 03/08/2024     03/08/2024    K 3.6 03/08/2024     03/08/2024    CREATININE 0.7 03/08/2024    BUN 14 03/08/2024    CO2 24 03/08/2024    TSH 2.949 08/27/2019    INR 1.0 03/08/2024    HGBA1C 5.8 (H) 04/24/2023       I have explained the risks and benefits of endoscopy procedures to the patient including but not limited to bleeding, perforation, infection, and death.  The patient was asked if they understand and allowed to ask any further questions to their satisfaction.      Ryan Heard MD

## 2024-06-14 NOTE — [employer]ATION PATIENT INSTRUCTIONS
Discharge Summary/Instructions after an Endoscopic Procedure  Patient Name: Jesús Patel  Patient MRN: [id number]  Patient   YOB: 1975 Friday, June 14, 2024  Ryan Heard MD  Dear patient,  As a result of recent federal legislation (The Federal Cures Act), you may   receive lab or pathology results from your procedure in your [facility]   account before your physician is able to contact you. Your physician or   their representative will relay the results to you with their   recommendations at their soonest availability.  Thank you,  Your health is very important to us during the Covid Crisis. Following your   procedure today, you will receive a daily text for 2 weeks asking about   signs or symptoms of Covid 19.  Please respond to this text when you   receive it so we can follow up and keep you as safe as possible.   RESTRICTIONS:  During your procedure today, you received medications for sedation.  These   medications may affect your judgment, balance and coordination.  Therefore,   for 24 hours, you have the following restrictions:   - DO NOT drive a car, operate machinery, make legal/financial decisions,   sign important papers or drink alcohol.    ACTIVITY:  Today: no heavy lifting, straining or running due to procedural   sedation/anesthesia.  The following day: return to full activity including work.  DIET:  Eat and drink normally unless instructed otherwise.     TREATMENT FOR COMMON SIDE EFFECTS:  - Mild abdominal pain, nausea, belching, bloating or excessive gas:  rest,   eat lightly and use a heating pad.  - Sore Throat: treat with throat lozenges and/or gargle with warm salt   water.  - Because air was used during the procedure, expelling large amounts of air   from your rectum or belching is normal.  - If a bowel prep was taken, you may not have a bowel movement for 1-3 days.    This is normal.  SYMPTOMS TO WATCH FOR AND REPORT TO YOUR PHYSICIAN:  1. Abdominal pain or bloating, other  than gas cramps.  2. Chest pain.  3. Back pain.  4. Signs of infection such as: chills or fever occurring within 24 hours   after the procedure.  5. Rectal bleeding, which would show as bright red, maroon, or black stools.   (A tablespoon of blood from the rectum is not serious, especially if   hemorrhoids are present.)  6. Vomiting.  7. Weakness or dizziness.  GO DIRECTLY TO THE NEAREST EMERGENCY ROOM IF YOU HAVE ANY OF THE FOLLOWING:      Difficulty breathing              Chills and/or fever over 101 F   Persistent vomiting and/or vomiting blood   Severe abdominal pain   Severe chest pain   Black, tarry stools   Bleeding- more than one tablespoon   Any other symptom or condition that you feel may need urgent attention  Your doctor recommends these additional instructions:  If any biopsies were taken, your doctors clinic will contact you in 1 to 2   weeks with any results.  - Discharge patient to home.   - Patient has a contact number available for emergencies.  The signs and   symptoms of potential delayed complications were discussed with the   patient.  Return to normal activities tomorrow.  Written discharge   instructions were provided to the patient.   - Resume previous diet.   - Continue present medications.   - Repeat upper endoscopy in 2 years for screening purposes.   - Return to referring physician as previously scheduled.   - Would take omeprazole more routinely given esophagitis seen today.  For questions, problems or results please call your physician - Ryan Heard MD.  EMERGENCY PHONE NUMBER: [phone],  LAB RESULTS: [phone]  IF A COMPLICATION OR EMERGENCY SITUATION ARISES AND YOU ARE UNABLE TO REACH   YOUR PHYSICIAN - GO DIRECTLY TO THE EMERGENCY ROOM.  Ryan Heard MD  6/14/2024 8:57:09 AM  This report has been verified and signed electronically.  Dear patient,  As a result of recent federal legislation (The Federal Cures Act), you may   receive lab or pathology results from  your procedure in your [facility]   account before your physician is able to contact you. Your physician or   their representative will relay the results to you with their   recommendations at their soonest availability.  Thank you,  PROVATION

## 2024-06-14 NOTE — ANESTHESIA POSTPROCEDURE EVALUATION
Anesthesia Post Evaluation    Patient: Jesús Patel    Procedure(s) Performed: Procedure(s) (LRB):  ESOPHAGOGASTRODUODENOSCOPY (EGD) (N/A)    Final Anesthesia Type: general      Patient location during evaluation: GI PACU  Patient participation: Yes- Able to Participate  Level of consciousness: awake and alert, oriented and awake  Post-procedure vital signs: reviewed and stable  Pain management: adequate  Airway patency: patent  JARAD mitigation strategies: Multimodal analgesia  PONV status at discharge: No PONV  Anesthetic complications: no      Cardiovascular status: blood pressure returned to baseline and hemodynamically stable  Respiratory status: unassisted and spontaneous ventilation  Hydration status: euvolemic  Follow-up not needed.              Vitals Value Taken Time   /57 06/14/24 0928   Temp 36.7 °C (98.1 °F) 06/14/24 0858   Pulse 65 06/14/24 0928   Resp 18 06/14/24 0928   SpO2 96 % 06/14/24 0928         Event Time   Out of Recovery 09:34:31         Pain/Maurice Score: Maurice Score: 10 (6/14/2024  9:28 AM)

## 2024-06-14 NOTE — TRANSFER OF CARE
"Anesthesia Transfer of Care Note    Patient: Jesús Patel    Procedure(s) Performed: Procedure(s) (LRB):  ESOPHAGOGASTRODUODENOSCOPY (EGD) (N/A)    Patient location: GI    Anesthesia Type: general    Transport from OR: Transported from OR on room air with adequate spontaneous ventilation    Post pain: adequate analgesia    Post assessment: no apparent anesthetic complications and tolerated procedure well    Post vital signs: stable    Level of consciousness: awake    Nausea/Vomiting: no nausea/vomiting    Complications: none    Transfer of care protocol was followed      Last vitals: Visit Vitals  /61 (BP Location: Right arm, Patient Position: Lying)   Pulse 73   Temp 37.1 °C (98.8 °F) (Temporal)   Resp 19   Ht 5' 8" (1.727 m)   Wt (!) 152.4 kg (336 lb)   SpO2 96%   BMI 51.09 kg/m²     "

## 2024-06-24 RX ORDER — ATORVASTATIN CALCIUM 80 MG/1
80 TABLET, FILM COATED ORAL NIGHTLY
Qty: 90 TABLET | Refills: 3 | Status: SHIPPED | OUTPATIENT
Start: 2024-06-24 | End: 2025-06-24

## 2024-07-15 ENCOUNTER — TELEPHONE (OUTPATIENT)
Dept: OPTOMETRY | Facility: CLINIC | Age: 49
End: 2024-07-15
Payer: COMMERCIAL

## 2024-07-15 NOTE — TELEPHONE ENCOUNTER
----- Message from Claudette Lange OD sent at 7/15/2024  9:33 AM CDT -----  Contact: [phone]  Rx extended but he had an appt with me hema and he just made one for November on the portal so I am thinking he has to reschedule tomorrow. Can you let him know I did extend the Rx but I can see him sooner than November? Also let me know if he is truly canceling tomorrow because I may try to move one of the patients on Wednesday to that slot. We have an in person meeting at [facility] before clinic starts Wednesday I think its because that's Dr. Urbina's last day  ----- Message -----  From: Paula Valles  Sent: 7/15/2024   9:26 AM CDT  To: Claudette Lange Staff    Patient is calling to get an extension pair of contact.  Please call to assist patient is going out of town for work

## 2024-07-23 ENCOUNTER — PATIENT MESSAGE (OUTPATIENT)
Dept: OPTOMETRY | Facility: CLINIC | Age: 49
End: 2024-07-23
Payer: COMMERCIAL

## 2024-08-06 ENCOUNTER — TELEPHONE (OUTPATIENT)
Dept: OPTOMETRY | Facility: CLINIC | Age: 49
End: 2024-08-06
Payer: COMMERCIAL

## 2024-08-07 ENCOUNTER — OFFICE VISIT (OUTPATIENT)
Dept: OPTOMETRY | Facility: CLINIC | Age: 49
End: 2024-08-07
Payer: COMMERCIAL

## 2024-08-07 DIAGNOSIS — Z97.3 WEARS CONTACT LENSES: ICD-10-CM

## 2024-08-07 DIAGNOSIS — Z46.0 FITTING AND ADJUSTMENT OF SPECTACLES AND CONTACT LENSES: Primary | ICD-10-CM

## 2024-08-07 DIAGNOSIS — H52.4 PRESBYOPIA OF BOTH EYES: ICD-10-CM

## 2024-08-07 DIAGNOSIS — H04.123 DRY EYE SYNDROME OF BILATERAL LACRIMAL GLANDS: Primary | ICD-10-CM

## 2024-08-07 PROCEDURE — 99999 PR PBB SHADOW E&M-EST. PATIENT-LVL III: CPT | Mod: PBBFAC,,, | Performed by: OPTOMETRIST

## 2024-08-07 PROCEDURE — 99999 PR PBB SHADOW E&M-EST. PATIENT-LVL I: CPT | Mod: PBBFAC,,, | Performed by: OPTOMETRIST

## 2024-08-07 RX ORDER — TADALAFIL 20 MG/1
TABLET ORAL
COMMUNITY
Start: 2024-06-06

## 2024-08-11 ENCOUNTER — PATIENT MESSAGE (OUTPATIENT)
Dept: OPTOMETRY | Facility: CLINIC | Age: 49
End: 2024-08-11
Payer: COMMERCIAL

## 2024-08-11 DIAGNOSIS — G47.00 INSOMNIA, UNSPECIFIED TYPE: ICD-10-CM

## 2024-08-12 ENCOUNTER — PATIENT MESSAGE (OUTPATIENT)
Dept: SLEEP MEDICINE | Facility: CLINIC | Age: 49
End: 2024-08-12
Payer: COMMERCIAL

## 2024-08-12 RX ORDER — ZOLPIDEM TARTRATE 12.5 MG/1
12.5 TABLET, FILM COATED, EXTENDED RELEASE ORAL NIGHTLY PRN
Qty: 30 TABLET | Refills: 1 | Status: SHIPPED | OUTPATIENT
Start: 2024-08-12 | End: 2024-08-13 | Stop reason: SDUPTHER

## 2024-08-12 NOTE — TELEPHONE ENCOUNTER
Requested Prescriptions     Pending Prescriptions Disp Refills    zolpidem (AMBIEN CR) 12.5 MG CR tablet 30 tablet 1     Sig: Take 1 tablet (12.5 mg total) by mouth nightly as needed for Insomnia.     Lov 4/4/23

## 2024-08-13 DIAGNOSIS — G47.00 INSOMNIA, UNSPECIFIED TYPE: ICD-10-CM

## 2024-08-13 RX ORDER — ZOLPIDEM TARTRATE 12.5 MG/1
12.5 TABLET, FILM COATED, EXTENDED RELEASE ORAL NIGHTLY PRN
Qty: 30 TABLET | Refills: 0 | Status: SHIPPED | OUTPATIENT
Start: 2024-08-13 | End: 2025-02-11

## 2024-08-26 RX ORDER — ASPIRIN 81 MG/1
81 TABLET ORAL
Qty: 90 TABLET | Refills: 3 | Status: SHIPPED | OUTPATIENT
Start: 2024-08-26

## 2024-09-03 ENCOUNTER — OFFICE VISIT (OUTPATIENT)
Dept: SLEEP MEDICINE | Facility: CLINIC | Age: 49
End: 2024-09-03
Payer: COMMERCIAL

## 2024-09-03 VITALS
SYSTOLIC BLOOD PRESSURE: 122 MMHG | WEIGHT: 315 LBS | HEART RATE: 71 BPM | DIASTOLIC BLOOD PRESSURE: 80 MMHG | BODY MASS INDEX: 47.74 KG/M2 | HEIGHT: 68 IN

## 2024-09-03 DIAGNOSIS — G47.00 INSOMNIA, UNSPECIFIED TYPE: ICD-10-CM

## 2024-09-03 DIAGNOSIS — G47.33 OSA (OBSTRUCTIVE SLEEP APNEA): Primary | ICD-10-CM

## 2024-09-03 PROCEDURE — 99999 PR PBB SHADOW E&M-EST. PATIENT-LVL III: CPT | Mod: PBBFAC,,, | Performed by: NURSE PRACTITIONER

## 2024-09-03 PROCEDURE — 3079F DIAST BP 80-89 MM HG: CPT | Mod: CPTII,S$GLB,, | Performed by: NURSE PRACTITIONER

## 2024-09-03 PROCEDURE — 1159F MED LIST DOCD IN RCRD: CPT | Mod: CPTII,S$GLB,, | Performed by: NURSE PRACTITIONER

## 2024-09-03 PROCEDURE — 3008F BODY MASS INDEX DOCD: CPT | Mod: CPTII,S$GLB,, | Performed by: NURSE PRACTITIONER

## 2024-09-03 PROCEDURE — 99214 OFFICE O/P EST MOD 30 MIN: CPT | Mod: S$GLB,,, | Performed by: NURSE PRACTITIONER

## 2024-09-03 PROCEDURE — 3074F SYST BP LT 130 MM HG: CPT | Mod: CPTII,S$GLB,, | Performed by: NURSE PRACTITIONER

## 2024-09-03 RX ORDER — ZOLPIDEM TARTRATE 12.5 MG/1
12.5 TABLET, FILM COATED, EXTENDED RELEASE ORAL NIGHTLY PRN
Qty: 30 TABLET | Refills: 5 | Status: SHIPPED | OUTPATIENT
Start: 2024-09-03 | End: 2025-03-04

## 2024-09-03 RX ORDER — ZOLPIDEM TARTRATE 12.5 MG/1
12.5 TABLET, FILM COATED, EXTENDED RELEASE ORAL NIGHTLY PRN
Qty: 30 TABLET | Refills: 5 | Status: SHIPPED | OUTPATIENT
Start: 2024-09-03 | End: 2024-09-03 | Stop reason: SDUPTHER

## 2024-09-03 NOTE — PROGRESS NOTES
"Jesús Patel  returns today regarding the management of obstructive sleep apnea.     Since last seen he continues to use nightly 7-18cm. Got using resvent machine anymore, has in closet. Using DS2 machine . 60# loss. Still taking ambien 10mg to help sleep onset. Sleep remains consolidated and he denies complex sleep behaviors. Improved excessive daytime sleepiness overall. Traveling back home this weekend.  FFM  interrogation 30d avg 5:08h/n AHI 1.8, 90 %tile 13.9cm      PSG (372#) 11/9/15:AHI was 48.2 with an oxygen kandy of 76.0%. Initial improvement was observed on 15 cm H2O controlling respiratory events in supine REM sleep with residual hypopneas. Improvement was observed on 15 cm H2O, but the patient was not tested on that pressure for the sufficient amount of time, thus the effective pressure was not determined.       /80 (BP Location: Right arm, Patient Position: Sitting, BP Method: Medium (Automatic))   Pulse 71   Ht 5' 8" (1.727 m)   Wt (!) 152.8 kg (336 lb 13.8 oz)   BMI 51.22 kg/m²     ASSESSMENT:   1. Obstructive sleep apnea, severe significant, continued excellent adherence, having continued improvement of his symptoms, but recent pressure intolerance given 50# loss since surgery last month. 7/23/19: Ready to resume consistent use if pressure is not too high. Needs new supplies 1/21/20: Adherent with apap, benefiting from use. AHI<5. 9/28/21: adherent, benefiting from PAP. AHI<5. Eligible new machine 3/2023: got Resvent machine 10/31/22 but mask exhalation flap issues disrupting sleep so began using replacement machine since then that came in, oral drying though 9/2024: Adherent/benefiting AHI<5  2. insomnia        PLAN:   1.continue apap 7-18cm  supplies via [facility]/heated hose. Discussed control of JARAD  2.continue  AMbien 10mg prn qhs,safe use         "

## 2024-09-05 ENCOUNTER — PATIENT MESSAGE (OUTPATIENT)
Dept: INTERNAL MEDICINE | Facility: CLINIC | Age: 49
End: 2024-09-05

## 2024-09-05 ENCOUNTER — OFFICE VISIT (OUTPATIENT)
Dept: INTERNAL MEDICINE | Facility: CLINIC | Age: 49
End: 2024-09-05
Payer: COMMERCIAL

## 2024-09-05 VITALS
BODY MASS INDEX: 47.74 KG/M2 | WEIGHT: 315 LBS | OXYGEN SATURATION: 96 % | SYSTOLIC BLOOD PRESSURE: 110 MMHG | HEART RATE: 78 BPM | DIASTOLIC BLOOD PRESSURE: 70 MMHG | HEIGHT: 68 IN

## 2024-09-05 DIAGNOSIS — I10 ESSENTIAL HYPERTENSION: ICD-10-CM

## 2024-09-05 DIAGNOSIS — M21.41 PES PLANUS OF RIGHT FOOT: Primary | ICD-10-CM

## 2024-09-05 DIAGNOSIS — M54.50 ACUTE LEFT-SIDED LOW BACK PAIN WITHOUT SCIATICA: ICD-10-CM

## 2024-09-05 DIAGNOSIS — E66.01 MORBID OBESITY WITH BMI OF 50.0-59.9, ADULT: ICD-10-CM

## 2024-09-05 DIAGNOSIS — F33.9 DEPRESSION, RECURRENT: ICD-10-CM

## 2024-09-05 DIAGNOSIS — Z00.00 ROUTINE GENERAL MEDICAL EXAMINATION AT A HEALTH CARE FACILITY: Primary | ICD-10-CM

## 2024-09-05 DIAGNOSIS — J32.9 RHINOSINUSITIS: ICD-10-CM

## 2024-09-05 PROCEDURE — 3008F BODY MASS INDEX DOCD: CPT | Mod: CPTII,S$GLB,, | Performed by: INTERNAL MEDICINE

## 2024-09-05 PROCEDURE — 3078F DIAST BP <80 MM HG: CPT | Mod: CPTII,S$GLB,, | Performed by: INTERNAL MEDICINE

## 2024-09-05 PROCEDURE — 99999 PR PBB SHADOW E&M-EST. PATIENT-LVL IV: CPT | Mod: PBBFAC,,, | Performed by: INTERNAL MEDICINE

## 2024-09-05 PROCEDURE — 3074F SYST BP LT 130 MM HG: CPT | Mod: CPTII,S$GLB,, | Performed by: INTERNAL MEDICINE

## 2024-09-05 PROCEDURE — 99396 PREV VISIT EST AGE 40-64: CPT | Mod: S$GLB,,, | Performed by: INTERNAL MEDICINE

## 2024-09-05 PROCEDURE — 1159F MED LIST DOCD IN RCRD: CPT | Mod: CPTII,S$GLB,, | Performed by: INTERNAL MEDICINE

## 2024-09-05 PROCEDURE — 1160F RVW MEDS BY RX/DR IN RCRD: CPT | Mod: CPTII,S$GLB,, | Performed by: INTERNAL MEDICINE

## 2024-09-05 RX ORDER — FLUTICASONE PROPIONATE 50 MCG
SPRAY, SUSPENSION (ML) NASAL
Qty: 48 ML | Refills: 11 | Status: SHIPPED | OUTPATIENT
Start: 2024-09-05

## 2024-09-05 RX ORDER — HYDROCHLOROTHIAZIDE 12.5 MG/1
12.5 TABLET ORAL DAILY
Qty: 90 TABLET | Refills: 11 | Status: SHIPPED | OUTPATIENT
Start: 2024-09-05

## 2024-09-05 RX ORDER — IBUPROFEN 800 MG/1
800 TABLET ORAL 3 TIMES DAILY PRN
Qty: 90 TABLET | Refills: 2 | Status: SHIPPED | OUTPATIENT
Start: 2024-09-05

## 2024-09-05 RX ORDER — SILDENAFIL 100 MG/1
100 TABLET, FILM COATED ORAL DAILY PRN
Qty: 10 TABLET | Refills: 11 | Status: SHIPPED | OUTPATIENT
Start: 2024-09-05

## 2024-09-05 NOTE — PROGRESS NOTES
Subjective:       Patient ID: Jesús Patel is a 49 y.o. male.    Chief Complaint: Annual Exam    Here for annual exam/Patient is here for followup for chronic conditions.    Has flat feet bilat, has medial R foot pains.    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    Has been working on [employer], has had significant weight loss just since being sober and cutting out alcohol.          Review of Systems   Constitutional:  Negative for activity change and unexpected weight change.   HENT:  Negative for hearing loss, rhinorrhea and trouble swallowing.    Eyes:  Negative for discharge and visual disturbance.   Respiratory:  Negative for chest tightness and wheezing.    Cardiovascular:  Negative for chest pain and palpitations.   Gastrointestinal:  Negative for blood in stool, constipation, diarrhea and vomiting.   Endocrine: Negative for polydipsia and polyuria.   Genitourinary:  Negative for difficulty urinating, hematuria and urgency.   Musculoskeletal:  Positive for arthralgias. Negative for joint swelling and neck pain.   Neurological:  Negative for weakness and headaches.   Psychiatric/Behavioral:  Negative for confusion and dysphoric mood.            Objective:      Physical Exam  Vitals reviewed.   Constitutional:       General: He is not in acute distress.     Appearance: He is well-developed. He is obese.   HENT:      Head: Normocephalic and atraumatic.      Right Ear: Hearing normal.      Left Ear: Hearing normal.      Nose: Nose normal. No rhinorrhea.   Eyes:      General: Lids are normal.         Right eye: No discharge.         Left eye: No discharge.      Conjunctiva/sclera: Conjunctivae normal.   Neck:      Trachea: Trachea normal. No tracheal deviation.   Cardiovascular:      Rate and Rhythm: Normal rate and regular rhythm.      Heart sounds: Normal heart sounds. No murmur heard.     No friction rub. No gallop.   Pulmonary:      Effort: Pulmonary effort is normal. No respiratory distress.      Breath sounds: Normal breath  sounds. No wheezing or rales.   Abdominal:      General: Bowel sounds are normal. There is no distension.      Palpations: Abdomen is soft.      Tenderness: There is no abdominal tenderness. There is no guarding or rebound.   Genitourinary:     Rectum: No external hemorrhoid.   Musculoskeletal:      Cervical back: Neck supple.   Lymphadenopathy:      Cervical: No cervical adenopathy.   Skin:     General: Skin is warm and dry.      Findings: No rash.   Neurological:      Mental Status: He is alert.      Motor: No abnormal muscle tone.   Psychiatric:         Speech: Speech normal.         Behavior: Behavior normal. Behavior is cooperative.         Thought Content: Thought content normal.         Judgment: Judgment normal.         Assessment:       1. Routine general medical examination at a health care facility    2. Rhinosinusitis    3. Morbid obesity with BMI of 50.0-59.9, adult    4. Essential hypertension    5. Acute left-sided low back pain without sciatica    6. Depression, recurrent        Plan:       Jesús was seen today for annual exam.    Diagnoses and all orders for this visit:    Routine general medical examination at a health care facility  -     Lipid Panel; Future    Rhinosinusitis  -     fluticasone propionate (FLONASE) 50 mcg/actuation nasal spray; SPRAY 2 SPRAYS IN EACH NOSTRIL TWICE A DAY    Morbid obesity with BMI of 50.0-59.9, adult  Improved.    Essential hypertension  -     hydroCHLOROthiazide (HYDRODIURIL) 12.5 MG Tab; Take 1 tablet (12.5 mg total) by mouth once daily.  Controlled    Acute left-sided low back pain without sciatica  -     ibuprofen (ADVIL,MOTRIN) 800 MG tablet; Take 1 tablet (800 mg total) by mouth 3 (three) times daily as needed for Pain.  Advised to limit use due to his liver disease.  He will touch base with hepatologist if he can continue taking ibuprofen as needed.    Depression, recurrent  Symptoms much better since being sober.    Other orders  -     sildenafiL (VIAGRA)  100 MG tablet; Take 1 tablet (100 mg total) by mouth daily as needed for Erectile Dysfunction.    Cirrhosis: Has been sober, has appointment with hepatology.    Health Maintenance         Date Due Completion Date    Pneumococcal Vaccines (Age 0-64) (2 of 2 - PCV) 10/27/2021 10/27/2020    Hemoglobin A1c (Prediabetes) 04/24/2024 4/24/2023    Influenza Vaccine (1) 09/01/2024 10/11/2023    COVID-19 Vaccine (4 - 2023-24 season) 09/01/2024 12/18/2021    Lipid Panel 11/27/2024 11/27/2023    TETANUS VACCINE 10/02/2028 10/2/2018    Colorectal Cancer Screening 02/11/2032 2/11/2022   Advised he get flu and COVID vaccine when available.         Visit today included increased complexity associated with the care of the episodic problem  addressed and managing the longitudinal care of the patient due to the serious and/or complex managed problem(s) .    Follow up in about 1 year (around 9/5/2025).    Future Appointments   Date Time Provider Department Center   9/6/2024  6:15 AM [facility]   9/6/2024  7:20 AM LAB, APPOINTMENT [facility]   9/13/2024  1:30 PM Chantell Sal, NP [facility] HEPAT [facility]

## 2024-09-06 ENCOUNTER — HOSPITAL ENCOUNTER (OUTPATIENT)
Dept: RADIOLOGY | Facility: HOSPITAL | Age: 49
Discharge: HOME | End: 2024-09-06
Attending: NURSE PRACTITIONER
Payer: COMMERCIAL

## 2024-09-06 DIAGNOSIS — E66.01 MORBID OBESITY WITH BMI OF 50.0-59.9, ADULT: ICD-10-CM

## 2024-09-06 DIAGNOSIS — K74.00 LIVER FIBROSIS: ICD-10-CM

## 2024-09-06 PROCEDURE — 76391 MR ELASTOGRAPHY: CPT | Mod: TC

## 2024-09-06 PROCEDURE — 76391 MR ELASTOGRAPHY: CPT | Mod: 26,,, | Performed by: STUDENT IN AN ORGANIZED HEALTH CARE EDUCATION/TRAINING PROGRAM

## 2024-09-06 NOTE — TELEPHONE ENCOUNTER
Hi, please contact the patient to assist in scheduling    Orders Placed This Encounter    Ambulatory referral/consult to Podiatry       Thank you, Lester Awan

## 2024-09-11 ENCOUNTER — PATIENT MESSAGE (OUTPATIENT)
Dept: HEPATOLOGY | Facility: CLINIC | Age: 49
End: 2024-09-11
Payer: COMMERCIAL

## 2024-09-13 ENCOUNTER — TELEPHONE (OUTPATIENT)
Dept: HEPATOLOGY | Facility: CLINIC | Age: 49
End: 2024-09-13

## 2024-09-13 DIAGNOSIS — E66.01 MORBID OBESITY WITH BMI OF 50.0-59.9, ADULT: ICD-10-CM

## 2024-09-13 DIAGNOSIS — K76.0 FATTY LIVER: Primary | ICD-10-CM

## 2024-09-13 NOTE — TELEPHONE ENCOUNTER
Please contact pt to schedule f/u video visit with me in 1 year with MRI elasto and lab a few days before    Thanks !

## 2024-10-21 ENCOUNTER — OFFICE VISIT (OUTPATIENT)
Dept: PODIATRY | Facility: CLINIC | Age: 49
End: 2024-10-21
Payer: COMMERCIAL

## 2024-10-21 ENCOUNTER — HOSPITAL ENCOUNTER (OUTPATIENT)
Dept: RADIOLOGY | Facility: HOSPITAL | Age: 49
Discharge: HOME | End: 2024-10-21
Attending: PODIATRIST
Payer: COMMERCIAL

## 2024-10-21 VITALS
HEIGHT: 68 IN | DIASTOLIC BLOOD PRESSURE: 74 MMHG | HEART RATE: 71 BPM | SYSTOLIC BLOOD PRESSURE: 112 MMHG | WEIGHT: 315 LBS | BODY MASS INDEX: 47.74 KG/M2

## 2024-10-21 DIAGNOSIS — M76.821 POSTERIOR TIBIAL TENDON DYSFUNCTION (PTTD) OF RIGHT LOWER EXTREMITY: Primary | ICD-10-CM

## 2024-10-21 DIAGNOSIS — M79.671 BILATERAL FOOT PAIN: ICD-10-CM

## 2024-10-21 DIAGNOSIS — M21.41 PES PLANUS OF RIGHT FOOT: ICD-10-CM

## 2024-10-21 DIAGNOSIS — M79.672 BILATERAL FOOT PAIN: ICD-10-CM

## 2024-10-21 PROCEDURE — 99204 OFFICE O/P NEW MOD 45 MIN: CPT | Mod: S$GLB,,, | Performed by: PODIATRIST

## 2024-10-21 PROCEDURE — 73630 X-RAY EXAM OF FOOT: CPT | Mod: 26,,, | Performed by: RADIOLOGY

## 2024-10-21 PROCEDURE — 1160F RVW MEDS BY RX/DR IN RCRD: CPT | Mod: CPTII,S$GLB,, | Performed by: PODIATRIST

## 2024-10-21 PROCEDURE — 1159F MED LIST DOCD IN RCRD: CPT | Mod: CPTII,S$GLB,, | Performed by: PODIATRIST

## 2024-10-21 PROCEDURE — 3074F SYST BP LT 130 MM HG: CPT | Mod: CPTII,S$GLB,, | Performed by: PODIATRIST

## 2024-10-21 PROCEDURE — 73630 X-RAY EXAM OF FOOT: CPT | Mod: TC,50

## 2024-10-21 PROCEDURE — 3008F BODY MASS INDEX DOCD: CPT | Mod: CPTII,S$GLB,, | Performed by: PODIATRIST

## 2024-10-21 PROCEDURE — 3078F DIAST BP <80 MM HG: CPT | Mod: CPTII,S$GLB,, | Performed by: PODIATRIST

## 2024-10-21 PROCEDURE — 99999 PR PBB SHADOW E&M-EST. PATIENT-LVL V: CPT | Mod: PBBFAC,,, | Performed by: PODIATRIST

## 2024-10-21 RX ORDER — MELOXICAM 15 MG/1
15 TABLET ORAL DAILY PRN
Qty: 30 TABLET | Refills: 0 | Status: SHIPPED | OUTPATIENT
Start: 2024-10-21 | End: 2024-11-20

## 2024-10-21 NOTE — PROGRESS NOTES
CC:  Foot Problem (Flat feet)         HPI:   Jesús Patel is a 49 y.o. male with concerns of pain to bilateral foot.   Exacerbation of severe flat feet.   He has orthotics from 8-9 years ago.  He has seen orthopedics.  He wears new balance shoes.  Seems to be helping.  He does stretch in the morning.  He also has very dry skin on his soles and he is seeing Dermatology for this.      Patient Active Problem List   Diagnosis    Other hyperlipidemia    Low back pain    Vitamin D insufficiency    Gastroesophageal reflux disease    Obstructive sleep apnea syndrome    Chest wall pain    Incisional hernia, without obstruction or gangrene    Insomnia    Anxiety and depression    Morbid obesity with BMI of 50.0-59.9, adult    Essential hypertension    Prediabetes    Mild persistent asthma without complication    Cough    Allergic conjunctivitis, bilateral    Allergic rhinitis due to dust mite    Left leg pain    Cellulitis of left lower extremity    Acute superficial venous thrombosis of left lower extremity    Fatty liver    Depression, recurrent    History of alcohol use    Lymphedema of both lower extremities    Edema of both lower extremities    Reduced hand strength    Pain of left thumb    Liver fibrosis    Portal hypertension         Current Outpatient Medications on File Prior to Visit   Medication Sig Dispense Refill    amLODIPine (NORVASC) 10 MG tablet Take 1 tablet (10 mg total) by mouth once daily. 90 tablet 3    ammonium lactate 12 % Crea Bid to thick right sole 385 g 3    aspirin (ECOTRIN) 81 MG EC tablet TAKE 1 TABLET BY MOUTH EVERY DAY 90 tablet 3    atorvastatin (LIPITOR) 80 MG tablet Take 1 tablet (80 mg total) by mouth every evening. 90 tablet 3    cetirizine (ZYRTEC) 10 MG tablet Take 10 mg by mouth as needed.      diphenhydrAMINE (BENADRYL) 25 mg capsule Take 25 mg by mouth every 6 (six) hours as needed for Itching.      erythromycin with ethanoL (EMGEL) 2 % gel Apply topically 2 (two)  times daily. Prn neck break outs 60 g 3    fluticasone propionate (FLONASE) 50 mcg/actuation nasal spray SPRAY 2 SPRAYS IN EACH NOSTRIL TWICE A DAY 48 mL 11    hydroCHLOROthiazide (HYDRODIURIL) 12.5 MG Tab Take 1 tablet (12.5 mg total) by mouth once daily. 90 tablet 11    salicylic acid 17 % gel Apply topically once daily. Apply focally to skin tag/warts, not normal skin, and cover with tape or Band Aid until scab forms.  Allow for healing and watch for parts of tag/wart to fall off.  For warts okay to peel away dead skin painlessly.  Repeat as needed until tag/wart completely falls off. 7 g 2    semaglutide (OZEMPIC) 0.25 mg or 0.5 mg (2 mg/3 mL) pen injector Inject 0.5 mg into the skin every 7 days. 3 mL 3    sildenafiL (VIAGRA) 100 MG tablet Take 1 tablet (100 mg total) by mouth daily as needed for Erectile Dysfunction. 10 tablet 11    tacrolimus (PROTOPIC) 0.1 % ointment APPLY TOPICALLY 2 (TWO) TIMES DAILY. TO WHITE PATCH OF THE HAND 60 g 0    zolpidem (AMBIEN CR) 12.5 MG CR tablet Take 1 tablet (12.5 mg total) by mouth nightly as needed for Insomnia. 30 tablet 5    albuterol (VENTOLIN HFA) 90 mcg/actuation inhaler Inhale 2 puffs into the lungs every 6 (six) hours as needed for Wheezing or Shortness of Breath. Rescue 18 g 11    budesonide-formoterol 80-4.5 mcg (SYMBICORT) 80-4.5 mcg/actuation HFAA Inhale 2 puffs into the lungs once daily. Controller 10.2 g 11    omeprazole (PRILOSEC) 40 MG capsule Take 1 capsule (40 mg total) by mouth every morning. 90 capsule 3     No current facility-administered medications on file prior to visit.         Review of patient's allergies indicates:  No Known Allergies      Review of Systems -   General ROS: negative for - chills or fever  Respiratory ROS: no cough, shortness of breath, or wheezing  Cardiovascular ROS: no chest pain or dyspnea on exertion      PHYSICAL EXAM:     Vitals:    10/21/24 0748   BP: 112/74   Pulse: 71   Weight: (!) 151.3 kg (333 lb 8.9 oz)   Height: 5'  "8" (1.727 m)       Vasc:   Palpable pedal pulses Dorsalis Pedis:  present;   Posterior Tibial:  present,   Foot warm to touch  CFT: normal bilaterally        Neuro:   Epicritic sensation intact,   negative Tinels with percussion of the tibial nerve, bilateral foot.  Mulders: negative         Derm:   Wounds/Ulcers: absent  Bruising: absent  Erythema: absent  no rash or pruritis        MSK:   Maximum tenderness with palpation along the course of the posterior tibial tendon just proximal to the medial malleolus down to the insertion of the navicular.   Swelling to medial foot: moderate  Decreased medial arch with weight bearing,   Too many toes sign  Valgus hindfoot/RCSP everted with forefoot abduction    Pain at sinus tarsi: absent        Pending xrays        Assessment / Plan:    Problem List Items Addressed This Visit    None  Visit Diagnoses       Posterior tibial tendon dysfunction (PTTD) of right lower extremity    -  Primary    Relevant Medications    meloxicam (MOBIC) 15 MG tablet    Pes planus of right foot        Relevant Medications    meloxicam (MOBIC) 15 MG tablet    Other Relevant Orders    X-Ray Foot Complete Bilateral (Completed)    ORTHOTIC DEVICE (DME)    Bilateral foot pain        Relevant Medications    meloxicam (MOBIC) 15 MG tablet    Other Relevant Orders    X-Ray Foot Complete Bilateral (Completed)    ORTHOTIC DEVICE (DME)              I counseled the patient on the patient's conditions, their implications and medical management.  Exacerbation chronic condition.      Xrays    Chronic tendon dysfunction can be treated with bracing (foot and ankle), shoe modifications, and custom foot orthotics (which can include a skive and high medial flange)    Prescription as ordered above.   "

## 2024-11-17 ENCOUNTER — PATIENT MESSAGE (OUTPATIENT)
Dept: OTHER | Facility: OTHER | Age: 49
End: 2024-11-17
Payer: COMMERCIAL

## 2024-11-22 ENCOUNTER — LAB VISIT (OUTPATIENT)
Dept: LAB | Facility: HOSPITAL | Age: 49
End: 2024-11-22
Attending: INTERNAL MEDICINE
Payer: COMMERCIAL

## 2024-11-22 DIAGNOSIS — E78.49 OTHER HYPERLIPIDEMIA: ICD-10-CM

## 2024-11-22 LAB
ALBUMIN SERPL BCP-MCNC: 3.8 G/DL (ref 3.5–5.2)
ALP SERPL-CCNC: 112 U/L (ref 40–150)
ALT SERPL W/O P-5'-P-CCNC: 18 U/L (ref 10–44)
ANION GAP SERPL CALC-SCNC: 7 MMOL/L (ref 8–16)
AST SERPL-CCNC: 27 U/L (ref 10–40)
BILIRUB SERPL-MCNC: 0.7 MG/DL (ref 0.1–1)
BUN SERPL-MCNC: 12 MG/DL (ref 6–20)
CALCIUM SERPL-MCNC: 9.6 MG/DL (ref 8.7–10.5)
CHLORIDE SERPL-SCNC: 107 MMOL/L (ref 95–110)
CO2 SERPL-SCNC: 24 MMOL/L (ref 23–29)
CREAT SERPL-MCNC: 0.8 MG/DL (ref 0.5–1.4)
EST. GFR  (NO RACE VARIABLE): >60 ML/MIN/1.73 M^2
GLUCOSE SERPL-MCNC: 88 MG/DL (ref 70–110)
POTASSIUM SERPL-SCNC: 3.9 MMOL/L (ref 3.5–5.1)
PROT SERPL-MCNC: 7.2 G/DL (ref 6–8.4)
SODIUM SERPL-SCNC: 138 MMOL/L (ref 136–145)

## 2024-11-22 PROCEDURE — 36415 COLL VENOUS BLD VENIPUNCTURE: CPT | Performed by: INTERNAL MEDICINE

## 2024-11-22 PROCEDURE — 80053 COMPREHEN METABOLIC PANEL: CPT | Performed by: INTERNAL MEDICINE

## 2024-11-29 ENCOUNTER — OFFICE VISIT (OUTPATIENT)
Dept: CARDIOLOGY | Facility: CLINIC | Age: 49
End: 2024-11-29
Payer: COMMERCIAL

## 2024-11-29 VITALS
BODY MASS INDEX: 47.74 KG/M2 | DIASTOLIC BLOOD PRESSURE: 63 MMHG | SYSTOLIC BLOOD PRESSURE: 102 MMHG | HEIGHT: 68 IN | HEART RATE: 70 BPM | WEIGHT: 315 LBS

## 2024-11-29 DIAGNOSIS — E78.49 OTHER HYPERLIPIDEMIA: Primary | ICD-10-CM

## 2024-11-29 DIAGNOSIS — G47.33 OBSTRUCTIVE SLEEP APNEA SYNDROME: ICD-10-CM

## 2024-11-29 DIAGNOSIS — E66.01 MORBID OBESITY WITH BMI OF 50.0-59.9, ADULT: ICD-10-CM

## 2024-11-29 DIAGNOSIS — R60.0 EDEMA OF BOTH LOWER EXTREMITIES: ICD-10-CM

## 2024-11-29 DIAGNOSIS — I89.0 LYMPHEDEMA OF BOTH LOWER EXTREMITIES: ICD-10-CM

## 2024-11-29 DIAGNOSIS — I10 ESSENTIAL HYPERTENSION: ICD-10-CM

## 2024-11-29 PROCEDURE — 99999 PR PBB SHADOW E&M-EST. PATIENT-LVL IV: CPT | Mod: PBBFAC,,, | Performed by: INTERNAL MEDICINE

## 2024-11-29 NOTE — PROGRESS NOTES
[facility]       Chief Complaint   Patient presents with    Edema of both lower extremities       Patient ID: Jesús Patel is a 49 y.o. male with BLE lymphedema, HTN, HLD, JARAD (on CPAP), morbid obesity s/p gastric bypass, who presents for a follow up appointment. Pertinent history/events are as follows:     -Pt kindly referred by Niall Mauricio PA-C for evaluation of thrombophlebitis of superficial veins of left lower extremity.    -At our initial clinic visit on 4/4/2022, Mr. Josie Patel reports left leg pain which started approximately 2 weeks ago.  On 3/27/2022 he presented to the ED for evaluation.  LLE Venous Ultrasound on 3/27/2022 revealed Left calf nonocclusive superficial thrombophlebitis.  There is no evidence of deep venous thrombosis in the left lower extremity.  He states the left leg pain has continued since that time.  Exam significant for LLE with erythema and multiple palpable cords with TTP.  Both  LE's with changes consistent with lymphedema.    Plan:   Left Leg Pain- Likely due to superficial venous thrombosis with cellulitis.  Check LLE venous reflux study today to rule out DVT and SVT in close proximity to the deep venous system.  If no evidence of DVT, treat with Xarelto 10 mg daily for 45 days.  Treat with doxycycline 100 mg bid for 21 days for cellulitis.  Pt to elevate legs when resting.    BLE Lymphedema- Plan to refer to lymphedema clinic after cellulitis has fully resolved.  Morbid Obesity s/p Gastric Bypass- Refer back to Bariatric Surgery for evaluation.  Encourage diet, exercise and weigh loss.  HTN- Continue current medications.  HLD- Check updated lipid panel    -Results Addendum 4/4/2022:  LLE venous ultrasound done today shows extensive superficial venous thrombosis extending to close proximity of the saphenofemoral junction.  This is very close to the deep venous system, therefore, will treat as a DVT.  Start Eliquis 10 mg twice daily  for the 1st 7 days, then reduce to 5 mg twice daily thereafter.  Repeat LLE venous ultrasound in 45 days.  Plan discussed in detail with Mr. Josie Patel, who voiced understanding.    -At follow up clinic visit on 5/18/2022, Mr. Josie Patel reports significant improvement in LLE edema and pain since starting Eliquis 5 mg bid.  He has no claudication or tissue loss.  He is enrolled in the digital HTN program.   Plan:   Left Leg Pain- Due to superficial venous thrombosis with cellulitis. Symptoms now significantly improved.  LLE venous ultrasound done today shows extensive superficial venous thrombosis extending to close proximity of the saphenofemoral junction.  This is very close to the deep venous system, therefore, will treat as a DVT.  Continue Eliquis 5 mg twice daily.  Repeat LLE venous ultrasound in 3 months.  BLE Lymphedema- Plan to refer to lymphedema clinic after cellulitis and superficial venous thrombosis has fully resolved.  Morbid Obesity s/p Gastric Bypass- Refer back to Bariatric Surgery for evaluation.  Encourage diet, exercise and weigh loss.  HTN- Continue current medications.  HLD- Check updated lipid panel.    -8/10/2023 clinic visit: Mr. Josie Patel reports no leg pain or edema.  He would like to stop taking Eliquis.   Plan:    Left Leg Pain- Due to superficial venous thrombosis with cellulitis. Symptoms now significantly improved.  Cellulitis has resolved.  LLE venous ultrasound done today shows extensive superficial venous thrombosis extending to close proximity of the saphenofemoral junction.  This is very close to the deep venous system, therefore, will treat as a DVT.  Check BLE venous ultrasound.  If not SVT or DVT, will stop Eliquis 5 mg twice daily.    BLE Lymphedema- Refer to lymphedema clinic.  Refer to lymphedema clinic. Recommend wearing graduated compression hose.  Limit sodium intake to 2000 mg daily.  Limit volume intake to 1.5 L daily.  Elevate legs when resting.  Morbid  Obesity s/p Gastric Bypass- Refer back to Bariatric Surgery for evaluation.  Encourage diet, exercise and weigh loss.  HTN- Continue current medications.  HLD- Start atorvastatin 80 mg daily.      -12/4/2023 clinic visit: Mr. Josie Patel reports no leg pain or edema.  BLE Venous Ultrasound on 8/29/2023 revealed no no evidence of a right or left lower extremity DVT or SVT.   He is on waiting list for lymphedema clinic therapy.  LDL 59 on 11/27/2023.  Plan:   Left Leg Pain- Due to superficial venous thrombosis with cellulitis. Symptoms now completely resolved.  BLE Venous Ultrasound on 8/29/2023 revealed no no evidence of a right or left lower extremity DVT or SVT.  OK to stop Eliquis.  Start ASA 81 mg daily.  BLE Lymphedema- Refer to lymphedema clinic (pt is on waiting list for lymphedema clinic therapy).  Recommend wearing graduated compression hose.  Limit sodium intake to 2000 mg daily.  Limit volume intake to 1.5 L daily.  Elevate legs when resting.  Morbid Obesity s/p Gastric Bypass- Pt has lost 50 pounds since clinic on 8/10/2023.  Encourage diet, exercise and weigh loss.  HTN- Continue current medications.  HLD- Continue atorvastatin 80 mg daily.      -4/22/2024 clinic visit: Mr. Josie Patel reports doing well with no chest pain or SOB.  BLE edema is well controlled.  Pt is participating with lymphedema clinic therapy.   Plan:  BLE Lymphedema- Improving.  Continue lymphedema clinic therapy.  Continue graduated compression hose.  Limit sodium intake to 2000 mg daily.  Limit volume intake to 1.5 L daily.  Elevate legs when resting.  Morbid Obesity s/p Gastric Bypass- Pt has lost approximately 70 pounds since clinic on 8/10/2023.  Encourage diet, exercise and weigh loss.  HTN- Continue current medications.  HLD- Continue atorvastatin 80 mg daily.      HPI:  Mr. Josie Patel reports doing well with no chest pain or SOB.  BLE edema is well controlled.  Pt received a promotion at work and is planning to move  to the [place] area in 6/2025. LDL 58 on 9/6/2024.       Past Medical History:   Diagnosis Date    Anticoagulant long-term use     Asthma     GERD (gastroesophageal reflux disease)     Hyperlipidemia     Hypertension     Liver disease     Low back pain     Morbid obesity with BMI of 50.0-59.9, adult     JARAD (obstructive sleep apnea)     Pre-diabetes     Varicose veins     Vitamin D deficiency      Past Surgical History:   Procedure Laterality Date    ADENOIDECTOMY      COLONOSCOPY N/A 2/11/2022    Procedure: COLONOSCOPY;  Surgeon: Vaughn Cortes MD;  Location: [facility] (2ND FLR);  Service: Endoscopy;  Laterality: N/A;    ESOPHAGOGASTRODUODENOSCOPY N/A 2/11/2022    Procedure: EGD (ESOPHAGOGASTRODUODENOSCOPY);  Surgeon: Vaughn Cortes MD;  [facility]  Service: Endoscopy;  Laterality: N/A;  BMI-55  Wt:378#-fully vacc-inst portal-tb  COVID screening 2/8/22 [place]  - [facility]    ESOPHAGOGASTRODUODENOSCOPY N/A 6/14/2024    Procedure: ESOPHAGOGASTRODUODENOSCOPY (EGD);  Surgeon: Ryan Heard MD;  Location: [facility];  Service: Endoscopy;  Laterality: N/A;  referral Chantell Scroggs. Cirrhosis Labs ordered. EC Instr. to portal. EC  BMI 51  6/7-precall complete-MS  6/12-pt confirmed-MS    EYE SURGERY      GASTRECTOMY      LAPAROSCOPIC GASTRIC BANDING  2002    In [place]: uncertain of brand/ size    LAPAROSCOPIC REPAIR OF RECURRENT INCARCERATED INCISIONAL HERNIA N/A 6/21/2018    Procedure: REPAIR-HERNIA-INCISIONAL-LAPAROSCOPIC WITH MESH;  Surgeon: Vaughn Parker Jr., MD;  Location: [facility] OR [address] [place] [address];  Service: General;  Laterality: N/A;    LASIK       Social History     Socioeconomic History    Marital status: Single   Tobacco Use    Smoking status: Former     Current packs/day: 0.00     Average packs/day: 0.3 packs/day for 5.0 years (1.3 ttl pk-yrs)     Types: Cigarettes     Start date: 1/1/2004     Quit date: 1/1/2009     Years since quitting: 15.9    Smokeless tobacco: Never   Substance and Sexual  Activity    Alcohol use: Not Currently     Comment: stopped 4/2023    Drug use: No    Sexual activity: Not Currently   Social History Narrative    Works [employer].  Previously worked as a .  1 daughter, Yenni (19 years old), [employer] student marine bio. He is . Lives with brother. Not much exercise     Social Drivers of Health     Financial Resource Strain: Low Risk  (3/8/2024)    Overall Financial Resource Strain (CARDIA)     Difficulty of Paying Living Expenses: Not very hard   Food Insecurity: Food Insecurity Present (3/8/2024)    Hunger Vital Sign     Worried About Running Out of Food in the Last Year: Never true     Ran Out of Food in the Last Year: Sometimes true   Transportation Needs: No Transportation Needs (3/8/2024)    PRAPARE - Transportation     Lack of Transportation (Medical): No     Lack of Transportation (Non-Medical): No   Physical Activity: Insufficiently Active (3/8/2024)    Exercise Vital Sign     Days of Exercise per Week: 2 days     Minutes of Exercise per Session: 30 min   Stress: Stress Concern Present (3/8/2024)    [employer] - Occupational Stress Questionnaire     Feeling of Stress : To some extent   Housing Stability: Low Risk  (3/8/2024)    Housing Stability Vital Sign     Unable to Pay for Housing in the Last Year: No     Number of Places Lived in the Last Year: 0     Unstable Housing in the Last Year: No     Family History   Problem Relation Name Age of Onset    Hypertension Mother      Obesity Mother          s/p sleeve 2013: good results    Cancer Maternal Grandfather          colon cancer    Diabetes Paternal Grandmother      No Known Problems Father      No Known Problems Brother Baldo     Colon cancer Maternal Grandmother      Obesity Brother Ravin     No Known Problems Sister         Review of patient's allergies indicates:  No Known Allergies    Medication List with Changes/Refills   Current Medications     ALBUTEROL (VENTOLIN HFA) 90 MCG/ACTUATION INHALER    Inhale 2 puffs into the lungs every 6 (six) hours as needed for Wheezing or Shortness of Breath. Rescue    AMLODIPINE (NORVASC) 10 MG TABLET    Take 1 tablet (10 mg total) by mouth once daily.    AMMONIUM LACTATE 12 % CREA    Bid to thick right sole    ASPIRIN (ECOTRIN) 81 MG EC TABLET    TAKE 1 TABLET BY MOUTH EVERY DAY    ATORVASTATIN (LIPITOR) 80 MG TABLET    Take 1 tablet (80 mg total) by mouth every evening.    BUDESONIDE-FORMOTEROL 80-4.5 MCG (SYMBICORT) 80-4.5 MCG/ACTUATION HFAA    Inhale 2 puffs into the lungs once daily. Controller    CETIRIZINE (ZYRTEC) 10 MG TABLET    Take 10 mg by mouth as needed.    DIPHENHYDRAMINE (BENADRYL) 25 MG CAPSULE    Take 25 mg by mouth every 6 (six) hours as needed for Itching.    ERYTHROMYCIN WITH ETHANOL (EMGEL) 2 % GEL    Apply topically 2 (two) times daily. Prn neck break outs    FLUTICASONE PROPIONATE (FLONASE) 50 MCG/ACTUATION NASAL SPRAY    SPRAY 2 SPRAYS IN EACH NOSTRIL TWICE A DAY    HYDROCHLOROTHIAZIDE (HYDRODIURIL) 12.5 MG TAB    Take 1 tablet (12.5 mg total) by mouth once daily.    OMEPRAZOLE (PRILOSEC) 40 MG CAPSULE    Take 1 capsule (40 mg total) by mouth every morning.    SALICYLIC ACID 17 % GEL    Apply topically once daily. Apply focally to skin tag/warts, not normal skin, and cover with tape or Band Aid until scab forms.  Allow for healing and watch for parts of tag/wart to fall off.  For warts okay to peel away dead skin painlessly.  Repeat as needed until tag/wart completely falls off.    SILDENAFIL (VIAGRA) 100 MG TABLET    Take 1 tablet (100 mg total) by mouth daily as needed for Erectile Dysfunction.    TACROLIMUS (PROTOPIC) 0.1 % OINTMENT    APPLY TOPICALLY 2 (TWO) TIMES DAILY. TO WHITE PATCH OF THE HAND    ZOLPIDEM (AMBIEN CR) 12.5 MG CR TABLET    Take 1 tablet (12.5 mg total) by mouth nightly as needed for Insomnia.   Discontinued Medications    SEMAGLUTIDE (OZEMPIC) 0.25 MG OR 0.5 MG (2 MG/3 ML)  "PEN INJECTOR    Inject 0.5 mg into the skin every 7 days.       Review of Systems  Constitution: Denies chills, fever, and sweats.  HENT: Denies headaches or blurry vision.  Cardiovascular: Denies chest pain or irregular heart beat.  Respiratory: Denies cough or shortness of breath.  Gastrointestinal: Denies abdominal pain, nausea, or vomiting.  Musculoskeletal: Negative for left leg pain and redness.  Neurological: Denies dizziness or focal weakness.  Psychiatric/Behavioral: Normal mental status.  Hematologic/Lymphatic: Denies bleeding problem or easy bruising/bleeding.  Skin: Denies rash or suspicious lesions    Physical Examination  /63 (BP Location: Right arm, Patient Position: Sitting)   Pulse 70   Ht 5' 8" (1.727 m)   Wt (!) 154.5 kg (340 lb 9.8 oz)   BMI 51.79 kg/m²     Constitutional: No acute distress, conversant  HEENT: Sclera anicteric, Pupils equal, round and reactive to light, extraocular motions intact, Oropharynx clear  Neck: No JVD, no carotid bruits  Cardiovascular: regular rate and rhythm, no murmur, rubs or gallops, normal S1/S2  Pulmonary: Clear to auscultation bilaterally  Abdominal: Abdomen soft, nontender, nondistended, positive bowel sounds  Extremities: Both  LE's with changes consistent with lymphedema and prominent varicose veins.    Pulses:  Carotid pulses are 2+ on the right side, and 2+ on the left side.  Radial pulses are 2+ on the right side, and 2+ on the left side.   Femoral pulses are 2+ on the right side, and 2+ on the left side.  Popliteal pulses are 2+ on the right side, and 2+ on the left side.   Dorsalis pedis pulses are 2+ on the right side, and 2+ on the left side.   Posterior tibial pulses are 2+ on the right side, and 2+ on the left side.    Skin: No ecchymosis, erythema, or ulcers  Psych: Alert and oriented x 3, appropriate affect  Neuro: CNII-XII intact, no focal deficits    Labs:  Most Recent Data  CBC:   Lab Results   Component Value Date    WBC 6.01 " "09/06/2024    HGB 13.9 (L) 09/06/2024    HCT 43.1 09/06/2024     09/06/2024    MCV 82 09/06/2024    RDW 14.1 09/06/2024     BMP:   Lab Results   Component Value Date     11/22/2024    K 3.9 11/22/2024     11/22/2024    CO2 24 11/22/2024    BUN 12 11/22/2024    CREATININE 0.8 11/22/2024    GLU 88 11/22/2024    CALCIUM 9.6 11/22/2024    MG 2.4 10/02/2018    PHOS 2.8 10/02/2018     LFTS;   Lab Results   Component Value Date    PROT 7.2 11/22/2024    ALBUMIN 3.8 11/22/2024    BILITOT 0.7 11/22/2024    AST 27 11/22/2024    ALKPHOS 112 11/22/2024    ALT 18 11/22/2024     COAGS:   Lab Results   Component Value Date    INR 1.1 09/06/2024     FLP:   Lab Results   Component Value Date    CHOL 107 (L) 09/06/2024    CHOL 107 (L) 09/06/2024    HDL 37 (L) 09/06/2024    HDL 37 (L) 09/06/2024    LDLCALC 58.0 (L) 09/06/2024    LDLCALC 58.0 (L) 09/06/2024    TRIG 60 09/06/2024    TRIG 60 09/06/2024    CHOLHDL 34.6 09/06/2024    CHOLHDL 34.6 09/06/2024     CARDIAC: No results found for: "TROPONINI", "CKTOTAL", "CKMB", "BNP"    Imaging:    BLE Venous Ultrasound 8/29/2023:    There is no evidence of a right lower extremity DVT.    There is no evidence of a left lower extremity DVT.    The left superficial femoral middle vein is normal.    LLE Venous Ultrasound 3/27/2022:  No evidence of deep venous thrombosis in the left lower extremity.   Left calf nonocclusive superficial thrombophlebitis.    Assessment/Plan:  Jesús Patel is a 49 y.o. male with BLE lymphedema, HTN, HLD, JARAD (on CPAP), morbid obesity s/p gastric bypass, who presents for a follow up appointment.    1. BLE Lymphedema- Improving.  Continue lymphedema clinic therapy.  Continue graduated compression hose.  Limit sodium intake to 2000 mg daily.  Limit volume intake to 1.5 L daily.  Elevate legs when resting.    2. Morbid Obesity s/p Gastric Bypass- Encourage diet, exercise and weigh loss.    4. HTN- Continue current medications.    5. " HLD- LDL 58 on 9/6/2024. Continue atorvastatin 80 mg daily.      Follow up in 6 months with lipids prior    Total duration of face to face visit time 30 minutes.  Total time spent counseling greater than fifty percent of total visit time.  Counseling included discussion regarding imaging findings, diagnosis, possibilities, treatment options, risks and benefits.  The patient had many questions regarding the options and long-term effects.    Davonte Lance MD, PhD  Interventional Cardiology

## 2024-11-29 NOTE — PATIENT INSTRUCTIONS
Assessment/Plan:  Jesús Patel is a 49 y.o. male with BLE lymphedema, HTN, HLD, JARAD (on CPAP), morbid obesity s/p gastric bypass, who presents for a follow up appointment.    1. BLE Lymphedema- Improving.  Continue lymphedema clinic therapy.  Continue graduated compression hose.  Limit sodium intake to 2000 mg daily.  Limit volume intake to 1.5 L daily.  Elevate legs when resting.    2. Morbid Obesity s/p Gastric Bypass- Encourage diet, exercise and weigh loss.    4. HTN- Continue current medications.    5. HLD- LDL 58 on 9/6/2024. Continue atorvastatin 80 mg daily.      Follow up in 6 months with lipids prior

## 2025-01-09 ENCOUNTER — PATIENT MESSAGE (OUTPATIENT)
Dept: SLEEP MEDICINE | Facility: CLINIC | Age: 50
End: 2025-01-09
Payer: COMMERCIAL

## 2025-01-09 DIAGNOSIS — G47.00 INSOMNIA, UNSPECIFIED TYPE: Primary | ICD-10-CM

## 2025-01-09 RX ORDER — ZOLPIDEM TARTRATE 10 MG/1
10 TABLET ORAL NIGHTLY PRN
Qty: 30 TABLET | Refills: 0 | Status: SHIPPED | OUTPATIENT
Start: 2025-01-09 | End: 2025-07-10

## 2025-02-07 ENCOUNTER — E-VISIT (OUTPATIENT)
Dept: INTERNAL MEDICINE | Facility: CLINIC | Age: 50
End: 2025-02-07
Payer: COMMERCIAL

## 2025-02-07 ENCOUNTER — PATIENT MESSAGE (OUTPATIENT)
Dept: INTERNAL MEDICINE | Facility: CLINIC | Age: 50
End: 2025-02-07
Payer: COMMERCIAL

## 2025-02-07 DIAGNOSIS — J32.9 SINUSITIS, UNSPECIFIED CHRONICITY, UNSPECIFIED LOCATION: Primary | ICD-10-CM

## 2025-02-07 PROCEDURE — 99422 OL DIG E/M SVC 11-20 MIN: CPT | Mod: ,,, | Performed by: INTERNAL MEDICINE

## 2025-02-10 ENCOUNTER — PATIENT MESSAGE (OUTPATIENT)
Dept: INTERNAL MEDICINE | Facility: CLINIC | Age: 50
End: 2025-02-10
Payer: COMMERCIAL

## 2025-02-10 DIAGNOSIS — J32.9 SINUSITIS, UNSPECIFIED CHRONICITY, UNSPECIFIED LOCATION: Primary | ICD-10-CM

## 2025-02-10 RX ORDER — AMOXICILLIN AND CLAVULANATE POTASSIUM 875; 125 MG/1; MG/1
1 TABLET, FILM COATED ORAL 2 TIMES DAILY
Qty: 14 TABLET | Refills: 0 | Status: SHIPPED | OUTPATIENT
Start: 2025-02-10 | End: 2025-02-17

## 2025-02-10 NOTE — PROGRESS NOTES
Patient ID: Jesús Patel is a 49 y.o. male.    Chief Complaint: General Illness (Entered automatically based on patient selection in [employer].)    The patient initiated a request through [employer] on 2/7/2025 for evaluation and management with a chief complaint of General Illness (Entered automatically based on patient selection in [employer].)     I evaluated the questionnaire submission on 02/12/2025  .    [facility]-Cough And Cold    2/7/2025  4:12 PM CST - Filed by Patient   What do you need help with? Sinus Infection   Do you agree to participate in an E-Visit? Yes   If you have any of the following symptoms, go to your local emergency room or call 911: I acknowledge   What is the main issue you would like addressed today? Pain on my face   Do you think you might have COVID-19 or the Flu? No   Have you tested positive for COVID-19 or Flu? No   What symptoms do you currently have?  Headache;  Stuffy nose;  Face and nose pain   Have you ever smoked? Never smoked   Have you had a fever? No   When did your symptoms first appear? 1/31/2025   In the last two weeks, have you been in close contact with someone who has COVID-19, the Flu, or strep throat? Not sure   List what you have done or taken to help your symptoms. Mucinex   On a scale of 1-10, where 10 is the worst you can imagine, how severe are your symptoms? (range: 1 - 10) 9   Have your symptoms changed since they first started? Worsened   Do you have transportation to an [facility] location to get tested for COVID-19? Yes   Provide any additional information you feel is important.    Please attach any relevant images or files    Are you able to take your vital signs? No         Encounter Diagnosis   Name Primary?    Sinusitis, unspecified chronicity, unspecified location Yes    Augmentin sent in,  Explained that if not better in 1-2 weeks, pt should rtc/call PCP      No orders of the defined types were placed in this encounter.            No follow-ups on file.      E-Visit Time Tracking:    Day 1 Time (in minutes): 12    Total Time (in minutes): 12

## 2025-02-20 ENCOUNTER — OFFICE VISIT (OUTPATIENT)
Dept: PODIATRY | Facility: CLINIC | Age: 50
End: 2025-02-20
Payer: COMMERCIAL

## 2025-02-20 VITALS
BODY MASS INDEX: 47.74 KG/M2 | HEIGHT: 68 IN | HEART RATE: 79 BPM | DIASTOLIC BLOOD PRESSURE: 74 MMHG | WEIGHT: 315 LBS | SYSTOLIC BLOOD PRESSURE: 124 MMHG

## 2025-02-20 DIAGNOSIS — M79.674 TOE PAIN, RIGHT: Primary | ICD-10-CM

## 2025-02-20 DIAGNOSIS — L84 CORN OR CALLUS: ICD-10-CM

## 2025-02-20 PROCEDURE — 3078F DIAST BP <80 MM HG: CPT | Mod: CPTII,S$GLB,, | Performed by: PODIATRIST

## 2025-02-20 PROCEDURE — 1160F RVW MEDS BY RX/DR IN RCRD: CPT | Mod: CPTII,S$GLB,, | Performed by: PODIATRIST

## 2025-02-20 PROCEDURE — 99999 PR PBB SHADOW E&M-EST. PATIENT-LVL IV: CPT | Mod: PBBFAC,,, | Performed by: PODIATRIST

## 2025-02-20 PROCEDURE — 3074F SYST BP LT 130 MM HG: CPT | Mod: CPTII,S$GLB,, | Performed by: PODIATRIST

## 2025-02-20 PROCEDURE — 1159F MED LIST DOCD IN RCRD: CPT | Mod: CPTII,S$GLB,, | Performed by: PODIATRIST

## 2025-02-20 PROCEDURE — 99213 OFFICE O/P EST LOW 20 MIN: CPT | Mod: S$GLB,,, | Performed by: PODIATRIST

## 2025-02-20 PROCEDURE — 3008F BODY MASS INDEX DOCD: CPT | Mod: CPTII,S$GLB,, | Performed by: PODIATRIST

## 2025-02-20 NOTE — PROGRESS NOTES
CC:  Callouses ([place] hole on left toe in pain.)         HPI:   Jesús Patel is a 49 y.o. male with concerns of Callouses ([place] hole on left toe in pain.)  Medial aspect of the RIGHT hallux, painful past several months. No trauma recalled.   No self treatment yet.   He has been wearing custom molded orthotics and compression socks.  Helping with PTTD pain.           Patient Active Problem List   Diagnosis    Other hyperlipidemia    Low back pain    Vitamin D insufficiency    Gastroesophageal reflux disease    Obstructive sleep apnea syndrome    Chest wall pain    Incisional hernia, without obstruction or gangrene    Insomnia    Anxiety and depression    Morbid obesity with BMI of 50.0-59.9, adult    Essential hypertension    Prediabetes    Mild persistent asthma without complication    Cough    Allergic conjunctivitis, bilateral    Allergic rhinitis due to dust mite    Left leg pain    Cellulitis of left lower extremity    Acute superficial venous thrombosis of left lower extremity    Fatty liver    Depression, recurrent    History of alcohol use    Lymphedema of both lower extremities    Edema of both lower extremities    Reduced hand strength    Pain of left thumb    Liver fibrosis    Portal hypertension         Current Outpatient Medications on File Prior to Visit   Medication Sig Dispense Refill    amLODIPine (NORVASC) 10 MG tablet Take 1 tablet (10 mg total) by mouth once daily. 90 tablet 3    ammonium lactate 12 % Crea Bid to thick right sole 385 g 3    aspirin (ECOTRIN) 81 MG EC tablet TAKE 1 TABLET BY MOUTH EVERY DAY 90 tablet 3    atorvastatin (LIPITOR) 80 MG tablet Take 1 tablet (80 mg total) by mouth every evening. 90 tablet 3    cetirizine (ZYRTEC) 10 MG tablet Take 10 mg by mouth as needed.      diphenhydrAMINE (BENADRYL) 25 mg capsule Take 25 mg by mouth every 6 (six) hours as needed for Itching.      erythromycin with ethanoL (EMGEL) 2 % gel Apply topically 2 (two) times daily. Prn  "neck break outs 60 g 3    fluticasone propionate (FLONASE) 50 mcg/actuation nasal spray SPRAY 2 SPRAYS IN EACH NOSTRIL TWICE A DAY 48 mL 11    hydroCHLOROthiazide (HYDRODIURIL) 12.5 MG Tab Take 1 tablet (12.5 mg total) by mouth once daily. 90 tablet 11    omeprazole (PRILOSEC) 40 MG capsule Take 1 capsule (40 mg total) by mouth every morning. 90 capsule 3    salicylic acid 17 % gel Apply topically once daily. Apply focally to skin tag/warts, not normal skin, and cover with tape or Band Aid until scab forms.  Allow for healing and watch for parts of tag/wart to fall off.  For warts okay to peel away dead skin painlessly.  Repeat as needed until tag/wart completely falls off. 7 g 2    sildenafiL (VIAGRA) 100 MG tablet Take 1 tablet (100 mg total) by mouth daily as needed for Erectile Dysfunction. 10 tablet 11    tacrolimus (PROTOPIC) 0.1 % ointment APPLY TOPICALLY 2 (TWO) TIMES DAILY. TO WHITE PATCH OF THE HAND 60 g 0    zolpidem (AMBIEN CR) 12.5 MG CR tablet Take 1 tablet (12.5 mg total) by mouth nightly as needed for Insomnia. 30 tablet 5    zolpidem (AMBIEN) 10 mg Tab Take 1 tablet (10 mg total) by mouth nightly as needed. 30 tablet 0    albuterol (VENTOLIN HFA) 90 mcg/actuation inhaler Inhale 2 puffs into the lungs every 6 (six) hours as needed for Wheezing or Shortness of Breath. Rescue 18 g 11    budesonide-formoterol 80-4.5 mcg (SYMBICORT) 80-4.5 mcg/actuation HFAA Inhale 2 puffs into the lungs once daily. Controller 10.2 g 11     No current facility-administered medications on file prior to visit.         Review of patient's allergies indicates:  No Known Allergies      Review of Systems -   General ROS: negative for - chills or fever  Respiratory ROS: no cough, shortness of breath, or wheezing  Cardiovascular ROS: no chest pain or dyspnea on exertion      PHYSICAL EXAM:     Vitals:    02/20/25 1327   BP: 124/74   Pulse: 79   Weight: (!) 154.5 kg (340 lb 9.8 oz)   Height: 5' 8" (1.727 m)       Vasc:   Palpable " pedal pulses Dorsalis Pedis:  present;   Posterior Tibial:  present,   Foot warm to touch  CFT: normal bilaterally        Neuro:   Epicritic sensation intact,   negative Tinels with percussion of the tibial nerve, bilateral foot.  Mulders: negative         Derm:   Wounds/Ulcers: absent  Bruising: absent  Erythema: absent  Xerotic sole RIGHT foot.   RIGHT hallux medial aspect nucleated hyperkeratosis.  Does not appear verrucous.  no open wound.        MSK:   Swelling to medial foot: moderate  Decreased medial arch with weight bearing,   Too many toes sign  Valgus hindfoot/RCSP everted with forefoot abduction    Pain at sinus tarsi: absent          Assessment / Plan:    Problem List Items Addressed This Visit    None  Visit Diagnoses         Toe pain, right    -  Primary      Corn or callus                I counseled the patient on the patient's conditions, their implications and medical management.    Callus/corn trimmed with relief without immediate complication.   Continue to moisturize feet daily.   Shoe and activity modification as needed for relief.   Avoid shoes tight the toe box.   Call or return to clinic prn if these symptoms worsen or fail to improve as anticipated.       I spent a total of 20 minutes on the day of the visit.  This includes face to face time and non-face to face time preparing to see the patient (eg, review of tests), obtaining and/or reviewing separately obtained history, documenting clinical information in the electronic or other health record, independently interpreting results and communicating results to the patient/family/caregiver, or care coordinator.

## 2025-02-25 ENCOUNTER — E-VISIT (OUTPATIENT)
Dept: INTERNAL MEDICINE | Facility: CLINIC | Age: 50
End: 2025-02-25
Payer: COMMERCIAL

## 2025-02-25 DIAGNOSIS — R41.840 ATTENTION DEFICIT: Primary | ICD-10-CM

## 2025-02-27 ENCOUNTER — PATIENT MESSAGE (OUTPATIENT)
Dept: INTERNAL MEDICINE | Facility: CLINIC | Age: 50
End: 2025-02-27
Payer: COMMERCIAL

## 2025-02-27 DIAGNOSIS — R41.840 ATTENTION DEFICIT: Primary | ICD-10-CM

## 2025-02-27 NOTE — PROGRESS NOTES
Patient ID: Jesús Patel is a 49 y.o. male.    Chief Complaint: General Illness (Entered automatically based on patient selection in [employer].)    The patient initiated a request through [employer] on 2/25/2025 for evaluation and management with a chief complaint of General Illness (Entered automatically based on patient selection in [employer].)     I evaluated the questionnaire submission on 02/27/2025  .    [facility] E[facility]-Medication    2/25/2025  9:42 PM CST - Filed by Patient   What do you need help with? Medication Request   Do you agree to participate in an E-Visit? Yes   If you have any of the following symptoms, please present to your local emergency room or call 911:  I acknowledge   Medication requests for narcotics will not be addressed via an E-Visit.  Please schedule an appointment. I acknowledge   Do you want to address a new or existing medication? I would like to start a new medication that I do not already take   What is the main issue you would like addressed today? Focus   What is the name of the medication that you would like to start? Retaling   Have you taken a similar medication in the past? No   Why are you requesting this particular medication? Improving my focus    What medical condition is the  medication intended to treat? Na   Provide any additional information you feel is important. [place] to improve my focus. I guess is stress   Please attach any relevant images or files    Are you able to take your vital signs? Yes   Systolic Blood Pressure:    Diastolic Blood Pressure:    Weight: 330   Height:    Pulse:    Temperature:    Respiration rate:    Pulse Oxygen:          Encounter Diagnosis   Name Primary?    Attention deficit Yes        No orders of the defined types were placed in this encounter.           No follow-ups on file.      E-Visit Time Tracking:    Day 1 Time (in minutes): 6    Total Time (in minutes): 6

## 2025-02-28 NOTE — TELEPHONE ENCOUNTER
Hi, please contact the patient to assist in scheduling    Orders Placed This Encounter    Ambulatory referral/consult to Psychiatry       Thank you, Lester Awan

## 2025-03-03 ENCOUNTER — PATIENT MESSAGE (OUTPATIENT)
Dept: CARDIOLOGY | Facility: CLINIC | Age: 50
End: 2025-03-03
Payer: COMMERCIAL

## 2025-03-03 ENCOUNTER — E-VISIT (OUTPATIENT)
Dept: INTERNAL MEDICINE | Facility: CLINIC | Age: 50
End: 2025-03-03
Payer: COMMERCIAL

## 2025-03-03 ENCOUNTER — PATIENT MESSAGE (OUTPATIENT)
Dept: INTERNAL MEDICINE | Facility: CLINIC | Age: 50
End: 2025-03-03

## 2025-03-03 DIAGNOSIS — J06.9 UPPER RESPIRATORY TRACT INFECTION, UNSPECIFIED TYPE: Primary | ICD-10-CM

## 2025-03-03 NOTE — PROGRESS NOTES
Patient ID: Jesús Patel is a 49 y.o. male.    Chief Complaint: General Illness (Entered automatically based on patient selection in [facility].)    The patient initiated a request through [employer] on 3/3/2025 for evaluation and management with a chief complaint of General Illness (Entered automatically based on patient selection in [employer] [facility].)     I evaluated the questionnaire submission on 03/03/2025  .    [facility] Evisit [facility]-Cough And Cold    3/3/2025 11:24 AM CST - Filed by Patient   What do you need help with? Sinus Infection   Do you agree to participate in an E-Visit? Yes   If you have any of the following symptoms, go to your local emergency room or call 911: I acknowledge   What is the main issue you would like addressed today? get this sinus infection under control and coughing allergies   Do you think you might have COVID-19 or the Flu? No   Have you tested positive for COVID-19 or Flu? No   What symptoms do you currently have?  Cough;  Headache;  Stuffy nose;  Runny nose;  Face and nose pain;  Ear pain   Describe your cough: Contains mucus   Describe your mucus: Green;  Yellow;  Clear   Have you ever smoked? Never smoked   Have you had a fever? No   When did your symptoms first appear? 3/1/2025   In the last two weeks, have you been in close contact with someone who has COVID-19, the Flu, or strep throat? Not sure   List what you have done or taken to help your symptoms. Nothing   On a scale of 1-10, where 10 is the worst you can imagine, how severe are your symptoms? (range: 1 - 10) 6   Have your symptoms changed since they first started? Worsened   Do you have transportation to an [facility] location to get tested for COVID-19? No   Provide any additional information you feel is important. Please call me   Please attach any relevant images or files    Are you able to take your vital signs? No         Encounter Diagnosis   Name Primary?    Upper respiratory tract infection, unspecified  type Yes    Please see email exchange    No orders of the defined types were placed in this encounter.           No follow-ups on file.      E-Visit Time Tracking:    Day 1 Time (in minutes): 7    Total Time (in minutes): 7

## 2025-03-24 ENCOUNTER — PATIENT MESSAGE (OUTPATIENT)
Dept: SLEEP MEDICINE | Facility: CLINIC | Age: 50
End: 2025-03-24
Payer: COMMERCIAL

## 2025-03-24 DIAGNOSIS — G47.00 INSOMNIA, UNSPECIFIED TYPE: ICD-10-CM

## 2025-03-24 RX ORDER — ZOLPIDEM TARTRATE 10 MG/1
10 TABLET ORAL NIGHTLY PRN
Qty: 30 TABLET | Refills: 0 | Status: SHIPPED | OUTPATIENT
Start: 2025-03-24 | End: 2025-09-22

## 2025-03-24 RX ORDER — ZOLPIDEM TARTRATE 12.5 MG/1
12.5 TABLET, FILM COATED, EXTENDED RELEASE ORAL NIGHTLY PRN
Qty: 30 TABLET | Refills: 5 | Status: SHIPPED | OUTPATIENT
Start: 2025-03-24 | End: 2025-03-25 | Stop reason: SDUPTHER

## 2025-03-24 NOTE — TELEPHONE ENCOUNTER
Requested Prescriptions     Pending Prescriptions Disp Refills    zolpidem (AMBIEN CR) 12.5 MG CR tablet 30 tablet 5     Sig: Take 1 tablet (12.5 mg total) by mouth nightly as needed for Insomnia.    Lov 09/03/24

## 2025-03-24 NOTE — PROGRESS NOTES
Refills have been requested for the following medications:         zolpidem (AMBIEN CR) 12.5 MG CR tablet [Comfort Minaya, NP]      Patient Comment: Comfort, hope all is well. I'm in the process of relocation over here in [place]. Can you please send my refill to the [facility] close to my new apt.  [address], [place] [address]     Preferred pharmacy: [facility] - [address], [place] [address]  Delivery method: Pickup

## 2025-03-25 DIAGNOSIS — G47.00 INSOMNIA, UNSPECIFIED TYPE: ICD-10-CM

## 2025-03-25 RX ORDER — ZOLPIDEM TARTRATE 12.5 MG/1
12.5 TABLET, FILM COATED, EXTENDED RELEASE ORAL NIGHTLY PRN
Qty: 30 TABLET | Refills: 5 | Status: SHIPPED | OUTPATIENT
Start: 2025-03-25 | End: 2025-09-23

## 2025-03-25 NOTE — PROGRESS NOTES
[id number] [place] [facility], [place] - [address]

## 2025-05-15 RX ORDER — SILDENAFIL 25 MG/1
25 TABLET, FILM COATED ORAL
Qty: 10 TABLET | Refills: 11 | OUTPATIENT
Start: 2025-05-15

## 2025-05-15 NOTE — TELEPHONE ENCOUNTER
Refill Decision Note   Jesús Patel  is requesting a refill authorization.  Brief Assessment and Rationale for Refill:  Quick Discontinue     Medication Therapy Plan:  The original prescription was reordered on 9/5/2024 by Lester Awan MD.      Comments:     Note composed:10:08 AM 05/15/2025

## 2025-05-19 ENCOUNTER — PATIENT MESSAGE (OUTPATIENT)
Dept: HEPATOLOGY | Facility: CLINIC | Age: 50
End: 2025-05-19
Payer: COMMERCIAL

## 2025-05-21 DIAGNOSIS — J45.30 MILD PERSISTENT ASTHMA WITHOUT COMPLICATION: ICD-10-CM

## 2025-05-21 RX ORDER — BUDESONIDE AND FORMOTEROL FUMARATE DIHYDRATE 80; 4.5 UG/1; UG/1
2 AEROSOL RESPIRATORY (INHALATION) DAILY
Qty: 30.6 G | Refills: 1 | Status: SHIPPED | OUTPATIENT
Start: 2025-05-21 | End: 2026-05-21

## 2025-05-21 NOTE — TELEPHONE ENCOUNTER
No care due was identified.  [facility] Embedded Care Due Messages. Reference number: [id number].   5/21/2025 9:23:14 AM CDT

## 2025-05-21 NOTE — TELEPHONE ENCOUNTER
No care due was identified.  [facility] Embedded Care Due Messages. Reference number: [id number].   5/21/2025 9:26:19 AM CDT

## 2025-05-21 NOTE — TELEPHONE ENCOUNTER
Refill Decision Note   Jesús Patel  is requesting a refill authorization.  Brief Assessment and Rationale for Refill:  Approve     Medication Therapy Plan:         Comments:     Note composed:11:04 AM 05/21/2025

## 2025-05-29 DIAGNOSIS — I10 ESSENTIAL HYPERTENSION: ICD-10-CM

## 2025-05-29 NOTE — TELEPHONE ENCOUNTER
No care due was identified.  [facility] Embedded Care Due Messages. Reference number: [id number].   5/29/2025 6:18:27 PM CDT

## 2025-05-30 RX ORDER — AMLODIPINE BESYLATE 10 MG/1
10 TABLET ORAL DAILY
Qty: 90 TABLET | Refills: 3 | Status: SHIPPED | OUTPATIENT
Start: 2025-05-30

## 2025-06-21 RX ORDER — ATORVASTATIN CALCIUM 80 MG/1
80 TABLET, FILM COATED ORAL NIGHTLY
Qty: 90 TABLET | Refills: 0 | Status: SHIPPED | OUTPATIENT
Start: 2025-06-21

## 2025-06-21 NOTE — TELEPHONE ENCOUNTER
Refill Decision Note   Jesús Patel  is requesting a refill authorization.  Brief Assessment and Rationale for Refill:  Approve     Medication Therapy Plan:       Medication Reconciliation Completed: No   Comments:     No Care Gaps recommended.     Note composed:10:27 AM 06/21/2025

## (undated) DEVICE — DRAPE ABDOMINAL TIBURON 14X11

## (undated) DEVICE — TRAY MINOR GEN SURG

## (undated) DEVICE — SUT MONOCRYL 4-0 PS-2

## (undated) DEVICE — SUT GORE-TEX 36IN CV-0

## (undated) DEVICE — SCISSOR 5MMX35CM DIRECT DRIVE

## (undated) DEVICE — APPLICATOR CHLORAPREP ORN 26ML

## (undated) DEVICE — NDL HYPO REG 25G X 1 1/2

## (undated) DEVICE — CLOSURE SKIN STERI STRIP 1/2X4

## (undated) DEVICE — MARKER SKIN STND TIP BLUE BARR

## (undated) DEVICE — ADHESIVE MASTISOL VIAL 48/BX

## (undated) DEVICE — GAUZE SPONGE 4X4 12PLY

## (undated) DEVICE — TROCAR ENDOPATH XCEL 12MM 10CM

## (undated) DEVICE — DRESSING TRANS 4X4 TEGADERM

## (undated) DEVICE — SEE MEDLINE ITEM 146372

## (undated) DEVICE — SEE MEDLINE ITEM 157117

## (undated) DEVICE — CANNULA ENDOPATH XCEL 5X100MM

## (undated) DEVICE — TROCAR ENDOPATH XCEL 5X100MM

## (undated) DEVICE — SUT 0 VICRYL / UR6 (J603)

## (undated) DEVICE — BLADE SURG CARBON STEEL SZ11

## (undated) DEVICE — KIT PROCEDURE STER INLET CLOSU

## (undated) DEVICE — TUBING HF INSUFFLATION W/ FLTR

## (undated) DEVICE — ELECTRODE REM PLYHSV RETURN 9

## (undated) DEVICE — STRAP SECURE 5MM

## (undated) DEVICE — GOWN SURGICAL X-LARGE